# Patient Record
Sex: FEMALE | Race: WHITE | NOT HISPANIC OR LATINO | Employment: OTHER | URBAN - METROPOLITAN AREA
[De-identification: names, ages, dates, MRNs, and addresses within clinical notes are randomized per-mention and may not be internally consistent; named-entity substitution may affect disease eponyms.]

---

## 2017-01-24 ENCOUNTER — RESOLUTE PROFESSIONAL BILLING HOSPITAL PROF FEE (OUTPATIENT)
Dept: CARDIOLOGY | Facility: MEDICAL CENTER | Age: 67
End: 2017-01-24
Payer: MEDICARE

## 2017-01-24 ENCOUNTER — APPOINTMENT (OUTPATIENT)
Dept: RADIOLOGY | Facility: MEDICAL CENTER | Age: 67
DRG: 216 | End: 2017-01-24
Attending: EMERGENCY MEDICINE
Payer: MEDICARE

## 2017-01-24 ENCOUNTER — HOSPITAL ENCOUNTER (INPATIENT)
Facility: MEDICAL CENTER | Age: 67
LOS: 16 days | DRG: 216 | End: 2017-02-09
Attending: EMERGENCY MEDICINE | Admitting: INTERNAL MEDICINE
Payer: MEDICARE

## 2017-01-24 ENCOUNTER — HOSPITAL ENCOUNTER (OUTPATIENT)
Dept: RADIOLOGY | Facility: MEDICAL CENTER | Age: 67
End: 2017-01-24

## 2017-01-24 DIAGNOSIS — I34.0 MITRAL VALVE INSUFFICIENCY, UNSPECIFIED ETIOLOGY: ICD-10-CM

## 2017-01-24 PROBLEM — J81.1 PULMONARY EDEMA: Status: ACTIVE | Noted: 2017-01-24

## 2017-01-24 LAB
ALBUMIN SERPL BCP-MCNC: 3.9 G/DL (ref 3.2–4.9)
ALBUMIN/GLOB SERPL: 1.4 G/DL
ALP SERPL-CCNC: 95 U/L (ref 30–99)
ALT SERPL-CCNC: 16 U/L (ref 2–50)
ANION GAP SERPL CALC-SCNC: 9 MMOL/L (ref 0–11.9)
APPEARANCE UR: CLEAR
APTT PPP: 25.6 SEC (ref 24.7–36)
AST SERPL-CCNC: 20 U/L (ref 12–45)
BACTERIA #/AREA URNS HPF: ABNORMAL /HPF
BASE EXCESS BLDA CALC-SCNC: -7 MMOL/L (ref -4–3)
BASE EXCESS BLDA CALC-SCNC: -8 MMOL/L (ref -4–3)
BASOPHILS # BLD AUTO: 0.3 % (ref 0–1.8)
BASOPHILS # BLD: 0.05 K/UL (ref 0–0.12)
BILIRUB SERPL-MCNC: 0.6 MG/DL (ref 0.1–1.5)
BILIRUB UR QL STRIP.AUTO: NEGATIVE
BNP SERPL-MCNC: 309 PG/ML (ref 0–100)
BODY TEMPERATURE: ABNORMAL DEGREES
BODY TEMPERATURE: ABNORMAL DEGREES
BUN SERPL-MCNC: 19 MG/DL (ref 8–22)
CALCIUM SERPL-MCNC: 8.3 MG/DL (ref 8.5–10.5)
CHLORIDE SERPL-SCNC: 99 MMOL/L (ref 96–112)
CO2 BLDA-SCNC: 19 MMOL/L (ref 20–33)
CO2 BLDA-SCNC: 22 MMOL/L (ref 20–33)
CO2 SERPL-SCNC: 21 MMOL/L (ref 20–33)
COLOR UR: ABNORMAL
CREAT SERPL-MCNC: 0.98 MG/DL (ref 0.5–1.4)
DEPRECATED D DIMER PPP IA-ACNC: 1543 NG/ML(D-DU)
EOSINOPHIL # BLD AUTO: 0.02 K/UL (ref 0–0.51)
EOSINOPHIL NFR BLD: 0.1 % (ref 0–6.9)
EPI CELLS #/AREA URNS HPF: ABNORMAL /HPF
ERYTHROCYTE [DISTWIDTH] IN BLOOD BY AUTOMATED COUNT: 48 FL (ref 35.9–50)
EST. AVERAGE GLUCOSE BLD GHB EST-MCNC: 111 MG/DL
FLUAV H1 2009 PAND RNA SPEC QL NAA+PROBE: NOT DETECTED
FLUAV RNA SPEC QL NAA+PROBE: NEGATIVE
FLUAV+FLUBV AG SPEC QL IA: NORMAL
FLUBV RNA SPEC QL NAA+PROBE: NEGATIVE
GFR SERPL CREATININE-BSD FRML MDRD: 56 ML/MIN/1.73 M 2
GLOBULIN SER CALC-MCNC: 2.7 G/DL (ref 1.9–3.5)
GLUCOSE SERPL-MCNC: 178 MG/DL (ref 65–99)
GLUCOSE UR STRIP.AUTO-MCNC: NEGATIVE MG/DL
HBA1C MFR BLD: 5.5 % (ref 0–5.6)
HCO3 BLDA-SCNC: 18.3 MMOL/L (ref 17–25)
HCO3 BLDA-SCNC: 20.4 MMOL/L (ref 17–25)
HCT VFR BLD AUTO: 45.4 % (ref 37–47)
HGB BLD-MCNC: 14.9 G/DL (ref 12–16)
HYALINE CASTS #/AREA URNS LPF: ABNORMAL /LPF
IMM GRANULOCYTES # BLD AUTO: 0.07 K/UL (ref 0–0.11)
IMM GRANULOCYTES NFR BLD AUTO: 0.5 % (ref 0–0.9)
INR PPP: 0.96 (ref 0.87–1.13)
KETONES UR STRIP.AUTO-MCNC: NEGATIVE MG/DL
LACTATE BLD-SCNC: 1.6 MMOL/L (ref 0.5–2)
LEUKOCYTE ESTERASE UR QL STRIP.AUTO: NEGATIVE
LV EJECT FRACT  99904: 75
LV EJECT FRACT MOD 2C 99903: 83.14
LV EJECT FRACT MOD 4C 99902: 76.68
LV EJECT FRACT MOD BP 99901: 80.65
LYMPHOCYTES # BLD AUTO: 0.89 K/UL (ref 1–4.8)
LYMPHOCYTES NFR BLD: 6 % (ref 22–41)
MAGNESIUM SERPL-MCNC: 1.9 MG/DL (ref 1.5–2.5)
MCH RBC QN AUTO: 31.4 PG (ref 27–33)
MCHC RBC AUTO-ENTMCNC: 32.8 G/DL (ref 33.6–35)
MCV RBC AUTO: 95.8 FL (ref 81.4–97.8)
MICRO URNS: ABNORMAL
MONOCYTES # BLD AUTO: 0.83 K/UL (ref 0–0.85)
MONOCYTES NFR BLD AUTO: 5.6 % (ref 0–13.4)
NEUTROPHILS # BLD AUTO: 12.86 K/UL (ref 2–7.15)
NEUTROPHILS NFR BLD: 87.5 % (ref 44–72)
NITRITE UR QL STRIP.AUTO: NEGATIVE
NRBC # BLD AUTO: 0 K/UL
NRBC BLD AUTO-RTO: 0 /100 WBC
O2/TOTAL GAS SETTING VFR VENT: 100 %
O2/TOTAL GAS SETTING VFR VENT: 100 %
PCO2 BLDA: 38.6 MMHG (ref 26–37)
PCO2 BLDA: 48 MMHG (ref 26–37)
PCO2 TEMP ADJ BLDA: 38.6 MMHG (ref 26–37)
PCO2 TEMP ADJ BLDA: 48 MMHG (ref 26–37)
PH BLDA: 7.24 [PH] (ref 7.4–7.5)
PH BLDA: 7.28 [PH] (ref 7.4–7.5)
PH TEMP ADJ BLDA: 7.24 [PH] (ref 7.4–7.5)
PH TEMP ADJ BLDA: 7.28 [PH] (ref 7.4–7.5)
PH UR STRIP.AUTO: 5 [PH]
PHOSPHATE SERPL-MCNC: 4.6 MG/DL (ref 2.5–4.5)
PLATELET # BLD AUTO: 222 K/UL (ref 164–446)
PMV BLD AUTO: 11.1 FL (ref 9–12.9)
PO2 BLDA: 62 MMHG (ref 64–87)
PO2 BLDA: 79 MMHG (ref 64–87)
PO2 TEMP ADJ BLDA: 62 MMHG (ref 64–87)
PO2 TEMP ADJ BLDA: 79 MMHG (ref 64–87)
POTASSIUM SERPL-SCNC: 3.8 MMOL/L (ref 3.6–5.5)
PROT SERPL-MCNC: 6.6 G/DL (ref 6–8.2)
PROT UR QL STRIP: NEGATIVE MG/DL
PROTHROMBIN TIME: 13.1 SEC (ref 12–14.6)
RBC # BLD AUTO: 4.74 M/UL (ref 4.2–5.4)
RBC # URNS HPF: ABNORMAL /HPF
RBC UR QL AUTO: ABNORMAL
SAO2 % BLDA: 87 % (ref 93–99)
SAO2 % BLDA: 94 % (ref 93–99)
SIGNIFICANT IND 70042: NORMAL
SITE SITE: NORMAL
SODIUM SERPL-SCNC: 129 MMOL/L (ref 135–145)
SOURCE SOURCE: NORMAL
SP GR UR STRIP.AUTO: 1
SPECIMEN DRAWN FROM PATIENT: ABNORMAL
SPECIMEN DRAWN FROM PATIENT: ABNORMAL
TROPONIN I SERPL-MCNC: 1.79 NG/ML (ref 0–0.04)
TSH SERPL DL<=0.005 MIU/L-ACNC: 3.22 UIU/ML (ref 0.3–3.7)
WBC # BLD AUTO: 14.7 K/UL (ref 4.8–10.8)
WBC #/AREA URNS HPF: ABNORMAL /HPF

## 2017-01-24 PROCEDURE — 81001 URINALYSIS AUTO W/SCOPE: CPT

## 2017-01-24 PROCEDURE — 80053 COMPREHEN METABOLIC PANEL: CPT

## 2017-01-24 PROCEDURE — 83605 ASSAY OF LACTIC ACID: CPT

## 2017-01-24 PROCEDURE — 84484 ASSAY OF TROPONIN QUANT: CPT

## 2017-01-24 PROCEDURE — 87040 BLOOD CULTURE FOR BACTERIA: CPT | Mod: 91

## 2017-01-24 PROCEDURE — 700111 HCHG RX REV CODE 636 W/ 250 OVERRIDE (IP): Performed by: INTERNAL MEDICINE

## 2017-01-24 PROCEDURE — 83880 ASSAY OF NATRIURETIC PEPTIDE: CPT

## 2017-01-24 PROCEDURE — 94002 VENT MGMT INPAT INIT DAY: CPT

## 2017-01-24 PROCEDURE — 93306 TTE W/DOPPLER COMPLETE: CPT | Mod: 26 | Performed by: INTERNAL MEDICINE

## 2017-01-24 PROCEDURE — 02HV33Z INSERTION OF INFUSION DEVICE INTO SUPERIOR VENA CAVA, PERCUTANEOUS APPROACH: ICD-10-PCS | Performed by: EMERGENCY MEDICINE

## 2017-01-24 PROCEDURE — 83036 HEMOGLOBIN GLYCOSYLATED A1C: CPT

## 2017-01-24 PROCEDURE — 700101 HCHG RX REV CODE 250: Performed by: EMERGENCY MEDICINE

## 2017-01-24 PROCEDURE — 93005 ELECTROCARDIOGRAM TRACING: CPT | Performed by: EMERGENCY MEDICINE

## 2017-01-24 PROCEDURE — 96366 THER/PROPH/DIAG IV INF ADDON: CPT

## 2017-01-24 PROCEDURE — 5A1945Z RESPIRATORY VENTILATION, 24-96 CONSECUTIVE HOURS: ICD-10-PCS | Performed by: EMERGENCY MEDICINE

## 2017-01-24 PROCEDURE — 85610 PROTHROMBIN TIME: CPT

## 2017-01-24 PROCEDURE — 85730 THROMBOPLASTIN TIME PARTIAL: CPT

## 2017-01-24 PROCEDURE — 87503 INFLUENZA DNA AMP PROB ADDL: CPT

## 2017-01-24 PROCEDURE — 770022 HCHG ROOM/CARE - ICU (200)

## 2017-01-24 PROCEDURE — 87086 URINE CULTURE/COLONY COUNT: CPT

## 2017-01-24 PROCEDURE — 700111 HCHG RX REV CODE 636 W/ 250 OVERRIDE (IP): Performed by: STUDENT IN AN ORGANIZED HEALTH CARE EDUCATION/TRAINING PROGRAM

## 2017-01-24 PROCEDURE — 36556 INSERT NON-TUNNEL CV CATH: CPT

## 2017-01-24 PROCEDURE — 99291 CRITICAL CARE FIRST HOUR: CPT | Mod: GC | Performed by: INTERNAL MEDICINE

## 2017-01-24 PROCEDURE — 71010 DX-CHEST-PORTABLE (1 VIEW): CPT

## 2017-01-24 PROCEDURE — 83735 ASSAY OF MAGNESIUM: CPT

## 2017-01-24 PROCEDURE — 700111 HCHG RX REV CODE 636 W/ 250 OVERRIDE (IP): Performed by: EMERGENCY MEDICINE

## 2017-01-24 PROCEDURE — 3E043XZ INTRODUCTION OF VASOPRESSOR INTO CENTRAL VEIN, PERCUTANEOUS APPROACH: ICD-10-PCS | Performed by: EMERGENCY MEDICINE

## 2017-01-24 PROCEDURE — 85379 FIBRIN DEGRADATION QUANT: CPT

## 2017-01-24 PROCEDURE — 99223 1ST HOSP IP/OBS HIGH 75: CPT | Performed by: INTERNAL MEDICINE

## 2017-01-24 PROCEDURE — 82803 BLOOD GASES ANY COMBINATION: CPT

## 2017-01-24 PROCEDURE — 93306 TTE W/DOPPLER COMPLETE: CPT

## 2017-01-24 PROCEDURE — C1751 CATH, INF, PER/CENT/MIDLINE: HCPCS

## 2017-01-24 PROCEDURE — 84443 ASSAY THYROID STIM HORMONE: CPT

## 2017-01-24 PROCEDURE — 85025 COMPLETE CBC W/AUTO DIFF WBC: CPT

## 2017-01-24 PROCEDURE — 99291 CRITICAL CARE FIRST HOUR: CPT

## 2017-01-24 PROCEDURE — 96367 TX/PROPH/DG ADDL SEQ IV INF: CPT

## 2017-01-24 PROCEDURE — 84100 ASSAY OF PHOSPHORUS: CPT

## 2017-01-24 PROCEDURE — 96375 TX/PRO/DX INJ NEW DRUG ADDON: CPT

## 2017-01-24 PROCEDURE — 87400 INFLUENZA A/B EACH AG IA: CPT

## 2017-01-24 PROCEDURE — 96365 THER/PROPH/DIAG IV INF INIT: CPT

## 2017-01-24 PROCEDURE — 700105 HCHG RX REV CODE 258: Performed by: EMERGENCY MEDICINE

## 2017-01-24 PROCEDURE — 96368 THER/DIAG CONCURRENT INF: CPT

## 2017-01-24 PROCEDURE — 87502 INFLUENZA DNA AMP PROBE: CPT

## 2017-01-24 PROCEDURE — 36600 WITHDRAWAL OF ARTERIAL BLOOD: CPT

## 2017-01-24 PROCEDURE — C1751 CATH, INF, PER/CENT/MIDLINE: HCPCS | Performed by: EMERGENCY MEDICINE

## 2017-01-24 PROCEDURE — 36415 COLL VENOUS BLD VENIPUNCTURE: CPT

## 2017-01-24 RX ORDER — POTASSIUM CHLORIDE 7.45 MG/ML
10 INJECTION INTRAVENOUS
Status: COMPLETED | OUTPATIENT
Start: 2017-01-24 | End: 2017-01-25

## 2017-01-24 RX ORDER — BISACODYL 10 MG
10 SUPPOSITORY, RECTAL RECTAL
Status: DISCONTINUED | OUTPATIENT
Start: 2017-01-24 | End: 2017-01-31

## 2017-01-24 RX ORDER — POTASSIUM CHLORIDE 14.9 MG/ML
20 INJECTION INTRAVENOUS ONCE
Status: DISCONTINUED | OUTPATIENT
Start: 2017-01-24 | End: 2017-01-24

## 2017-01-24 RX ORDER — HEPARIN SODIUM 5000 [USP'U]/ML
5000 INJECTION, SOLUTION INTRAVENOUS; SUBCUTANEOUS EVERY 8 HOURS
Status: DISCONTINUED | OUTPATIENT
Start: 2017-01-25 | End: 2017-01-24

## 2017-01-24 RX ORDER — NITROGLYCERIN 20 MG/100ML
5-100 INJECTION INTRAVENOUS CONTINUOUS
Status: DISCONTINUED | OUTPATIENT
Start: 2017-01-24 | End: 2017-01-24

## 2017-01-24 RX ORDER — LIDOCAINE HYDROCHLORIDE 10 MG/ML
1-2 INJECTION, SOLUTION INFILTRATION; PERINEURAL
Status: DISCONTINUED | OUTPATIENT
Start: 2017-01-24 | End: 2017-02-01

## 2017-01-24 RX ORDER — NOREPINEPHRINE BITARTRATE 1 MG/ML
INJECTION, SOLUTION INTRAVENOUS
Status: COMPLETED
Start: 2017-01-24 | End: 2017-01-24

## 2017-01-24 RX ORDER — LACTULOSE 20 G/30ML
30 SOLUTION ORAL
Status: DISCONTINUED | OUTPATIENT
Start: 2017-01-24 | End: 2017-02-09 | Stop reason: HOSPADM

## 2017-01-24 RX ORDER — IPRATROPIUM BROMIDE AND ALBUTEROL SULFATE 2.5; .5 MG/3ML; MG/3ML
3 SOLUTION RESPIRATORY (INHALATION)
Status: DISCONTINUED | OUTPATIENT
Start: 2017-01-24 | End: 2017-02-09 | Stop reason: HOSPADM

## 2017-01-24 RX ORDER — PRAVASTATIN SODIUM 20 MG
20 TABLET ORAL DAILY
Status: ON HOLD | COMMUNITY
End: 2017-02-18

## 2017-01-24 RX ORDER — FAMOTIDINE 20 MG/1
20 TABLET, FILM COATED ORAL EVERY 12 HOURS
Status: DISCONTINUED | OUTPATIENT
Start: 2017-01-24 | End: 2017-02-09 | Stop reason: HOSPADM

## 2017-01-24 RX ORDER — CHLORHEXIDINE GLUCONATE ORAL RINSE 1.2 MG/ML
15 SOLUTION DENTAL 2 TIMES DAILY
Status: DISCONTINUED | OUTPATIENT
Start: 2017-01-24 | End: 2017-02-01

## 2017-01-24 RX ORDER — ENEMA 19; 7 G/133ML; G/133ML
1 ENEMA RECTAL
Status: DISCONTINUED | OUTPATIENT
Start: 2017-01-24 | End: 2017-01-31

## 2017-01-24 RX ORDER — DEXTROSE MONOHYDRATE 25 G/50ML
25 INJECTION, SOLUTION INTRAVENOUS
Status: DISCONTINUED | OUTPATIENT
Start: 2017-01-24 | End: 2017-02-09 | Stop reason: HOSPADM

## 2017-01-24 RX ORDER — MAGNESIUM SULFATE HEPTAHYDRATE 40 MG/ML
2 INJECTION, SOLUTION INTRAVENOUS ONCE
Status: COMPLETED | OUTPATIENT
Start: 2017-01-24 | End: 2017-01-25

## 2017-01-24 RX ORDER — PRAVASTATIN SODIUM 20 MG
20 TABLET ORAL EVERY EVENING
Status: DISCONTINUED | OUTPATIENT
Start: 2017-01-24 | End: 2017-02-09 | Stop reason: HOSPADM

## 2017-01-24 RX ADMIN — FAMOTIDINE 20 MG: 10 INJECTION INTRAVENOUS at 23:23

## 2017-01-24 RX ADMIN — PROPOFOL 30 MCG/KG/MIN: 10 INJECTION, EMULSION INTRAVENOUS at 20:44

## 2017-01-24 RX ADMIN — MAGNESIUM SULFATE IN WATER 2 G: 40 INJECTION, SOLUTION INTRAVENOUS at 23:26

## 2017-01-24 RX ADMIN — POTASSIUM CHLORIDE 10 MEQ: 7.46 INJECTION, SOLUTION INTRAVENOUS at 23:26

## 2017-01-24 RX ADMIN — NITROGLYCERIN 10 MCG/MIN: 20 INJECTION INTRAVENOUS at 20:44

## 2017-01-24 RX ADMIN — NOREPINEPHRINE BITARTRATE 15 MCG/MIN: 1 INJECTION INTRAVENOUS at 21:15

## 2017-01-24 ASSESSMENT — LIFESTYLE VARIABLES: DO YOU DRINK ALCOHOL: NO

## 2017-01-24 ASSESSMENT — PAIN SCALES - GENERAL: PAINLEVEL_OUTOF10: ASSUMED PAIN PRESENT

## 2017-01-24 NOTE — IP AVS SNAPSHOT
" <p align=\"LEFT\"><IMG SRC=\"//[email or web address]\" alt=\"Image\" WIDTH=\"50%\" HEIGHT=\"200\" BORDER=\"\"></p>                   Name:Kristen Salazar  Medical Record Number:[id number]  CSN: [id number]    YOB: 1950   Age: 66 y.o.  Sex: female  HT:1.702 m (5' 7.01\") WT: 58 kg (127 lb 13.9 oz)          Admit Date: 1/24/2017     Discharge Date:   Today's Date: 2/9/2017  Attending Doctor:  Priyanka Atkins, *                  Allergies:  Codeine and Latex          Follow-up Information     1. Follow up with BRODERICK Rankin On 2/13/2017.    Specialty:  Family Medicine    Why:  10:20 am    Contact information    [address]  [address]  [facility] [id number] [phone]          2. Follow up with Chris Schmitt M.D. On 3/6/2017.    Specialty:  Cardiac Surgery    Why:  12:45 pm    Contact information    [address]  [address]  [place] [address]  [phone]           Medication List      Take these Medications        Instructions    artificial tears 1.4 % Soln    Place 1 Drop in both eyes as needed (discomfort/dry eyes).   Dose:  1 Drop       aspirin 81 MG EC tablet    Take 1 Tab by mouth every day.   Dose:  81 mg       famotidine 20 MG Tabs   Commonly known as:  PEPCID    Take 1 Tab by mouth every 12 hours.   Dose:  20 mg       furosemide 10 MG/ML Soln   Commonly known as:  LASIX    4 mL by Intravenous route every day.   Dose:  40 mg       glucosamine Sulfate 500 MG Caps    Take 500 mg by mouth 3 times a day, with meals.   Dose:  500 mg       ipratropium-albuterol 0.5-2.5 (3) MG/3ML nebulizer solution   Commonly known as:  DUONEB    3 mL by Nebulization route every four hours as needed for Shortness of Breath.   Dose:  3 mL       multivitamin Tabs    Take 1 Tab by mouth every day.   Dose:  1 Tab       oyster shell calcium/vitamin D 250-125 MG-UNIT Tabs tablet    Take 1 Tab by mouth every day.   Dose:  1 Tab       potassium chloride SA 10 MEQ Tbcr   Commonly known as:  K-DUR    Take 2 Tabs by " mouth every day.   Dose:  20 mEq       pravastatin 20 MG Tabs   Commonly known as:  PRAVACHOL    Take 20 mg by mouth every day.   Dose:  20 mg       sodium chloride 0.65 % Soln   Commonly known as:  OCEAN    Spray 2 Sprays in nose as needed.   Dose:  2 Spray       tramadol 50 MG Tabs   Commonly known as:  ULTRAM    Take 1 Tab by mouth every four hours as needed (Moderate Pain (NRS Pain Scale 4-6; CPOT Pain Scale 3-5) if opiates not ordered or tolerated).   Dose:  50 mg       * warfarin 5 MG Tabs   Commonly known as:  COUMADIN    Take 1 Tab by mouth COUMADIN-DAILY.   Dose:  5 mg       * warfarin 7.5 MG Tabs   Commonly known as:  COUMADIN    Take 1 Tab by mouth COUMADIN-DAILY.   Dose:  7.5 mg       * Notice:  This list has 2 medication(s) that are the same as other medications prescribed for you. Read the directions carefully, and ask your doctor or other care provider to review them with you.

## 2017-01-24 NOTE — IP AVS SNAPSHOT
2/9/2017          Kristen Salazar  [address]  [facility] [address]    Dear Kristen:    [facility] wants to ensure your discharge home is safe and you or your loved ones have had all your questions answered regarding your care after you leave the hospital.    You may receive a telephone call within two days of your discharge.  This call is to make certain you understand your discharge instructions as well as ensure we provided you with the best care possible during your stay with us.     The call will only last approximately 3-5 minutes and will be done by a nurse.    Once again, we want to ensure your discharge home is safe and that you have a clear understanding of any next steps in your care.  If you have any questions or concerns, please do not hesitate to contact us, we are here for you.  Thank you for choosing [facility] for your healthcare needs.    Sincerely,    Marquis Harrell    [facility]

## 2017-01-24 NOTE — IP AVS SNAPSHOT
" Home Care Instructions                                                                                                                  Name:Kristen Salazar  Medical Record Number:[id number]  CSN: [id number]    YOB: 1950   Age: 66 y.o.  Sex: female  HT:1.702 m (5' 7.01\") WT: 58 kg (127 lb 13.9 oz)          Admit Date: 1/24/2017     Discharge Date:   Today's Date: 2/9/2017  Attending Doctor:  Priyanka Atkins, *                  Allergies:  Codeine and Latex            Discharge Instructions       Discharge Instructions    Discharged to group home by medical transportation with escort. Discharged via wheelchair, hospital escort: Yes.  Special equipment needed: Oxygen    Be sure to schedule a follow-up appointment with your primary care doctor or any specialists as instructed.     Discharge Plan:   Influenza Vaccine Indication: Indicated: 65 years and older    I understand that a diet low in cholesterol, fat, and sodium is recommended for good health. Unless I have been given specific instructions below for another diet, I accept this instruction as my diet prescription.   Other diet: cardiac    Special Instructions: heart surgery and coumadin    Cardiac Surgery Discharge Instructions/[facility]    Activity:  1. NO driving for 4 weeks after surgery. You may ride as a passenger.  2. NO lifting of any item over 10 lbs (e.g. gallon of milk) for 6 weeks after surgery.  Do not raise both arms above head, only one at a time.  Do not push or pull with your arms.  3. Walk as much as possible! Walk a minimum of 4 times per day. Depending on your fatigue and comfort level, you may walk as much as you wish. There is no maximum.  4. Other physical activities (sex, housework, gardening, etc.) are OK after 4 weeks or after 8 weeks if you want to golf (start by putting, then advance to chipping, then to driving).  5. Continue using incentive spirometer for 2 weeks.  If you are going home on oxygen and " you were not on oxygen prior to surgery, keep using until you are oxygen free.  6. Weigh daily and write it down starting  the next morning after you arrive home. Call your doctor for a weight gain of 2-4 lbs in 1-2 days.    Incision Care:  1. SHOWER EVERYDAY-no baths. Make sure to clean your incision(s) twice daily with plain, perfume and dye-free soap (if you shower in the morning, stand at the sink at bedtime and clean incision with soap and water). Then pat incision(s) dry with clean towel. Avoid creams or lotions on the incision(s).    2. If there is any increase in redness or swelling, or separation of the incision line, or thick drainage* from any of the incisions, call [facility] ([phone]). * Clear, thin drainage is not abnormal especially from the leg incision and/or chest tube sites.                    3. Continue to wear your LC Stockings for 4 weeks on the leg(s) with the incision(s) or swelling. You may take off the stocking(s) when in bed or when the leg(s) are elevated.    General Instructions:  1. If you are on the blood thinner Coumadin (warfarin), you will need your coumadin levels checked periodically.  2. You have been referred to Cardiac Rehab which is highly recommended for you after heart surgery. You can start Cardiac Rehab 30 days after surgery.  If you do not have an appointment at the time of discharge call [phone] to schedule an appointment.  3. Your Primary Care Doctor typically handles Home Oxygen. The Home Oxygen service that drops off your tanks should be checking your oxygen saturation levels. Oxygen may be stopped when you are > 90 % saturated on room air. Check with your Primary Care Doctor if you are unsure.    Prescription Refills/Questions:   Call your usual Pharmacy for ALL refills   1. Pain medication questions only: Call [facility] at [phone].  (Remember that refills may require additional physician approval and will need at least 24 hours’  notice. Please call for refills on Mondays through Fridays from 9 am to 4 pm).  2. For all other medications (except pain medication): Call your Cardiology Group or Primary Care Doctor (not [facility]) for refills or questions.    [facility] IS ALWAYS AVAILABLE TO ANSWER YOUR QUESTIONS. DO NOT HESITATE TO CALL!    · Is patient discharged on Warfarin / Coumadin?   Yes    You are receiving the drug warfarin. Please understand the importance of monitoring warfarin with scheduled PT/INR blood draws.    IMPORTANT: HOW TO USE THIS INFORMATION:  This is a summary and does NOT have all possible information about this product. This information does not assure that this product is safe, effective, or appropriate for you. This information is not individual medical advice and does not substitute for the advice of your health care professional. Always ask your health care professional for complete information about this product and your specific health needs.      WARFARIN - ORAL (WARF-uh-rin)      COMMON BRAND NAME(S): Coumadin      WARNING:  Warfarin can cause very serious (possibly fatal) bleeding. This is more likely to occur when you first start taking this medication or if you take too much warfarin. To decrease your risk for bleeding, your doctor or other health care provider will monitor you closely and check your lab results (INR test) to make sure you are not taking too much warfarin. Keep all medical and laboratory appointments. Tell your doctor right away if you notice any signs of serious bleeding. See also Side Effects section.      USES:  This medication is used to treat blood clots (such as in deep vein thrombosis-DVT or pulmonary embolus-PE) and/or to prevent new clots from forming in your body. Preventing harmful blood clots helps to reduce the risk of a stroke or heart attack. Conditions that increase your risk of developing blood clots include a certain type of irregular heart rhythm  "(atrial fibrillation), heart valve replacement, recent heart attack, and certain surgeries (such as hip/knee replacement). Warfarin is commonly called a \"blood thinner,\" but the more correct term is \"anticoagulant.\" It helps to keep blood flowing smoothly in your body by decreasing the amount of certain substances (clotting proteins) in your blood.      HOW TO USE:  Read the Medication Guide provided by your pharmacist before you start taking warfarin and each time you get a refill. If you have any questions, ask your doctor or pharmacist. Take this medication by mouth with or without food as directed by your doctor or other health care professional, usually once a day. It is very important to take it exactly as directed. Do not increase the dose, take it more frequently, or stop using it unless directed by your doctor. Dosage is based on your medical condition, laboratory tests (such as INR), and response to treatment. Your doctor or other health care provider will monitor you closely while you are taking this medication to determine the right dose for you. Use this medication regularly to get the most benefit from it. To help you remember, take it at the same time each day. It is important to eat a balanced, consistent diet while taking warfarin. Some foods can affect how warfarin works in your body and may affect your treatment and dose. Avoid sudden large increases or decreases in your intake of foods high in vitamin K (such as broccoli, cauliflower, cabbage, brussels sprouts, kale, spinach, and other green leafy vegetables, liver, green tea, certain vitamin supplements). If you are trying to lose weight, check with your doctor before you try to go on a diet. Cranberry products may also affect how your warfarin works. Limit the amount of cranberry juice (16 ounces/480 milliliters a day) or other cranberry products you may drink or eat.      SIDE EFFECTS:  Nausea, loss of appetite, or stomach/abdominal pain may " occur. If any of these effects persist or worsen, tell your doctor or pharmacist promptly. Remember that your doctor has prescribed this medication because he or she has judged that the benefit to you is greater than the risk of side effects. Many people using this medication do not have serious side effects. This medication can cause serious bleeding if it affects your blood clotting proteins too much (shown by unusually high INR lab results). Even if your doctor stops your medication, this risk of bleeding can continue for up to a week. Tell your doctor right away if you have any signs of serious bleeding, including: unusual pain/swelling/discomfort, unusual/easy bruising, prolonged bleeding from cuts or gums, persistent/frequent nosebleeds, unusually heavy/prolonged menstrual flow, pink/dark urine, coughing up blood, vomit that is bloody or looks like coffee grounds, severe headache, dizziness/fainting, unusual or persistent tiredness/weakness, bloody/black/tarry stools, chest pain, shortness of breath, difficulty swallowing. Tell your doctor right away if any of these unlikely but serious side effects occur: persistent nausea/vomiting, severe stomach/abdominal pain, yellowing eyes/skin. This drug rarely has caused very serious (possibly fatal) problems if its effects lead to small blood clots (usually at the beginning of treatment). This can lead to severe skin/tissue damage that may require surgery or amputation if left untreated. Patients with certain blood conditions (protein C or S deficiency) may be at greater risk. Get medical help right away if any of these rare but serious side effects occur: painful/red/purplish patches on the skin (such as on the toe, breast, abdomen), change in the amount of urine, vision changes, confusion, slurred speech, weakness on one side of the body. A very serious allergic reaction to this drug is rare. However, get medical help right away if you notice any symptoms of a  serious allergic reaction, including: rash, itching/swelling (especially of the face/tongue/throat), severe dizziness, trouble breathing. This is not a complete list of possible side effects. If you notice other effects not listed above, contact your doctor or pharmacist. In the [place] - Call your doctor for medical advice about side effects. You may report side effects to FDA at 1-733-EIP-9286. In [place] - Call your doctor for medical advice about side effects. You may report side effects to [facility] [place] at 1-969.385.7072.      PRECAUTIONS:  Before taking warfarin, tell your doctor or pharmacist if you are allergic to it; or if you have any other allergies. This product may contain inactive ingredients, which can cause allergic reactions or other problems. Talk to your pharmacist for more details. Before using this medication, tell your doctor or pharmacist your medical history, especially of: blood disorders (such as anemia, hemophilia), bleeding problems (such as bleeding of the stomach/intestines, bleeding in the brain), blood vessel disorders (such as aneurysms), recent major injury/surgery, liver disease, alcohol use, mental/mood disorders (including memory problems), frequent falls/injuries. It is important that all your doctors and dentists know that you take warfarin. Before having surgery or any medical/dental procedures, tell your doctor or dentist that you are taking this medication and about all the products you use (including prescription drugs, nonprescription drugs, and herbal products). Avoid getting injections into the muscles. If you must have an injection into a muscle (for example, a flu shot), it should be given in the arm. This way, it will be easier to check for bleeding and/or apply pressure bandages. This medication may cause stomach bleeding. Daily use of alcohol while using this medicine will increase your risk for stomach bleeding and may also affect how this medication works. Limit or  avoid alcoholic beverages. If you have not been eating well, if you have an illness or infection that causes fever, vomiting, or diarrhea for more than 2 days, or if you start using any antibiotic medications, contact your doctor or pharmacist immediately because these conditions can affect how warfarin works. This medication can cause heavy bleeding. To lower the chance of getting cut, bruised, or injured, use great caution with sharp objects like safety razors and nail cutters. Use an electric razor when shaving and a soft toothbrush when brushing your teeth. Avoid activities such as contact sports. If you fall or injure yourself, especially if you hit your head, call your doctor immediately. Your doctor may need to check you. The Food & Drug Administration has stated that generic warfarin products are interchangeable. However, consult your doctor or pharmacist before switching warfarin products. Be careful not to take more than one medication that contains warfarin unless specifically directed by the doctor or health care provider who is monitoring your warfarin treatment. Older adults may be at greater risk for bleeding while using this drug. This medication is not recommended for use during pregnancy because of serious (possibly fatal) harm to an unborn baby. Discuss the use of reliable forms of birth control with your doctor. If you become pregnant or think you may be pregnant, tell your doctor immediately. If you are planning pregnancy, discuss a plan for managing your condition with your doctor before you become pregnant. Your doctor may switch the type of medication you use during pregnancy. Very small amounts of this medication may pass into breast milk but is unlikely to harm a nursing infant. Consult your doctor before breast-feeding.      DRUG INTERACTIONS:  Drug interactions may change how your medications work or increase your risk for serious side effects. This document does not contain all possible  "drug interactions. Keep a list of all the products you use (including prescription/nonprescription drugs and herbal products) and share it with your doctor and pharmacist. Do not start, stop, or change the dosage of any medicines without your doctor's approval. Warfarin interacts with many prescription, nonprescription, vitamin, and herbal products. This includes medications that are applied to the skin or inside the vagina or rectum. The interactions with warfarin usually result in an increase or decrease in the \"blood-thinning\" (anticoagulant) effect. Your doctor or other health care professional should closely monitor you to prevent serious bleeding or clotting problems. While taking warfarin, it is very important to tell your doctor or pharmacist of any changes in medications, vitamins, or herbal products that you are taking. Some products that may interact with this drug include: capecitabine, imatinib, mifepristone. Aspirin, aspirin-like drugs (salicylates), and nonsteroidal anti-inflammatory drugs (NSAIDs such as ibuprofen, naproxen, celecoxib) may have effects similar to warfarin. These drugs may increase the risk of bleeding problems if taken during treatment with warfarin. Carefully check all prescription/nonprescription product labels (including drugs applied to the skin such as pain-relieving creams) since the products may contain NSAIDs or salicylates. Talk to your doctor about using a different medication (such as acetaminophen) to treat pain/fever. Low-dose aspirin and related drugs (such as clopidogrel, ticlopidine) should be continued if prescribed by your doctor for specific medical reasons such as heart attack or stroke prevention. Consult your doctor or pharmacist for more details. Many herbal products interact with warfarin. Tell your doctor before taking any herbal products, especially bromelains, coenzyme Q10, cranberry, danshen, dong quai, fenugreek, garlic, ginkgo biloba, ginseng, and St. " Saud's wort, among others. This medication may interfere with a certain laboratory test to measure theophylline levels, possibly causing false test results. Make sure laboratory personnel and all your doctors know you use this drug.      OVERDOSE:  If overdose is suspected, contact a poison control center or emergency room immediately. [place] residents can call the  National Poison Hotline at 1-137.570.9665. [place] residents can call a provincial poison control center. Symptoms of overdose may include: bloody/black/tarry stools, pink/dark urine, unusual/prolonged bleeding.      NOTES:  Do not share this medication with others. Laboratory and/or medical tests (such as INR, complete blood count) must be performed periodically to monitor your progress or check for side effects. Consult your doctor for more details.      MISSED DOSE:  For the best possible benefit, do not miss any doses. If you do miss a dose and remember on the same day, take it as soon as you remember. If you remember on the next day, skip the missed dose and resume your usual dosing schedule. Do not double the dose to catch up because this could increase your risk for bleeding. Keep a record of missed doses to give to your doctor or pharmacist. Contact your doctor or pharmacist if you miss 2 or more doses in a row.      STORAGE:  Store at room temperature away from light and moisture. Do not store in the bathroom. Keep all medications away from children and pets. Do not flush medications down the toilet or pour them into a drain unless instructed to do so. Properly discard this product when it is  or no longer needed. Consult your pharmacist or local waste disposal company for more details about how to safely discard your product.      MEDICAL ALERT:  Your condition and medication can cause complications in a medical emergency. For information about enrolling in MedicAlert, call 1-913.218.3732 ([place]) or 1-771.652.9973 ([place]).      Information  last revised October 2010 Copyright(c) 2010 [employer]             · Is patient Post Blood Transfusion?  No  Depression / Suicide Risk    As you are discharged from this [facility] facility, it is important to learn how to keep safe from harming yourself.    Recognize the warning signs:  · Abrupt changes in personality, positive or negative- including increase in energy   · Giving away possessions  · Change in eating patterns- significant weight changes-  positive or negative  · Change in sleeping patterns- unable to sleep or sleeping all the time   · Unwillingness or inability to communicate  · Depression  · Unusual sadness, discouragement and loneliness  · Talk of wanting to die  · Neglect of personal appearance   · Rebelliousness- reckless behavior  · Withdrawal from people/activities they love  · Confusion- inability to concentrate     If you or a loved one observes any of these behaviors or has concerns about self-harm, here's what you can do:  · Talk about it- your feelings and reasons for harming yourself  · Remove any means that you might use to hurt yourself (examples: pills, rope, extension cords, firearm)  · Get professional help from the community (Mental Health, Substance Abuse, psychological counseling)  · Do not be alone:Call your Safe Contact- someone whom you trust who will be there for you.  · Call your local CRISIS HOTLINE 165-3700 or 050-070-6532  · Call your local Children's Mobile Crisis Response Team [place] (966) 612-0389 or www.[place]  · Call the toll free National Suicide Prevention Hotlines   · National Suicide Prevention Lifeline 637-581-EQQI (1950)  · National Hope Line Network 800-SUICIDE (613-5668)    Discharge Instructions    [facility]. Discharged via wheelchair, hospital escort: Yes.  Special equipment needed: Oxygen    Be sure to schedule a follow-up appointment with your primary care doctor or any specialists as  instructed.     Discharge Plan:   Influenza Vaccine Indication: Patient Refuses (open heart surgical patient.)    I understand that a diet low in cholesterol, fat, and sodium is recommended for good health. Unless I have been given specific instructions below for another diet, I accept this instruction as my diet prescription.   Other diet: Cardiac    Special Instructions:   [facility] Cardiac Surgery Discharge Instructions    Activity:  1. NO driving for 4 weeks after surgery. You may ride as a passenger.  2. NO lifting of any item over 10 lbs (e.g. gallon of milk) for 6 weeks after surgery.  3. Walk as much as possible! Walk a minimum of once a day. Depending on your fatigue and comfort level, you may walk as much as you wish. There is no maximum.  4. Other physical activities (sex, housework, gardening, etc.) are OK after 4 weeks or after 8 weeks if you want to golf (start by putting, then advance to chipping, then to driving).  5. Continue using incentive spirometer for 2 weeks, especially if going home on oxygen.  6. Weigh daily and write it down starting  the next morning after you arrive home. Call your doctor for a weight gain of 2-4 lbs in 1-2 days.    Incision Care:  1. SHOWER ONLY - no baths. Clean incision(s) daily with plain soap or any other dye or perfume free soap. Then pat incision(s) dry with clean towel. Avoid creams or lotions on the incision(s).  2. If there is any increase in redness or swelling, or separation of the incision line, or thick drainage* from any of the incisions, call [facility] ([phone]). * Clear, thin drainage is not abnormal especially from the leg incision and/or chest tube sites.                    3. Continue to wear your LC Stockings for 4 weeks on the leg(s) with the incision(s) or swelling. You may take off the stocking(s) when in bed or when the leg(s) are elevated.    Prescription Refills/Questions: Call your usual Pharmacy for ALL refills      (Remember that refills may require additional physician approval and will need at least 24 hours' notice. Please call for refills on Mondays through Fridays from 9 am to 4 pm).    1. Pain medication questions only: Call [facility] ([phone]).  2. For all other medications (except pain medication): Call your Cardiology Group or Primary Care Doctor (not [facility]) for refills or questions.    General Instructions:  1. If you are on the blood thinner Coumadin (warfarin), you will need your coumadin levels checked periodically. For any questions about scheduling, ask your Cardiology Group or your Home Health Nurse whether this has been arranged for you.     2. Cardiac Rehab is highly recommended. If you do not have an appointment at the time of discharge call [facility] @ [phone] to schedule your initial interview.      3. Your Primary Care Doctor typically handles Home Oxygen. The Home Oxygen service that drops off your tanks should be checking your oxygen saturation levels. Oxygen may be stopped when you are > 90 % saturated on room air. Check with your Primary Care Doctor if you are unsure.    [facility] IS ALWAYS AVAILABLE TO ANSWER YOUR QUESTIONS. DO NOT HESITATE TO CALL!      · Is patient discharged on Warfarin / Coumadin?   Yes    You are receiving the drug warfarin. Please understand the importance of monitoring warfarin with scheduled PT/INR blood draws.  Follow-up with a call to your personal Doctor's office in 3 days to schedule a PT/INR. .    IMPORTANT: HOW TO USE THIS INFORMATION:  This is a summary and does NOT have all possible information about this product. This information does not assure that this product is safe, effective, or appropriate for you. This information is not individual medical advice and does not substitute for the advice of your health care professional. Always ask your health care professional for complete information about this product and your  "specific health needs.      WARFARIN - ORAL (WARF-uh-rin)      COMMON BRAND NAME(S): Coumadin      WARNING:  Warfarin can cause very serious (possibly fatal) bleeding. This is more likely to occur when you first start taking this medication or if you take too much warfarin. To decrease your risk for bleeding, your doctor or other health care provider will monitor you closely and check your lab results (INR test) to make sure you are not taking too much warfarin. Keep all medical and laboratory appointments. Tell your doctor right away if you notice any signs of serious bleeding. See also Side Effects section.      USES:  This medication is used to treat blood clots (such as in deep vein thrombosis-DVT or pulmonary embolus-PE) and/or to prevent new clots from forming in your body. Preventing harmful blood clots helps to reduce the risk of a stroke or heart attack. Conditions that increase your risk of developing blood clots include a certain type of irregular heart rhythm (atrial fibrillation), heart valve replacement, recent heart attack, and certain surgeries (such as hip/knee replacement). Warfarin is commonly called a \"blood thinner,\" but the more correct term is \"anticoagulant.\" It helps to keep blood flowing smoothly in your body by decreasing the amount of certain substances (clotting proteins) in your blood.      HOW TO USE:  Read the Medication Guide provided by your pharmacist before you start taking warfarin and each time you get a refill. If you have any questions, ask your doctor or pharmacist. Take this medication by mouth with or without food as directed by your doctor or other health care professional, usually once a day. It is very important to take it exactly as directed. Do not increase the dose, take it more frequently, or stop using it unless directed by your doctor. Dosage is based on your medical condition, laboratory tests (such as INR), and response to treatment. Your doctor or other health " care provider will monitor you closely while you are taking this medication to determine the right dose for you. Use this medication regularly to get the most benefit from it. To help you remember, take it at the same time each day. It is important to eat a balanced, consistent diet while taking warfarin. Some foods can affect how warfarin works in your body and may affect your treatment and dose. Avoid sudden large increases or decreases in your intake of foods high in vitamin K (such as broccoli, cauliflower, cabbage, brussels sprouts, kale, spinach, and other green leafy vegetables, liver, green tea, certain vitamin supplements). If you are trying to lose weight, check with your doctor before you try to go on a diet. Cranberry products may also affect how your warfarin works. Limit the amount of cranberry juice (16 ounces/480 milliliters a day) or other cranberry products you may drink or eat.      SIDE EFFECTS:  Nausea, loss of appetite, or stomach/abdominal pain may occur. If any of these effects persist or worsen, tell your doctor or pharmacist promptly. Remember that your doctor has prescribed this medication because he or she has judged that the benefit to you is greater than the risk of side effects. Many people using this medication do not have serious side effects. This medication can cause serious bleeding if it affects your blood clotting proteins too much (shown by unusually high INR lab results). Even if your doctor stops your medication, this risk of bleeding can continue for up to a week. Tell your doctor right away if you have any signs of serious bleeding, including: unusual pain/swelling/discomfort, unusual/easy bruising, prolonged bleeding from cuts or gums, persistent/frequent nosebleeds, unusually heavy/prolonged menstrual flow, pink/dark urine, coughing up blood, vomit that is bloody or looks like coffee grounds, severe headache, dizziness/fainting, unusual or persistent tiredness/weakness,  bloody/black/tarry stools, chest pain, shortness of breath, difficulty swallowing. Tell your doctor right away if any of these unlikely but serious side effects occur: persistent nausea/vomiting, severe stomach/abdominal pain, yellowing eyes/skin. This drug rarely has caused very serious (possibly fatal) problems if its effects lead to small blood clots (usually at the beginning of treatment). This can lead to severe skin/tissue damage that may require surgery or amputation if left untreated. Patients with certain blood conditions (protein C or S deficiency) may be at greater risk. Get medical help right away if any of these rare but serious side effects occur: painful/red/purplish patches on the skin (such as on the toe, breast, abdomen), change in the amount of urine, vision changes, confusion, slurred speech, weakness on one side of the body. A very serious allergic reaction to this drug is rare. However, get medical help right away if you notice any symptoms of a serious allergic reaction, including: rash, itching/swelling (especially of the face/tongue/throat), severe dizziness, trouble breathing. This is not a complete list of possible side effects. If you notice other effects not listed above, contact your doctor or pharmacist. In the [place] - Call your doctor for medical advice about side effects. You may report side effects to FDA at 1-710-CIH-8867. In [place] - Call your doctor for medical advice about side effects. You may report side effects to [facility] [place] at 1-819.535.3922.      PRECAUTIONS:  Before taking warfarin, tell your doctor or pharmacist if you are allergic to it; or if you have any other allergies. This product may contain inactive ingredients, which can cause allergic reactions or other problems. Talk to your pharmacist for more details. Before using this medication, tell your doctor or pharmacist your medical history, especially of: blood disorders (such as anemia, hemophilia), bleeding  problems (such as bleeding of the stomach/intestines, bleeding in the brain), blood vessel disorders (such as aneurysms), recent major injury/surgery, liver disease, alcohol use, mental/mood disorders (including memory problems), frequent falls/injuries. It is important that all your doctors and dentists know that you take warfarin. Before having surgery or any medical/dental procedures, tell your doctor or dentist that you are taking this medication and about all the products you use (including prescription drugs, nonprescription drugs, and herbal products). Avoid getting injections into the muscles. If you must have an injection into a muscle (for example, a flu shot), it should be given in the arm. This way, it will be easier to check for bleeding and/or apply pressure bandages. This medication may cause stomach bleeding. Daily use of alcohol while using this medicine will increase your risk for stomach bleeding and may also affect how this medication works. Limit or avoid alcoholic beverages. If you have not been eating well, if you have an illness or infection that causes fever, vomiting, or diarrhea for more than 2 days, or if you start using any antibiotic medications, contact your doctor or pharmacist immediately because these conditions can affect how warfarin works. This medication can cause heavy bleeding. To lower the chance of getting cut, bruised, or injured, use great caution with sharp objects like safety razors and nail cutters. Use an electric razor when shaving and a soft toothbrush when brushing your teeth. Avoid activities such as contact sports. If you fall or injure yourself, especially if you hit your head, call your doctor immediately. Your doctor may need to check you. The Food & Drug Administration has stated that generic warfarin products are interchangeable. However, consult your doctor or pharmacist before switching warfarin products. Be careful not to take more than one medication that  "contains warfarin unless specifically directed by the doctor or health care provider who is monitoring your warfarin treatment. Older adults may be at greater risk for bleeding while using this drug. This medication is not recommended for use during pregnancy because of serious (possibly fatal) harm to an unborn baby. Discuss the use of reliable forms of birth control with your doctor. If you become pregnant or think you may be pregnant, tell your doctor immediately. If you are planning pregnancy, discuss a plan for managing your condition with your doctor before you become pregnant. Your doctor may switch the type of medication you use during pregnancy. Very small amounts of this medication may pass into breast milk but is unlikely to harm a nursing infant. Consult your doctor before breast-feeding.      DRUG INTERACTIONS:  Drug interactions may change how your medications work or increase your risk for serious side effects. This document does not contain all possible drug interactions. Keep a list of all the products you use (including prescription/nonprescription drugs and herbal products) and share it with your doctor and pharmacist. Do not start, stop, or change the dosage of any medicines without your doctor's approval. Warfarin interacts with many prescription, nonprescription, vitamin, and herbal products. This includes medications that are applied to the skin or inside the vagina or rectum. The interactions with warfarin usually result in an increase or decrease in the \"blood-thinning\" (anticoagulant) effect. Your doctor or other health care professional should closely monitor you to prevent serious bleeding or clotting problems. While taking warfarin, it is very important to tell your doctor or pharmacist of any changes in medications, vitamins, or herbal products that you are taking. Some products that may interact with this drug include: capecitabine, imatinib, mifepristone. Aspirin, aspirin-like drugs " (salicylates), and nonsteroidal anti-inflammatory drugs (NSAIDs such as ibuprofen, naproxen, celecoxib) may have effects similar to warfarin. These drugs may increase the risk of bleeding problems if taken during treatment with warfarin. Carefully check all prescription/nonprescription product labels (including drugs applied to the skin such as pain-relieving creams) since the products may contain NSAIDs or salicylates. Talk to your doctor about using a different medication (such as acetaminophen) to treat pain/fever. Low-dose aspirin and related drugs (such as clopidogrel, ticlopidine) should be continued if prescribed by your doctor for specific medical reasons such as heart attack or stroke prevention. Consult your doctor or pharmacist for more details. Many herbal products interact with warfarin. Tell your doctor before taking any herbal products, especially bromelains, coenzyme Q10, cranberry, danshen, dong quai, fenugreek, garlic, ginkgo biloba, ginseng, and [place]'s wort, among others. This medication may interfere with a certain laboratory test to measure theophylline levels, possibly causing false test results. Make sure laboratory personnel and all your doctors know you use this drug.      OVERDOSE:  If overdose is suspected, contact a poison control center or emergency room immediately. [place] residents can call the [place] National Poison Hotline at 1-587.171.9960. [place] residents can call a provincial poison control center. Symptoms of overdose may include: bloody/black/tarry stools, pink/dark urine, unusual/prolonged bleeding.      NOTES:  Do not share this medication with others. Laboratory and/or medical tests (such as INR, complete blood count) must be performed periodically to monitor your progress or check for side effects. Consult your doctor for more details.      MISSED DOSE:  For the best possible benefit, do not miss any doses. If you do miss a dose and remember on the same day, take it as soon as  you remember. If you remember on the next day, skip the missed dose and resume your usual dosing schedule. Do not double the dose to catch up because this could increase your risk for bleeding. Keep a record of missed doses to give to your doctor or pharmacist. Contact your doctor or pharmacist if you miss 2 or more doses in a row.      STORAGE:  Store at room temperature away from light and moisture. Do not store in the bathroom. Keep all medications away from children and pets. Do not flush medications down the toilet or pour them into a drain unless instructed to do so. Properly discard this product when it is  or no longer needed. Consult your pharmacist or local waste disposal company for more details about how to safely discard your product.      MEDICAL ALERT:  Your condition and medication can cause complications in a medical emergency. For information about enrolling in MedicAlert, call 1-495.779.2038 ([place]) or 1-401.191.9326 ([place]).      Information last revised 2010 Copyright(c) 2010 [employer]             · Is patient Post Blood Transfusion?  No    Depression / Suicide Risk    As you are discharged from this [facility] facility, it is important to learn how to keep safe from harming yourself.    Recognize the warning signs:  · Abrupt changes in personality, positive or negative- including increase in energy   · Giving away possessions  · Change in eating patterns- significant weight changes-  positive or negative  · Change in sleeping patterns- unable to sleep or sleeping all the time   · Unwillingness or inability to communicate  · Depression  · Unusual sadness, discouragement and loneliness  · Talk of wanting to die  · Neglect of personal appearance   · Rebelliousness- reckless behavior  · Withdrawal from people/activities they love  · Confusion- inability to concentrate     If you or a loved one observes any of these behaviors or has concerns about self-harm, here's what  you can do:  · Talk about it- your feelings and reasons for harming yourself  · Remove any means that you might use to hurt yourself (examples: pills, rope, extension cords, firearm)  · Get professional help from the community (Mental Health, Substance Abuse, psychological counseling)  · Do not be alone:Call your Safe Contact- someone whom you trust who will be there for you.  · Call your local CRISIS HOTLINE 053-0766 or 977-355-5275  · Call your local Children's Mobile Crisis Response Team [place] (679) 821-6693 or wwwSpontly  · Call the toll free National Suicide Prevention Hotlines   · National Suicide Prevention Lifeline 575-490-UOHW (7592)  · National Hope Line Network 800-SUICIDE (557-4174)    Heart Attack  A heart attack (myocardial infarction, MI) causes damage to your heart that cannot be fixed. A heart attack can happen when a heart (coronary) artery becomes blocked or narrowed. This cuts off the blood supply and oxygen to your heart.  When one or more of your coronary arteries becomes blocked, that area of your heart begins to die. This causes the pain you feel during a heart attack. Heart attack pain can also occur in one part of the body but be felt in another part of the body (referred pain). You may feel referred heart attack pain in your left arm, neck, or jaw. Pain may even be felt in the right arm.  CAUSES   Many conditions can cause a heart attack. These include:   · Atherosclerosis. This is when a fatty substance (plaque) gradually builds up in the arteries. This buildup can block or reduce the blood supply to one or more of the heart arteries.  · A blood clot. A blood clot can develop suddenly when plaque breaks up (ruptures) within a heart artery. A blood clot can block the heart artery, which prevents blood flow to the heart.    · Severe tightening (spasm) of the heart artery. This cuts off blood flow through the artery.    RISK FACTORS  People at risk for heart attack usually  have one or more of the following risk factors:   2. High blood pressure (hypertension).  3. High cholesterol.  4. Smoking.  5. Being male.  6. Being overweight or obese.  7. Older aged.     8. A family history of heart disease.  9. Lack of exercise.  10. Diabetes.  11. Stress.  12. Drinking too much alcohol.  13. Using illegal street drugs, such as cocaine and methamphetamines.  SYMPTOMS   Heart attack symptoms can vary from person to person. Symptoms depend on factors like gender and age.   2. In both men and women, heart attack symptoms can include the followin. Chest pain. This may feel like crushing, squeezing, or a feeling of pressure.  2. Shortness of breath.  3. Heartburn or indigestion with or without vomiting, shortness of breath, or sweating.  4. Sudden cold sweats.  5. Sudden light-headedness.  6. Upper back pain.    3. Women can have unique heart attack symptoms, such as:    1. Unexplained feelings of nervousness or anxiety.  2. Discomfort between the shoulder blades or upper back.  3. Tingling in the hands and arms.    4. Older people (of both genders) can have subtle heart attack symptoms, such as:    1. Sweating.  2. Shortness of breath.  3. General tiredness or not feeling well.    DIAGNOSIS   Diagnosing a heart attack involves several tests. They include:   3. An assessment of your vital signs. This includes checking your:  1. Heart rhythm.  2. Blood pressure.  3. Breathing rate.  4. Oxygen level.    4. An ECG (electrocardiogram) to measure the electrical activity of your heart.  5. Blood tests called cardiac markers. In these tests, blood is drawn at scheduled times to check for the specific proteins or enzymes released by damaged heart muscle.  6. A chest X-ray.  7. An echocardiogram to evaluate heart motion and blood flow.  8. Coronary angiography to look at the heart arteries.    TREATMENT   Treatment for a heart attack may include:   3. Medicine that breaks up or dissolves blood clots  in the heart artery.  4. Angioplasty.  5. Cardiac stent placement.  6. Intra-aortic balloon pump therapy (IABP).  7. Open heart surgery (coronary artery bypass graft, CABG).  HOME CARE INSTRUCTIONS  · Take medicines only as directed by your health care provider. You may need to take medicine after a heart attack to:    ¨ Keep your blood from clotting too easily.  ¨ Control your blood pressure.  ¨ Lower your cholesterol.  ¨ Control abnormal heart rhythms.    · Do not take the following medicines unless your health care provider approves:  ¨ Nonsteroidal anti-inflammatory drugs (NSAIDs), such as ibuprofen, naproxen, or celecoxib.  ¨ Vitamin supplements that contain vitamin A, vitamin E, or both.  ¨ Hormone replacement therapy that contains estrogen with or without progestin.  · Make lifestyle changes as directed by your health care provider. These may include:    ¨ Quitting smoking, if you smoke.  ¨ Getting regular exercise. Ask your health care provider to suggest some activities that are safe for you.  ¨ Eating a heart-healthy diet. A registered dietitian can help you learn healthy eating options.  ¨ Maintaining a healthy weight.    ¨ Managing diabetes, if necessary.  ¨ Reducing stress.  ¨ Limiting how much alcohol you drink.  SEEK IMMEDIATE MEDICAL CARE IF:   · You have sudden, unexplained chest discomfort.  · You have sudden, unexplained discomfort in your arms, back, neck, or jaw.  · You have shortness of breath at any time.  · You suddenly start to sweat or your skin gets clammy.  · You feel nauseous or vomit.  · You suddenly feel light-headed or dizzy.  · Your heart begins to beat fast or feels like it is skipping beats.  These symptoms may represent a serious problem that is an emergency. Do not wait to see if the symptoms will go away. Get medical help right away. Call your local emergency services (911 in the [place] Do not drive yourself to the hospital.     This information is not intended to replace advice  given to you by your health care provider. Make sure you discuss any questions you have with your health care provider.     Document Released: 12/18/2006 Document Revised: 01/08/2016 Document Reviewed: 02/20/2015  [employer] Patient Education ©2016 [employer]      Follow-up Information     1. Follow up with BRODERICK Rankin On 2/13/2017.    Specialty:  Family Medicine    Why:  10:20 am    Contact information    [address]  [address]  [place] [id number] [phone]          2. Follow up with Chris Schmitt M.D. On 3/6/2017.    Specialty:  Cardiac Surgery    Why:  12:45 pm    Contact information    [id number]  [address]  [place] [address]  [phone]           Discharge Medication Instructions:    Below are the medications your physician expects you to take upon discharge:    Review all your home medications and newly ordered medications with your doctor and/or pharmacist. Follow medication instructions as directed by your doctor and/or pharmacist.    Please keep your medication list with you and share with your physician.               Medication List      START taking these medications        Instructions    artificial tears 1.4 % Soln    Place 1 Drop in both eyes as needed (discomfort/dry eyes).   Dose:  1 Drop       aspirin 81 MG EC tablet   Last time this was given:  81 mg on 2/9/2017  8:10 AM    Take 1 Tab by mouth every day.   Dose:  81 mg       famotidine 20 MG Tabs   Last time this was given:  20 mg on 2/9/2017  8:10 AM   Commonly known as:  PEPCID    Take 1 Tab by mouth every 12 hours.   Dose:  20 mg       furosemide 10 MG/ML Soln   Last time this was given:  60 mg on 2/8/2017  8:23 AM   Commonly known as:  LASIX    4 mL by Intravenous route every day.   Dose:  40 mg       ipratropium-albuterol 0.5-2.5 (3) MG/3ML nebulizer solution   Last time this was given:  3 mL on 2/6/2017  5:46 AM   Commonly known as:  DUONEB    3 mL by Nebulization route every four hours as needed for  Shortness of Breath.   Dose:  3 mL       potassium chloride SA 10 MEQ Tbcr   Last time this was given:  40 mEq on 2/5/2017  8:05 AM   Commonly known as:  K-DUR    Take 2 Tabs by mouth every day.   Dose:  20 mEq       sodium chloride 0.65 % Soln   Last time this was given:  2 Sprays on 2/7/2017  8:41 AM   Commonly known as:  OCEAN    Spray 2 Sprays in nose as needed.   Dose:  2 Spray       tramadol 50 MG Tabs   Last time this was given:  50 mg on 2/6/2017  9:56 PM   Commonly known as:  ULTRAM    Take 1 Tab by mouth every four hours as needed (Moderate Pain (NRS Pain Scale 4-6; CPOT Pain Scale 3-5) if opiates not ordered or tolerated).   Dose:  50 mg       * warfarin 5 MG Tabs   Last time this was given:  7.5 mg on 2/8/2017  6:07 PM   Commonly known as:  COUMADIN    Take 1 Tab by mouth COUMADIN-DAILY.   Dose:  5 mg       * warfarin 7.5 MG Tabs   Last time this was given:  7.5 mg on 2/8/2017  6:07 PM   Commonly known as:  COUMADIN    Take 1 Tab by mouth COUMADIN-DAILY.   Dose:  7.5 mg       * Notice:  This list has 2 medication(s) that are the same as other medications prescribed for you. Read the directions carefully, and ask your doctor or other care provider to review them with you.      CONTINUE taking these medications        Instructions    glucosamine Sulfate 500 MG Caps    Take 500 mg by mouth 3 times a day, with meals.   Dose:  500 mg       multivitamin Tabs   Last time this was given:  1 Tab on 2/9/2017  8:10 AM    Take 1 Tab by mouth every day.   Dose:  1 Tab       oyster shell calcium/vitamin D 250-125 MG-UNIT Tabs tablet   Last time this was given:  1 Tab on 2/9/2017  8:09 AM    Take 1 Tab by mouth every day.   Dose:  1 Tab       pravastatin 20 MG Tabs   Last time this was given:  20 mg on 2/8/2017 10:03 PM   Commonly known as:  PRAVACHOL    Take 20 mg by mouth every day.   Dose:  20 mg               Instructions           Diet / Nutrition:    Follow any diet instructions given to you by your doctor or the  dietician, including how much salt (sodium) you are allowed each day.    If you are overweight, talk to your doctor about a weight reduction plan.    Activity:    Remain physically active following your doctor's instructions about exercise and activity.    Rest often.     Any time you become even a little tired or short of breath, SIT DOWN and rest.    Worsening Symptoms:    Report any of the following signs and symptoms to the doctor's office immediately:    *Pain of jaw, arm, or neck  *Chest pain not relieved by medication                               *Dizziness or loss of consciousness  *Difficulty breathing even when at rest   *More tired than usual                                       *Bleeding drainage or swelling of surgical site  *Swelling of feet, ankles, legs or stomach                 *Fever (>100ºF)  *Pink or blood tinged sputum  *Weight gain (3lbs/day or 5lbs /week)           *Shock from internal defibrillator (if applicable)  *Palpitations or irregular heartbeats                *Cool and/or numb extremities    Stroke Awareness    Common Risk Factors for Stroke include:    Age  Atrial Fibrillation  Carotid Artery Stenosis  Diabetes Mellitus  Excessive alcohol consumption  High blood pressure  Overweight   Physical inactivity  Smoking    Warning signs and symptoms of a stroke include:    *Sudden numbness or weakness of the face, arm or leg (especially on one side of the body).  *Sudden confusion, trouble speaking or understanding.  *Sudden trouble seeing in one or both eyes.  *Sudden trouble walking, dizziness, loss of balance or coordination.Sudden severe headache with no known cause.    It is very important to get treatment quickly when a stroke occurs. If you experience any of the above warning signs, call 911 immediately.                   Disclaimer         Quit Smoking / Tobacco Use:    I understand the use of any tobacco products increases my chance of suffering from future heart disease or  stroke and could cause other illnesses which may shorten my life. Quitting the use of tobacco products is the single most important thing I can do to improve my health. For further information on smoking / tobacco cessation call a Toll Free Quit Line at 1-953.145.5110 (*[employer] [place]) or 1-927.234.3332 (American Lung Association) or you can access the web based program at www.lungusa.org.    [place] Tobacco Users Help Line:  (841) 524-5742       Toll Free: 1-954.270.7197  Quit Tobacco Program [facility] (999)430-4158    Crisis Hotline:    [place] Crisis Hotline:  0-238-LBXALRS or 1-629.927.6890    [place] Crisis Hotline:    1-836.477.4627 or 792-421-0317    Discharge Survey:   Thank you for choosing [facility]. We hope we did everything we could to make your hospital stay a pleasant one. You may be receiving a phone survey and we would appreciate your time and participation in answering the questions. Your input is very valuable to us in our efforts to improve our service to our patients and their families.        My signature on this form indicates that:    1. I have reviewed and understand the above information.  2. My questions regarding this information have been answered to my satisfaction.  3. I have formulated a plan with my discharge nurse to obtain my prescribed medications for home.                  Disclaimer         __________________________________                     __________       ________                       Patient Signature                                                 Date                    Time

## 2017-01-24 NOTE — IP AVS SNAPSHOT
Odimax Access Code: Activation code not generated  Current Odimax Status: Patient Declined    [employer]  A secure, online tool to manage your health information     [employer]’s Odimax® is a secure, online tool that connects you to your personalized health information from the privacy of your home -- day or night - making it very easy for you to manage your healthcare. Once the activation process is completed, you can even access your medical information using the Odimax elle, which is available for free in the Apple Elle store or Google Play store.     Odimax provides the following levels of access (as shown below):   My Chart Features   [facility] Primary Care Doctor [facility]  Specialists [facility]  Urgent  Care Non-[facility]  Primary Care  Doctor   Email your healthcare team securely and privately 24/7 X X X X   Manage appointments: schedule your next appointment; view details of past/upcoming appointments X      Request prescription refills. X      View recent personal medical records, including lab and immunizations X X X X   View health record, including health history, allergies, medications X X X X   Read reports about your outpatient visits, procedures, consult and ER notes X X X X   See your discharge summary, which is a recap of your hospital and/or ER visit that includes your diagnosis, lab results, and care plan. X X       How to register for Odimax:  1. Go to  [email or web address].  2. Click on the Sign Up Now box, which takes you to the New Member Sign Up page. You will need to provide the following information:  a. Enter your Odimax Access Code exactly as it appears at the top of this page. (You will not need to use this code after you’ve completed the sign-up process. If you do not sign up before the expiration date, you must request a new code.)   b. Enter your date of birth.   c. Enter your home email address.   d. Click Submit, and follow the next screen’s instructions.  3. Create a Odimax ID.  This will be your [employer] login ID and cannot be changed, so think of one that is secure and easy to remember.  4. Create a [employer] password. You can change your password at any time.  5. Enter your Password Reset Question and Answer. This can be used at a later time if you forget your password.   6. Enter your e-mail address. This allows you to receive e-mail notifications when new information is available in [employer].  7. Click Sign Up. You can now view your health information.    For assistance activating your [employer] account, call [phone]

## 2017-01-25 ENCOUNTER — APPOINTMENT (OUTPATIENT)
Dept: RADIOLOGY | Facility: MEDICAL CENTER | Age: 67
DRG: 216 | End: 2017-01-25
Attending: STUDENT IN AN ORGANIZED HEALTH CARE EDUCATION/TRAINING PROGRAM
Payer: MEDICARE

## 2017-01-25 ENCOUNTER — APPOINTMENT (OUTPATIENT)
Dept: RADIOLOGY | Facility: MEDICAL CENTER | Age: 67
DRG: 216 | End: 2017-01-25
Attending: INTERNAL MEDICINE
Payer: MEDICARE

## 2017-01-25 ENCOUNTER — APPOINTMENT (OUTPATIENT)
Dept: RADIOLOGY | Facility: MEDICAL CENTER | Age: 67
DRG: 216 | End: 2017-01-25
Attending: PSYCHIATRY & NEUROLOGY
Payer: MEDICARE

## 2017-01-25 LAB
ALBUMIN SERPL BCP-MCNC: 3.6 G/DL (ref 3.2–4.9)
ALBUMIN/GLOB SERPL: 1.6 G/DL
ALP SERPL-CCNC: 71 U/L (ref 30–99)
ALT SERPL-CCNC: 14 U/L (ref 2–50)
ANION GAP SERPL CALC-SCNC: 9 MMOL/L (ref 0–11.9)
APTT PPP: 179.3 SEC (ref 24.7–36)
APTT PPP: 35.2 SEC (ref 24.7–36)
AST SERPL-CCNC: 24 U/L (ref 12–45)
BASE EXCESS BLDA CALC-SCNC: -6 MMOL/L (ref -4–3)
BASOPHILS # BLD AUTO: 0.2 % (ref 0–1.8)
BASOPHILS # BLD: 0.03 K/UL (ref 0–0.12)
BILIRUB SERPL-MCNC: 0.7 MG/DL (ref 0.1–1.5)
BODY TEMPERATURE: ABNORMAL DEGREES
BUN SERPL-MCNC: 21 MG/DL (ref 8–22)
CALCIUM SERPL-MCNC: 8.2 MG/DL (ref 8.5–10.5)
CHLORIDE SERPL-SCNC: 98 MMOL/L (ref 96–112)
CHOLEST SERPL-MCNC: 154 MG/DL (ref 100–199)
CO2 BLDA-SCNC: 19 MMOL/L (ref 20–33)
CO2 SERPL-SCNC: 19 MMOL/L (ref 20–33)
CREAT SERPL-MCNC: 1.24 MG/DL (ref 0.5–1.4)
EKG IMPRESSION: NORMAL
EOSINOPHIL # BLD AUTO: 0 K/UL (ref 0–0.51)
EOSINOPHIL NFR BLD: 0 % (ref 0–6.9)
ERYTHROCYTE [DISTWIDTH] IN BLOOD BY AUTOMATED COUNT: 46.5 FL (ref 35.9–50)
GFR SERPL CREATININE-BSD FRML MDRD: 43 ML/MIN/1.73 M 2
GLOBULIN SER CALC-MCNC: 2.2 G/DL (ref 1.9–3.5)
GLUCOSE SERPL-MCNC: 137 MG/DL (ref 65–99)
HCO3 BLDA-SCNC: 18.4 MMOL/L (ref 17–25)
HCT VFR BLD AUTO: 42.3 % (ref 37–47)
HDLC SERPL-MCNC: 72 MG/DL
HGB BLD-MCNC: 14.1 G/DL (ref 12–16)
IMM GRANULOCYTES # BLD AUTO: 0.07 K/UL (ref 0–0.11)
IMM GRANULOCYTES NFR BLD AUTO: 0.4 % (ref 0–0.9)
LDLC SERPL CALC-MCNC: 61 MG/DL
LYMPHOCYTES # BLD AUTO: 0.98 K/UL (ref 1–4.8)
LYMPHOCYTES NFR BLD: 4.9 % (ref 22–41)
MAGNESIUM SERPL-MCNC: 2.2 MG/DL (ref 1.5–2.5)
MCH RBC QN AUTO: 31.2 PG (ref 27–33)
MCHC RBC AUTO-ENTMCNC: 33.3 G/DL (ref 33.6–35)
MCV RBC AUTO: 93.6 FL (ref 81.4–97.8)
MONOCYTES # BLD AUTO: 1.12 K/UL (ref 0–0.85)
MONOCYTES NFR BLD AUTO: 5.6 % (ref 0–13.4)
NEUTROPHILS # BLD AUTO: 17.64 K/UL (ref 2–7.15)
NEUTROPHILS NFR BLD: 88.9 % (ref 44–72)
NRBC # BLD AUTO: 0 K/UL
NRBC BLD AUTO-RTO: 0 /100 WBC
O2/TOTAL GAS SETTING VFR VENT: 100 %
PCO2 BLDA: 32.8 MMHG (ref 26–37)
PCO2 TEMP ADJ BLDA: 30.8 MMHG (ref 26–37)
PH BLDA: 7.36 [PH] (ref 7.4–7.5)
PH TEMP ADJ BLDA: 7.38 [PH] (ref 7.4–7.5)
PHOSPHATE SERPL-MCNC: 4.1 MG/DL (ref 2.5–4.5)
PLATELET # BLD AUTO: 257 K/UL (ref 164–446)
PMV BLD AUTO: 11.9 FL (ref 9–12.9)
PO2 BLDA: 156 MMHG (ref 64–87)
PO2 TEMP ADJ BLDA: 147 MMHG (ref 64–87)
POTASSIUM SERPL-SCNC: 4.6 MMOL/L (ref 3.6–5.5)
PROT SERPL-MCNC: 5.8 G/DL (ref 6–8.2)
RBC # BLD AUTO: 4.52 M/UL (ref 4.2–5.4)
SAO2 % BLDA: 99 % (ref 93–99)
SODIUM SERPL-SCNC: 126 MMOL/L (ref 135–145)
SPECIMEN DRAWN FROM PATIENT: ABNORMAL
TRIGL SERPL-MCNC: 105 MG/DL (ref 0–149)
TRIGL SERPL-MCNC: 87 MG/DL (ref 0–149)
TROPONIN I SERPL-MCNC: 0.75 NG/ML (ref 0–0.04)
TROPONIN I SERPL-MCNC: 3.35 NG/ML (ref 0–0.04)
TROPONIN I SERPL-MCNC: 4.23 NG/ML (ref 0–0.04)
WBC # BLD AUTO: 19.8 K/UL (ref 4.8–10.8)

## 2017-01-25 PROCEDURE — 84478 ASSAY OF TRIGLYCERIDES: CPT

## 2017-01-25 PROCEDURE — 700101 HCHG RX REV CODE 250

## 2017-01-25 PROCEDURE — 36600 WITHDRAWAL OF ARTERIAL BLOOD: CPT

## 2017-01-25 PROCEDURE — 700111 HCHG RX REV CODE 636 W/ 250 OVERRIDE (IP): Performed by: INTERNAL MEDICINE

## 2017-01-25 PROCEDURE — 700111 HCHG RX REV CODE 636 W/ 250 OVERRIDE (IP)

## 2017-01-25 PROCEDURE — C1894 INTRO/SHEATH, NON-LASER: HCPCS

## 2017-01-25 PROCEDURE — 700102 HCHG RX REV CODE 250 W/ 637 OVERRIDE(OP): Performed by: STUDENT IN AN ORGANIZED HEALTH CARE EDUCATION/TRAINING PROGRAM

## 2017-01-25 PROCEDURE — 700101 HCHG RX REV CODE 250: Performed by: INTERNAL MEDICINE

## 2017-01-25 PROCEDURE — 80053 COMPREHEN METABOLIC PANEL: CPT

## 2017-01-25 PROCEDURE — 99291 CRITICAL CARE FIRST HOUR: CPT | Mod: GC | Performed by: INTERNAL MEDICINE

## 2017-01-25 PROCEDURE — 80061 LIPID PANEL: CPT

## 2017-01-25 PROCEDURE — 82803 BLOOD GASES ANY COMBINATION: CPT

## 2017-01-25 PROCEDURE — 83735 ASSAY OF MAGNESIUM: CPT

## 2017-01-25 PROCEDURE — 96366 THER/PROPH/DIAG IV INF ADDON: CPT

## 2017-01-25 PROCEDURE — 93005 ELECTROCARDIOGRAM TRACING: CPT | Performed by: STUDENT IN AN ORGANIZED HEALTH CARE EDUCATION/TRAINING PROGRAM

## 2017-01-25 PROCEDURE — 85025 COMPLETE CBC W/AUTO DIFF WBC: CPT

## 2017-01-25 PROCEDURE — 360979 HCHG DIAGNOSTIC CATH

## 2017-01-25 PROCEDURE — 700111 HCHG RX REV CODE 636 W/ 250 OVERRIDE (IP): Performed by: STUDENT IN AN ORGANIZED HEALTH CARE EDUCATION/TRAINING PROGRAM

## 2017-01-25 PROCEDURE — 71010 DX-CHEST-PORTABLE (1 VIEW): CPT

## 2017-01-25 PROCEDURE — 93458 L HRT ARTERY/VENTRICLE ANGIO: CPT

## 2017-01-25 PROCEDURE — 302136 NUTRITION PUMP: Performed by: INTERNAL MEDICINE

## 2017-01-25 PROCEDURE — 306782 CATH,FOLEY 16FR LATEX FREE: Performed by: INTERNAL MEDICINE

## 2017-01-25 PROCEDURE — 93010 ELECTROCARDIOGRAM REPORT: CPT | Performed by: INTERNAL MEDICINE

## 2017-01-25 PROCEDURE — 99153 MOD SED SAME PHYS/QHP EA: CPT

## 2017-01-25 PROCEDURE — 96367 TX/PROPH/DG ADDL SEQ IV INF: CPT

## 2017-01-25 PROCEDURE — A9270 NON-COVERED ITEM OR SERVICE: HCPCS | Performed by: STUDENT IN AN ORGANIZED HEALTH CARE EDUCATION/TRAINING PROGRAM

## 2017-01-25 PROCEDURE — 304952 HCHG R 2 PADS

## 2017-01-25 PROCEDURE — B2151ZZ FLUOROSCOPY OF LEFT HEART USING LOW OSMOLAR CONTRAST: ICD-10-PCS | Performed by: INTERNAL MEDICINE

## 2017-01-25 PROCEDURE — C1769 GUIDE WIRE: HCPCS

## 2017-01-25 PROCEDURE — 700101 HCHG RX REV CODE 250: Performed by: STUDENT IN AN ORGANIZED HEALTH CARE EDUCATION/TRAINING PROGRAM

## 2017-01-25 PROCEDURE — 94003 VENT MGMT INPAT SUBQ DAY: CPT

## 2017-01-25 PROCEDURE — 4A023N7 MEASUREMENT OF CARDIAC SAMPLING AND PRESSURE, LEFT HEART, PERCUTANEOUS APPROACH: ICD-10-PCS | Performed by: INTERNAL MEDICINE

## 2017-01-25 PROCEDURE — 84484 ASSAY OF TROPONIN QUANT: CPT | Mod: 91

## 2017-01-25 PROCEDURE — 84100 ASSAY OF PHOSPHORUS: CPT

## 2017-01-25 PROCEDURE — 71275 CT ANGIOGRAPHY CHEST: CPT

## 2017-01-25 PROCEDURE — A9270 NON-COVERED ITEM OR SERVICE: HCPCS | Performed by: INTERNAL MEDICINE

## 2017-01-25 PROCEDURE — 700105 HCHG RX REV CODE 258: Performed by: STUDENT IN AN ORGANIZED HEALTH CARE EDUCATION/TRAINING PROGRAM

## 2017-01-25 PROCEDURE — B2111ZZ FLUOROSCOPY OF MULTIPLE CORONARY ARTERIES USING LOW OSMOLAR CONTRAST: ICD-10-PCS | Performed by: INTERNAL MEDICINE

## 2017-01-25 PROCEDURE — 700105 HCHG RX REV CODE 258: Performed by: INTERNAL MEDICINE

## 2017-01-25 PROCEDURE — 770022 HCHG ROOM/CARE - ICU (200)

## 2017-01-25 PROCEDURE — 99152 MOD SED SAME PHYS/QHP 5/>YRS: CPT

## 2017-01-25 PROCEDURE — 85730 THROMBOPLASTIN TIME PARTIAL: CPT | Mod: 91

## 2017-01-25 PROCEDURE — 700117 HCHG RX CONTRAST REV CODE 255: Performed by: INTERNAL MEDICINE

## 2017-01-25 PROCEDURE — 700102 HCHG RX REV CODE 250 W/ 637 OVERRIDE(OP): Performed by: INTERNAL MEDICINE

## 2017-01-25 RX ORDER — FUROSEMIDE 10 MG/ML
20 INJECTION INTRAMUSCULAR; INTRAVENOUS ONCE
Status: COMPLETED | OUTPATIENT
Start: 2017-01-25 | End: 2017-01-25

## 2017-01-25 RX ORDER — LIDOCAINE HYDROCHLORIDE 20 MG/ML
INJECTION, SOLUTION INFILTRATION; PERINEURAL
Status: COMPLETED
Start: 2017-01-25 | End: 2017-01-25

## 2017-01-25 RX ORDER — MIDAZOLAM HYDROCHLORIDE 1 MG/ML
INJECTION INTRAMUSCULAR; INTRAVENOUS
Status: COMPLETED
Start: 2017-01-25 | End: 2017-01-25

## 2017-01-25 RX ORDER — SODIUM CHLORIDE 9 MG/ML
INJECTION, SOLUTION INTRAVENOUS
Status: ACTIVE
Start: 2017-01-25 | End: 2017-01-25

## 2017-01-25 RX ORDER — OMEPRAZOLE 20 MG/1
20 CAPSULE, DELAYED RELEASE ORAL DAILY
Status: DISCONTINUED | OUTPATIENT
Start: 2017-01-25 | End: 2017-01-25

## 2017-01-25 RX ORDER — HEPARIN SODIUM,PORCINE 1000/ML
VIAL (ML) INJECTION
Status: COMPLETED
Start: 2017-01-25 | End: 2017-01-25

## 2017-01-25 RX ORDER — ASPIRIN 325 MG
325 TABLET ORAL DAILY
Status: DISCONTINUED | OUTPATIENT
Start: 2017-01-25 | End: 2017-01-26

## 2017-01-25 RX ORDER — HEPARIN SODIUM 5000 [USP'U]/ML
5000 INJECTION, SOLUTION INTRAVENOUS; SUBCUTANEOUS EVERY 8 HOURS
Status: DISCONTINUED | OUTPATIENT
Start: 2017-01-25 | End: 2017-01-30

## 2017-01-25 RX ORDER — HEPARIN SODIUM 1000 [USP'U]/ML
5000 INJECTION, SOLUTION INTRAVENOUS; SUBCUTANEOUS ONCE
Status: COMPLETED | OUTPATIENT
Start: 2017-01-25 | End: 2017-01-25

## 2017-01-25 RX ORDER — HEPARIN SODIUM 1000 [USP'U]/ML
2600 INJECTION, SOLUTION INTRAVENOUS; SUBCUTANEOUS PRN
Status: DISCONTINUED | OUTPATIENT
Start: 2017-01-25 | End: 2017-01-25

## 2017-01-25 RX ORDER — VERAPAMIL HYDROCHLORIDE 2.5 MG/ML
INJECTION, SOLUTION INTRAVENOUS
Status: COMPLETED
Start: 2017-01-25 | End: 2017-01-25

## 2017-01-25 RX ADMIN — PROPOFOL 60 MCG/KG/MIN: 10 INJECTION, EMULSION INTRAVENOUS at 23:08

## 2017-01-25 RX ADMIN — ENOXAPARIN SODIUM 40 MG: 100 INJECTION SUBCUTANEOUS at 08:07

## 2017-01-25 RX ADMIN — DOXYCYCLINE 100 MG: 100 INJECTION, POWDER, LYOPHILIZED, FOR SOLUTION INTRAVENOUS at 23:11

## 2017-01-25 RX ADMIN — HEPARIN SODIUM 5000 UNITS: 1000 INJECTION, SOLUTION INTRAVENOUS; SUBCUTANEOUS at 12:51

## 2017-01-25 RX ADMIN — PROPOFOL 70 MCG/KG/MIN: 10 INJECTION, EMULSION INTRAVENOUS at 16:30

## 2017-01-25 RX ADMIN — FENTANYL CITRATE 25 MCG: 50 INJECTION, SOLUTION INTRAMUSCULAR; INTRAVENOUS at 06:32

## 2017-01-25 RX ADMIN — HEPARIN SODIUM 2000 UNITS: 200 INJECTION, SOLUTION INTRAVENOUS at 14:54

## 2017-01-25 RX ADMIN — DOXYCYCLINE 100 MG: 100 INJECTION, POWDER, LYOPHILIZED, FOR SOLUTION INTRAVENOUS at 12:28

## 2017-01-25 RX ADMIN — POTASSIUM CHLORIDE 10 MEQ: 7.46 INJECTION, SOLUTION INTRAVENOUS at 00:00

## 2017-01-25 RX ADMIN — DOXYCYCLINE 100 MG: 100 INJECTION, POWDER, LYOPHILIZED, FOR SOLUTION INTRAVENOUS at 00:33

## 2017-01-25 RX ADMIN — CHLORHEXIDINE GLUCONATE 15 ML: 1.2 RINSE ORAL at 08:07

## 2017-01-25 RX ADMIN — PROPOFOL 45 MCG/KG/MIN: 10 INJECTION, EMULSION INTRAVENOUS at 05:58

## 2017-01-25 RX ADMIN — NOREPINEPHRINE BITARTRATE 15 MCG/MIN: 1 INJECTION INTRAVENOUS at 17:53

## 2017-01-25 RX ADMIN — FENTANYL CITRATE 50 MCG: 50 INJECTION, SOLUTION INTRAMUSCULAR; INTRAVENOUS at 14:57

## 2017-01-25 RX ADMIN — PROPOFOL 60 MCG/KG/MIN: 10 INJECTION, EMULSION INTRAVENOUS at 00:49

## 2017-01-25 RX ADMIN — HEPARIN SODIUM 1050 UNITS: 5000 INJECTION, SOLUTION INTRAVENOUS at 12:52

## 2017-01-25 RX ADMIN — PROPOFOL 70 MCG/KG/MIN: 10 INJECTION, EMULSION INTRAVENOUS at 19:56

## 2017-01-25 RX ADMIN — NITROGLYCERIN 10 ML: 20 INJECTION INTRAVENOUS at 14:54

## 2017-01-25 RX ADMIN — PROPOFOL 70 MCG/KG/MIN: 10 INJECTION, EMULSION INTRAVENOUS at 08:25

## 2017-01-25 RX ADMIN — THERA TABS 1 TABLET: TAB at 08:07

## 2017-01-25 RX ADMIN — PROPOFOL 70 MCG/KG/MIN: 10 INJECTION, EMULSION INTRAVENOUS at 12:17

## 2017-01-25 RX ADMIN — PRAVASTATIN SODIUM 20 MG: 20 TABLET ORAL at 21:07

## 2017-01-25 RX ADMIN — NOREPINEPHRINE BITARTRATE 20 MCG/MIN: 1 INJECTION INTRAVENOUS at 03:11

## 2017-01-25 RX ADMIN — IOHEXOL 78 ML: 350 INJECTION, SOLUTION INTRAVENOUS at 15:00

## 2017-01-25 RX ADMIN — FUROSEMIDE 20 MG: 10 INJECTION, SOLUTION INTRAVENOUS at 06:20

## 2017-01-25 RX ADMIN — CHLORHEXIDINE GLUCONATE 15 ML: 1.2 RINSE ORAL at 03:10

## 2017-01-25 RX ADMIN — FAMOTIDINE 20 MG: 20 TABLET, FILM COATED ORAL at 08:07

## 2017-01-25 RX ADMIN — NOREPINEPHRINE BITARTRATE 20 MCG/MIN: 1 INJECTION INTRAVENOUS at 09:10

## 2017-01-25 RX ADMIN — IOHEXOL 40 ML: 350 INJECTION, SOLUTION INTRAVENOUS at 15:46

## 2017-01-25 RX ADMIN — CHLORHEXIDINE GLUCONATE 15 ML: 1.2 RINSE ORAL at 21:06

## 2017-01-25 RX ADMIN — CALCIUM CARBONATE-CHOLECALCIFEROL TAB 250 MG-125 UNIT 1 TABLET: 250-125 TAB at 08:07

## 2017-01-25 RX ADMIN — CEFTRIAXONE SODIUM 1 G: 1 INJECTION, POWDER, FOR SOLUTION INTRAMUSCULAR; INTRAVENOUS at 08:06

## 2017-01-25 RX ADMIN — FAMOTIDINE 20 MG: 20 TABLET, FILM COATED ORAL at 21:07

## 2017-01-25 RX ADMIN — HEPARIN SODIUM: 1000 INJECTION, SOLUTION INTRAVENOUS; SUBCUTANEOUS at 14:54

## 2017-01-25 RX ADMIN — ASPIRIN 325 MG: 325 TABLET, COATED ORAL at 08:08

## 2017-01-25 RX ADMIN — LIDOCAINE HYDROCHLORIDE: 20 INJECTION, SOLUTION INFILTRATION; PERINEURAL at 14:54

## 2017-01-25 RX ADMIN — DOBUTAMINE HYDROCHLORIDE 2.5 MCG/KG/MIN: 100 INJECTION INTRAVENOUS at 00:34

## 2017-01-25 ASSESSMENT — PATIENT HEALTH QUESTIONNAIRE - PHQ9
SUM OF ALL RESPONSES TO PHQ9 QUESTIONS 1 AND 2: 0
2. FEELING DOWN, DEPRESSED, IRRITABLE, OR HOPELESS: NOT AT ALL
SUM OF ALL RESPONSES TO PHQ QUESTIONS 1-9: 0
1. LITTLE INTEREST OR PLEASURE IN DOING THINGS: NOT AT ALL

## 2017-01-25 ASSESSMENT — ENCOUNTER SYMPTOMS
MYALGIAS: 0
TINGLING: 0
HEARTBURN: 0
NAUSEA: 0
WEIGHT LOSS: 0
VOMITING: 0
DIZZINESS: 0
NECK PAIN: 0
BLURRED VISION: 0
HEADACHES: 0
SHORTNESS OF BREATH: 1
COUGH: 1
WHEEZING: 1
DOUBLE VISION: 0
CHILLS: 1
FEVER: 0

## 2017-01-25 NOTE — PROGRESS NOTES
Procedure:  1.  LHC with LV gram  2.  Selective R and L coronary angiogram    Indications:  1.  Non-STEMI  2.  Respiratory Failure  3.  History of MR    Findings:  LV with EF 60-65% with MR 2+    LM - Normal  LAD - (tortous) Normal  LCX - Normal  RCA - Normal    Recommendation:  - Relatively normal coronary arteries.  Discuss with [facility] (Dr. Corbett) who has reviewed patient's echocardiogram, and believe that her mitral regurgitation is moderate-to-severe (chronic), and does not need surgery at this time which I am in agreement also.    - Recommend CT chest to rule out PE.  - Aggressive medical treatment:  Continue pressors support, and gentle diuresis.

## 2017-01-25 NOTE — H&P
[facility] Admitting History and Physical    Name Kristen Salazar     1950   Age/Sex 66 y.o. female   MRN [id number]   Code Status FULL     After 5PM or if no immediate response to page, please call for cross-coverage  Attending/Team: ALESIA ZARAGOZA/GONDA Call [phone] to page   1st Call - Day Intern (R1):   Callie 2nd Call - Day Sr. Resident (R2/R3):          Chief Complaint:    Exertional dyspnea with hypertensive emergency  and acute pulmonary edema     Diagnosis:  Acute hypoxemic respiratory failure  Acute Pulmonary edema due to severe MR in setting of Mitral valve prolapse  HTN emergency 208/121  Cardiogenic shock   Hyponatremia  Hypomagnesemia  Elevated troponin  Non-anion gap metabolic acidosis  Dyslipidemia  LAURI--on 2 L home oxygen  CAP/Leukocytosis  Hx of SDH-  Chronic back pain/knee/neck pain  Diarrhea     ID: 66-year-old female with past medical history of mitral valve prolapse, hypertension and chronic back pain and neck pain was admitted with exertional dyspnea hypertensive emergency and acute pulmonary edema.    History is obtained from patient's  at bedside since patient is intubated.  According to her  patient was doing fine until this afternoon and she was doing some skiing yesterday.  This morning she called her  that she is not feeling well and went to ER. She was found hypoxic, tachypneic, tachycardiac in ER with blood pressure 208/121, heart rate 135. She was found to have desaturation to 50%,73% 87% and was requiring 3-4    2 L oxygen. She is a 2 L oxygen at home. According to chart review patient also had loose stool this morning at  some chills and cough but denied any night sweats or fever. She was intubated, 1 L fluid given, nitroglycerin drip was started and she was transferred here with [facility].      Course at outside facility  Blood pressure 208/121, heart rate 135, oxygen saturation 50%/73%/87% 3-4 L oxygen, afebrile    WBC  10.2, hemoglobin 12.2, hematocrit 35.2, platelet 196  Lactic acid 0.9, troponin 0.04, d-dimer less than 136,  Sodium 129, potassium 2.7, chloride 97, CO2 25, we are 19, creatinine 0.9, liver enzymes within normal limits  She was given Rocephin, 1 L fluid intubated and was started on nitroglycerin drip.    Course in our ER,  Blood pressure 76/50, pulse 112, respiratory 22,   Central line was placed nitroglycerin was stopped and Levophed was started  Stat echocardiogram  Sodium 129, potassium 3.8, magnesium 1.9, phosphorus 4.6, CO2 is21,  Troponin 1.79,   WBC 14.7, hemoglobin 14.9, hematocrit 45.4, platelet count 222  UA: Small occult blood, WBC 0-2, RBC 20-50, bacteria few, hyaline casts 3-5 negative for nitrite and leukocyte esterase  TSH 3.2, influenza AB Negative  Arterial blood gas: 7.2/38.6/79     Chest x-ray: Suggestive of bilateral lower lobe infiltrates likely ARDS versus pulmonary edema  Echocardiogram:  patient is on Levophed at time of echocardiogram,    Hyperdynamic left ventricular systolic function, left ventricular ejection fraction 75%, posterior mitral wall prolapse with leaflet flail, severe mitral regurg, mild tricuspid regurg, RVSP 40    Review of Systems   Constitutional: Positive for chills. Negative for fever and weight loss.   HENT: Negative for hearing loss.    Eyes: Negative for blurred vision and double vision.   Respiratory: Positive for cough, shortness of breath and wheezing.    Gastrointestinal: Negative for heartburn, nausea and vomiting.   Genitourinary: Negative for dysuria and urgency.   Musculoskeletal: Negative for myalgias and neck pain.   Skin: Negative for itching and rash.   Neurological: Negative for dizziness, tingling and headaches.             Past Medical History:   Past Medical History   Diagnosis Date   • Allergy    • Arthritis    • Osteoporosis    • Heart murmur    • Fracture        Past Surgical History:  Past Surgical History   Procedure Laterality Date   •  "Knee arthroscopy         Current Outpatient Medications:  Home Medications     Reviewed by Birdie Schumacher (Pharmacy Tech) on 01/24/17 at 2307  Med List Status: Complete    Medication Last Dose Status    Calcium Carb-Cholecalciferol (OYSTER SHELL CALCIUM/VITAMIN D) 250-125 MG-UNIT Tab tablet 1/24/2017 Active    glucosamine Sulfate 500 MG CAPS 1/24/2017 Active    losartan (COZAAR) 25 MG Tab 1/24/2017 Active    multivitamin (THERAGRAN) TABS 1/24/2017 Active    pravastatin (PRAVACHOL) 20 MG Tab 1/24/2017 Active                Medication Allergy/Sensitivities:  Allergies   Allergen Reactions   • Codeine      vomiting   • Latex          Family History:  History reviewed. No pertinent family history.    Social History:  Social History     Social History   • Marital Status:      Spouse Name: N/A   • Number of Children: N/A   • Years of Education: N/A     Occupational History   • Not on file.     Social History Main Topics   • Smoking status: Never Smoker    • Smokeless tobacco: Never Used   • Alcohol Use: Yes      Comment: sometimes   • Drug Use: No   • Sexual Activity: Not on file     Other Topics Concern   • Not on file     Social History Narrative    smoking: Never smoker, alcohol: One glass of wine every now and then, and drugs: Denies  Lives with family  Living situation: Family  PCP :       Physical Exam     Filed Vitals:    01/24/17 2230 01/24/17 2245 01/24/17 2300 01/24/17 2310   BP:       Pulse:       Temp:       Resp: 20      Height:       Weight:       SpO2: 98% 97% 100% 99%     Body mass index is 19.85 kg/(m^2).  /74 mmHg  Pulse 106  Temp(Src) 37 °C (98.6 °F)  Resp 20  Ht 1.753 m (5' 9\")  Wt 61 kg (134 lb 7.7 oz)  BMI 19.85 kg/m2  SpO2 99%  LMP  (LMP Unknown)  O2 therapy: Pulse Oximetry: 99 %, FIO2%: 100, O2 Delivery: Ventilator    Physical Exam   Constitutional: She is well-developed, well-nourished, and in no distress.   HENT:   Head: Normocephalic.   Eyes: Pupils are equal, round, and " reactive to light.   Neck: No JVD present. No tracheal deviation present. No thyromegaly present.   Cardiovascular: Normal rate and regular rhythm.  Exam reveals no friction rub.    Murmur heard.  S3 gallop  PSM   Pulmonary/Chest: She is in respiratory distress. She has no wheezes. She has no rales.   Abdominal: She exhibits no distension. There is no tenderness. There is no rebound.   Musculoskeletal: She exhibits no edema.   Neurological:   Intubated/Sedated             Data Review       Old Records Request:   Completed  Current Records review and summary: Completed    Lab Data Review:  Recent Results (from the past 24 hour(s))   ISTAT ARTERIAL BLOOD GAS    Collection Time: 17  8:45 PM   Result Value Ref Range    Ph 7.236 (LL) 7.400 - 7.500    Pco2 48.0 (H) 26.0 - 37.0 mmHg    Po2 62 (L) 64 - 87 mmHg    Tco2 22 20 - 33 mmol/L    S02 87 (L) 93 - 99 %    Hco3 20.4 17.0 - 25.0 mmol/L    BE -7 (L) -4 - 3 mmol/L    Body Temp 98.6 F degrees    O2 Therapy 100 %    Ph Temp Alexandr 7.236 (LL) 7.400 - 7.500    Pco2 Temp Co 48.0 (H) 26.0 - 37.0 mmHg    Po2 Temp Cor 62 (L) 64 - 87 mmHg    Specimen Arterial    EKG (ER)    Collection Time: 17  8:46 PM   Result Value Ref Range    Report       [facility]    Test Date:  2017  Pt Name:    LILY SMARTRADHA             Department: ER  MRN:        [id number]                      Room:       [facility]  Gender:     F                            Technician: SUNIL  :        1950                   Requested By:LOCO SIGALA  Order #:    [id number]                    Reading MD:    Measurements  Intervals                                Axis  Rate:       103                          P:          65  CO:         164                          QRS:        -10  QRSD:       98                           T:          52  QT:         372  QTc:        487    Interpretive Statements  SINUS TACHYCARDIA  BORDERLINE PROLONGED QT INTERVAL  Compared to  ECG 02/07/2012 00:12:45  Sinus rhythm no longer present  Ventricular premature complex(es) no longer present     CBC WITH DIFFERENTIAL    Collection Time: 01/24/17  8:56 PM   Result Value Ref Range    WBC 14.7 (H) 4.8 - 10.8 K/uL    RBC 4.74 4.20 - 5.40 M/uL    Hemoglobin 14.9 12.0 - 16.0 g/dL    Hematocrit 45.4 37.0 - 47.0 %    MCV 95.8 81.4 - 97.8 fL    MCH 31.4 27.0 - 33.0 pg    MCHC 32.8 (L) 33.6 - 35.0 g/dL    RDW 48.0 35.9 - 50.0 fL    Platelet Count 222 164 - 446 K/uL    MPV 11.1 9.0 - 12.9 fL    Neutrophils-Polys 87.50 (H) 44.00 - 72.00 %    Lymphocytes 6.00 (L) 22.00 - 41.00 %    Monocytes 5.60 0.00 - 13.40 %    Eosinophils 0.10 0.00 - 6.90 %    Basophils 0.30 0.00 - 1.80 %    Immature Granulocytes 0.50 0.00 - 0.90 %    Nucleated RBC 0.00 /100 WBC    Neutrophils (Absolute) 12.86 (H) 2.00 - 7.15 K/uL    Lymphs (Absolute) 0.89 (L) 1.00 - 4.80 K/uL    Monos (Absolute) 0.83 0.00 - 0.85 K/uL    Eos (Absolute) 0.02 0.00 - 0.51 K/uL    Baso (Absolute) 0.05 0.00 - 0.12 K/uL    Immature Granulocytes (abs) 0.07 0.00 - 0.11 K/uL    NRBC (Absolute) 0.00 K/uL   COMP METABOLIC PANEL    Collection Time: 01/24/17  8:56 PM   Result Value Ref Range    Sodium 129 (L) 135 - 145 mmol/L    Potassium 3.8 3.6 - 5.5 mmol/L    Chloride 99 96 - 112 mmol/L    Co2 21 20 - 33 mmol/L    Anion Gap 9.0 0.0 - 11.9    Glucose 178 (H) 65 - 99 mg/dL    Bun 19 8 - 22 mg/dL    Creatinine 0.98 0.50 - 1.40 mg/dL    Calcium 8.3 (L) 8.5 - 10.5 mg/dL    AST(SGOT) 20 12 - 45 U/L    ALT(SGPT) 16 2 - 50 U/L    Alkaline Phosphatase 95 30 - 99 U/L    Total Bilirubin 0.6 0.1 - 1.5 mg/dL    Albumin 3.9 3.2 - 4.9 g/dL    Total Protein 6.6 6.0 - 8.2 g/dL    Globulin 2.7 1.9 - 3.5 g/dL    A-G Ratio 1.4 g/dL   TROPONIN    Collection Time: 01/24/17  8:56 PM   Result Value Ref Range    Troponin I 1.79 (H) 0.00 - 0.04 ng/mL   BTYPE NATRIURETIC PEPTIDE    Collection Time: 01/24/17  8:56 PM   Result Value Ref Range    B Natriuretic Peptide 309 (H) 0 - 100 pg/mL    PROTHROMBIN TIME (INR)    Collection Time: 01/24/17  8:56 PM   Result Value Ref Range    PT 13.1 12.0 - 14.6 sec    INR 0.96 0.87 - 1.13   APTT    Collection Time: 01/24/17  8:56 PM   Result Value Ref Range    APTT 25.6 24.7 - 36.0 sec   Influenza Rapid    Collection Time: 01/24/17  8:56 PM   Result Value Ref Range    Significant Indicator NEG     Source RESP     Site RESPIRATORY     Rapid Influenza A-B       Negative for Influenza A and Influenza B antigens.  Infection due to influenza A or B cannot be ruled out  since the antigen present in the specimen may be below the  detection limit of the test. Culture confirmation of  negative samples is recommended.     Influenza By PCR, A/B/H1N1    Collection Time: 01/24/17  8:56 PM   Result Value Ref Range    Influenza virus A RNA Negative Negative    Influenza virus B RNA Negative Negative    Influenza A 2009, H1N1, PCR Not Detected Negative   MAGNESIUM    Collection Time: 01/24/17  8:56 PM   Result Value Ref Range    Magnesium 1.9 1.5 - 2.5 mg/dL   PHOSPHORUS    Collection Time: 01/24/17  8:56 PM   Result Value Ref Range    Phosphorus 4.6 (H) 2.5 - 4.5 mg/dL   ESTIMATED GFR    Collection Time: 01/24/17  8:56 PM   Result Value Ref Range    GFR If African American >60 >60 mL/min/1.73 m 2    GFR If Non  56 (A) >60 mL/min/1.73 m 2   Hemoglobin A1c    Collection Time: 01/24/17  8:56 PM   Result Value Ref Range    Glycohemoglobin 5.5 0.0 - 5.6 %    Est Avg Glucose 111 mg/dL   TSH (Thyroid Stimulating Hormone)    Collection Time: 01/24/17  8:56 PM   Result Value Ref Range    TSH 3.220 0.300 - 3.700 uIU/mL   ECHOCARDIOGRAM COMP W/O CONT    Collection Time: 01/24/17  9:47 PM   Result Value Ref Range    Eject.Frac. MOD BP 80.65     Eject.Frac. MOD 4C 76.68     Eject.Frac. MOD 2C 83.14     Left Ventrical Ejection Fraction 75    ISTAT ARTERIAL BLOOD GAS    Collection Time: 01/24/17 10:28 PM   Result Value Ref Range    Ph 7.284 (LL) 7.400 - 7.500    Pco2 38.6 (H)  26.0 - 37.0 mmHg    Po2 79 64 - 87 mmHg    Tco2 19 (L) 20 - 33 mmol/L    S02 94 93 - 99 %    Hco3 18.3 17.0 - 25.0 mmol/L    BE -8 (L) -4 - 3 mmol/L    Body Temp 37.0 C degrees    O2 Therapy 100 %    Ph Temp Alexandr 7.284 (LL) 7.400 - 7.500    Pco2 Temp Co 38.6 (H) 26.0 - 37.0 mmHg    Po2 Temp Cor 79 64 - 87 mmHg    Specimen Arterial    Urinalysis    Collection Time: 01/24/17 10:39 PM   Result Value Ref Range    Micro Urine Req Microscopic     Color Straw     Character Clear     Specific Gravity 1.005 <1.035    Ph 5.0 5.0-8.0    Glucose Negative Negative mg/dL    Ketones Negative Negative mg/dL    Protein Negative Negative mg/dL    Bilirubin Negative Negative    Nitrite Negative Negative    Leukocyte Esterase Negative Negative    Occult Blood Small (A) Negative   LACTIC ACID    Collection Time: 01/24/17 10:39 PM   Result Value Ref Range    Lactic Acid 1.6 0.5 - 2.0 mmol/L   D-DIMER    Collection Time: 01/24/17 10:39 PM   Result Value Ref Range    D-Dimer Screen 1543 (H) <250 ng/mL(D-DU)   URINE MICROSCOPIC (W/UA)    Collection Time: 01/24/17 10:39 PM   Result Value Ref Range    WBC 0-2 /hpf    RBC 20-50 (A) /hpf    Bacteria Few (A) None /hpf    Epithelial Cells Few /hpf    Hyaline Cast 3-5 (A) /lpf       Imaging/Procedures Review:    ndependant Imaging Review: Completed  ECHOCARDIOGRAM COMP W/O CONT   Final Result      DX-CHEST-PORTABLE (1 VIEW)   Final Result         1. Right central venous catheter with tip in the mid SVC.      OUTSIDE IMAGES-DX CHEST   Preliminary Result      OUTSIDE IMAGES-DX CHEST   Preliminary Result      DX-CHEST-PORTABLE (1 VIEW)   Final Result         1. Extensive groundglass and airspace opacities throughout both lungs, worse in the bilateral lower lungs. This could be seen with multifocal pneumonia, ARDS or pulmonary edema.   2. Endotracheal tube with tip in the mid thoracic trachea.      DX-CHEST-PORTABLE (1 VIEW)    (Results Pending)        EKG:  Heart rate 103, , weight 164, QTC  487 no acute ST/T-segment changes,   EKG Independant Review: Completed    (x) Records reviewed and summarized in current documentation       Assessment/Plan     No new assessment & plan notes have been filed under this hospital service since the last note was generated.  Service: MEDICAL      Anticipated Hospital stay:  >2 midnights     Acute hypoxemic respiratory failure  Acute Pulmonary edema due to severe MR in setting of Mitral valve prolapse  HTN emergency 208/121  Elevated troponin  Cardiogenic shock  Due to acute pulmonary edema due to severe MR  Bump in troponin is likely due to pulmonary edema and demand ischemia and shock  No EKG changes  Bedside echocardiogram done, cardiology on board  Currently on pressors Levophed, Dobutamine  The patient will require KAREN, angiogram and mitral valve repair/replacement  Stopped Nitroglycerin  Trend Trops and EKgs     Hyponatremia   repeat BMP in the morning  Due to volume overload due to acute pulmonary edema    Hypomagnesemia  2 g IV    Non-anion gap metabolic acidosis  Diarrhea  Likely due to diarrhea  Repeat CMP in the morning  Received 1 L of fluid  Strict I's and O's  No further fluids    Dyslipidemia  Repeat lipid panel  Continue pravastatin 20    LAURI--on 2 L home oxygen  CAP/Leukocytosis  Tachycardia tachypnea and hypoxia is due to pulmonary edema but has symptoms of cough and chills  Started on CT and doxycycline can de-escalate when stable--    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

## 2017-01-25 NOTE — PROGRESS NOTES
Pt will be transferred to [facility] for continued care. Report called to Dr. Ramirez. Please Contact for further questions.

## 2017-01-25 NOTE — ED PROVIDER NOTES
ED Provider Note    Scribed for Walter Riggins D.O. by Shauna Borrego. 1/24/2017  8:37 PM    Primary care provider: Young Salinas M.D.  Means of arrival: EMS  History obtained from: EMS  History limited by: Intubated     CHIEF COMPLAINT  Chief Complaint   Patient presents with   • Respiratory Distress     HPI  Kristen Salazar is a 66 y.o. female with history of COPD who presents to the Emergency Department by [facility] from [facility]. Patient was in severe respiratory distress and was intubated prior to transport. She was given 1 liter NS, 1 g rocephin and was started on Nitro and propofol when intubated. Per EMS, the patient's  reported the patient had been short of breath for the past three days. Other symptoms include chills and nonproductive cough. Per report, the patient's  denied her having fever, chest pain or back pain. She is a non-smoker. No further details of history of present illness can be obtained within the constraints of the patient's intubated and sedated state.     REVIEW OF SYSTEMS  No further details of the review of systems can be obtained within the constraints of the patient's intubated and sedated state.     PAST MEDICAL HISTORY  Past Medical History   Diagnosis Date   • Allergy    • Arthritis    • Osteoporosis    • Heart murmur    • Fracture      SURGICAL HISTORY  Past Surgical History   Procedure Laterality Date   • Knee arthroscopy        SOCIAL HISTORY  Social History   Substance Use Topics   • Smoking status: Never Smoker    • Smokeless tobacco: Never Used   • Alcohol Use: Yes      Comment: sometimes      History   Drug Use No     FAMILY HISTORY  History reviewed. No pertinent family history.    CURRENT MEDICATIONS  Home Medications     Reviewed by Birdie Schumacher (Pharmacy Tech) on 01/24/17 at 2307  Med List Status: Complete    Medication Last Dose Status    Calcium Carb-Cholecalciferol (OYSTER SHELL CALCIUM/VITAMIN D) 250-125 MG-UNIT Tab tablet  "1/24/2017 Active    glucosamine Sulfate 500 MG CAPS 1/24/2017 Active    losartan (COZAAR) 25 MG Tab 1/24/2017 Active    multivitamin (THERAGRAN) TABS 1/24/2017 Active    pravastatin (PRAVACHOL) 20 MG Tab 1/24/2017 Active              ALLERGIES  Allergies   Allergen Reactions   • Codeine      vomiting   • Latex        PHYSICAL EXAM  VITAL SIGNS: /74 mmHg  Pulse 106  Temp(Src) 37 °C (98.6 °F)  Resp 22  Ht 1.753 m (5' 9\")  Wt 61 kg (134 lb 7.7 oz)  BMI 19.85 kg/m2  SpO2 90%  LMP  (LMP Unknown)    Nursing notes and vitals reviewed.  Constitutional: Well developed, Well nourished. Intubated.   Eyes: PERRLA, EOMI, Conjunctiva normal, No discharge.   Cardiovascular: Normal heart rate, Normal rhythm, No murmurs, No rubs, No gallops.   Thorax & Lungs: Rales and rhonchi bilaterally, positive chest rise with ventilator  Abdomen: Bowel sounds normal, Soft.  Skin: Warm, Dry, ecchymosis like to bilateral lower extremities  Musculoskeletal: Intact distal pulses, No edema, No cyanosis, No clubbing.   Neurologic: Unable to assess.   Psychiatric: Unable to assess.     DIAGNOSTIC STUDIES/PROCEDURES    LABS  Results for orders placed or performed during the hospital encounter of 01/24/17   CBC WITH DIFFERENTIAL   Result Value Ref Range    WBC 14.7 (H) 4.8 - 10.8 K/uL    RBC 4.74 4.20 - 5.40 M/uL    Hemoglobin 14.9 12.0 - 16.0 g/dL    Hematocrit 45.4 37.0 - 47.0 %    MCV 95.8 81.4 - 97.8 fL    MCH 31.4 27.0 - 33.0 pg    MCHC 32.8 (L) 33.6 - 35.0 g/dL    RDW 48.0 35.9 - 50.0 fL    Platelet Count 222 164 - 446 K/uL    MPV 11.1 9.0 - 12.9 fL    Neutrophils-Polys 87.50 (H) 44.00 - 72.00 %    Lymphocytes 6.00 (L) 22.00 - 41.00 %    Monocytes 5.60 0.00 - 13.40 %    Eosinophils 0.10 0.00 - 6.90 %    Basophils 0.30 0.00 - 1.80 %    Immature Granulocytes 0.50 0.00 - 0.90 %    Nucleated RBC 0.00 /100 WBC    Neutrophils (Absolute) 12.86 (H) 2.00 - 7.15 K/uL    Lymphs (Absolute) 0.89 (L) 1.00 - 4.80 K/uL    Monos (Absolute) 0.83 0.00 " - 0.85 K/uL    Eos (Absolute) 0.02 0.00 - 0.51 K/uL    Baso (Absolute) 0.05 0.00 - 0.12 K/uL    Immature Granulocytes (abs) 0.07 0.00 - 0.11 K/uL    NRBC (Absolute) 0.00 K/uL   COMP METABOLIC PANEL   Result Value Ref Range    Sodium 129 (L) 135 - 145 mmol/L    Potassium 3.8 3.6 - 5.5 mmol/L    Chloride 99 96 - 112 mmol/L    Co2 21 20 - 33 mmol/L    Anion Gap 9.0 0.0 - 11.9    Glucose 178 (H) 65 - 99 mg/dL    Bun 19 8 - 22 mg/dL    Creatinine 0.98 0.50 - 1.40 mg/dL    Calcium 8.3 (L) 8.5 - 10.5 mg/dL    AST(SGOT) 20 12 - 45 U/L    ALT(SGPT) 16 2 - 50 U/L    Alkaline Phosphatase 95 30 - 99 U/L    Total Bilirubin 0.6 0.1 - 1.5 mg/dL    Albumin 3.9 3.2 - 4.9 g/dL    Total Protein 6.6 6.0 - 8.2 g/dL    Globulin 2.7 1.9 - 3.5 g/dL    A-G Ratio 1.4 g/dL   TROPONIN   Result Value Ref Range    Troponin I 1.79 (H) 0.00 - 0.04 ng/mL   BTYPE NATRIURETIC PEPTIDE   Result Value Ref Range    B Natriuretic Peptide 309 (H) 0 - 100 pg/mL   PROTHROMBIN TIME (INR)   Result Value Ref Range    PT 13.1 12.0 - 14.6 sec    INR 0.96 0.87 - 1.13   APTT   Result Value Ref Range    APTT 25.6 24.7 - 36.0 sec   Influenza Rapid   Result Value Ref Range    Significant Indicator NEG     Source RESP     Site RESPIRATORY     Rapid Influenza A-B       Negative for Influenza A and Influenza B antigens.  Infection due to influenza A or B cannot be ruled out  since the antigen present in the specimen may be below the  detection limit of the test. Culture confirmation of  negative samples is recommended.     Influenza By PCR, A/B/H1N1   Result Value Ref Range    Influenza virus A RNA Negative Negative    Influenza virus B RNA Negative Negative    Influenza A 2009, H1N1, PCR Not Detected Negative   MAGNESIUM   Result Value Ref Range    Magnesium 1.9 1.5 - 2.5 mg/dL   PHOSPHORUS   Result Value Ref Range    Phosphorus 4.6 (H) 2.5 - 4.5 mg/dL   ESTIMATED GFR   Result Value Ref Range    GFR If African American >60 >60 mL/min/1.73 m 2    GFR If Non African  American 56 (A) >60 mL/min/1.73 m 2   Urinalysis   Result Value Ref Range    Micro Urine Req Microscopic     Color Straw     Character Clear     Specific Gravity 1.005 <1.035    Ph 5.0 5.0-8.0    Glucose Negative Negative mg/dL    Ketones Negative Negative mg/dL    Protein Negative Negative mg/dL    Bilirubin Negative Negative    Nitrite Negative Negative    Leukocyte Esterase Negative Negative    Occult Blood Small (A) Negative   Hemoglobin A1c   Result Value Ref Range    Glycohemoglobin 5.5 0.0 - 5.6 %    Est Avg Glucose 111 mg/dL   TSH (Thyroid Stimulating Hormone)   Result Value Ref Range    TSH 3.220 0.300 - 3.700 uIU/mL   LACTIC ACID   Result Value Ref Range    Lactic Acid 1.6 0.5 - 2.0 mmol/L   D-DIMER   Result Value Ref Range    D-Dimer Screen 1543 (H) <250 ng/mL(D-DU)   URINE MICROSCOPIC (W/UA)   Result Value Ref Range    WBC 0-2 /hpf    RBC 20-50 (A) /hpf    Bacteria Few (A) None /hpf    Epithelial Cells Few /hpf    Hyaline Cast 3-5 (A) /lpf   EKG (ER)   Result Value Ref Range    Report       [facility]    Test Date:  2017  Pt Name:    LILY ARGUETA             Department: ER  MRN:        [id number]                      Room:       [facility]  Gender:     F                            Technician: SUNIL  :        [id number]                   Requested By:LOCO SIGALA  Order #:    [id number]                    Reading MD:    Measurements  Intervals                                Axis  Rate:       103                          P:          65  MA:         164                          QRS:        -10  QRSD:       98                           T:          52  QT:         372  QTc:        487    Interpretive Statements  SINUS TACHYCARDIA  BORDERLINE PROLONGED QT INTERVAL  Compared to ECG 2012 00:12:45  Sinus rhythm no longer present  Ventricular premature complex(es) no longer present     ECHOCARDIOGRAM COMP W/O CONT   Result Value Ref Range    Eject.Frac. MOD BP  80.65     Eject.Frac. MOD 4C 76.68     Eject.Frac. MOD 2C 83.14     Left Ventrical Ejection Fraction 75    ISTAT ARTERIAL BLOOD GAS   Result Value Ref Range    Ph 7.236 (LL) 7.400 - 7.500    Pco2 48.0 (H) 26.0 - 37.0 mmHg    Po2 62 (L) 64 - 87 mmHg    Tco2 22 20 - 33 mmol/L    S02 87 (L) 93 - 99 %    Hco3 20.4 17.0 - 25.0 mmol/L    BE -7 (L) -4 - 3 mmol/L    Body Temp 98.6 F degrees    O2 Therapy 100 %    Ph Temp Alexandr 7.236 (LL) 7.400 - 7.500    Pco2 Temp Co 48.0 (H) 26.0 - 37.0 mmHg    Po2 Temp Cor 62 (L) 64 - 87 mmHg    Specimen Arterial    ISTAT ARTERIAL BLOOD GAS   Result Value Ref Range    Ph 7.284 (LL) 7.400 - 7.500    Pco2 38.6 (H) 26.0 - 37.0 mmHg    Po2 79 64 - 87 mmHg    Tco2 19 (L) 20 - 33 mmol/L    S02 94 93 - 99 %    Hco3 18.3 17.0 - 25.0 mmol/L    BE -8 (L) -4 - 3 mmol/L    Body Temp 37.0 C degrees    O2 Therapy 100 %    Ph Temp Alexandr 7.284 (LL) 7.400 - 7.500    Pco2 Temp Co 38.6 (H) 26.0 - 37.0 mmHg    Po2 Temp Cor 79 64 - 87 mmHg    Specimen Arterial       All labs reviewed by me.    RADIOLOGY  ECHOCARDIOGRAM COMP W/O CONT   Final Result      DX-CHEST-PORTABLE (1 VIEW)   Final Result         1. Right central venous catheter with tip in the mid SVC.      OUTSIDE IMAGES-DX CHEST   Preliminary Result      OUTSIDE IMAGES-DX CHEST   Preliminary Result      DX-CHEST-PORTABLE (1 VIEW)   Final Result         1. Extensive groundglass and airspace opacities throughout both lungs, worse in the bilateral lower lungs. This could be seen with multifocal pneumonia, ARDS or pulmonary edema.   2. Endotracheal tube with tip in the mid thoracic trachea.      DX-CHEST-PORTABLE (1 VIEW)    (Results Pending)     The radiologist's interpretation of all radiological studies have been reviewed by me.    EKG  12 lead EKG interpreted by me.   Rhythm:  Sinus tachycardia  Rate: 103  Axis: Normal  Ectopy: None  Conduction: Normal  ST Segments: No acute change  T Waves: No acute change  Q Waves: None  Clinical Impression: No ST  elevation myocardial infarction.   Comparison: No acute changes from previous EKG.    Central Line Placement Procedure Note  Indication: vascular access, poor peripheral access, central venous monitoring and centrally administered medications    Consent: Unable to be obtained due to patient's condition.    Procedure: The patient was positioned appropriately and the skin over the right internal jugular vein was prepped with Chloraprep. Local anesthesia was not performed due to the emergent nature of this procedure.  A large bore needle was used to identify the vein.  A guide wire was then inserted into the vein through the needle. A triple lumen catheter was then inserted into the vessel over the guide wire using the Seldinger technique.  All ports showed good, free flowing blood return and were flushed with saline solution.  The catheter was then securely fastened to the skin with sutures and covered with a sterile dressing.  A post procedure X-ray was ordered and showed good line position.    The patient tolerated the procedure well.    Complications: None    COURSE & MEDICAL DECISION MAKING  Pertinent Labs & Imaging studies reviewed. (See chart for details)    8:29 PM - Called acutely to bedside. EMS, pharmacy and respiratory at bedside. Patient will be treated with diprivan injection and 50 mg nitroglycerin. Ordered chest x-ray, CMP, triglyceride, troponin and other labs to evaluate her symptoms.     8:48 PM Consulted with pulmonary, Dr. Gonda and discussed patient's condition.     8:49 PM Evaluated patient's EKG. See above.     8:51 PM Reviewed patient's labs from previous facility for comparison which shows negative troponin, negative D-dimer, negative lactic acid, CMP showed sodium 129 and CBC was normal.     8:56 PM Consulted with cardiology, Dr. Delarosa and discussed patient's condition.     9:01 PM Consulted with nursing and respiratory. Patient is hypoxic and hypotensive at this time. Ordered  echocardiogram.     9:10 PM Consulted with [facility] Team, Dr. Pérez and discussed patient's condition. He will admit the patient.     9:15 PM Called to ensure echocardiogram is on their way to the patient.     9:17 PM Echocardiogram confirmed.     9:29 PM Placed central line at bedside. See procedure note above.     9:42 PM Ordered chest x-ray.     CRITICAL CARE  The very real possibilty of a deterioration of this patient's condition required the highest level of my preparedness for sudden, emergent intervention.  I provided critical care services, which included medication orders, frequent reevaluations of the patient's condition and response to treatment, ordering and reviewing test results, and discussing the case with various consultants.  The critical care time associated with the care of the patient was 35 minutes. Review chart for interventions. This time is exclusive of any other billable procedures.     This is a 58-year-old female presents with severe respiratory distress. The patient is initially intubated and transferred her facility. Upon arrival, the patient is extremely agitated, she is hypertensive, tachypneic and hypoxic. Initial x-ray reveals evidence of possible/probable pulmonary edema although infiltrate cannot be excluded. The patient had an outlying lactic acid that was negative and negative lactic acid here, she has a leukocytosis of 14,700 with a significant left-sided shift. Blood cultures have been obtained here in our emergency department for possible pneumonia. As for her Hemovac instability, patient became hypotensive with a blood pressure 70 systolic therefore central venous access obtained and the patient is placed on a leave of drip to titrate for a MAP greater than 65. Fortunately after IV fluids immediately fed the patient became normotensive. As her respiration status, she is hypoxic even though she is under there are status. I am unsure eren the etiology of her hypoxia. She may  have a pulmonary embolism, pneumonia, heart failure. I emergently consulted Dr. Delarosa for stat echocardiogram for possible valve rupture or congestive heart failure. The patient does not have ST elevation myocardial infarction EKG yet the patient does have an elevated troponin 1.79 I am unable to give the patient aspirin and cardiology has intervened at this point. In addition I consulted critical care Dr. Gonda for ventilatory support and critical care management. The patient is critically ill, she is responded to IV fluids and IV vasopressin as, it is possible she has a pneumonia is atypical at this point has received anabiotic. The patient's care has been seen by Dr. Govea at this point time.      CRITICAL CARE  The very real possibilty of a deterioration of this patient's condition required the highest level of my preparedness for sudden, emergent intervention.  I provided critical care services, which included medication orders, frequent reevaluations of the patient's condition and response to treatment, ordering and reviewing test results, and discussing the case with various consultants.  The critical care time associated with the care of the patient was 35 minutes. Review chart for interventions. This time is exclusive of any other billable procedures.     DISPOSITION:  Patient will be admitted to the ICU in guarded condition.    FINAL IMPRESSION  Respiratory failure  Pneumonia  Non-ST elevation myocardial infarction  Central venous access  Critical care time 35 minutes     IShauna (Marifer), am scribing for, and in the presence of, Walter Riggins D.O    Electronically signed by: Shauna Borrego (Marifer), 1/24/2017    Walter YU D.O. personally performed the services described in this documentation, as scribed by Shauna Borrego in my presence, and it is both accurate and complete.    The note accurately reflects work and decisions made by me.  Walter Riggins  1/25/2017  1:08  AM

## 2017-01-25 NOTE — H&P
CHIEF COMPLAINT:  Shortness of breath.    HISTORY OF PRESENT ILLNESS:  The patient is a 66-year-old female with a past   medical history significant for hypertension, hyperlipidemia, and mitral valve   prolapse who was in her usual state of health yesterday and in fact when   skiing all day with her .  This morning, she reported to her    that she was not feeling well with a nonproductive cough and some mild   shortness of breath.  She checked her oxygen saturation at home and found to   be 85%.  She presented to the emergency department at [facility]   where unfortunately she decompensated quickly requiring up to 15 L facemask   and then eventually requiring intubation.  She was initially hypertensive and   tachycardic with an oxygen saturation of 86% on room air at the outside   hospital and was transported on high peak and expiratory pressures on the   ventilator with an FIO2 of 100%, achieving saturations in the low 90s.  She   became progressively more hypotensive at this point and has been started on a   norepinephrine drip.  A stat bedside echo was performed with Dr. Delarosa, which   revealed a ruptured mitral valve leaflet with severe MR as the likely cause   of her decompensation.  She does have an elevated white count and temperature   up to 99 degrees and was started on empiric antibiotics in the meantime.  Per   her , she has not had any recent sick contacts and has been compliant   with her medications and was not having any chest pain or syncope or   palpitations.  Also no lower extremity edema.    PAST MEDICAL HISTORY:  1.  Systemic arterial hypertension.  2.  Hyperlipidemia.  3.  Mitral valve prolapse followed by Dr. Landin outpatient with last   checkup three months ago that was stable per .  4.  Nocturnal hypoxemia, on supplemental oxygen 2 liters per minute at night.  5.  Urinary tract infection.  6.  Subdural hematoma.    ALLERGIES:  TO CODEINE AND  LATEX.    OUTPATIENT MEDICATIONS:  Include pravastatin, and losartan.    PAST SURGICAL HISTORY:  Remarkable for subdural hematoma evacuation and knee   surgery.    SOCIAL HISTORY:  Remarkable for 10 years of minimal tobacco use.  No alcohol   nor illicit drugs and lives in [place], [place] with her .    FAMILY HISTORY:  Noncontributory.    REVIEW OF SYSTEMS:  Please see history of present illness, otherwise unable to   obtain additional given patient's current clinical condition.    PHYSICAL EXAMINATION:  VITAL SIGNS:  Temperature 37 degrees Celsius, heart rate of 106, respiratory   rate of 25 with an oxygen saturation of 100% on 100% FIO2, and blood pressure   102/63 on norepinephrine at 15 mcg per kilogram per minute.  GENERAL:  Well-developed, well-nourished female, sedated and on mechanical   ventilatory support.  HEENT:  Normocephalic and atraumatic.  Pupils are equal, round, and reactive   to light without scleral icterus or conjunctival pallor.  Moist oral mucosal   membranes with an endotracheal tube and a gastric tube in position without   evidence of intraoral trauma.  NECK:  Supple.  Trachea is midline.  There is no stridor or obvious jugular   venous distention.  CARDIOVASCULAR:  Holosystolic loud murmur in the left sternal border radiating   to the neck with a nondisplaced PMI.  Radial pulses are 2+ and symmetric,   brisk capillary refill on a Levophed drip at 15 mcg per kilogram per minute.  PULMONARY:  Coarse crackles throughout bilateral lung fields, worse in the   bases without consolidative changes or wheeze.  Breathing comfortably with the   ventilator at a mode of CMV a set rate of 20, tidal volume of 400, PEEP of   12, and 100% FIO2 with peak pressures in the mid 20s.  ABDOMEN:  Soft, nontender, and nondistended.  Positive bowel sounds.  GENITOURINARY:  Normal external female genitalia with Frank catheter in place.  EXTREMITIES:  No clubbing, cyanosis, or edema.  No calf asymmetry and  no   palpable cord.  NEUROLOGIC:  Patient is heavily sedated on a propofol drip, but does arouse   with sedation wean following commands, moving all extremities without focal   deficits.  SKIN:  Warm, pink, and dry without lesions or rash.    LABORATORY DATA:  CBC with a white count of 15,000, hemoglobin of 15,   platelets of 222,000 with a left shift on the differential.  Chemistries   remarkable for a sodium of 129, a potassium of 3.8, chloride 99, bicarbonate   of 21, BUN at 19, creatinine 0.98 with a glucose of 178, calcium of 8.3 with   LFTs being normal, phosphorus of 4.6, magnesium 1.9, lactic acid of 1.6,   glycohemoglobin of 5.5, troponin of 1.79, up from 0.04, and a brain   natriuretic peptide of 309 up from 288.  INR of 0.96.  D-dimer 1543.    Urinalysis showing small occult blood, negative nitrite, negative leukocyte   esterase, and 0-2 white cells.  Arterial blood gas, pH of 7.28, pCO2 of 39,   PaO2 of 79.  Influenza screen negative.    IMAGIN.  Chest x-ray showing diffuse bilateral infiltrates, worse in the right   lower lobe with a small left pleural effusion.  Endotracheal tube and the   internal jugular central venous catheter in good position without evidence of   pneumothorax.  2.  Echocardiogram showing hyperdynamic left ventricular systolic function   with an ejection fraction greater than 75% and posterior mitral valve prolapse   with probable flail leaflet with severe mitral regurgitation, mild tricuspid   regurgitation, and a right ventricular systolic pressure estimated at 40 mmHg.  3.  EKG sinus tachycardia at a rate of 103 with minimal lateral ST depression   and a QTC interval prolonged at 487.    ASSESSMENT:  1.  Acute hypoxemic respiratory failure requiring mechanical ventilatory   support.  2.  Diffuse acute cardiogenic pulmonary edema with severe A-a gradient.  3.  Mitral valve prolapse with ruptured leaflets and resultant severe mitral   regurgitation.  4.  Undifferentiated  shock requiring vasopressor support.  5.  Partially compensated metabolic acidosis.  6.  Leukocytosis, likely reactive, although will cover with empiric   antibiotics.  7.  Hyponatremia.  8.  Hypomagnesemia and hypocalcemia.  9.  Elevated troponin likely secondary to demand ischemia.  10.  Elevated D-dimer as an inflammatory marker.  Doubt pulmonary   embolism/deep venous thrombosis with a Wells score of 0.    PLAN:  Patient is critically ill at this time and will be admitted to the   intensive care unit for close observation and management.  She will be   continued on full mechanical ventilatory support with attempts to wean her   inspired oxygen fraction to keep saturations greater than 92%.  We will   increase her mandatory minute ventilation to help compensate for her metabolic   acidosis and maintain higher peak and expiratory pressures given her   pulmonary edema.  She will be kept on a norepinephrine support to maintain a   mean arterial pressure greater than 65 and may need the addition of low dose   Lasix drip to help diurese her pulmonary edema.  Cardiology is involved in her   case and we appreciate their assistance and will need to involve   cardiothoracic surgery as well for possible operative intervention of her   mitral valve.  She will be continued on empiric antibiotics at this time   including Rocephin and doxycycline given her prolonged QTC interval and her   calcium and magnesium will be replaced and her other electrolytes will be   monitored closely.  A Cortrak will be placed to initiate enteral nutrition.    She will be kept on DVT and GI prophylaxis per ICU protocol.  This assessment   and plan was discussed with the patient's  at bedside, the nurse,   respiratory therapy, and Dr. Riggins, as well as Dr. Delarosa and the [facility]   resident.  Patient is critically ill at this time and we appreciate the   opportunity to assist in her care and will continue to follow along closely   with  you.    Critical care time 45 minutes.  No time overlap.  Procedures are not included   in this time.    [id number].       ____________________________________     Jeremy Gonda, MD JG / JANET    DD:  01/24/2017 23:34:08  DT:  01/25/2017 02:02:36    D#:  [id number]  Job#:  [id number]

## 2017-01-25 NOTE — ED NOTES
Med rec updated and complete.  Allergies reviewed.  Met with family at bedside.  Pt unable to participate in an  Interview at this time.  No antibiotics in last 30 days.

## 2017-01-25 NOTE — PROGRESS NOTES
Pulmonary Critical Care Progress Note        Date of Service: 1/25/2017  Chief Complaint: Worsening dyspnea and acute hypoxic respiratory failure    History of Present Illness: The patient is a 66-year-old female with a past medical history significant for hypertension, hyperlipidemia, and mitral valve prolapse who was in her usual state of health on 1/24 when she noted a cough and sudden onset of shortness of breath.  She checked her oxygen saturation at home and found to be 85%. She presented to the emergency department at [facility] where unfortunately she decompensated quickly requiring up to 15 L facemask   and then eventually requiring intubation. She was initially hypertensive and tachycardic with an oxygen saturation of 86% on room air at the outside hospital and was transported on high peak and expiratory pressures on the ventilator with an FIO2 of 100%, achieving saturations in the low 90s. She became progressively more hypotensive requiring a norepinephrine drip. A stat bedside echo was performed with Dr. Delarosa in the ER, which revealed a ruptured mitral valve leaflet with severe MR as the likely cause of her decompensation.     ROS: Unable to obtain as the patient is sedated on the ventilator     Interval Events:  24 hour interval history reviewed    - Troponin cont to rise   - [facility] to evaluate patient and will go for cardiac catheterization today    PFSH:  No change.    Respiratory:  Quinones Vent Mode: APVCMV, Rate (breaths/min): 24, Vt Target (mL): 400, PEEP/CPAP: 12, FiO2: 100, Static Compliance (ml / cm H2O): 48, Control VTE (exp VT): 388  Pulse Oximetry: 100 %    Exam: clear to auscultation without rales or wheezes  ImagingCXR  I have personally reviewed the chest x-ray my impression is   there is no change from prior image bilateral pulmonary edema    CTA chest 1/25:  1.  No evidence pulmonary emboli.  2.  Moderate right and small left pleural effusions.  3.   Compressive atelectasis both lower lobes especially the left and left lung volume loss with mediastinal shift towards left.  4.  Patchy groundglass opacities within the right upper lobe consistent with focal areas of pneumonitis.  5.  7.5 mm left upper lobe nodule and smaller nodules within the lungs.  Recent Labs      01/24/17 2045  01/24/17   2228   ISTATAPH  7.236*  7.284*   ISTATAPCO2  48.0*  38.6*   ISTATAPO2  62*  79   ISTATATCO2  22  19*   [id number]  87*  94   ISTATARTHCO3  20.4  18.3   ISTATARTBE  -7*  -8*   ISTATTEMP  98.6 F  37.0 C   ISTATFIO2  100  100   ISTATSPEC  Arterial  Arterial   ISTATAPHTC  7.236*  7.284*   [id number]  62*  79       HemoDynamics:  Pulse: 95, Heart Rate (Monitored): 94  Blood Pressure : 105/74 mmHg, NIBP: (!) 93/52 mmHg     Levophed - 20  Dobutamine - 2.5  Exam: 3/6 holosystolic murmur heard throughout the precordium, regular rate and rhythm  Imaging: echo Reviewed     ECHO from 1/24  CONCLUSIONS  Hyperdynamic left ventricular systolic function.  Left ventricular ejection fraction is visually estimated to be greater   than 75%.  Posterior mitral valve prolapse with probable flail leaflet.  Severe mitral regurgitation.  Mild tricuspid regurgitation.  Right ventricular systolic pressure is estimated to be 40 mmHg  Recent Labs      01/24/17 2056  01/25/17   0304   TROPONINI  1.79*  4.23*   BNPBTYPENAT  309*   --        Neuro:  GCS Total Kali Coma Score: 7     Propofol - 70  Exam: Heavily sedated  Imaging: Available data reviewed    Fluids:  Intake/Output       01/23/17 0700 - 01/24/17 0659 (Not Admitted) 01/24/17 0700 - 01/25/17 0659 01/25/17 0700 - 01/26/17 0659      [id number] 3884-4002 Total 0073-5217 9194-1704 Total [id number] 1900-0659 Total       Intake    I.V.  --  -- --  --  366.8 366.8  --  -- --    Propofol Volume -- -- -- -- 137.8 137.8 -- -- --    Norepinephrine Volume -- -- -- -- 197.4 197.4 -- -- --    Dobutamine Volume -- -- -- -- 31.6 31.6 -- -- --    Total  Intake -- -- -- -- 366.8 366.8 -- -- --       Output    Urine  --  -- --  --  275 275  --  -- --    Indwelling Cathether -- -- -- -- 275 275 -- -- --    Stool  --  -- --  --  -- --  --  -- --    Number of Times Stooled -- -- -- -- 0 x 0 x -- -- --    Total Output -- -- -- -- 275 275 -- -- --       Net I/O     -- -- -- -- 91.8 91.8 -- -- --        Weight: 64.5 kg (142 lb 3.2 oz)  Recent Labs      17   SODIUM  129*   POTASSIUM  3.8   CHLORIDE  99   CO2  21   BUN  19   CREATININE  0.98   MAGNESIUM  1.9   PHOSPHORUS  4.6*   CALCIUM  8.3*       GI/Nutrition:  Exam: abdomen is soft and non-tender, normal active bowel sounds  Imaging: Available data reviewed  NPO  Liver Function  Recent Labs      17   ALTSGPT  16   ASTSGOT  20   ALKPHOSPHAT  95   TBILIRUBIN  0.6   GLUCOSE  178*       Heme:  Recent Labs      17   RBC  4.74   HEMOGLOBIN  14.9   HEMATOCRIT  45.4   PLATELETCT  222   PROTHROMBTM  13.1   APTT  25.6   INR  0.96       Infectious Disease:  Temp  Av.1 °C (97 °F)  Min: 35.5 °C (95.9 °F)  Max: 37 °C (98.6 °F)  Micro: antibiotics reviewed, cultures pending and cultures reviewed  Recent Labs      17   WBC  14.7*   NEUTSPOLYS  87.50*   LYMPHOCYTES  6.00*   MONOCYTES  5.60   EOSINOPHILS  0.10   BASOPHILS  0.30   ASTSGOT  20   ALTSGPT  16   ALKPHOSPHAT  95   TBILIRUBIN  0.6     Current Facility-Administered Medications   Medication Dose Frequency Provider Last Rate Last Dose   • omeprazole (PRILOSEC) capsule 20 mg  20 mg DAILY James Pérez M.D.       • propofol (DIPRIVAN) injection  5-80 mcg/kg/min Continuous Jeremy M Gonda, M.D. 18.3 mL/hr at 177 50 mcg/kg/min at 17   • norepinephrine (LEVOPHED) 8 mg in  mL Infusion  0.5-30 mcg/min Continuous James Pérez M.D. 41.3 mL/hr at 17 22 mcg/min at 17   • glucose 4 g chewable tablet 16 g  16 g Q15 MIN PRN James Pérez M.D.        And   • dextrose 50% (D50W)  injection 25 mL  25 mL Q15 MIN PRN James Pérez M.D.       • multivitamin (THERAGRAN) tablet 1 Tab  1 Tab DAILY James Pérez M.D.       • Respiratory Care per Protocol   Continuous RT Jeremy M Gonda, M.D.       • lactulose 20 GM/30ML solution 30 mL  30 mL Q24HRS PRN Jeremy M Gonda, M.D.       • bisacodyl (DULCOLAX) suppository 10 mg  10 mg Q24HRS PRN Jeremy M Gonda, M.D.       • fleet enema 133 mL  1 Each Once PRN Jeremy M Gonda, M.D.       • chlorhexidine (PERIDEX) 0.12 % solution 15 mL  15 mL BID Jeremy M Gonda, M.D.   15 mL at 01/25/17 0310   • lidocaine (XYLOCAINE) 1%  injection  1-2 mL Q30 MIN PRN Jeremy M Gonda, M.D.       • enoxaparin (LOVENOX) inj 40 mg  40 mg DAILY Jeremy M Gonda, M.D.       • fentaNYL (SUBLIMAZE) injection 25 mcg  25 mcg Q HOUR PRN Jeremy M Gonda, M.D.        Or   • fentaNYL (SUBLIMAZE) injection 50 mcg  50 mcg Q HOUR PRN Jeremy M Gonda, M.D.        Or   • fentaNYL (SUBLIMAZE) injection 100 mcg  100 mcg Q HOUR PRN Jeremy M Gonda, M.D.       • ipratropium-albuterol (DUONEB) nebulizer solution 3 mL  3 mL Q2HRS PRN (RT) Jeremy M Gonda, M.D.       • famotidine (PEPCID) tablet 20 mg  20 mg Q12HRS Jeremy M Gonda, M.D.        Or   • famotidine (PEPCID) injection 20 mg  20 mg Q12HRS Jeremy M Gonda, M.D.   20 mg at 01/24/17 7313   • cefTRIAXone (ROCEPHIN) 1 g in  mL IVPB  1 g Q24HRS Jeremy M Gonda, M.D.       • doxycycline (VIBRAMYCIN) 100 mg in  mL IVPB  100 mg Q12HRS Jeremy M Gonda, M.D.   Stopped at 01/25/17 0133   • oyster shell calcium/vitamin D 250-125 MG-UNIT tablet 1 Tab  1 Tab QDAY with Breakfast James Pérez M.D.       • pravastatin (PRAVACHOL) tablet 20 mg  20 mg Q EVENING Jeremy M Gonda, M.D.   Stopped at 01/24/17 2330   • DOBUTamine (DOBUTREX) 1 mg/mL premix infusion  2.5 mcg/kg/min Continuous Jeremy M Gonda, M.D. 9.2 mL/hr at 01/25/17 0311 2.5 mcg/kg/min at 01/25/17 0311     Last reviewed on 1/24/2017 11:07 PM by Birdie Schumacher    Quality   Measures:  Medications reviewed, EKG reviewed, Labs reviewed and Radiology images reviewed  Frank catheter: Critically Ill - Requiring Accurate Measurement of Urinary Output and Unconscious / Sedated Patient on a Ventilator  Central line in place: Need for access and Vasopressors    DVT Prophylaxis: Enoxaparin (Lovenox)  DVT prophylaxis - mechanical: SCDs  Ulcer prophylaxis: Yes  Antibiotics: Treating active infection/contamination beyond 24 hours perioperative coverage    Problems/Plan:    Acute Hypoxic Respiratory Failure from pulmonary edema and cardiogenic shock   - Cont full vent support   - RT/O2 protocols   - No SBTs for now  Cardiogenic Shock likely related to severe mitral regurgitation   - Cont vasopressor/ionotropic therapy   - Cards following   - Cardiac cath today   - CTA to r/o PE today  Severe Mitral Valve Regurgitation likely related to valve prolapse   - cardiac cath today   - CTA r/o PE   - CTS eval for valve repair  Acute Pulmonary Edema   - RT/O2 protocols   - cont vent support   - will diurese once patient clinically stable  Acute Leukocytosis and concern for aspiration   - blood/sputum cultures sent and follow   - Empiric Ceftriaxone/Doxycycline  Hyponatremia   - monitor   - reduce IVF  Elevated Troponin   - monitor   - f/u on cardiac catheterization   - heparin gtt   - ASA  Hyperglycemia   - ISS  Acute Metabolic Acidosis   - monitor    - keep MAPs > 65  Hx of mitral valve prolapse  Hx of systemic arterial hypertension  Hx of Dyslipidemia   - statin therapy  Prophylaxis   - heparin, bowel regimen, pepcid    Discussed patient condition and risk of morbidity and/or mortality with Family, RN, RT, Pharmacy, , [facility] resident and Charge nurse / hot rounds.    The patient remains critically ill.  Critical care time = 35 minutes in directly providing and coordinating critical care and extensive data review.  No time overlap and excludes procedures.

## 2017-01-25 NOTE — CARE PLAN
Problem: Ventilation Defect:  Goal: Ability to achieve and maintain unassisted ventilation or tolerate decreased levels of ventilator support  Intervention: Monitor ventilator weaning response  Adult Ventilation Update    Total Vent Days: 1      Patient Lines/Drains/Airways Status    Active Airway      Name: Placement date: Placement time: Site: Days:     Airway Group ET Tube Oral 8.0 01/24/17    Oral  less than 1               Events/Summary/Plan: Pt arrived to ED and placed on vent at this time (01/24/17 2025)

## 2017-01-25 NOTE — DIETARY
"Nutrition Support Assessment    Kristen Slaazar is a 66 y.o. female with admitting DX of Pulmonary edema    Pertinent History: COPD, mitral valve prolapse, HTN, osteoporosis  Allergies: Codeine and Latex    Height: 175.3 cm (5' 9\")  Weight: 64.5 kg (142 lb 3.2 oz)  Ideal Body Weight: 65.772 kg (145 lb)  Percent Ideal Body Weight: 98.1  Body mass index is 20.99 kg/(m^2).    Pertinent Labs: Na 126, glu 137, GFR 43, calcium 8.2  Pertinent Medications: Rocephin, doxycycline, pepcid, MVI, levophed, oyster shell calcium/Vit D, propofol; prn bowel meds  Surgery / Procedures: Pt arrived intubated   Skin: No breakdown noted  GI: Last BM PTA    Estimated Needs: REE per MSJ x1.2 = 1489 kcal/day; RMR per [facility] (vent L/min 9.6, T max/24 hours 37) = 1454 kcal/day  Total Calories / day: 1400 - 1600 (Calories / k - 25)  Total Grams Protein / day: 77 - 97 (Grams Protein / k.2 - 1.5)  Total Fluids ml / day: 1610 - 1935 ml (25-30 ml/kg)        Assessment / Evaluation:  Pt is intubated with orders to start TF. Cortrak in place.   Propofol @ 27.1 ml/hr provides 715 kcal/day; will adjust TF.  Vasopressors: Levophed @ 20 mcg/min. Will utilize fiber-free, semi-elemental TF formula to promote GI tolerance.   Await POC per Cardiology.    Plan / Recommendation:   Start Impact Peptide 1.5 @ 25 ml/hr and advance per protocol to goal rate with propofol 35 ml/hr to provide 1260 kcal (+Kcal from propofol), 79 grams protein, and 647 ml free water per day.  When propofol D/c'd, increase TF to final goal rate 40 ml/hr to provide 1440 kcal, 90 grams protein, and 739 ml free water per day.  Fluids per MD.    RD following.  "

## 2017-01-25 NOTE — DISCHARGE PLANNING
Remains intubated.   Day 2  12/50.    Does not follow.  On Levo.     Day 1 Doxy and Roceph.   Cortrak.      Follow for extubation.   Family bedside.

## 2017-01-25 NOTE — PROGRESS NOTES
Assumed care of patient at 0145. Received bedside report from IVY Snow. Patient transported on monitor with respiratory therapist and CIC RN without incident. Family at bedside. Rates verified. Patient comfortable in bed with appropriate alarms set. Will continue with care and continue to closely monitor.

## 2017-01-25 NOTE — CONSULTS
DATE OF SERVICE:  01/24/2017    REFERRING PHYSICIAN:  Walter Riggins DO    CHIEF COMPLAINT:  Respiratory failure.    HISTORY OF PRESENT ILLNESS:  The patient is a 66-year-old female with past   medical history significant for mitral valve prolapse.  She was well until today when   she suffered acute shortness of breath leading to respiratory failure.  She was intubated   and transferred from [facility] for further care.  Her EKG is unremarkable   without significant ST segment changes.    ALLERGIES:  CODEINE.    HOME MEDICATIONS:  Include Cozaar 25 mg daily.    CURRENT MEDICATIONS:  Include IV Levophed.    PAST MEDICAL ILLNESS:  As above plus osteoporosis, arthritis.    PAST SURGICAL HISTORY:  Knee surgery.    SOCIAL HISTORY:  She is .  Tobacco, none.    FAMILY HISTORY:  Unknown.    REVIEW OF SYSTEMS:  Unable to be obtained.    PHYSICAL EXAMINATION:  VITAL SIGNS:  Pulse 106, respirations 25, /74, temperature 37.9.  GENERAL:  No acute distress.  HEENT:  Eyes closed, oral intubated.  RESPIRATORY:  Anterior clear.  CARDIOVASCULAR:  S1, S2 regular, tachycardic with systolic murmur.  ABDOMEN:  Soft, nondistended.  No hepatosplenomegaly noted.  EXTREMITIES:  Upper extremities with no clubbing, cyanosis.  Lower   extremities, no edema.  NEUROLOGIC:  Sedated.  SKIN:  Warm and dry.    EKG:  As above.    LABORATORY DATA:  Chem-7 with potassium of 4.5, BUN of 21, creatinine of 0.94.    CBC with WBC of 14, H and H of 14 and 45, platelets of 222.    ASSESSMENT AND PLAN:  1.  Acute respiratory failure, ventilation support, EKG showing sinus   tachycardia:  The patient is a 66-year-old female with history of mitral valve  prolapse.  She suffered acute respiratory failure and is currently intubated.  We will obtain a stat echocardiogram as requested.  We will follow her   clinically.  2.  History of mitral valve prolapse:  Plan as above.  3.  Hypotension on pressor support.  4.  Respiratory failure, on  ventilation support.    Thank you for this consult.    Addendum: Stat echocardiogram demonstrated posterior mitral valve prolapse   with probable flail leaflet severe mitral regurgitation. Also her  arrived and  he informed me that she has been followed by Tom [facility] for mitral   valve prolapse with regular echocardiogram evaluations. We will contact [facility]  cardiologist service tomorrow. She will likely need transesophageal echocardiogram  and cardiac catheterization evaluations and consult with Chris Castaneda for  mitral valve repair or replacement.          ____________________________________     MD JOSE ANDRADE / NTS    DD:  01/24/2017 21:32:48  DT:  01/24/2017 23:44:08    D#:  [id number]  Job#:  [id number]

## 2017-01-25 NOTE — DISCHARGE PLANNING
Medical Social Work    Referral: Family Support    Intervention: MSW received a call from Unit Clerk; pt is a transfer from [facility] and pt's  is in the Lobby and is unable to be at bedside at this time.  MSW and SLOANE Palma met with pt's , Eliud De Anda ([phone] or [phone]) and escorted him to the Family Support Room.  Pt's  was briefly updated and provided with emotional support.  Pt's  was then escorted to bedside and was updated by ERP.     Plan: SW will remain available.

## 2017-01-25 NOTE — ED NOTES
All labs collected and sent, EKG complete.  Pt hypoxic and hypotensive, ERP aware and new orders received and implemented.

## 2017-01-26 ENCOUNTER — APPOINTMENT (OUTPATIENT)
Dept: RADIOLOGY | Facility: MEDICAL CENTER | Age: 67
DRG: 216 | End: 2017-01-26
Attending: INTERNAL MEDICINE
Payer: MEDICARE

## 2017-01-26 LAB
ALBUMIN SERPL BCP-MCNC: 3.2 G/DL (ref 3.2–4.9)
ALBUMIN/GLOB SERPL: 1.5 G/DL
ALP SERPL-CCNC: 61 U/L (ref 30–99)
ALT SERPL-CCNC: 10 U/L (ref 2–50)
ANION GAP SERPL CALC-SCNC: 10 MMOL/L (ref 0–11.9)
AST SERPL-CCNC: 15 U/L (ref 12–45)
BACTERIA UR CULT: NORMAL
BASE EXCESS BLDA CALC-SCNC: -4 MMOL/L (ref -4–3)
BASOPHILS # BLD AUTO: 0.2 % (ref 0–1.8)
BASOPHILS # BLD: 0.02 K/UL (ref 0–0.12)
BILIRUB SERPL-MCNC: 0.5 MG/DL (ref 0.1–1.5)
BODY TEMPERATURE: ABNORMAL DEGREES
BUN SERPL-MCNC: 21 MG/DL (ref 8–22)
CALCIUM SERPL-MCNC: 8.1 MG/DL (ref 8.5–10.5)
CHLORIDE SERPL-SCNC: 104 MMOL/L (ref 96–112)
CO2 BLDA-SCNC: 19 MMOL/L (ref 20–33)
CO2 SERPL-SCNC: 19 MMOL/L (ref 20–33)
CREAT SERPL-MCNC: 1.09 MG/DL (ref 0.5–1.4)
EOSINOPHIL # BLD AUTO: 0.01 K/UL (ref 0–0.51)
EOSINOPHIL NFR BLD: 0.1 % (ref 0–6.9)
ERYTHROCYTE [DISTWIDTH] IN BLOOD BY AUTOMATED COUNT: 47.2 FL (ref 35.9–50)
GFR SERPL CREATININE-BSD FRML MDRD: 50 ML/MIN/1.73 M 2
GLOBULIN SER CALC-MCNC: 2.1 G/DL (ref 1.9–3.5)
GLUCOSE SERPL-MCNC: 126 MG/DL (ref 65–99)
HCO3 BLDA-SCNC: 18.5 MMOL/L (ref 17–25)
HCT VFR BLD AUTO: 36.4 % (ref 37–47)
HGB BLD-MCNC: 12.3 G/DL (ref 12–16)
IMM GRANULOCYTES # BLD AUTO: 0.04 K/UL (ref 0–0.11)
IMM GRANULOCYTES NFR BLD AUTO: 0.4 % (ref 0–0.9)
LV EJECT FRACT  99904: 65
LYMPHOCYTES # BLD AUTO: 1.16 K/UL (ref 1–4.8)
LYMPHOCYTES NFR BLD: 10.8 % (ref 22–41)
MAGNESIUM SERPL-MCNC: 2.3 MG/DL (ref 1.5–2.5)
MCH RBC QN AUTO: 31.3 PG (ref 27–33)
MCHC RBC AUTO-ENTMCNC: 33.8 G/DL (ref 33.6–35)
MCV RBC AUTO: 92.6 FL (ref 81.4–97.8)
MONOCYTES # BLD AUTO: 0.73 K/UL (ref 0–0.85)
MONOCYTES NFR BLD AUTO: 6.8 % (ref 0–13.4)
NEUTROPHILS # BLD AUTO: 8.75 K/UL (ref 2–7.15)
NEUTROPHILS NFR BLD: 81.7 % (ref 44–72)
NRBC # BLD AUTO: 0 K/UL
NRBC BLD AUTO-RTO: 0 /100 WBC
O2/TOTAL GAS SETTING VFR VENT: 40 %
PCO2 BLDA: 26.4 MMHG (ref 26–37)
PCO2 TEMP ADJ BLDA: 26.9 MMHG (ref 26–37)
PH BLDA: 7.45 [PH] (ref 7.4–7.5)
PH TEMP ADJ BLDA: 7.45 [PH] (ref 7.4–7.5)
PHOSPHATE SERPL-MCNC: 3.1 MG/DL (ref 2.5–4.5)
PLATELET # BLD AUTO: 224 K/UL (ref 164–446)
PMV BLD AUTO: 11.9 FL (ref 9–12.9)
PO2 BLDA: 100 MMHG (ref 64–87)
PO2 TEMP ADJ BLDA: 103 MMHG (ref 64–87)
POTASSIUM SERPL-SCNC: 3.8 MMOL/L (ref 3.6–5.5)
PROT SERPL-MCNC: 5.3 G/DL (ref 6–8.2)
RBC # BLD AUTO: 3.93 M/UL (ref 4.2–5.4)
SAO2 % BLDA: 98 % (ref 93–99)
SIGNIFICANT IND 70042: NORMAL
SITE SITE: NORMAL
SODIUM SERPL-SCNC: 133 MMOL/L (ref 135–145)
SOURCE SOURCE: NORMAL
SPECIMEN DRAWN FROM PATIENT: ABNORMAL
TROPONIN I SERPL-MCNC: 0.48 NG/ML (ref 0–0.04)
WBC # BLD AUTO: 10.7 K/UL (ref 4.8–10.8)

## 2017-01-26 PROCEDURE — 83735 ASSAY OF MAGNESIUM: CPT

## 2017-01-26 PROCEDURE — 700111 HCHG RX REV CODE 636 W/ 250 OVERRIDE (IP): Performed by: INTERNAL MEDICINE

## 2017-01-26 PROCEDURE — 700101 HCHG RX REV CODE 250: Performed by: INTERNAL MEDICINE

## 2017-01-26 PROCEDURE — 93312 ECHO TRANSESOPHAGEAL: CPT

## 2017-01-26 PROCEDURE — 700111 HCHG RX REV CODE 636 W/ 250 OVERRIDE (IP): Performed by: HOSPITALIST

## 2017-01-26 PROCEDURE — 84100 ASSAY OF PHOSPHORUS: CPT

## 2017-01-26 PROCEDURE — 700105 HCHG RX REV CODE 258: Performed by: INTERNAL MEDICINE

## 2017-01-26 PROCEDURE — 99291 CRITICAL CARE FIRST HOUR: CPT | Mod: GC | Performed by: INTERNAL MEDICINE

## 2017-01-26 PROCEDURE — 71010 DX-CHEST-PORTABLE (1 VIEW): CPT

## 2017-01-26 PROCEDURE — 700102 HCHG RX REV CODE 250 W/ 637 OVERRIDE(OP): Performed by: INTERNAL MEDICINE

## 2017-01-26 PROCEDURE — 770022 HCHG ROOM/CARE - ICU (200)

## 2017-01-26 PROCEDURE — 700102 HCHG RX REV CODE 250 W/ 637 OVERRIDE(OP): Performed by: HOSPITALIST

## 2017-01-26 PROCEDURE — 85025 COMPLETE CBC W/AUTO DIFF WBC: CPT

## 2017-01-26 PROCEDURE — 93325 DOPPLER ECHO COLOR FLOW MAPG: CPT

## 2017-01-26 PROCEDURE — A9270 NON-COVERED ITEM OR SERVICE: HCPCS | Performed by: STUDENT IN AN ORGANIZED HEALTH CARE EDUCATION/TRAINING PROGRAM

## 2017-01-26 PROCEDURE — 84484 ASSAY OF TROPONIN QUANT: CPT

## 2017-01-26 PROCEDURE — 700102 HCHG RX REV CODE 250 W/ 637 OVERRIDE(OP): Performed by: STUDENT IN AN ORGANIZED HEALTH CARE EDUCATION/TRAINING PROGRAM

## 2017-01-26 PROCEDURE — 700105 HCHG RX REV CODE 258: Performed by: STUDENT IN AN ORGANIZED HEALTH CARE EDUCATION/TRAINING PROGRAM

## 2017-01-26 PROCEDURE — 700101 HCHG RX REV CODE 250: Performed by: STUDENT IN AN ORGANIZED HEALTH CARE EDUCATION/TRAINING PROGRAM

## 2017-01-26 PROCEDURE — A9270 NON-COVERED ITEM OR SERVICE: HCPCS | Performed by: HOSPITALIST

## 2017-01-26 PROCEDURE — 36600 WITHDRAWAL OF ARTERIAL BLOOD: CPT

## 2017-01-26 PROCEDURE — 82803 BLOOD GASES ANY COMBINATION: CPT

## 2017-01-26 PROCEDURE — 80053 COMPREHEN METABOLIC PANEL: CPT

## 2017-01-26 PROCEDURE — 94003 VENT MGMT INPAT SUBQ DAY: CPT

## 2017-01-26 PROCEDURE — A9270 NON-COVERED ITEM OR SERVICE: HCPCS | Performed by: INTERNAL MEDICINE

## 2017-01-26 PROCEDURE — 93321 DOPPLER ECHO F-UP/LMTD STD: CPT

## 2017-01-26 RX ORDER — POTASSIUM CHLORIDE 1.5 G/1.58G
40 POWDER, FOR SOLUTION ORAL ONCE
Status: COMPLETED | OUTPATIENT
Start: 2017-01-26 | End: 2017-01-26

## 2017-01-26 RX ORDER — FUROSEMIDE 10 MG/ML
20 INJECTION INTRAMUSCULAR; INTRAVENOUS ONCE
Status: COMPLETED | OUTPATIENT
Start: 2017-01-26 | End: 2017-01-26

## 2017-01-26 RX ADMIN — THERA TABS 1 TABLET: TAB at 08:06

## 2017-01-26 RX ADMIN — CHLORHEXIDINE GLUCONATE 15 ML: 1.2 RINSE ORAL at 20:28

## 2017-01-26 RX ADMIN — PROPOFOL 50 MCG/KG/MIN: 10 INJECTION, EMULSION INTRAVENOUS at 23:53

## 2017-01-26 RX ADMIN — FENTANYL CITRATE 100 MCG: 50 INJECTION, SOLUTION INTRAMUSCULAR; INTRAVENOUS at 09:15

## 2017-01-26 RX ADMIN — DOXYCYCLINE 100 MG: 100 INJECTION, POWDER, LYOPHILIZED, FOR SOLUTION INTRAVENOUS at 10:12

## 2017-01-26 RX ADMIN — NOREPINEPHRINE BITARTRATE 6 MCG/MIN: 1 INJECTION INTRAVENOUS at 22:01

## 2017-01-26 RX ADMIN — PROPOFOL 50 MCG/KG/MIN: 10 INJECTION, EMULSION INTRAVENOUS at 17:40

## 2017-01-26 RX ADMIN — PROPOFOL 55 MCG/KG/MIN: 10 INJECTION, EMULSION INTRAVENOUS at 08:09

## 2017-01-26 RX ADMIN — CALCIUM CARBONATE-CHOLECALCIFEROL TAB 250 MG-125 UNIT 1 TABLET: 250-125 TAB at 08:06

## 2017-01-26 RX ADMIN — FAMOTIDINE 20 MG: 20 TABLET, FILM COATED ORAL at 20:28

## 2017-01-26 RX ADMIN — HEPARIN SODIUM 5000 UNITS: 5000 INJECTION, SOLUTION INTRAVENOUS; SUBCUTANEOUS at 06:00

## 2017-01-26 RX ADMIN — ASPIRIN 81 MG: 81 TABLET ORAL at 08:06

## 2017-01-26 RX ADMIN — HEPARIN SODIUM 5000 UNITS: 5000 INJECTION, SOLUTION INTRAVENOUS; SUBCUTANEOUS at 15:22

## 2017-01-26 RX ADMIN — PRAVASTATIN SODIUM 20 MG: 20 TABLET ORAL at 21:56

## 2017-01-26 RX ADMIN — PROPOFOL 60 MCG/KG/MIN: 10 INJECTION, EMULSION INTRAVENOUS at 15:23

## 2017-01-26 RX ADMIN — CEFTRIAXONE SODIUM 1 G: 1 INJECTION, POWDER, FOR SOLUTION INTRAMUSCULAR; INTRAVENOUS at 08:06

## 2017-01-26 RX ADMIN — FUROSEMIDE 20 MG: 10 INJECTION, SOLUTION INTRAMUSCULAR; INTRAVENOUS at 10:12

## 2017-01-26 RX ADMIN — NOREPINEPHRINE BITARTRATE 13 MCG/MIN: 1 INJECTION INTRAVENOUS at 03:27

## 2017-01-26 RX ADMIN — PROPOFOL 60 MCG/KG/MIN: 10 INJECTION, EMULSION INTRAVENOUS at 03:20

## 2017-01-26 RX ADMIN — HEPARIN SODIUM 5000 UNITS: 5000 INJECTION, SOLUTION INTRAVENOUS; SUBCUTANEOUS at 21:56

## 2017-01-26 RX ADMIN — DOXYCYCLINE 100 MG: 100 INJECTION, POWDER, LYOPHILIZED, FOR SOLUTION INTRAVENOUS at 21:56

## 2017-01-26 RX ADMIN — CHLORHEXIDINE GLUCONATE 15 ML: 1.2 RINSE ORAL at 08:06

## 2017-01-26 RX ADMIN — FAMOTIDINE 20 MG: 10 INJECTION INTRAVENOUS at 08:07

## 2017-01-26 RX ADMIN — POTASSIUM CHLORIDE 40 MEQ: 1.5 POWDER, FOR SOLUTION ORAL at 08:06

## 2017-01-26 ASSESSMENT — ENCOUNTER SYMPTOMS
RESPIRATORY NEGATIVE: 1
MUSCULOSKELETAL NEGATIVE: 1
CARDIOVASCULAR NEGATIVE: 1
NEUROLOGICAL NEGATIVE: 1
PSYCHIATRIC NEGATIVE: 1
EYES NEGATIVE: 1
CONSTITUTIONAL NEGATIVE: 1
GASTROINTESTINAL NEGATIVE: 1

## 2017-01-26 NOTE — CARE PLAN
Problem: Infection  Goal: Will remain free from infection  Outcome: PROGRESSING SLOWER THAN EXPECTED    Problem: Venous Thromboembolism (VTW)/Deep Vein Thrombosis (DVT) Prevention:  Goal: Patient will participate in Venous Thrombosis (VTE)/Deep Vein Thrombosis (DVT)Prevention Measures  Outcome: PROGRESSING AS EXPECTED

## 2017-01-26 NOTE — CARE PLAN
Problem: Nutritional:  Goal: Nutrition support tolerated and meeting greater than 85% of estimated needs  Outcome: PROGRESSING AS EXPECTED  TF @ goal with propofol.

## 2017-01-26 NOTE — PROGRESS NOTES
[facility] ICU Progress Note      Admit Date: 1/24/2017  ICU Day: 2    Resident(s): Navid Ledesma   Attending: ALESIA ZARAGOZA/ Dr. Atkins    Date & Time:   1/26/2017   6:24 AM       Patient ID:    Name:             Kristen Salazar   YOB: 1950  Age:                 66 y.o.  female   MRN:               [id number]    Diagnosis:  Acute Hypoxemic respiratory failure   Acute pulmonary edema d/t severe MR  Hypertensive Emergency  Demand ischemia  Prolonged Qtc     HPI:  67 y/o F with PMHx HTN ,DLD, MVP presenting as a transfer for acute hypoxic respiratory failure with acute pulmonary edema.     Consultants:  Cardiology: Dr. Ramirez    PMA: Dr. Atkins    Interval Events:  KIMBERLY overnight.   Plan for KAREN today.   Will discuss with Dr. Ramirez about involvement of CT surgery.   Still on pressors, on Levo 13.   Will start lasix today.     Physical Exam:  GEN: Vented, Sedated.   HEENT: NC/AT. ET tube securely in place. NGT in place.  CV: S1, S2, NSR, Systolic murmur +  RESP: CTABL, no w/r/r, no basilar crackles   ABD: Soft, NT, ND; +BS  EXT: No LE edema b/l. No signs of cyanosis  NEURO: Sedated, Vented.     Respiratory:  Quinones Vent Mode: APVCMV  Respiration: (!) 24, Pulse Oximetry: 98 %    Chest Tube Drains:    Recent Labs      01/24/17   2228  01/25/17   0444  01/26/17   0450   ISTATAPH  7.284*  7.356*  7.454   ISTATAPCO2  38.6*  32.8  26.4   ISTATAPO2  79  156*  100*   ISTATATCO2  19*  19*  19*   [id number]  94  99  98   ISTATARTHCO3  18.3  18.4  18.5   ISTATARTBE  -8*  -6*  -4   ISTATTEMP  37.0 C  96.0 F  99.3 F   ISTATFIO2  100  100  40   ISTATSPEC  Arterial  Arterial  Arterial   ISTATAPHTC  7.284*  7.377*  7.449   KCHKNEUM9EC  79  147*  103*       HemoDynamics:  Pulse: 81, Heart Rate (Monitored): 82 NIBP: 103/61 mmHg      Neuro:      Fluids:        Intake/Output Summary (Last 24 hours) at 01/26/17 0624  Last data filed at 01/26/17 0200   Gross per 24 hour   Intake 1438.35 ml   Output   1225 ml   Net 213.35  ml       Weight: 66.2 kg (145 lb 15.1 oz)  Body mass index is 21.54 kg/(m^2).    Recent Labs      17   0530  17   0500   SODIUM  129*  126*  133*   POTASSIUM  3.8  4.6  3.8   CHLORIDE  99  98  104   CO2  21  19*  19*   BUN  19  21  21   CREATININE  0.98  1.24  1.09   MAGNESIUM  1.9  2.2  2.3   PHOSPHORUS  4.6*  4.1  3.1   CALCIUM  8.3*  8.2*  8.1*       GI/Nutrition:  Recent Labs      17   0530  17   0500   ALTSGPT  16  14  10   ASTSGOT    15   ALKPHOSPHAT  95  71  61   TBILIRUBIN  0.6  0.7  0.5   GLUCOSE  178*  137*  126*       Heme:  Recent Labs      1730  17   1145  17   1800  17   0500   RBC  4.74  4.52   --    --   3.93*   HEMOGLOBIN  14.9  14.1   --    --   12.3   HEMATOCRIT  45.4  42.3   --    --   36.4*   PLATELETCT  222  257   --    --   224   PROTHROMBTM  13.1   --    --    --    --    APTT  25.6   --   35.2  179.3*   --    INR  0.96   --    --    --    --        Infectious Disease:  Temp  Av.8 °C (98.3 °F)  Min: 36.5 °C (97.7 °F)  Max: 37.6 °C (99.7 °F)  Recent Labs      17   0530  17   0500   WBC  14.7*  19.8*  10.7   NEUTSPOLYS  87.50*  88.90*  81.70*   LYMPHOCYTES  6.00*  4.90*  10.80*   MONOCYTES  5.60  5.60  6.80   EOSINOPHILS  0.10  0.00  0.10   BASOPHILS  0.30  0.20  0.20   ASTSGOT  20  24  15   ALTSGPT  16  14  10   ALKPHOSPHAT  95  71  61   TBILIRUBIN  0.6  0.7  0.5       Meds:  • aspirin  325 mg     • heparin  5,000 Units     • propofol  5-80 mcg/kg/min 60 mcg/kg/min (17)   • NORepinephrine (LEVOPHED) infusion  0.5-30 mcg/min 13 mcg/min (17)   • glucose 4 g  16 g      And   • dextrose 50%  25 mL     • multivitamin  1 Tab     • Respiratory Care per Protocol       • lactulose  30 mL     • bisacodyl  10 mg     • fleet  1 Each     • chlorhexidine  15 mL     • lidocaine  1-2 mL     • fentaNYL  25 mcg      Or   • fentaNYL  50 mcg      Or    • fentaNYL  100 mcg     • ipratropium-albuterol  3 mL     • famotidine  20 mg      Or   • famotidine  20 mg     • cefTRIAXone (ROCEPHIN) IVPB  1 g Stopped (01/25/17 0836)   • doxycycline  100 mg Stopped (01/26/17 0011)   • oyster shell calcium/vitamin D  1 Tab     • pravastatin  20 mg     • DOBUTamine in D5W  2.5 mcg/kg/min Stopped (01/25/17 2529)        Problem and Plan:    Acute Hypoxemic respiratory failure   Acute pulmonary edema d/t severe MR  Hypertensive Emergency  Demand ischemia  Prolonged Qtc   CAP?  - due to severe MR?   - echo:EF 75%, severe MR, RVSP 40  - cardiac cath: normal coronary arteries  - CTA: no PE  - Dr. Ramirez spoke with Dr. Schmitt (thoracic surg) -> no need for emergent surgery at this time  PLAN  - KAREN to be done today  - cont levo  - will start lasix today -> 20mg once and will see how BP tolerates  - avoid qtc prolonging drugs, keep Mg >2  - empirically on C3 and doxy    Chronic medical issues:   DLD: cont pravastatin  Chronic back/knee/neck pain: pain meds held  HTN: losartan held      DISPO: ICU    CODE STATUS: Full    Quality Measures:  Frank Catheter: Yes  DVT Prophylaxis: Heparin  Ulcer Prophylaxis: pepcid  Antibiotics:Rocephin, doxy  Lines: Central    Procedures:  1/24 Intubation  1/24 Central line  1/25 cardiac cath    Imaging:  DX-CHEST-PORTABLE (1 VIEW)   Final Result         1. Decrease groundglass and airspace opacities in both lungs.      CT-CTA CHEST PULMONARY ARTERY W/ RECONS   Final Result      1.  No evidence pulmonary emboli.   2.  Moderate right and small left pleural effusions.   3.  Compressive atelectasis both lower lobes especially the left and left lung volume loss with mediastinal shift towards left.   4.  Patchy groundglass opacities within the right upper lobe consistent with focal areas of pneumonitis.   5.  7.5 mm left upper lobe nodule and smaller nodules within the lungs.   Nodule measuring between 7 and 8 mm:   Low Risk Patient:  Initial follow up CT at  6-12 months, then at 18-24 months if no change.      High Risk Patient:  Initial follow up CT at 3-6 months, then at 9-12 and 24 months if no change.         Low Risk - Minimal or absent history of smoking and of other known risk factors.   High Risk - History of smoking or of other known risk factors.   Fleischner Society Guidelines for Pulmonary Nodules:  Radiology, 237:2005         DX-CHEST-PORTABLE (1 VIEW)   Final Result         1. Interval placement of feeding tube. No other significant interval change.      DX-ABDOMEN FOR TUBE PLACEMENT   Final Result         Feeding tube with tip in the stomach.      ECHOCARDIOGRAM COMP W/O CONT   Final Result      DX-CHEST-PORTABLE (1 VIEW)   Final Result         1. Right central venous catheter with tip in the mid SVC.      OUTSIDE IMAGES-DX CHEST   Preliminary Result      OUTSIDE IMAGES-DX CHEST   Preliminary Result      DX-CHEST-PORTABLE (1 VIEW)   Final Result         1. Extensive groundglass and airspace opacities throughout both lungs, worse in the bilateral lower lungs. This could be seen with multifocal pneumonia, ARDS or pulmonary edema.   2. Endotracheal tube with tip in the mid thoracic trachea.      TRANSESOPHAGEAL ECHO W/O CONT (Order not available for [facility])    (Results Pending)

## 2017-01-26 NOTE — PROGRESS NOTES
Ariel Shrestha, and Colleen bedside for KAREN. See MAR for medications, pt tolerated procedure well. BP stable, respirations even and unlabored.  notified of result.

## 2017-01-26 NOTE — CONSULTS
DATE OF SERVICE:  01/25/2017    CONSULTATION REGARDING:  Severe MR    BRIEF SUMMARY:  The patient was seen and examined in the CCU (bed [place] [address])   at your request for consultation regarding severe MR.  As you know, the   patient is a 66-year-old male with known history of stable (moderate to   severe) mitral valve regurgitation, O2 (2 liter) dependent at night, who was   transferred to [facility] for acute respiratory failure.  She is currently sedated   and intubated on ventilation support.  Most of the information is taken by   the medical record and also spoken to her .  According to him, patient   has been doing well and actually went skiing with him the day before.  She   became more dyspneic yesterday with saturation 85.  She eventually was brought   to the ER in [place], [place], which ___ respiratory did decompensate   quickly that required mask and eventually intubated for airway protection.    Initial cardiac enzyme, at one point, indicated is was 1.7 and, this morning,   it was elevated at 3.35.    Her cardiologist is Dr. Tom Landin at [facility].  She has been   followed regularly for her valvular disease.  She has had a series of   echocardiograms in which the last one was in April of 2016, with EF of 55-60%.    Mitral valve prolapse with posterior leaflet involvement.  There is   moderate-to-severe MR.  Prior to that, there was an echo March 2015, with EF   of 55% with moderate-to-severe MR.  She had an echo back in April 2014, with   EF of 60-65% with moderate-to-severe MR.  KAREN was done back in May of 2014 and   indicated moderate mitral valve prolapse with moderate MR.    PAST MEDICAL HISTORY:  1.  Mitral valve prolapse with mitral valve regurgitation.  2.  Chronic obstructive pulmonary disease.  3.  O2 at night.    PAST SURGICAL HISTORY:  Unable to obtain.    SOCIAL HISTORY:  Patient is .  No tobacco use.    FAMILY HISTORY:  Noncontributory.    MEDICATIONS:  1.   Pravachol 20 mg daily.  2.  Multivitamin daily.  3.  Losartan 25 mg daily.  4.  Glucosamine 500 mg.    ALLERGIES:  1.  CODEINE.  2.  LATEX.    REVIEW OF SYSTEMS:  All are negative according to CMS/AMA criteria, except for   what is stated as above.    PHYSICAL EXAMINATION:  VITAL SIGNS:  Consistent with borderline hypertension.  She is currently on   pressor medication.  She is currently intubated on the ventilation support.  HEENT:  Consist of ET tube is in place.  SKIN:  Consist of cool and moist.  CARDIOVASCULAR:  Consist of normal rate and rhythm, S1, S2 with positive   murmurs.  PULMONARY:  Decreased breath sound bilaterally.  GASTROINTESTINAL:  Positive bowel sounds, soft and nontender.  EXTREMITIES:  Consist of no edema.    LABORATORY DATA:  Reviewed.    ASSESSMENT AND PLAN:  1.  Respiratory failure/ventilation support.  2.  Elevated troponin.  3.  Severe MR on echocardiogram.  4.  O2 dependent at night.    Recommended start heparin for elevated troponin.  In addition, I had a long   discussion with the patient's , at this point, we would recommend   coronary angiogram for further assessment due to the elevated troponin,   otherwise, would recommend consult ___, please see Dr. Chris Schmitt for   further assessment on the valve.    Thank you very much for allowing me to be part of this patient's healthcare.       ____________________________________     Kurtis Ramirez DO    TY / NTS    DD:  01/25/2017 17:13:40  DT:  01/25/2017 22:24:32    D#:  [id number]  Job#:  [id number]

## 2017-01-26 NOTE — PROGRESS NOTES
[facility] ICU Progress Note    Admission Date: 1/25/2017    ICU Day: 1    Admitting Attending/Resident: Dr. Gonda/James Pérez    Day Attending/Resident(s): Dr. Atkins/Navid Salazar    Consultants:   Dr. Ramirez, Dr. Corbett    Interval Events:  Admitted to floor.  Cardiologist Dr. Ramirez suspected that acute MI or acute PE was cause  Of worsening mitral valve, patient sent to cath, normal coronaries, PE study negative, cardiothoracic consulted, [place] immediate surgery not warranted    =====================================================  Objective - Systems Based - Physical Exam Incorporated into Labs  =====================================================  Fluids:    Date 01/25/17 0700 - 01/26/17 0659   Shift 3694-9897 9122-4701 1331-0887 24 Hour Total   I  N  T  A  K  E   I.V. 601.9   601.9      Propofol Volume 195.7   195.7      Norepinephrine Volume 303.8   303.8      Heparin Volume 28.8   28.8      Dobutamine Volume 73.6   73.6    Shift Total 601.9   601.9   O  U  T  P  U  T   Urine 600   600      Indwelling Cathether 600   600    Shift Total 600   600   NET 1.9   1.9     Intake/Output Summary (Last 24 hours) at 01/25/17 1710  Last data filed at 01/25/17 1400   Gross per 24 hour   Intake 1104.64 ml   Output    961 ml   Net 143.64 ml     Weight: 64.5 kg (142 lb 3.2 oz)  Body mass index is 20.99 kg/(m^2).    Recent Labs      01/24/17 2056 01/25/17   0530   SODIUM  129*  126*   POTASSIUM  3.8  4.6   CHLORIDE  99  98   CO2  21  19*   BUN  19  21   CREATININE  0.98  1.24   MAGNESIUM  1.9  2.2   PHOSPHORUS  4.6*  4.1   CALCIUM  8.3*  8.2*     =====================================================    GEN:  Vent/sedated    HEENT:  Nc; at; pupils reactive    EXT:  2+ pulses; no LE edema    NEURO:  Vent/sedated    Respiratory/Pulmonary:  Quinones Vent Mode: APVCMV  Respiration: (!) 24, Pulse Oximetry: 97 %  Coarse crackles    HemoDynamics/Cardiovascular:  Pulse: 84, Heart Rate (Monitored): 88 Blood Pressure :  105/74 mmHg, NIBP: 129/66 mmHg    Holosystolic murmur appreciated    GI/Nutrition:  Recent Labs      17   0530   ALTSGPT  16  14   ASTSGOT  20  24   ALKPHOSPHAT  95  71   TBILIRUBIN  0.6  0.7   GLUCOSE  178*  137*       Heme/Onc:  Recent Labs      17   0530  17   1145   RBC  4.74  4.52   --    HEMOGLOBIN  14.9  14.1   --    HEMATOCRIT  45.4  42.3   --    PLATELETCT  222  257   --    PROTHROMBTM  13.1   --    --    APTT  25.6   --   35.2   INR  0.96   --    --        Infectious Disease:  Temp  Av.2 °C (97.1 °F)  Min: 35.5 °C (95.9 °F)  Max: 37 °C (98.6 °F)  Recent Labs      17   0530   WBC  14.7*  19.8*   NEUTSPOLYS  87.50*  88.90*   LYMPHOCYTES  6.00*  4.90*   MONOCYTES  5.60  5.60   EOSINOPHILS  0.10  0.00   BASOPHILS  0.30  0.20   ASTSGOT  20  24   ALTSGPT  16  14   ALKPHOSPHAT  95  71   TBILIRUBIN  0.6  0.7     • aspirin  325 mg     • NS       • heparin 1000 units/mL  2,600 Units      And   • heparin   1,050 Units (17 1252)   • propofol   Stopped (17 1730)   • propofol  5-80 mcg/kg/min 70 mcg/kg/min (17 1217)   • NORepinephrine (LEVOPHED) infusion  0.5-30 mcg/min 20 mcg/min (17 0910)   • glucose 4 g  16 g      And   • dextrose 50%  25 mL     • multivitamin  1 Tab     • Respiratory Care per Protocol       • lactulose  30 mL     • bisacodyl  10 mg     • fleet  1 Each     • chlorhexidine  15 mL     • lidocaine  1-2 mL     • fentaNYL  25 mcg      Or   • fentaNYL  50 mcg      Or   • fentaNYL  100 mcg     • ipratropium-albuterol  3 mL     • famotidine  20 mg      Or   • famotidine  20 mg     • cefTRIAXone (ROCEPHIN) IVPB  1 g Stopped (17 0836)   • doxycycline  100 mg Stopped (17 1328)   • oyster shell calcium/vitamin D  1 Tab     • pravastatin  20 mg     • DOBUTamine in D5W  2.5 mcg/kg/min 2.5 mcg/kg/min (17 0311)        =====================================================  Procedures and  Imaging:  CT-CTA CHEST PULMONARY ARTERY W/ RECONS   Final Result      1.  No evidence pulmonary emboli.   2.  Moderate right and small left pleural effusions.   3.  Compressive atelectasis both lower lobes especially the left and left lung volume loss with mediastinal shift towards left.   4.  Patchy groundglass opacities within the right upper lobe consistent with focal areas of pneumonitis.   5.  7.5 mm left upper lobe nodule and smaller nodules within the lungs.   Nodule measuring between 7 and 8 mm:   Low Risk Patient:  Initial follow up CT at 6-12 months, then at 18-24 months if no change.      High Risk Patient:  Initial follow up CT at 3-6 months, then at 9-12 and 24 months if no change.         Low Risk - Minimal or absent history of smoking and of other known risk factors.   High Risk - History of smoking or of other known risk factors.   Fleischner Society Guidelines for Pulmonary Nodules:  Radiology, 237:2005         DX-CHEST-PORTABLE (1 VIEW)   Final Result         1. Interval placement of feeding tube. No other significant interval change.      DX-ABDOMEN FOR TUBE PLACEMENT   Final Result         Feeding tube with tip in the stomach.      ECHOCARDIOGRAM COMP W/O CONT   Final Result      DX-CHEST-PORTABLE (1 VIEW)   Final Result         1. Right central venous catheter with tip in the mid SVC.      OUTSIDE IMAGES-DX CHEST   Preliminary Result      OUTSIDE IMAGES-DX CHEST   Preliminary Result      DX-CHEST-PORTABLE (1 VIEW)   Final Result         1. Extensive groundglass and airspace opacities throughout both lungs, worse in the bilateral lower lungs. This could be seen with multifocal pneumonia, ARDS or pulmonary edema.   2. Endotracheal tube with tip in the mid thoracic trachea.          =====================================================  Problem and Plan:  Acute hypoxemic respiratory failure from acute pulmonary edema  Severe MR  Dr. Ramirez suspects acute MI or acute PE, cardiac cath shows  essentially  Normal coronaries - PE study negative  Currently on pressors Levophed, Dobutamine  Full vent support  Medical management  Diuresis    Elevated trops  No EKG changes  Dr. Ramirez started patient on heparin therapy, thought NSTEMI  Cardiac cath normal    Leukocytosis  No fevers, neg cxr, neg UA  Cover with empiric abx  Cont ceftriaxone & doxy (prolonged QTc)    Quality Measures:  Frank Catheter: yes   DVT Prophylaxis: heparin  Ulcer Prophylaxis: yes  Antibiotics: ceftriaxone, doxycline  Lines: piv, central  ======================================================

## 2017-01-26 NOTE — PROCEDURES
DATE OF SERVICE:  01/25/2017    INDICATION:  1.  Non-STEMI.  2.  Right-sided heart failure.  3.  Known history of moderate to severe MR.    BRIEF SUMMARY:  The patient is a pleasant 66-year-old female with history of   moderate to severe mitral valve regurgitation that has been followed carefully   by Dr. Landin ([facility]) for the past several years.  Last   echocardiogram was done possibly last in 6 months ago with stable moderate to   severe mitral valve regurgitation.  The patient is transferred to [facility] ED   from [facility] where she presented with acute shortness of breath.    Per record, patient's  indicated that she is not feeling well with   nonproductive cough and mild shortness of breath.  Saturation was in the 80s.    By the time she is in [facility], she is quickly decompensated   with requirement of 15 liters of face mask.  Patient eventually was intubated   and transferred to [facility] for further evaluation.  Echocardiogram was   obtained, and evaluated by Dr. Delarosa.    PROCEDURE:  1.  Left heart catheterization with LV gram.  2.  Selective right and left coronary angiography.    FINDINGS:  1.  LV gram consists of EF of 60-65% with no wall motion abnormality.  2+   mitral valve regurgitation.  2.  Right dominant system.  3.  Left main consists of normal size vessel with normal bifurcation of LAD   and left circumflex.  Left main is angiographically free of disease.  4.  LAD consists of moderate to large size vessel.  It is mildly tortuous at   the mid and distal segment, but appeared to have good ATIF flow and no   significant disease.  LAD would give off multiple small branches of diagonal,   large one at the mid segment.  Overall, diagonal system appeared to be   angiographically free of disease.  5.  Left circumflex consists of moderate to large size vessel.  It is   angiographically free of disease.  The left circumflex gives 3 OM system with   the  first and second OM tiny, rest no more than 1.5 mm in diameter.  They are   angiographically free of disease.  OM3 is the large of the size of 3, but   still approximately 2.0 mm in diameter, but angiographically free of disease.  6.  Ostial consists of a large and dominant system.  It has a high bifurcation   of the PDA and posterior lateral branch.  Overall, the ostial system is   angiographically free of disease.    RECOMMENDATION:  Apparently, normal coronary arteries.  I have discussed the   case with Dr. Schmitt (thoracic surgeon) and updated him on regarding to her   coronary situation.  Dr. Schmitt has reviewed echocardiogram, which appeared   to be more of moderate to severe MR and chronically origin.  He indicated that   patient does not need emergent surgery at this time, which I agree.  Plan for   CT chest to rule out PE given her elevated D-dimer and also the troponin.  In   addition, patient would benefit a KAREN for further assessment above.    DESCRIPTION OF PROCEDURE:  Patient was brought back to the operating table,   and draped in sterile fashion.  Timeout as per protocol was initiated prior to   starting procedure.  Following that, the right common femoral artery was   assessed and located.  2% lidocaine was used to anesthetize.  The right common   femoral artery was accessed by modified Seldinger method.  A 4-Chadian sheath   was then placed.  We used a pigtail catheter along with J-wire to advance   through the aortic valve without any difficulty.  J-wire was removed.    Hemodynamic measurement of the left ventricle was taken, which LVEDP was   approximately 11-13 mmHg.  Following that, LV gram was obtained.  Findings as   stated above.  At that point in time, pigtail catheter was then pulled back to   aorta with no gradient differences during the pullback.  Next, J-wire was   reinserted for catheter exchange, which the pigtail catheter was exchanged for   JL4 catheter.  We used JL4 catheter to  engage into the left coronary system.    Selective coronary angiogram was obtained in multiple planes and views.    Findings as stated above.  Following that, the JL4 catheter was exchanged for   JR4 catheter over the wire.  JR4 catheter was then carefully engaged into the   right coronary system without difficulty.  Coronary angiogram was obtained in   multiple planes and views.  Findings as stated above.  Following that, the   catheter was removed.  At this point in time, _____ of its entirety.  No   revascularization is recommended.  Please see further discussion as stated   above in the recommendation.  Catheter was removed, sheath was then pulled and   manual compression was held as per protocol.  Overall, patient tolerated the   procedure well and no complication noticed.    Approximately 80 mL contrast used.       ____________________________________     Kurtis Ramirez DO    TY / JANET    DD:  01/25/2017 15:41:03  DT:  01/25/2017 19:00:59    D#:  [id number]  Job#:  [id number]    cc: VICKEY DAVIS DO

## 2017-01-26 NOTE — CARE PLAN
Problem: Ventilation Defect:  Goal: Ability to achieve and maintain unassisted ventilation or tolerate decreased levels of ventilator support  Intervention: Support and monitor invasive and noninvasive mechanical ventilation  Adult Ventilation Update    Total Vent Days: 4  CMV: 24/400/+12/30%  Patient Lines/Drains/Airways Status    Active Airway      Name: Placement date:           Airway Group ET Tube Oral 8.0 01/24/17                  Plateau Pressure (Q Shift): 21   Static Compliance (ml / cm H2O): 47     Patient failed trials because of Barriers to Wean: No Order     Sputum/Suction   Cough: Moist   Sputum Amount: Small  Sputum Color: White   Sputum Consistency: Thick;Thin     Mobility Group  Activity Performed: Unable to mobilize     Events/Summary/Plan: No ventilatory changes made overnight. FiO2 titrated per ABG and SpO2.

## 2017-01-26 NOTE — PROGRESS NOTES
Cardiology Progress Note               Author: Kurtis Ramirez Date & Time created: 2017  8:58 AM     Interval History:  (late entry)   Patient is seen and examined earlier this morning prior to the KAREN.  Chart is reviewed.  Tele - NSR      Review of Systems   Unable to perform ROS: intubated   Constitutional: Negative.    HENT: Negative.    Eyes: Negative.    Respiratory: Negative.    Cardiovascular: Negative.    Gastrointestinal: Negative.    Genitourinary: Negative.    Musculoskeletal: Negative.    Skin: Negative.    Neurological: Negative.    Endo/Heme/Allergies: Negative.    Psychiatric/Behavioral: Negative.        Physical Exam   Constitutional: She is oriented to person, place, and time. She appears well-developed and well-nourished.   Eyes: EOM are normal.   Neck: Normal range of motion. Neck supple.   Cardiovascular: Normal rate, regular rhythm and normal heart sounds.    Pulmonary/Chest: Effort normal and breath sounds normal.   Abdominal: Soft. Bowel sounds are normal.   Musculoskeletal: Normal range of motion.   Neurological: She is alert and oriented to person, place, and time. She has normal reflexes.   Skin: Skin is warm and dry.   Psychiatric: She has a normal mood and affect. Her behavior is normal. Judgment and thought content normal.       Hemodynamics:  Temp (24hrs), Av.8 °C (98.3 °F), Min:36.5 °C (97.7 °F), Max:37.6 °C (99.7 °F)  Temperature: 37.6 °C (99.7 °F)  Pulse  Av  Min: 66  Max: 106Heart Rate (Monitored): 80  NIBP: 112/66 mmHg     Respiratory:  Quinones Vent Mode: APVCMV, Rate (breaths/min): 24, PEEP/CPAP: 12, FiO2: 30, P Peak (PIP): 23, P MEAN: 15 Respiration: (!) 24, Pulse Oximetry: 97 %     Work Of Breathing / Effort: Vented  RUL Breath Sounds: Clear, RML Breath Sounds: Clear, RLL Breath Sounds: Diminished, PRAMOD Breath Sounds: Clear, LLL Breath Sounds: Diminished  Fluids:     Weight: 66.2 kg (145 lb 15.1 oz)  GI/Nutrition:  Orders Placed This Encounter   Procedures   •  Diet NPO     Standing Status: Standing      Number of Occurrences: 1      Standing Expiration Date:      Order Specific Question:  Type:     Answer:  Now [1]     Order Specific Question:  Restrict to:     Answer:  Strict [1]     Lab Results:  Recent Labs      01/24/17 2056 01/25/17   0530  01/26/17   0500   WBC  14.7*  19.8*  10.7   RBC  4.74  4.52  3.93*   HEMOGLOBIN  14.9  14.1  12.3   HEMATOCRIT  45.4  42.3  36.4*   MCV  95.8  93.6  92.6   MCH  31.4  31.2  31.3   MCHC  32.8*  33.3*  33.8   RDW  48.0  46.5  47.2   PLATELETCT  222  257  224   MPV  11.1  11.9  11.9     Recent Labs      01/24/17 2056 01/25/17   0530  01/26/17   0500   SODIUM  129*  126*  133*   POTASSIUM  3.8  4.6  3.8   CHLORIDE  99  98  104   CO2  21  19*  19*   GLUCOSE  178*  137*  126*   BUN  19  21  21   CREATININE  0.98  1.24  1.09   CALCIUM  8.3*  8.2*  8.1*     Recent Labs      01/24/17 2056 01/25/17   1145  01/25/17   1800   APTT  25.6  35.2  179.3*   INR  0.96   --    --      Recent Labs      01/24/17 2056   BNPBTYPENAT  309*     Recent Labs      01/24/17 2056 01/25/17   0530  01/25/17   1800  01/26/17   0500   TROPONINI  1.79*   < >  3.35*  0.75*  0.48*   BNPBTYPENAT  309*   --    --    --    --     < > = values in this interval not displayed.     Recent Labs      01/25/17   0304  01/25/17   0530   TRIGLYCERIDE  105  87   HDL  72   --    LDL  61   --          Medical Decision Making, by Problem:  Active Hospital Problems    Diagnosis   • Pulmonary edema [J81.1]       Plan:  - KAREN shows no rupture cord or tendineae.  Discuss with IM team.    - Continue current treatment.  Continue to wean Levophed.  Gentle diuresis.  - Will obtain renal artery US to rule out renal stenosis (low suspect) as the cause for pulm edema, which is suspected due to tachycardia with chronic mitral valve regurgitation.    EKG reviewed, Labs reviewed, Medications reviewed and Radiology images reviewed

## 2017-01-26 NOTE — PROGRESS NOTES
Pulmonary Critical Care Progress Note        Date of Service: 1/26/2017  Chief Complaint: Worsening dyspnea and acute hypoxic respiratory failure    History of Present Illness: The patient is a 66-year-old female with a past medical history significant for hypertension, hyperlipidemia, and mitral valve prolapse who was in her usual state of health on 1/24 when she noted a cough and sudden onset of shortness of breath.  She checked her oxygen saturation at home and found to be 85%. She presented to the emergency department at [facility] where unfortunately she decompensated quickly requiring up to 15 L facemask   and then eventually requiring intubation. She was initially hypertensive and tachycardic with an oxygen saturation of 86% on room air at the outside hospital and was transported on high peak and expiratory pressures on the ventilator with an FIO2 of 100%, achieving saturations in the low 90s. She became progressively more hypotensive requiring a norepinephrine drip. A stat bedside echo was performed with Dr. Delarosa in the ER, which revealed a ruptured mitral valve leaflet with severe MR as the likely cause of her decompensation.     ROS: Unable to obtain as the patient is sedated on the ventilator     Interval Events:  24 hour interval history reviewed    - Cardiac cath yesterday that did not show disease   - CTA of chest was negative for PE      PFSH:  No change.    Respiratory:  Quinones Vent Mode: APVCMV, Rate (breaths/min): 24, Vt Target (mL): 400, PEEP/CPAP: 12, FiO2: 40, Static Compliance (ml / cm H2O): 47, Control VTE (exp VT): 400  Pulse Oximetry: 97 %    Exam: clear to auscultation without rales or wheezes  ImagingCXR  I have personally reviewed the chest x-ray my impression is   there is no change from prior image bilateral pulmonary edema     CTA chest 1/25:  1.  No evidence pulmonary emboli.  2.  Moderate right and small left pleural effusions.  3.  Compressive atelectasis both lower  lobes especially the left and left lung volume loss with mediastinal shift towards left.  4.  Patchy groundglass opacities within the right upper lobe consistent with focal areas of pneumonitis.  5.  7.5 mm left upper lobe nodule and smaller nodules within the lungs.  Recent Labs      01/24/17   2045  01/24/17   2228  01/25/17   0444   ISTATAPH  7.236*  7.284*  7.356*   ISTATAPCO2  48.0*  38.6*  32.8   ISTATAPO2  62*  79  156*   ISTATATCO2  22  19*  19*   [id number]  87*  94  99   ISTATARTHCO3  20.4  18.3  18.4   ISTATARTBE  -7*  -8*  -6*   ISTATTEMP  98.6 F  37.0 C  96.0 F   ISTATFIO2  100  100  100   ISTATSPEC  Arterial  Arterial  Arterial   ISTATAPHTC  7.236*  7.284*  7.377*   [id number]  62*  79  147*       HemoDynamics:  Pulse: 81, Heart Rate (Monitored): 81  NIBP: 103/61 mmHg     Levophed - 20  Dobutamine - 2.5  Exam: 3/6 holosystolic murmur heard throughout the precordium, regular rate and rhythm  Imaging: echo Reviewed     ECHO from 1/24  CONCLUSIONS  Hyperdynamic left ventricular systolic function.  Left ventricular ejection fraction is visually estimated to be greater   than 75%.  Posterior mitral valve prolapse with probable flail leaflet.  Severe mitral regurgitation.  Mild tricuspid regurgitation.  Right ventricular systolic pressure is estimated to be 40 mmHg  Recent Labs      01/24/17   2056  01/25/17   0304  01/25/17   0530  01/25/17   1800   TROPONINI  1.79*  4.23*  3.35*  0.75*   BNPBTYPENAT  309*   --    --    --        Neuro:  GCS Total Hayden Coma Score: 8     Propofol - 70  Exam: Heavily sedated  Imaging: Available data reviewed    Fluids:  Intake/Output       01/24/17 0700 - 01/25/17 0659 01/25/17 0700 - 01/26/17 0659 01/26/17 0700 - 01/27/17 0659      5091-6052 8731-0341 Total 0371-1842 7482-2960 Total 5702-5810 9683-0587 Total       Intake    I.V.  --  502.7 502.7  942.3  436.1 1378.4  --  -- --    Propofol Volume -- 173.9 173.9 298.1 190.4 488.5 -- -- --    Norepinephrine Volume -- 278.9  278.9 425.6 203.7 629.3 -- -- --    Heparin Volume -- -- -- 112.8 42 154.8 -- -- --    Dobutamine Volume -- 50 50 105.8 -- 105.8 -- -- --    Enteral  --  -- --  60  -- 60  --  -- --    Free Water / Tube Flush -- -- -- 60 -- 60 -- -- --    Total Intake -- 502.7 502.7 1002.3 436.1 1438.4 -- -- --       Output    Urine  --  361 361  900  325 1225  --  -- --    Indwelling Cathether -- 361 361  -- -- --    Stool  --  -- --  --  -- --  --  -- --    Number of Times Stooled -- 0 x 0 x -- -- -- -- -- --    Total Output -- 361 361  -- -- --       Net I/O     -- 141.7 141.7 102.3 111.1 213.4 -- -- --           Recent Labs      17   0530   SODIUM  129*  126*   POTASSIUM  3.8  4.6   CHLORIDE  99  98   CO2  21  19*   BUN  19  21   CREATININE  0.98  1.24   MAGNESIUM  1.9  2.2   PHOSPHORUS  4.6*  4.1   CALCIUM  8.3*  8.2*       GI/Nutrition:  Exam: abdomen is soft and non-tender, normal active bowel sounds  Imaging: Available data reviewed  NPO  Liver Function  Recent Labs      17   0530   ALTSGPT  16  14   ASTSGOT  20  24   ALKPHOSPHAT  95  71   TBILIRUBIN  0.6  0.7   GLUCOSE  178*  137*       Heme:  Recent Labs      17   0530  17   1145  17   1800   RBC  4.74  4.52   --    --    HEMOGLOBIN  14.9  14.1   --    --    HEMATOCRIT  45.4  42.3   --    --    PLATELETCT  222  257   --    --    PROTHROMBTM  13.1   --    --    --    APTT  25.6   --   35.2  179.3*   INR  0.96   --    --    --        Infectious Disease:  Temp  Av.4 °C (97.5 °F)  Min: 35.6 °C (96 °F)  Max: 36.7 °C (98.1 °F)  Micro: antibiotics reviewed, cultures pending and cultures reviewed  Recent Labs      17   0530   WBC  14.7*  19.8*   NEUTSPOLYS  87.50*  88.90*   LYMPHOCYTES  6.00*  4.90*   MONOCYTES  5.60  5.60   EOSINOPHILS  0.10  0.00   BASOPHILS  0.30  0.20   ASTSGOT  20  24   ALTSGPT  16  14   ALKPHOSPHAT  95  71   TBILIRUBIN  0.6   0.7     Current Facility-Administered Medications   Medication Dose Frequency Provider Last Rate Last Dose   • aspirin (ASA) tablet 325 mg  325 mg DAILY Jeremy M Gonda, M.D.   325 mg at 01/25/17 0808   • heparin injection 5,000 Units  5,000 Units Q8HRS Kurtis Ramirez D.O.   Stopped at 01/25/17 2200   • propofol (DIPRIVAN) injection  5-80 mcg/kg/min Continuous Jeremy M Gonda, M.D. 22 mL/hr at 01/26/17 0320 60 mcg/kg/min at 01/26/17 0320   • norepinephrine (LEVOPHED) 8 mg in  mL Infusion  0.5-30 mcg/min Continuous James Pérez M.D. 24.4 mL/hr at 01/26/17 0327 13 mcg/min at 01/26/17 0327   • glucose 4 g chewable tablet 16 g  16 g Q15 MIN PRN James Pérez M.D.        And   • dextrose 50% (D50W) injection 25 mL  25 mL Q15 MIN PRN James Pérez M.D.       • multivitamin (THERAGRAN) tablet 1 Tab  1 Tab DAILY James Pérez M.D.   1 Tab at 01/25/17 0807   • Respiratory Care per Protocol   Continuous RT Jeremy M Gonda, M.D.       • lactulose 20 GM/30ML solution 30 mL  30 mL Q24HRS PRN Jeremy M Gonda, M.D.       • bisacodyl (DULCOLAX) suppository 10 mg  10 mg Q24HRS PRN Jeremy M Gonda, M.D.       • fleet enema 133 mL  1 Each Once PRN Jeremy M Gonda, M.D.       • chlorhexidine (PERIDEX) 0.12 % solution 15 mL  15 mL BID Jeremy M Gonda, M.D.   15 mL at 01/25/17 2106   • lidocaine (XYLOCAINE) 1%  injection  1-2 mL Q30 MIN PRN Jeremy M Gonda, M.D.       • fentaNYL (SUBLIMAZE) injection 25 mcg  25 mcg Q HOUR PRN Jeremy M Gonda, M.D.   25 mcg at 01/25/17 0632    Or   • fentaNYL (SUBLIMAZE) injection 50 mcg  50 mcg Q HOUR PRN Jeremy M Gonda, M.D.        Or   • fentaNYL (SUBLIMAZE) injection 100 mcg  100 mcg Q HOUR PRN Jeremy M Gonda, M.D.       • ipratropium-albuterol (DUONEB) nebulizer solution 3 mL  3 mL Q2HRS PRN (RT) Jeremy M Gonda, M.D.       • famotidine (PEPCID) tablet 20 mg  20 mg Q12HRS Jeremy M Gonda, M.D.   20 mg at 01/25/17 2107    Or   • famotidine (PEPCID) injection 20 mg  20 mg Q12HRS Christo GARRIDO  Gonda, M.D.   20 mg at 01/24/17 2323   • cefTRIAXone (ROCEPHIN) 1 g in  mL IVPB  1 g Q24HRS Jeremy M Gonda, M.D.   Stopped at 01/25/17 0836   • doxycycline (VIBRAMYCIN) 100 mg in  mL IVPB  100 mg Q12HRS Jeremy M Gonda, M.D.   Stopped at 01/26/17 0011   • oyster shell calcium/vitamin D 250-125 MG-UNIT tablet 1 Tab  1 Tab QDAY with Breakfast James Pérez M.D.   1 Tab at 01/25/17 0807   • pravastatin (PRAVACHOL) tablet 20 mg  20 mg Q EVENING Jeremy M Gonda, M.D.   20 mg at 01/25/17 2107   • DOBUTamine (DOBUTREX) 1 mg/mL premix infusion  2.5 mcg/kg/min Continuous Jeremy M Gonda, M.D.   Stopped at 01/25/17 1730     Last reviewed on 1/24/2017 11:07 PM by [facility]    Quality  Measures:  Medications reviewed, EKG reviewed, Labs reviewed and Radiology images reviewed  Frank catheter: Critically Ill - Requiring Accurate Measurement of Urinary Output and Unconscious / Sedated Patient on a Ventilator  Central line in place: Need for access and Vasopressors    DVT Prophylaxis: Heparin  DVT prophylaxis - mechanical: SCDs  Ulcer prophylaxis: Yes  Antibiotics: Treating active infection/contamination beyond 24 hours perioperative coverage    Problems/Plan:    Acute Hypoxic Respiratory Failure from pulmonary edema and cardiogenic shock   - Cont full vent support   - RT/O2 protocols   - No SBTs for now due to high PEEP   - will wean PEEP to 10 today  Cardiogenic Shock likely related to severe mitral regurgitation   - Cont vasopressor/ionotropic therapy   - Will start gentle diuresis   - Cards following   - Cardiac cath yesterday was negative and heparin stopped   - CTA was negative  Severe Mitral Valve Regurgitation likely related to valve prolapse   - cardiac cath today   - CTA r/o PE   - CTS consulted and no surgery at this time  Acute Pulmonary Edema   - RT/O2 protocols   - cont vent support   - will start gentle diuresis  Acute Leukocytosis and concern for aspiration   - blood/sputum cultures sent and  follow   - Empiric Ceftriaxone/Doxycycline  Hyponatremia   - improved   - monitor   - reduce IVF  Elevated Troponin   - monitor   - f/u on cardiac catheterization   - heparin gtt   - ASA  Hyperglycemia   - ISS  Acute Metabolic Acidosis   - monitor    - keep MAPs > 65  Hx of mitral valve prolapse  Hx of systemic arterial hypertension  Hx of Dyslipidemia   - statin therapy  Prophylaxis   - heparin, bowel regimen, pepcid    Discussed patient condition and risk of morbidity and/or mortality with Family, RN, RT, Pharmacy, , [employer] [facility] resident and Charge nurse / hot rounds and well as cardiology  The patient remains critically ill.  Critical care time = 33 minutes in directly providing and coordinating critical care and extensive data review.  No time overlap and excludes procedures.

## 2017-01-26 NOTE — CARE PLAN
Problem: Ventilation Defect:  Goal: Ability to achieve and maintain unassisted ventilation or tolerate decreased levels of ventilator support  Intervention: Support and monitor invasive and noninvasive mechanical ventilation  Adult Ventilation Update    Total Vent Days: 2      Patient Lines/Drains/Airways Status    Active Airway      Name: Placement date: Placement time: Site: Days:     Airway Group ET Tube Oral 8.0 01/24/17    Oral  1                 Cough: Non Productive (01/24/17 2025)  Sputum Amount: Small (01/25/17 1200)  Sputum Color: Clear;White (01/25/17 1200)  Sputum Consistency: Thin (01/25/17 1200)    Mobility Group  Activity Performed: Unable to mobilize (01/25/17 1200)  Pt Calls for Assistance: No (01/25/17 1300)  Reason Not Mobilized: Bed rest;Other (comment) (01/25/17 0600)    Events/Summary/Plan:

## 2017-01-26 NOTE — CONSULTS
Cardiology Consult  (late entry - patient is seen and examined earlier this morning.  Please see dictation)  - This is a 66-year-old female with history of stable (moderate to severe) Mitral Regurgitation, O2 (2 L) dependent at night who is transferred to [facility] for acute respiratory failure.    - Her cardiologist is Dr. LOUIE Landin at [facility] [place]    - 4/22/2016 Echo:  EF 55-60% MVP (posterior leaflet) with moderate to severe MR (no changes since 3/30/2015)  - 03/30/2015 Echo:  EF 55% moderate to severe MR.    - 05/01/2014 KAREN:  Moderate MVP with moderate MR  - 04/02/2014 Echo:  EF 60-65% with moderate to severe MR.      A/P:  1) Resp Failure/Vent support  2) Elevated troponin  3) Severe MR  4) O2 dependent at night hx.     - Start heparin for elevated troponin.   - Recommend coronary angiogram for further assessment  - Recommend consult [facility] (Dr. SHANTELLE Leblanc).

## 2017-01-26 NOTE — CARE PLAN
Problem: Venous Thromboembolism (VTW)/Deep Vein Thrombosis (DVT) Prevention:  Goal: Patient will participate in Venous Thrombosis (VTE)/Deep Vein Thrombosis (DVT)Prevention Measures  Intervention: Ensure patient wears graduated elastic stockings (LC hose) and/or SCDs, if ordered, when in bed or chair (Remove at least once per shift for skin check)  SCDs were in place throughout shift. No signs of DTV.       Problem: Skin Integrity  Goal: Risk for impaired skin integrity will decrease  Intervention: Assess and monitor skin integrity, appearance and/or temperature  Skin was intact but cold in the extremities. No signs of skin break down.

## 2017-01-26 NOTE — CARE PLAN
Problem: Infection  Goal: Will remain free from infection  Outcome: PROGRESSING AS EXPECTED  Patients individual infection risk assessed. Monitored for signs and symptoms of infection throughout shift. Washed hands or foamed in and out every encounter. Utilized universal precautions. Scrubbed hub before access to IV lines per protocol.     Problem: Skin Integrity  Goal: Risk for impaired skin integrity will decrease  Outcome: PROGRESSING AS EXPECTED  Skin assessed at start of shift. Nutrition, moisture, sensory, activity, mobility, friction and shear assessed and addressed with patient and family. Ray nicholson.

## 2017-01-26 NOTE — DISCHARGE PLANNING
Remains intubated.    Day 4. 12/30%   Day 2 Doxy and Roceph.     MV Prolapse.  SR 80-90's.  BP stable.  No temp.  Not moblize.  Consult CARDS for surgery.      Follow for extubation.

## 2017-01-27 ENCOUNTER — APPOINTMENT (OUTPATIENT)
Dept: RADIOLOGY | Facility: MEDICAL CENTER | Age: 67
DRG: 216 | End: 2017-01-27
Attending: INTERNAL MEDICINE
Payer: MEDICARE

## 2017-01-27 LAB
ALBUMIN SERPL BCP-MCNC: 3.1 G/DL (ref 3.2–4.9)
ALBUMIN/GLOB SERPL: 1.3 G/DL
ALP SERPL-CCNC: 63 U/L (ref 30–99)
ALT SERPL-CCNC: 8 U/L (ref 2–50)
ANION GAP SERPL CALC-SCNC: 9 MMOL/L (ref 0–11.9)
AST SERPL-CCNC: 10 U/L (ref 12–45)
BASE EXCESS BLDA CALC-SCNC: -4 MMOL/L (ref -4–3)
BASOPHILS # BLD AUTO: 0.3 % (ref 0–1.8)
BASOPHILS # BLD: 0.02 K/UL (ref 0–0.12)
BILIRUB SERPL-MCNC: 0.5 MG/DL (ref 0.1–1.5)
BODY TEMPERATURE: ABNORMAL DEGREES
BUN SERPL-MCNC: 32 MG/DL (ref 8–22)
CALCIUM SERPL-MCNC: 8.4 MG/DL (ref 8.5–10.5)
CHLORIDE SERPL-SCNC: 108 MMOL/L (ref 96–112)
CO2 BLDA-SCNC: 19 MMOL/L (ref 20–33)
CO2 SERPL-SCNC: 20 MMOL/L (ref 20–33)
CREAT SERPL-MCNC: 0.98 MG/DL (ref 0.5–1.4)
EOSINOPHIL # BLD AUTO: 0.06 K/UL (ref 0–0.51)
EOSINOPHIL NFR BLD: 0.8 % (ref 0–6.9)
ERYTHROCYTE [DISTWIDTH] IN BLOOD BY AUTOMATED COUNT: 47.9 FL (ref 35.9–50)
GFR SERPL CREATININE-BSD FRML MDRD: 56 ML/MIN/1.73 M 2
GLOBULIN SER CALC-MCNC: 2.4 G/DL (ref 1.9–3.5)
GLUCOSE SERPL-MCNC: 121 MG/DL (ref 65–99)
HCO3 BLDA-SCNC: 18 MMOL/L (ref 17–25)
HCT VFR BLD AUTO: 33.1 % (ref 37–47)
HGB BLD-MCNC: 10.9 G/DL (ref 12–16)
IMM GRANULOCYTES # BLD AUTO: 0.03 K/UL (ref 0–0.11)
IMM GRANULOCYTES NFR BLD AUTO: 0.4 % (ref 0–0.9)
LYMPHOCYTES # BLD AUTO: 1.01 K/UL (ref 1–4.8)
LYMPHOCYTES NFR BLD: 14 % (ref 22–41)
MAGNESIUM SERPL-MCNC: 2.4 MG/DL (ref 1.5–2.5)
MCH RBC QN AUTO: 30.7 PG (ref 27–33)
MCHC RBC AUTO-ENTMCNC: 32.9 G/DL (ref 33.6–35)
MCV RBC AUTO: 93.2 FL (ref 81.4–97.8)
MONOCYTES # BLD AUTO: 0.62 K/UL (ref 0–0.85)
MONOCYTES NFR BLD AUTO: 8.6 % (ref 0–13.4)
NEUTROPHILS # BLD AUTO: 5.45 K/UL (ref 2–7.15)
NEUTROPHILS NFR BLD: 75.9 % (ref 44–72)
NRBC # BLD AUTO: 0 K/UL
NRBC BLD AUTO-RTO: 0 /100 WBC
O2/TOTAL GAS SETTING VFR VENT: 30 %
PCO2 BLDA: 24.1 MMHG (ref 26–37)
PCO2 TEMP ADJ BLDA: 22.8 MMHG (ref 26–37)
PH BLDA: 7.48 [PH] (ref 7.4–7.5)
PH TEMP ADJ BLDA: 7.5 [PH] (ref 7.4–7.5)
PHOSPHATE SERPL-MCNC: 2.7 MG/DL (ref 2.5–4.5)
PLATELET # BLD AUTO: 215 K/UL (ref 164–446)
PMV BLD AUTO: 12.9 FL (ref 9–12.9)
PO2 BLDA: 56 MMHG (ref 64–87)
PO2 TEMP ADJ BLDA: 52 MMHG (ref 64–87)
POTASSIUM SERPL-SCNC: 3.5 MMOL/L (ref 3.6–5.5)
PROT SERPL-MCNC: 5.5 G/DL (ref 6–8.2)
RBC # BLD AUTO: 3.55 M/UL (ref 4.2–5.4)
SAO2 % BLDA: 92 % (ref 93–99)
SODIUM SERPL-SCNC: 137 MMOL/L (ref 135–145)
SPECIMEN DRAWN FROM PATIENT: ABNORMAL
TRIGL SERPL-MCNC: 168 MG/DL (ref 0–149)
WBC # BLD AUTO: 7.2 K/UL (ref 4.8–10.8)

## 2017-01-27 PROCEDURE — 700111 HCHG RX REV CODE 636 W/ 250 OVERRIDE (IP): Performed by: INTERNAL MEDICINE

## 2017-01-27 PROCEDURE — 82803 BLOOD GASES ANY COMBINATION: CPT

## 2017-01-27 PROCEDURE — 85025 COMPLETE CBC W/AUTO DIFF WBC: CPT

## 2017-01-27 PROCEDURE — 700105 HCHG RX REV CODE 258: Performed by: INTERNAL MEDICINE

## 2017-01-27 PROCEDURE — A9270 NON-COVERED ITEM OR SERVICE: HCPCS | Performed by: STUDENT IN AN ORGANIZED HEALTH CARE EDUCATION/TRAINING PROGRAM

## 2017-01-27 PROCEDURE — 700102 HCHG RX REV CODE 250 W/ 637 OVERRIDE(OP): Performed by: INTERNAL MEDICINE

## 2017-01-27 PROCEDURE — 84478 ASSAY OF TRIGLYCERIDES: CPT

## 2017-01-27 PROCEDURE — 94003 VENT MGMT INPAT SUBQ DAY: CPT

## 2017-01-27 PROCEDURE — 770022 HCHG ROOM/CARE - ICU (200)

## 2017-01-27 PROCEDURE — 36600 WITHDRAWAL OF ARTERIAL BLOOD: CPT

## 2017-01-27 PROCEDURE — 99291 CRITICAL CARE FIRST HOUR: CPT | Mod: GC | Performed by: INTERNAL MEDICINE

## 2017-01-27 PROCEDURE — 700102 HCHG RX REV CODE 250 W/ 637 OVERRIDE(OP): Performed by: HOSPITALIST

## 2017-01-27 PROCEDURE — A9270 NON-COVERED ITEM OR SERVICE: HCPCS | Performed by: INTERNAL MEDICINE

## 2017-01-27 PROCEDURE — 93975 VASCULAR STUDY: CPT

## 2017-01-27 PROCEDURE — 80053 COMPREHEN METABOLIC PANEL: CPT

## 2017-01-27 PROCEDURE — 700102 HCHG RX REV CODE 250 W/ 637 OVERRIDE(OP): Performed by: STUDENT IN AN ORGANIZED HEALTH CARE EDUCATION/TRAINING PROGRAM

## 2017-01-27 PROCEDURE — 700111 HCHG RX REV CODE 636 W/ 250 OVERRIDE (IP): Performed by: HOSPITALIST

## 2017-01-27 PROCEDURE — 84100 ASSAY OF PHOSPHORUS: CPT

## 2017-01-27 PROCEDURE — 83735 ASSAY OF MAGNESIUM: CPT

## 2017-01-27 PROCEDURE — 700101 HCHG RX REV CODE 250: Performed by: INTERNAL MEDICINE

## 2017-01-27 PROCEDURE — A9270 NON-COVERED ITEM OR SERVICE: HCPCS | Performed by: HOSPITALIST

## 2017-01-27 PROCEDURE — 71010 DX-CHEST-PORTABLE (1 VIEW): CPT

## 2017-01-27 RX ORDER — POTASSIUM CHLORIDE 1.5 G/1.58G
40 POWDER, FOR SOLUTION ORAL ONCE
Status: COMPLETED | OUTPATIENT
Start: 2017-01-27 | End: 2017-01-27

## 2017-01-27 RX ORDER — QUETIAPINE FUMARATE 25 MG/1
25 TABLET, FILM COATED ORAL
Status: DISCONTINUED | OUTPATIENT
Start: 2017-01-27 | End: 2017-01-29

## 2017-01-27 RX ORDER — FUROSEMIDE 10 MG/ML
20 INJECTION INTRAMUSCULAR; INTRAVENOUS ONCE
Status: COMPLETED | OUTPATIENT
Start: 2017-01-27 | End: 2017-01-27

## 2017-01-27 RX ORDER — FUROSEMIDE 10 MG/ML
20 INJECTION INTRAMUSCULAR; INTRAVENOUS
Status: DISCONTINUED | OUTPATIENT
Start: 2017-01-28 | End: 2017-01-31

## 2017-01-27 RX ORDER — POTASSIUM CHLORIDE 1.5 G/1.58G
20 POWDER, FOR SOLUTION ORAL DAILY
Status: DISCONTINUED | OUTPATIENT
Start: 2017-01-27 | End: 2017-01-30

## 2017-01-27 RX ADMIN — THERA TABS 1 TABLET: TAB at 08:38

## 2017-01-27 RX ADMIN — POTASSIUM CHLORIDE 20 MEQ: 1.5 POWDER, FOR SOLUTION ORAL at 10:32

## 2017-01-27 RX ADMIN — FENTANYL CITRATE 50 MCG: 50 INJECTION, SOLUTION INTRAMUSCULAR; INTRAVENOUS at 20:53

## 2017-01-27 RX ADMIN — CEFTRIAXONE SODIUM 1 G: 1 INJECTION, POWDER, FOR SOLUTION INTRAMUSCULAR; INTRAVENOUS at 08:39

## 2017-01-27 RX ADMIN — PROPOFOL 60 MCG/KG/MIN: 10 INJECTION, EMULSION INTRAVENOUS at 04:26

## 2017-01-27 RX ADMIN — IBUPROFEN 400 MG: 100 SUSPENSION ORAL at 10:32

## 2017-01-27 RX ADMIN — CHLORHEXIDINE GLUCONATE 15 ML: 1.2 RINSE ORAL at 08:38

## 2017-01-27 RX ADMIN — CHLORHEXIDINE GLUCONATE 15 ML: 1.2 RINSE ORAL at 20:53

## 2017-01-27 RX ADMIN — DOXYCYCLINE 100 MG: 100 INJECTION, POWDER, LYOPHILIZED, FOR SOLUTION INTRAVENOUS at 08:39

## 2017-01-27 RX ADMIN — LIDOCAINE HYDROCHLORIDE 2 ML: 10 INJECTION, SOLUTION INFILTRATION; PERINEURAL at 20:57

## 2017-01-27 RX ADMIN — POTASSIUM CHLORIDE 40 MEQ: 1.5 POWDER, FOR SOLUTION ORAL at 08:38

## 2017-01-27 RX ADMIN — ASPIRIN 81 MG: 81 TABLET ORAL at 08:38

## 2017-01-27 RX ADMIN — QUETIAPINE FUMARATE 25 MG: 25 TABLET, FILM COATED ORAL at 16:21

## 2017-01-27 RX ADMIN — FAMOTIDINE 20 MG: 20 TABLET, FILM COATED ORAL at 08:38

## 2017-01-27 RX ADMIN — HEPARIN SODIUM 5000 UNITS: 5000 INJECTION, SOLUTION INTRAVENOUS; SUBCUTANEOUS at 06:30

## 2017-01-27 RX ADMIN — FENTANYL CITRATE 100 MCG: 50 INJECTION, SOLUTION INTRAMUSCULAR; INTRAVENOUS at 23:52

## 2017-01-27 RX ADMIN — FUROSEMIDE 20 MG: 10 INJECTION, SOLUTION INTRAVENOUS at 08:38

## 2017-01-27 RX ADMIN — FAMOTIDINE 20 MG: 20 TABLET, FILM COATED ORAL at 20:53

## 2017-01-27 RX ADMIN — PROPOFOL 40 MCG/KG/MIN: 10 INJECTION, EMULSION INTRAVENOUS at 08:46

## 2017-01-27 RX ADMIN — LIDOCAINE HYDROCHLORIDE 2 ML: 10 INJECTION, SOLUTION INFILTRATION; PERINEURAL at 15:33

## 2017-01-27 RX ADMIN — FUROSEMIDE 20 MG: 10 INJECTION, SOLUTION INTRAVENOUS at 20:52

## 2017-01-27 RX ADMIN — LIDOCAINE HYDROCHLORIDE 2 ML: 10 INJECTION, SOLUTION INFILTRATION; PERINEURAL at 17:45

## 2017-01-27 RX ADMIN — HEPARIN SODIUM 5000 UNITS: 5000 INJECTION, SOLUTION INTRAVENOUS; SUBCUTANEOUS at 13:52

## 2017-01-27 RX ADMIN — CALCIUM CARBONATE-CHOLECALCIFEROL TAB 250 MG-125 UNIT 1 TABLET: 250-125 TAB at 08:49

## 2017-01-27 RX ADMIN — DOXYCYCLINE 100 MG: 100 INJECTION, POWDER, LYOPHILIZED, FOR SOLUTION INTRAVENOUS at 21:39

## 2017-01-27 RX ADMIN — HEPARIN SODIUM 5000 UNITS: 5000 INJECTION, SOLUTION INTRAVENOUS; SUBCUTANEOUS at 20:52

## 2017-01-27 RX ADMIN — FENTANYL CITRATE 100 MCG: 50 INJECTION, SOLUTION INTRAMUSCULAR; INTRAVENOUS at 22:44

## 2017-01-27 RX ADMIN — PRAVASTATIN SODIUM 20 MG: 20 TABLET ORAL at 20:52

## 2017-01-27 ASSESSMENT — PAIN SCALES - GENERAL
PAINLEVEL_OUTOF10: ASSUMED PAIN PRESENT
PAINLEVEL_OUTOF10: ASSUMED PAIN PRESENT

## 2017-01-27 NOTE — CARE PLAN
Problem: Ventilation Defect:  Goal: Ability to achieve and maintain unassisted ventilation or tolerate decreased levels of ventilator support  Adult Ventilation Update    Total Vent Days: 5      Patient Lines/Drains/Airways Status    Active Airway      Name: Placement date: Placement time: Site: Days:     Airway Group ET Tube Oral 8.0 01/24/17    Oral  4                 Plateau Pressure (Q Shift): 21 (01/26/17 0718)  Static Compliance (ml / cm H2O): 50 (01/27/17 0502)    Sputum/Suction   Cough: Non Productive (01/27/17 0400)  Sputum Amount: Moderate (01/27/17 0400)  Sputum Color: White;Yellow (01/27/17 0400)  Sputum Consistency: Thin;Thick (01/27/17 0400)    Events/Summary/Plan: Fio2 increased to 40% post ABG

## 2017-01-27 NOTE — CARE PLAN
Problem: Ventilation Defect:  Goal: Ability to achieve and maintain unassisted ventilation or tolerate decreased levels of ventilator support  Adult Ventilation Update    Total Vent Days: 4      Patient Lines/Drains/Airways Status    Active Airway      Name: Placement date: Placement time: Site: Days:     Airway Group ET Tube Oral 8.0 01/24/17    Oral  2               APVcmv 24, 400, +10, 30%     Plateau Pressure (Q Shift): 21 (01/26/17 0718)  Static Compliance (ml / cm H2O): 53 (01/26/17 1449)    Patient failed trials because of Barriers to Wean: No Order (01/26/17 0718)    Cough: Productive (01/26/17 1600)  Sputum Amount: Small (01/26/17 1600)  Sputum Color: Clear;White (01/26/17 1600)  Sputum Consistency: Thick;Thin (01/26/17 1600)    Mobility Group  Activity Performed: Unable to mobilize (01/26/17 0800)  Pt Calls for Assistance: No (01/26/17 0800)  Reason Not Mobilized: Unstable condition;Other (comment) (PEEP>12) (01/26/17 0800)  Mobilization Comments:  (PEEP 12) (01/26/17 0400)    Events/Summary/Plan: decreased PEEP from 12 to 10, no other vent changes made today

## 2017-01-27 NOTE — CARE PLAN
Problem: Venous Thromboembolism (VTW)/Deep Vein Thrombosis (DVT) Prevention:  Goal: Patient will participate in Venous Thrombosis (VTE)/Deep Vein Thrombosis (DVT)Prevention Measures  Intervention: Ensure patient wears graduated elastic stockings (LC hose) and/or SCDs, if ordered, when in bed or chair (Remove at least once per shift for skin check)  SCDs in place throughout shift. Pt is being anticoagulated. No signs of DVT.       Problem: Respiratory:  Goal: Respiratory status will improve  Intervention: Administer and titrate oxygen therapy  Pt sating in mid to high 90s throughout shift, however morning ABG showed decreased O2. RT increased FiO2 to 40%

## 2017-01-27 NOTE — PROGRESS NOTES
Pulmonary Critical Care Progress Note        Date of Service: 1/27/2017  Chief Complaint: Worsening dyspnea and acute hypoxic respiratory failure    History of Present Illness: The patient is a 66-year-old female with a past medical history significant for hypertension, hyperlipidemia, and mitral valve prolapse who was in her usual state of health on 1/24 when she noted a cough and sudden onset of shortness of breath.  She checked her oxygen saturation at home and found to be 85%. She presented to the emergency department at [facility] where unfortunately she decompensated quickly requiring up to 15 L facemask   and then eventually requiring intubation. She was initially hypertensive and tachycardic with an oxygen saturation of 86% on room air at the outside hospital and was transported on high peak and expiratory pressures on the ventilator with an FIO2 of 100%, achieving saturations in the low 90s. She became progressively more hypotensive requiring a norepinephrine drip. A stat bedside echo was performed with Dr. Delarosa in the ER, which revealed a ruptured mitral valve leaflet with severe MR as the likely cause of her decompensation.     ROS: Unable to obtain as the patient is sedated on the ventilator     Interval Events:  24 hour interval history reviewed    - Tolerated lasix challenge yesterday   - Was agitated overnight with reduction of Propofol   - Improved hemodynamics today      PFSH:  No change.    Respiratory:  Quinones Vent Mode: APVCMV, Rate (breaths/min): 24, Vt Target (mL): 400, PEEP/CPAP: 10, FiO2: 40, Static Compliance (ml / cm H2O): 50, Control VTE (exp VT): 401  Pulse Oximetry: 98 %    Exam: clear to auscultation without rales or wheezes  ImagingCXR  I have personally reviewed the chest x-ray my impression is   there is no change from prior image bilateral pulmonary edema slightly improved    CTA chest 1/25:  1.  No evidence pulmonary emboli.  2.  Moderate right and small left pleural  effusions.  3.  Compressive atelectasis both lower lobes especially the left and left lung volume loss with mediastinal shift towards left.  4.  Patchy groundglass opacities within the right upper lobe consistent with focal areas of pneumonitis.  5.  7.5 mm left upper lobe nodule and smaller nodules within the lungs.  Recent Labs      01/24/17   2228  01/25/17   0444  01/26/17   0450   ISTATAPH  7.284*  7.356*  7.454   ISTATAPCO2  38.6*  32.8  26.4   ISTATAPO2  79  156*  100*   ISTATATCO2  19*  19*  19*   [id number]  94  99  98   ISTATARTHCO3  18.3  18.4  18.5   ISTATARTBE  -8*  -6*  -4   ISTATTEMP  37.0 C  96.0 F  99.3 F   ISTATFIO2  100  100  40   ISTATSPEC  Arterial  Arterial  Arterial   ISTATAPHTC  7.284*  7.377*  7.449   [id number]  79  147*  103*       HemoDynamics:  Pulse: 68, Heart Rate (Monitored): 63  NIBP: 107/61 mmHg     Levophed - 20  Dobutamine - 2.5  Exam: 3/6 holosystolic murmur heard throughout the precordium, regular rate and rhythm  Imaging: echo Reviewed     ECHO from 1/24  CONCLUSIONS  Hyperdynamic left ventricular systolic function.  Left ventricular ejection fraction is visually estimated to be greater   than 75%.  Posterior mitral valve prolapse with probable flail leaflet.  Severe mitral regurgitation.  Mild tricuspid regurgitation.  Right ventricular systolic pressure is estimated to be 40 mmHg  Recent Labs      01/24/17   2056   01/25/17   0530  01/25/17   1800  01/26/17   0500   TROPONINI  1.79*   < >  3.35*  0.75*  0.48*   BNPBTYPENAT  309*   --    --    --    --     < > = values in this interval not displayed.       Neuro:  GCS Total Phoenix Coma Score: 8     Propofol - 60  Exam: Heavily sedated  Imaging: Available data reviewed    Fluids:  Intake/Output       01/25/17 0700 - 01/26/17 0659 01/26/17 0700 - 01/27/17 0659 01/27/17 0700 - 01/28/17 0659      5066-2643 8920-3396 Total 2977-9450 8441-1159 Total 0700-1859 1900-0659 Total       Intake    I.V.  942.3  621.7 1564  410.9  53.5  464.4  --  -- --    Propofol Volume 298.1 278.4 576.5 221.7 36.6 258.3 -- -- --    Norepinephrine Volume 425.6 301.3 726.9 189.2 16.9 206.1 -- -- --    Heparin Volume 112.8 42 154.8 -- -- -- -- -- --    Dobutamine Volume 105.8 -- 105.8 -- -- -- -- -- --    Enteral  60  340 400  420  70 490  --  -- --    Enteral Volume -- 340 340 420 70 490 -- -- --    Free Water / Tube Flush 60 -- 60 -- -- -- -- -- --    Total Intake 1002.3 961.7 1964 830.9 123.5 954.4 -- -- --       Output    Urine  900  565 1465  1350  250 1600  --  -- --    Indwelling Cathether  4363 360 9105 -- -- --    Total Output  8117 214 9583 -- -- --       Net I/O     102.3 396.7 499 -519.1 -126.5 -645.6 -- -- --        Weight: 62.6 kg (138 lb 0.1 oz)  Recent Labs      17   0530  17   0500   SODIUM  129*  126*  133*   POTASSIUM  3.8  4.6  3.8   CHLORIDE  99  98  104   CO2  21  19*  19*   BUN  19  21  21   CREATININE  0.98  1.24  1.09   MAGNESIUM  1.9  2.2  2.3   PHOSPHORUS  4.6*  4.1  3.1   CALCIUM  8.3*  8.2*  8.1*       GI/Nutrition:  Exam: abdomen is soft and non-tender, normal active bowel sounds  Imaging: Available data reviewed  CorTrak: at goal with tube feeds  Liver Function  Recent Labs      17   0530  17   0500   ALTSGPT  16  14  10   ASTSGOT  20  24  15   ALKPHOSPHAT  95  71  61   TBILIRUBIN  0.6  0.7  0.5   GLUCOSE  178*  137*  126*       Heme:  Recent Labs      17   0530  17   1145  17   1800  17   0500   RBC  4.74  4.52   --    --   3.93*   HEMOGLOBIN  14.9  14.1   --    --   12.3   HEMATOCRIT  45.4  42.3   --    --   36.4*   PLATELETCT  222  257   --    --   224   PROTHROMBTM  13.1   --    --    --    --    APTT  25.6   --   35.2  179.3*   --    INR  0.96   --    --    --    --        Infectious Disease:  Temp  Av.7 °C (98 °F)  Min: 35.6 °C (96.1 °F)  Max: 37.6 °C (99.6 °F)  Micro: antibiotics reviewed, cultures  pending and cultures reviewed  Recent Labs      01/24/17 2056 01/25/17   0530  01/26/17   0500   WBC  14.7*  19.8*  10.7   NEUTSPOLYS  87.50*  88.90*  81.70*   LYMPHOCYTES  6.00*  4.90*  10.80*   MONOCYTES  5.60  5.60  6.80   EOSINOPHILS  0.10  0.00  0.10   BASOPHILS  0.30  0.20  0.20   ASTSGOT  20  24  15   ALTSGPT  16  14  10   ALKPHOSPHAT  95  71  61   TBILIRUBIN  0.6  0.7  0.5     Current Facility-Administered Medications   Medication Dose Frequency Provider Last Rate Last Dose   • aspirin EC (ECOTRIN) tablet 81 mg  81 mg DAILY Navid Ledesma M.D.   81 mg at 01/26/17 0806   • heparin injection 5,000 Units  5,000 Units Q8HRS Kurtis Ramirez D.O.   5,000 Units at 01/26/17 2156   • propofol (DIPRIVAN) injection  5-80 mcg/kg/min Continuous Jeremy M Gonda, M.D. 22 mL/hr at 01/27/17 0426 60 mcg/kg/min at 01/27/17 0426   • norepinephrine (LEVOPHED) 8 mg in  mL Infusion  0.5-30 mcg/min Continuous James Pérez M.D. 9.4 mL/hr at 01/27/17 0405 5 mcg/min at 01/27/17 0405   • glucose 4 g chewable tablet 16 g  16 g Q15 MIN PRN James Pérez M.D.        And   • dextrose 50% (D50W) injection 25 mL  25 mL Q15 MIN PRN James Pérez M.D.       • multivitamin (THERAGRAN) tablet 1 Tab  1 Tab DAILY James Pérez M.D.   1 Tab at 01/26/17 0806   • Respiratory Care per Protocol   Continuous RT Jeremy M Gonda, M.D.       • lactulose 20 GM/30ML solution 30 mL  30 mL Q24HRS PRN Jeremy M Gonda, M.D.       • bisacodyl (DULCOLAX) suppository 10 mg  10 mg Q24HRS PRN Jeremy M Gonda, M.D.       • fleet enema 133 mL  1 Each Once PRN Jeremy M Gonda, M.D.       • chlorhexidine (PERIDEX) 0.12 % solution 15 mL  15 mL BID Jeremy M Gonda, M.D.   15 mL at 01/26/17 2028   • lidocaine (XYLOCAINE) 1%  injection  1-2 mL Q30 MIN PRN Jeremy M Gonda, M.D.       • fentaNYL (SUBLIMAZE) injection 25 mcg  25 mcg Q HOUR PRN Jeremy M Gonda, M.D.   25 mcg at 01/25/17 0632    Or   • fentaNYL (SUBLIMAZE) injection 50 mcg  50 mcg Q HOUR PRN Christo  M Gonda, M.D.   100 mcg at 01/26/17 0915    Or   • fentaNYL (SUBLIMAZE) injection 100 mcg  100 mcg Q HOUR PRN Jeremy M Gonda, M.D.       • ipratropium-albuterol (DUONEB) nebulizer solution 3 mL  3 mL Q2HRS PRN (RT) Jeremy M Gonda, M.D.       • famotidine (PEPCID) tablet 20 mg  20 mg Q12HRS Jeremy M Gonda, M.D.   20 mg at 01/26/17 2028    Or   • famotidine (PEPCID) injection 20 mg  20 mg Q12HRS Jeremy M Gonda, M.D.   20 mg at 01/26/17 0807   • cefTRIAXone (ROCEPHIN) 1 g in  mL IVPB  1 g Q24HRS Jeremy M Gonda, M.D.   Stopped at 01/26/17 0836   • doxycycline (VIBRAMYCIN) 100 mg in  mL IVPB  100 mg Q12HRS Jeremy M Gonda, M.D.   Stopped at 01/26/17 2256   • oyster shell calcium/vitamin D 250-125 MG-UNIT tablet 1 Tab  1 Tab QDAY with Breakfast James Pérez M.D.   1 Tab at 01/26/17 0806   • pravastatin (PRAVACHOL) tablet 20 mg  20 mg Q EVENING Jeremy M Gonda, M.D.   20 mg at 01/26/17 2156   • DOBUTamine (DOBUTREX) 1 mg/mL premix infusion  2.5 mcg/kg/min Continuous Jeremy M Gonda, M.D.   Stopped at 01/25/17 1730     Last reviewed on 1/24/2017 11:07 PM by Abril Plata [facility]    Quality  Measures:  Medications reviewed, EKG reviewed, Labs reviewed and Radiology images reviewed  Frank catheter: Critically Ill - Requiring Accurate Measurement of Urinary Output and Unconscious / Sedated Patient on a Ventilator  Central line in place: Need for access and Vasopressors    DVT Prophylaxis: Heparin  DVT prophylaxis - mechanical: SCDs  Ulcer prophylaxis: Yes  Antibiotics: Treating active infection/contamination beyond 24 hours perioperative coverage    Problems/Plan:    Acute Hypoxic Respiratory Failure from pulmonary edema and cardiogenic shock   - Cont full vent support   - RT/O2 protocols   - Weaned PEEP to 8   - Start SBTs BID  Cardiogenic Shock likely related to severe mitral regurgitation   - s/p vasopressor/ionotropic therapy   - Cont diuresis   - Cards following   - Cardiac cath yesterday was negative and heparin  stopped   - CTA was negative  Severe Mitral Valve Regurgitation likely related to valve prolapse   - cardiac cath on 1/25   - CTA r/o'd PE   - CTS consulted and no surgery at this time  Acute Pulmonary Edema   - RT/O2 protocols   - cont vent support   - cont gentle diuresis  Acute Leukocytosis and concern for aspiration   - blood/sputum cultures sent and follow   - Empiric Ceftriaxone/Doxycycline  Hyponatremia   - improved   - monitor   - reduce IVF  Elevated Troponin   - monitor   - f/u on cardiac catheterization   - heparin gtt   - ASA  Hyperglycemia   - ISS  Acute Metabolic Acidosis   - monitor    - keep MAPs > 65  Hx of mitral valve prolapse  Hx of systemic arterial hypertension  Hx of Dyslipidemia   - statin therapy  Prophylaxis   - heparin, bowel regimen, pepcid    Discussed patient condition and risk of morbidity and/or mortality with Family, RN, RT, Pharmacy, , [facility] resident and Charge nurse / hot rounds and well as cardiology  The patient remains critically ill.  Critical care time = 32 minutes in directly providing and coordinating critical care and extensive data review.  No time overlap and excludes procedures.

## 2017-01-27 NOTE — PROGRESS NOTES
[facility] ICU Progress Note      Admit Date: 1/24/2017  ICU Day: 3    Resident(s): Navid Ledesma   Attending: ALESIA ZARAGOZA/ Dr. Atkins    Date & Time:   1/27/2017   9:06 AM       Patient ID:    Name:             Kristen Salazar   YOB: 1950  Age:                 66 y.o.  female   MRN:               [id number]    Diagnosis:  Acute Hypoxemic respiratory failure   Acute pulmonary edema d/t severe MR  Hypertensive Emergency  Demand ischemia  Prolonged Qtc     HPI:  65 y/o F with PMHx HTN ,DLD, MVP presenting as a transfer for acute hypoxic respiratory failure with acute pulmonary edema.     Consultants:  Cardiology: Dr. Ramirez    PMA: Dr. Atkins    Interval Events:  KIMBERLY overnight.   KAREN done yesterday: no rupture of cord or tendinae  Still on pressors, on Levo 4.   Diurese as tolerated.   Renal artery duplex to be done today.      Physical Exam:  GEN: Vented, Sedated.   HEENT: NC/AT. ET tube securely in place. NGT in place.  CV: S1, S2, NSR, Systolic murmur +  RESP: CTABL, no w/r/r, no basilar crackles   ABD: Soft, NT, ND; +BS  EXT: No LE edema b/l. No signs of cyanosis  NEURO: Sedated, Vented.     Respiratory:  Quinones Vent Mode: APVCMV  Respiration: (!) 24, Pulse Oximetry: 99 %    Chest Tube Drains:    Recent Labs      01/25/17   0444  01/26/17   0450  01/27/17   0456   ISTATAPH  7.356*  7.454  7.481   ISTATAPCO2  32.8  26.4  24.1*   ISTATAPO2  156*  100*  56*   ISTATATCO2  19*  19*  19*   [id number]  99  98  92*   ISTATARTHCO3  18.4  18.5  18.0   ISTATARTBE  -6*  -4  -4   ISTATTEMP  96.0 F  99.3 F  96.4 F   ISTATFIO2  100  40  30   ISTATSPEC  Arterial  Arterial  Arterial   ISTATAPHTC  7.377*  7.449  7.500   [id number]  147*  103*  52*       HemoDynamics:  Pulse: 62, Heart Rate (Monitored): 62 NIBP: (!) 97/56 mmHg      Neuro:      Fluids:        Intake/Output Summary (Last 24 hours) at 01/26/17 0624  Last data filed at 01/26/17 0200   Gross per 24 hour   Intake 1438.35 ml   Output   1225 ml   Net  213.35 ml       Weight: 62.6 kg (138 lb 0.1 oz)  Body mass index is 20.37 kg/(m^2).    Recent Labs      17   0530  17   0500  17   0430   SODIUM  126*  133*  137   POTASSIUM  4.6  3.8  3.5*   CHLORIDE  98  104  108   CO2  19*  19*  20   BUN  21  21  32*   CREATININE  1.24  1.09  0.98   MAGNESIUM  2.2  2.3  2.4   PHOSPHORUS  4.1  3.1  2.7   CALCIUM  8.2*  8.1*  8.4*       GI/Nutrition:  Recent Labs      17   0530  17   0500  17   0430   ALTSGPT  14  10  8   ASTSGOT  24  15  10*   ALKPHOSPHAT  71  61  63   TBILIRUBIN  0.7  0.5  0.5   GLUCOSE  137*  126*  121*       Heme:  Recent Labs      176  17   0530  17   1145  17   1800  17   0500  17   0430   RBC  4.74  4.52   --    --   3.93*  3.55*   HEMOGLOBIN  14.9  14.1   --    --   12.3  10.9*   HEMATOCRIT  45.4  42.3   --    --   36.4*  33.1*   PLATELETCT  222  257   --    --   224  215   PROTHROMBTM  13.1   --    --    --    --    --    APTT  25.6   --   35.2  179.3*   --    --    INR  0.96   --    --    --    --    --        Infectious Disease:  Temp  Av.7 °C (96.3 °F)  Min: 32.2 °C (89.9 °F)  Max: 37.6 °C (99.6 °F)  Recent Labs      17   0530  17   0500  17   0430   WBC  19.8*  10.7  7.2   NEUTSPOLYS  88.90*  81.70*  75.90*   LYMPHOCYTES  4.90*  10.80*  14.00*   MONOCYTES  5.60  6.80  8.60   EOSINOPHILS  0.00  0.10  0.80   BASOPHILS  0.20  0.20  0.30   ASTSGOT  24  15  10*   ALTSGPT  14  10  8   ALKPHOSPHAT  71  61  63   TBILIRUBIN  0.7  0.5  0.5       Meds:  • [START ON 2017] furosemide  20 mg     • aspirin EC  81 mg     • heparin  5,000 Units     • propofol  5-80 mcg/kg/min 40 mcg/kg/min (17 1240)   • NORepinephrine (LEVOPHED) infusion  0.5-30 mcg/min 5 mcg/min (17 3863)   • glucose 4 g  16 g      And   • dextrose 50%  25 mL     • multivitamin  1 Tab     • Respiratory Care per Protocol       • lactulose  30 mL     • bisacodyl  10 mg     • fleet  1  Each     • chlorhexidine  15 mL     • lidocaine  1-2 mL     • fentaNYL  25 mcg      Or   • fentaNYL  50 mcg      Or   • fentaNYL  100 mcg     • ipratropium-albuterol  3 mL     • famotidine  20 mg      Or   • famotidine  20 mg     • cefTRIAXone (ROCEPHIN) IVPB  1 g Stopped (01/27/17 0909)   • doxycycline  100 mg Stopped (01/27/17 0939)   • oyster shell calcium/vitamin D  1 Tab     • pravastatin  20 mg     • DOBUTamine in D5W  2.5 mcg/kg/min Stopped (01/25/17 6254)        Problem and Plan:    Acute Hypoxemic respiratory failure   Acute pulmonary edema d/t severe MR  Hypertensive Emergency  Demand ischemia  Prolonged Qtc   CAP?  - due to severe MR?   - echo:EF 75%, severe MR, RVSP 40  - cardiac cath: normal coronary arteries  - CTA: no PE  - Dr. Ramirez spoke with Dr. Schmitt (thoracic surg) -> no need for emergent surgery at this time  - KAREN: EF 65%, mod MR  PLAN  - cont levo  - lasix as BP can tolerate  - avoid qtc prolonging drugs, keep Mg >2  - empirically on C3 and doxy    Chronic medical issues:   DLD: cont pravastatin  Chronic back/knee/neck pain: pain meds held  HTN: losartan held      DISPO: ICU    CODE STATUS: Full    Quality Measures:  Frank Catheter: Yes  DVT Prophylaxis: Heparin  Ulcer Prophylaxis: pepcid  Antibiotics:Rocephin, doxy  Lines: Central    Procedures:  1/24 Intubation  1/24 Central line  1/25 cardiac cath    Imaging:  DX-CHEST-PORTABLE (1 VIEW)   Final Result         1. No significant interval change.      TRANSESOPHAGEAL ECHO W/O CONT (Order not available for [facility])   Final Result      DX-CHEST-PORTABLE (1 VIEW)   Final Result         1. Decrease groundglass and airspace opacities in both lungs.      CT-CTA CHEST PULMONARY ARTERY W/ RECONS   Final Result      1.  No evidence pulmonary emboli.   2.  Moderate right and small left pleural effusions.   3.  Compressive atelectasis both lower lobes especially the left and left lung volume loss with mediastinal shift towards left.   4.   Patchy groundglass opacities within the right upper lobe consistent with focal areas of pneumonitis.   5.  7.5 mm left upper lobe nodule and smaller nodules within the lungs.   Nodule measuring between 7 and 8 mm:   Low Risk Patient:  Initial follow up CT at 6-12 months, then at 18-24 months if no change.      High Risk Patient:  Initial follow up CT at 3-6 months, then at 9-12 and 24 months if no change.         Low Risk - Minimal or absent history of smoking and of other known risk factors.   High Risk - History of smoking or of other known risk factors.   Fleischner Society Guidelines for Pulmonary Nodules:  Radiology, 237:2005         DX-CHEST-PORTABLE (1 VIEW)   Final Result         1. Interval placement of feeding tube. No other significant interval change.      DX-ABDOMEN FOR TUBE PLACEMENT   Final Result         Feeding tube with tip in the stomach.      ECHOCARDIOGRAM COMP W/O CONT   Final Result      DX-CHEST-PORTABLE (1 VIEW)   Final Result         1. Right central venous catheter with tip in the mid SVC.      OUTSIDE IMAGES-DX CHEST   Preliminary Result      OUTSIDE IMAGES-DX CHEST   Preliminary Result      DX-CHEST-PORTABLE (1 VIEW)   Final Result         1. Extensive groundglass and airspace opacities throughout both lungs, worse in the bilateral lower lungs. This could be seen with multifocal pneumonia, ARDS or pulmonary edema.   2. Endotracheal tube with tip in the mid thoracic trachea.      US-RENAL ARTERY DUPLEX    (Results Pending)

## 2017-01-28 ENCOUNTER — APPOINTMENT (OUTPATIENT)
Dept: RADIOLOGY | Facility: MEDICAL CENTER | Age: 67
DRG: 216 | End: 2017-01-28
Attending: INTERNAL MEDICINE
Payer: MEDICARE

## 2017-01-28 LAB
ALBUMIN SERPL BCP-MCNC: 3.4 G/DL (ref 3.2–4.9)
ALBUMIN/GLOB SERPL: 1.4 G/DL
ALP SERPL-CCNC: 67 U/L (ref 30–99)
ALT SERPL-CCNC: 12 U/L (ref 2–50)
ANION GAP SERPL CALC-SCNC: 12 MMOL/L (ref 0–11.9)
AST SERPL-CCNC: 15 U/L (ref 12–45)
BASE EXCESS BLDA CALC-SCNC: -4 MMOL/L (ref -4–3)
BASOPHILS # BLD AUTO: 0.2 % (ref 0–1.8)
BASOPHILS # BLD: 0.01 K/UL (ref 0–0.12)
BILIRUB SERPL-MCNC: 0.6 MG/DL (ref 0.1–1.5)
BODY TEMPERATURE: ABNORMAL DEGREES
BUN SERPL-MCNC: 36 MG/DL (ref 8–22)
CALCIUM SERPL-MCNC: 8.8 MG/DL (ref 8.5–10.5)
CHLORIDE SERPL-SCNC: 109 MMOL/L (ref 96–112)
CO2 BLDA-SCNC: 20 MMOL/L (ref 20–33)
CO2 SERPL-SCNC: 22 MMOL/L (ref 20–33)
CREAT SERPL-MCNC: 0.79 MG/DL (ref 0.5–1.4)
EKG IMPRESSION: NORMAL
EOSINOPHIL # BLD AUTO: 0.07 K/UL (ref 0–0.51)
EOSINOPHIL NFR BLD: 1.1 % (ref 0–6.9)
ERYTHROCYTE [DISTWIDTH] IN BLOOD BY AUTOMATED COUNT: 47.3 FL (ref 35.9–50)
GFR SERPL CREATININE-BSD FRML MDRD: >60 ML/MIN/1.73 M 2
GLOBULIN SER CALC-MCNC: 2.5 G/DL (ref 1.9–3.5)
GLUCOSE SERPL-MCNC: 104 MG/DL (ref 65–99)
HCO3 BLDA-SCNC: 19.1 MMOL/L (ref 17–25)
HCT VFR BLD AUTO: 33.9 % (ref 37–47)
HGB BLD-MCNC: 11.2 G/DL (ref 12–16)
IMM GRANULOCYTES # BLD AUTO: 0.03 K/UL (ref 0–0.11)
IMM GRANULOCYTES NFR BLD AUTO: 0.5 % (ref 0–0.9)
LYMPHOCYTES # BLD AUTO: 0.86 K/UL (ref 1–4.8)
LYMPHOCYTES NFR BLD: 13.2 % (ref 22–41)
MAGNESIUM SERPL-MCNC: 2.1 MG/DL (ref 1.5–2.5)
MCH RBC QN AUTO: 31 PG (ref 27–33)
MCHC RBC AUTO-ENTMCNC: 33 G/DL (ref 33.6–35)
MCV RBC AUTO: 93.9 FL (ref 81.4–97.8)
MONOCYTES # BLD AUTO: 0.75 K/UL (ref 0–0.85)
MONOCYTES NFR BLD AUTO: 11.5 % (ref 0–13.4)
NEUTROPHILS # BLD AUTO: 4.8 K/UL (ref 2–7.15)
NEUTROPHILS NFR BLD: 73.5 % (ref 44–72)
NRBC # BLD AUTO: 0 K/UL
NRBC BLD AUTO-RTO: 0 /100 WBC
O2/TOTAL GAS SETTING VFR VENT: 40 %
PCO2 BLDA: 25.9 MMHG (ref 26–37)
PCO2 TEMP ADJ BLDA: 25.5 MMHG (ref 26–37)
PH BLDA: 7.48 [PH] (ref 7.4–7.5)
PH TEMP ADJ BLDA: 7.48 [PH] (ref 7.4–7.5)
PLATELET # BLD AUTO: 220 K/UL (ref 164–446)
PMV BLD AUTO: 12.4 FL (ref 9–12.9)
PO2 BLDA: 50 MMHG (ref 64–87)
PO2 TEMP ADJ BLDA: 49 MMHG (ref 64–87)
POTASSIUM SERPL-SCNC: 3.6 MMOL/L (ref 3.6–5.5)
PROT SERPL-MCNC: 5.9 G/DL (ref 6–8.2)
RBC # BLD AUTO: 3.61 M/UL (ref 4.2–5.4)
SAO2 % BLDA: 88 % (ref 93–99)
SODIUM SERPL-SCNC: 143 MMOL/L (ref 135–145)
SPECIMEN DRAWN FROM PATIENT: ABNORMAL
WBC # BLD AUTO: 6.5 K/UL (ref 4.8–10.8)

## 2017-01-28 PROCEDURE — 0BH18EZ INSERTION OF ENDOTRACHEAL AIRWAY INTO TRACHEA, VIA NATURAL OR ARTIFICIAL OPENING ENDOSCOPIC: ICD-10-PCS | Performed by: INTERNAL MEDICINE

## 2017-01-28 PROCEDURE — A9270 NON-COVERED ITEM OR SERVICE: HCPCS | Performed by: INTERNAL MEDICINE

## 2017-01-28 PROCEDURE — 71010 DX-CHEST-LIMITED (1 VIEW): CPT

## 2017-01-28 PROCEDURE — 700105 HCHG RX REV CODE 258: Performed by: INTERNAL MEDICINE

## 2017-01-28 PROCEDURE — 82803 BLOOD GASES ANY COMBINATION: CPT

## 2017-01-28 PROCEDURE — 700111 HCHG RX REV CODE 636 W/ 250 OVERRIDE (IP): Performed by: INTERNAL MEDICINE

## 2017-01-28 PROCEDURE — 80053 COMPREHEN METABOLIC PANEL: CPT

## 2017-01-28 PROCEDURE — 700101 HCHG RX REV CODE 250: Performed by: INTERNAL MEDICINE

## 2017-01-28 PROCEDURE — 700101 HCHG RX REV CODE 250

## 2017-01-28 PROCEDURE — A9270 NON-COVERED ITEM OR SERVICE: HCPCS | Performed by: STUDENT IN AN ORGANIZED HEALTH CARE EDUCATION/TRAINING PROGRAM

## 2017-01-28 PROCEDURE — 93005 ELECTROCARDIOGRAM TRACING: CPT | Performed by: HOSPITALIST

## 2017-01-28 PROCEDURE — 770022 HCHG ROOM/CARE - ICU (200)

## 2017-01-28 PROCEDURE — 94150 VITAL CAPACITY TEST: CPT

## 2017-01-28 PROCEDURE — 700102 HCHG RX REV CODE 250 W/ 637 OVERRIDE(OP): Performed by: INTERNAL MEDICINE

## 2017-01-28 PROCEDURE — 71010 DX-CHEST-PORTABLE (1 VIEW): CPT

## 2017-01-28 PROCEDURE — A9270 NON-COVERED ITEM OR SERVICE: HCPCS | Performed by: HOSPITALIST

## 2017-01-28 PROCEDURE — 700111 HCHG RX REV CODE 636 W/ 250 OVERRIDE (IP)

## 2017-01-28 PROCEDURE — 99291 CRITICAL CARE FIRST HOUR: CPT | Mod: GC,25 | Performed by: INTERNAL MEDICINE

## 2017-01-28 PROCEDURE — 83735 ASSAY OF MAGNESIUM: CPT

## 2017-01-28 PROCEDURE — 700101 HCHG RX REV CODE 250: Performed by: STUDENT IN AN ORGANIZED HEALTH CARE EDUCATION/TRAINING PROGRAM

## 2017-01-28 PROCEDURE — 700102 HCHG RX REV CODE 250 W/ 637 OVERRIDE(OP): Performed by: HOSPITALIST

## 2017-01-28 PROCEDURE — 94003 VENT MGMT INPAT SUBQ DAY: CPT

## 2017-01-28 PROCEDURE — 36600 WITHDRAWAL OF ARTERIAL BLOOD: CPT

## 2017-01-28 PROCEDURE — 700111 HCHG RX REV CODE 636 W/ 250 OVERRIDE (IP): Performed by: HOSPITALIST

## 2017-01-28 PROCEDURE — 31500 INSERT EMERGENCY AIRWAY: CPT | Performed by: INTERNAL MEDICINE

## 2017-01-28 PROCEDURE — 5A1955Z RESPIRATORY VENTILATION, GREATER THAN 96 CONSECUTIVE HOURS: ICD-10-PCS | Performed by: INTERNAL MEDICINE

## 2017-01-28 PROCEDURE — 31500 INSERT EMERGENCY AIRWAY: CPT

## 2017-01-28 PROCEDURE — 93010 ELECTROCARDIOGRAM REPORT: CPT | Performed by: INTERNAL MEDICINE

## 2017-01-28 PROCEDURE — 700102 HCHG RX REV CODE 250 W/ 637 OVERRIDE(OP): Performed by: STUDENT IN AN ORGANIZED HEALTH CARE EDUCATION/TRAINING PROGRAM

## 2017-01-28 PROCEDURE — 700105 HCHG RX REV CODE 258: Performed by: STUDENT IN AN ORGANIZED HEALTH CARE EDUCATION/TRAINING PROGRAM

## 2017-01-28 PROCEDURE — 85025 COMPLETE CBC W/AUTO DIFF WBC: CPT

## 2017-01-28 RX ORDER — DEXTROSE MONOHYDRATE 50 MG/ML
INJECTION, SOLUTION INTRAVENOUS
Status: ACTIVE
Start: 2017-01-28 | End: 2017-01-29

## 2017-01-28 RX ORDER — NOREPINEPHRINE BITARTRATE 1 MG/ML
INJECTION, SOLUTION INTRAVENOUS
Status: ACTIVE
Start: 2017-01-28 | End: 2017-01-29

## 2017-01-28 RX ORDER — LORAZEPAM 2 MG/ML
1 INJECTION INTRAMUSCULAR ONCE
Status: ACTIVE | OUTPATIENT
Start: 2017-01-28 | End: 2017-01-29

## 2017-01-28 RX ORDER — LORAZEPAM 2 MG/ML
INJECTION INTRAMUSCULAR
Status: COMPLETED
Start: 2017-01-28 | End: 2017-01-28

## 2017-01-28 RX ORDER — ETOMIDATE 2 MG/ML
20 INJECTION INTRAVENOUS ONCE
Status: COMPLETED | OUTPATIENT
Start: 2017-01-28 | End: 2017-01-28

## 2017-01-28 RX ORDER — ROCURONIUM BROMIDE 10 MG/ML
50 INJECTION, SOLUTION INTRAVENOUS ONCE
Status: COMPLETED | OUTPATIENT
Start: 2017-01-28 | End: 2017-01-28

## 2017-01-28 RX ADMIN — DEXMEDETOMIDINE HYDROCHLORIDE 0.7 MCG/KG/HR: 100 INJECTION, SOLUTION, CONCENTRATE INTRAVENOUS at 22:59

## 2017-01-28 RX ADMIN — POTASSIUM CHLORIDE 20 MEQ: 1.5 POWDER, FOR SOLUTION ORAL at 08:34

## 2017-01-28 RX ADMIN — LIDOCAINE HYDROCHLORIDE 2 ML: 10 INJECTION, SOLUTION INFILTRATION; PERINEURAL at 00:47

## 2017-01-28 RX ADMIN — LORAZEPAM 1 MG: 2 INJECTION INTRAMUSCULAR; INTRAVENOUS at 09:30

## 2017-01-28 RX ADMIN — AMIODARONE HYDROCHLORIDE 150 MG: 50 INJECTION, SOLUTION INTRAVENOUS at 10:55

## 2017-01-28 RX ADMIN — DEXMEDETOMIDINE HYDROCHLORIDE 0.7 MCG/KG/HR: 100 INJECTION, SOLUTION, CONCENTRATE INTRAVENOUS at 11:05

## 2017-01-28 RX ADMIN — PRAVASTATIN SODIUM 20 MG: 20 TABLET ORAL at 20:24

## 2017-01-28 RX ADMIN — THERA TABS 1 TABLET: TAB at 08:34

## 2017-01-28 RX ADMIN — DEXMEDETOMIDINE HYDROCHLORIDE 0.8 MCG/KG/HR: 100 INJECTION, SOLUTION, CONCENTRATE INTRAVENOUS at 18:04

## 2017-01-28 RX ADMIN — CHLORHEXIDINE GLUCONATE 15 ML: 1.2 RINSE ORAL at 20:21

## 2017-01-28 RX ADMIN — CHLORHEXIDINE GLUCONATE 15 ML: 1.2 RINSE ORAL at 08:34

## 2017-01-28 RX ADMIN — ETOMIDATE 20 MG: 2 INJECTION, SOLUTION INTRAVENOUS at 09:29

## 2017-01-28 RX ADMIN — ASPIRIN 81 MG: 81 TABLET ORAL at 08:34

## 2017-01-28 RX ADMIN — PROPOFOL 40 MCG/KG/MIN: 10 INJECTION, EMULSION INTRAVENOUS at 09:29

## 2017-01-28 RX ADMIN — PROPOFOL 15 MCG/KG/MIN: 10 INJECTION, EMULSION INTRAVENOUS at 22:28

## 2017-01-28 RX ADMIN — FENTANYL CITRATE 100 MCG: 50 INJECTION, SOLUTION INTRAMUSCULAR; INTRAVENOUS at 01:02

## 2017-01-28 RX ADMIN — LIDOCAINE HYDROCHLORIDE 2 ML: 10 INJECTION, SOLUTION INFILTRATION; PERINEURAL at 02:30

## 2017-01-28 RX ADMIN — FENTANYL CITRATE 100 MCG: 50 INJECTION, SOLUTION INTRAMUSCULAR; INTRAVENOUS at 02:03

## 2017-01-28 RX ADMIN — FUROSEMIDE 20 MG: 10 INJECTION, SOLUTION INTRAMUSCULAR; INTRAVENOUS at 05:27

## 2017-01-28 RX ADMIN — FENTANYL CITRATE 100 MCG: 50 INJECTION, SOLUTION INTRAMUSCULAR; INTRAVENOUS at 04:05

## 2017-01-28 RX ADMIN — CALCIUM CARBONATE-CHOLECALCIFEROL TAB 250 MG-125 UNIT 1 TABLET: 250-125 TAB at 08:34

## 2017-01-28 RX ADMIN — DEXMEDETOMIDINE HYDROCHLORIDE 0.9 MCG/KG/HR: 100 INJECTION, SOLUTION, CONCENTRATE INTRAVENOUS at 14:22

## 2017-01-28 RX ADMIN — CEFTRIAXONE SODIUM 1 G: 1 INJECTION, POWDER, FOR SOLUTION INTRAMUSCULAR; INTRAVENOUS at 08:34

## 2017-01-28 RX ADMIN — AMIODARONE HYDROCHLORIDE 1 MG/MIN: 50 INJECTION, SOLUTION INTRAVENOUS at 11:12

## 2017-01-28 RX ADMIN — AMIODARONE HYDROCHLORIDE 0.5 MG/MIN: 50 INJECTION, SOLUTION INTRAVENOUS at 19:00

## 2017-01-28 RX ADMIN — FENTANYL CITRATE 100 MCG: 50 INJECTION, SOLUTION INTRAMUSCULAR; INTRAVENOUS at 06:20

## 2017-01-28 RX ADMIN — HEPARIN SODIUM 5000 UNITS: 5000 INJECTION, SOLUTION INTRAVENOUS; SUBCUTANEOUS at 05:27

## 2017-01-28 RX ADMIN — DOXYCYCLINE 100 MG: 100 INJECTION, POWDER, LYOPHILIZED, FOR SOLUTION INTRAVENOUS at 08:34

## 2017-01-28 RX ADMIN — DOXYCYCLINE 100 MG: 100 INJECTION, POWDER, LYOPHILIZED, FOR SOLUTION INTRAVENOUS at 20:22

## 2017-01-28 RX ADMIN — HEPARIN SODIUM 5000 UNITS: 5000 INJECTION, SOLUTION INTRAVENOUS; SUBCUTANEOUS at 14:21

## 2017-01-28 RX ADMIN — ROCURONIUM BROMIDE 50 MG: 10 INJECTION INTRAVENOUS at 09:29

## 2017-01-28 RX ADMIN — NOREPINEPHRINE BITARTRATE 15 MCG/MIN: 1 INJECTION INTRAVENOUS at 12:30

## 2017-01-28 RX ADMIN — FAMOTIDINE 20 MG: 20 TABLET, FILM COATED ORAL at 20:24

## 2017-01-28 RX ADMIN — FENTANYL CITRATE 100 MCG: 50 INJECTION, SOLUTION INTRAMUSCULAR; INTRAVENOUS at 05:12

## 2017-01-28 RX ADMIN — LIDOCAINE HYDROCHLORIDE 1 ML: 10 INJECTION, SOLUTION INFILTRATION; PERINEURAL at 04:18

## 2017-01-28 RX ADMIN — HEPARIN SODIUM 5000 UNITS: 5000 INJECTION, SOLUTION INTRAVENOUS; SUBCUTANEOUS at 21:00

## 2017-01-28 RX ADMIN — FENTANYL CITRATE 100 MCG: 50 INJECTION, SOLUTION INTRAMUSCULAR; INTRAVENOUS at 03:03

## 2017-01-28 RX ADMIN — FUROSEMIDE 20 MG: 10 INJECTION, SOLUTION INTRAMUSCULAR; INTRAVENOUS at 16:01

## 2017-01-28 RX ADMIN — FAMOTIDINE 20 MG: 10 INJECTION INTRAVENOUS at 08:34

## 2017-01-28 ASSESSMENT — PULMONARY FUNCTION TESTS: FVC: 1.8

## 2017-01-28 ASSESSMENT — PATIENT HEALTH QUESTIONNAIRE - PHQ9
SUM OF ALL RESPONSES TO PHQ9 QUESTIONS 1 AND 2: 0
1. LITTLE INTEREST OR PLEASURE IN DOING THINGS: NOT AT ALL
2. FEELING DOWN, DEPRESSED, IRRITABLE, OR HOPELESS: NOT AT ALL
SUM OF ALL RESPONSES TO PHQ QUESTIONS 1-9: 0

## 2017-01-28 ASSESSMENT — LIFESTYLE VARIABLES: DO YOU DRINK ALCOHOL: NO

## 2017-01-28 ASSESSMENT — PAIN SCALES - GENERAL: PAINLEVEL_OUTOF10: 0

## 2017-01-28 NOTE — PROGRESS NOTES
[facility] ICU Progress Note      Admit Date: 1/24/2017  ICU Day: 4    Resident(s): Josr Al  Attending: ALESIA ZARAGOZA/ Dr. Atkins    Date & Time:   1/28/2017   9:59 AM       Patient ID:    Name:             Kristen Salazar   YOB: 1950  Age:                 66 y.o.  female   MRN:               [id number]    Diagnosis:  Acute Hypoxemic respiratory failure   Acute pulmonary edema d/t severe MR  Hypertensive Emergency  Demand ischemia  Prolonged Qtc     HPI:  67 y/o F with PMHx HTN ,DLD, MVP presenting as a transfer for acute hypoxic respiratory failure with acute pulmonary edema.     Consultants:  Cardiology: Dr. Ramirez    PMA: Dr. Atkins    Interval Events:  Renal duplex no evidence of stenosis  Extubated in AM, then re-intubated for respiratory failure  Possible run of Afib, not captured on EKG    Physical Exam:  GEN: Vented, Sedated.   HEENT: NC/AT. ET tube securely in place. NGT in place.  CV: S1, S2, NSR, Systolic murmur +  RESP: CTABL, no w/r/r, no basilar crackles   ABD: Soft, NT, ND; +BS  EXT: No LE edema b/l. No signs of cyanosis  NEURO: Sedated, Vented.     Respiratory:  Quinones Vent Mode: APVCMV  Respiration: (!) 30, Pulse Oximetry: 97 %    Chest Tube Drains:    Recent Labs      01/26/17   0450  01/27/17   0456  01/28/17   0435   ISTATAPH  7.454  7.481  7.476   ISTATAPCO2  26.4  24.1*  25.9*   ISTATAPO2  100*  56*  50*   ISTATATCO2  19*  19*  20   [id number]  98  92*  88*   ISTATARTHCO3  18.5  18.0  19.1   ISTATARTBE  -4  -4  -4   ISTATTEMP  99.3 F  96.4 F  98.1 F   ISTATFIO2  40  30  40   ISTATSPEC  Arterial  Arterial  Arterial   ISTATAPHTC  7.449  7.500  7.480   [id number]  103*  52*  49*       HemoDynamics:  Pulse: (!) 107, Heart Rate (Monitored): (!) 148 NIBP: 134/75 mmHg      Neuro:      Fluids:    Date 01/28/17 0700 - 01/29/17 0659   Shift 2415-6236 8847-0738 4145-8540 24 Hour Total   I  N  T  A  K  E   Enteral 80   80      Enteral Volume 80   80    Shift Total 80   80    O  U  T  P  U  T   Urine 650   650      Indwelling Cathether 650   650    Shift Total 650   650   NET -570   -570        Intake/Output Summary (Last 24 hours) at 17 0624  Last data filed at 17 0200   Gross per 24 hour   Intake 1438.35 ml   Output   1225 ml   Net 213.35 ml       Weight: 61 kg (134 lb 7.7 oz)  Body mass index is 19.85 kg/(m^2).    Recent Labs      17   0500  17   0430  17   SODIUM  133*  137  143   POTASSIUM  3.8  3.5*  3.6   CHLORIDE  104  108  109   CO2  19*  20  22   BUN  21  32*  36*   CREATININE  1.09  0.98  0.79   MAGNESIUM  2.3  2.4  2.1   PHOSPHORUS  3.1  2.7   --    CALCIUM  8.1*  8.4*  8.8       GI/Nutrition:  Recent Labs      17   0500  17   ALTSGPT  10  8  12   ASTSGOT  15  10*  15   ALKPHOSPHAT  61  63  67   TBILIRUBIN  0.5  0.5  0.6   GLUCOSE  126*  121*  104*       Heme:  Recent Labs      17   1145  17   1800  17   0500  17   0430  17   RBC   --    --   3.93*  3.55*  3.61*   HEMOGLOBIN   --    --   12.3  10.9*  11.2*   HEMATOCRIT   --    --   36.4*  33.1*  33.9*   PLATELETCT   --    --   224  215  220   APTT  35.2  179.3*   --    --    --        Infectious Disease:  Temp  Av.6 °C (97.8 °F)  Min: 35.7 °C (96.3 °F)  Max: 36.9 °C (98.5 °F)  Recent Labs      17   0500  17   0430  17   WBC  10.7  7.2  6.5   NEUTSPOLYS  81.70*  75.90*  73.50*   LYMPHOCYTES  10.80*  14.00*  13.20*   MONOCYTES  6.80  8.60  11.50   EOSINOPHILS  0.10  0.80  1.10   BASOPHILS  0.20  0.30  0.20   ASTSGOT  15  10*  15   ALTSGPT  10  8  12   ALKPHOSPHAT  61  63  67   TBILIRUBIN  0.5  0.5  0.6       Meds:  • lorazepam       • dexmedetomidine (PRECEDEX) infusion 4 mcg/ml  0.1-0.7 mcg/kg/hr     • MD Alert...Amiodarone Protocol Pharmacist to Implement       • amiodarone infusion  0.5-1 mg/min     • amiodarone ivpb (LOAD)  150 mg     • furosemide  20 mg     • quetiapine  25 mg     •  ibuprofen  400 mg     • potassium chloride  20 mEq     • aspirin EC  81 mg     • heparin  5,000 Units     • propofol  5-80 mcg/kg/min Stopped (01/27/17 0910)   • NORepinephrine (LEVOPHED) infusion  0.5-30 mcg/min Stopped (01/27/17 0900)   • glucose 4 g  16 g      And   • dextrose 50%  25 mL     • multivitamin  1 Tab     • Respiratory Care per Protocol       • lactulose  30 mL     • bisacodyl  10 mg     • fleet  1 Each     • chlorhexidine  15 mL     • lidocaine  1-2 mL     • fentaNYL  25 mcg      Or   • fentaNYL  50 mcg      Or   • fentaNYL  100 mcg     • ipratropium-albuterol  3 mL     • famotidine  20 mg      Or   • famotidine  20 mg     • cefTRIAXone (ROCEPHIN) IVPB  1 g Stopped (01/28/17 0904)   • doxycycline  100 mg 100 mg (01/28/17 0834)   • oyster shell calcium/vitamin D  1 Tab     • pravastatin  20 mg          Problem and Plan:    Acute Hypoxemic respiratory failure   Acute pulmonary edema d/t severe MR  Hypertensive Emergency  Demand ischemia  Prolonged Qtc   CAP?  - full vent support, extubation tried on 1/28 but patient had to be re-intubated due to distress  - due to severe MR?   - echo:EF 75%, severe MR, RVSP 40  - cardiac cath: normal coronary arteries  - CTA: no PE  - KAREN: EF 65%, mod MR, no rupture of cord or tendinae  - renal duplex neg for stenosis  - cont levo  - lasix as BP can tolerate  - avoid qtc prolonging drugs, keep Mg >2  - empirically on C3 and doxy    Plan:  - possible AFib - Dr. Ramirez discuss event with Dr. Leblanc and to consider having valve surgery sooner then later since patient is now symptomatic with electrical rhythm issues. Pending Dr. Leblanc assessment.    Chronic medical issues:   DLD: cont pravastatin  Chronic back/knee/neck pain: pain meds held  HTN: losartan held    DISPO: ICU    CODE STATUS: Full    Quality Measures:  Frank Catheter: Yes  DVT Prophylaxis: Heparin  Ulcer Prophylaxis: pepcid  Antibiotics:Rocephin, doxy  Lines: Central    Procedures:  1/24 Intubation  1/24  Central line  1/25 cardiac cath    Imaging:  DX-CHEST-PORTABLE (1 VIEW)   Final Result      No significant change from prior exam.      US-RENAL ARTERY DUPLEX   Final Result      No evidence of main renal artery stenosis.      DX-CHEST-PORTABLE (1 VIEW)   Final Result         1. No significant interval change.      TRANSESOPHAGEAL ECHO W/O CONT (Order not available for [facility])   Final Result      DX-CHEST-PORTABLE (1 VIEW)   Final Result         1. Decrease groundglass and airspace opacities in both lungs.      CT-CTA CHEST PULMONARY ARTERY W/ RECONS   Final Result      1.  No evidence pulmonary emboli.   2.  Moderate right and small left pleural effusions.   3.  Compressive atelectasis both lower lobes especially the left and left lung volume loss with mediastinal shift towards left.   4.  Patchy groundglass opacities within the right upper lobe consistent with focal areas of pneumonitis.   5.  7.5 mm left upper lobe nodule and smaller nodules within the lungs.   Nodule measuring between 7 and 8 mm:   Low Risk Patient:  Initial follow up CT at 6-12 months, then at 18-24 months if no change.      High Risk Patient:  Initial follow up CT at 3-6 months, then at 9-12 and 24 months if no change.         Low Risk - Minimal or absent history of smoking and of other known risk factors.   High Risk - History of smoking or of other known risk factors.   Fleischner Society Guidelines for Pulmonary Nodules:  Radiology, 237:2005         DX-CHEST-PORTABLE (1 VIEW)   Final Result         1. Interval placement of feeding tube. No other significant interval change.      DX-ABDOMEN FOR TUBE PLACEMENT   Final Result         Feeding tube with tip in the stomach.      ECHOCARDIOGRAM COMP W/O CONT   Final Result      DX-CHEST-PORTABLE (1 VIEW)   Final Result         1. Right central venous catheter with tip in the mid SVC.      OUTSIDE IMAGES-DX CHEST   Preliminary Result      OUTSIDE IMAGES-DX CHEST   Preliminary Result       DX-CHEST-PORTABLE (1 VIEW)   Final Result         1. Extensive groundglass and airspace opacities throughout both lungs, worse in the bilateral lower lungs. This could be seen with multifocal pneumonia, ARDS or pulmonary edema.   2. Endotracheal tube with tip in the mid thoracic trachea.      DX-CHEST-LIMITED (1 VIEW)    (Results Pending)

## 2017-01-28 NOTE — PROGRESS NOTES
Cardiology Progress Note               Author: Kurtis Ramirez Date & Time created: 2017  6:18 PM     Interval History:  Patient is seen and examined.  Her  is at the bedside.    - This is a 66-year-old male with known history of stable (moderate to severe) mitral valve regurgitation, O2 (2 liter) dependent at night, who was transferred to [facility] for acute respiratory failure.  The patient    has been doing well, and actually went skiing with him the day before.  She becames more dyspneic yesterday with saturation 85.  She eventually was brought  to the ER in [place], [place], which her respiratory did decompensate quickly that required mask and eventually intubated for airway protection.  Elevated cardiac enzymes.  - Her cardiologist is Dr. Tom Landin at [facility].  She has been  followed regularly for her valvular disease.  She has had a series of echocardiograms in which the last one was in 2016, with EF of 55-60%.    Mitral valve prolapse with posterior leaflet involvement.  There is  moderate-to-severe MR.  Prior to that, there was an echo 2015, with EF  of 55% with moderate-to-severe MR.  She had an echo back in 2014, with    EF of 60-65% with moderate-to-severe MR.  KAREN was done back in May of 2014 and  indicated moderate mitral valve prolapse with moderate MR.  - Coronary angiogram (2017):  Relatively normal arteries.    Review of Systems   Unable to perform ROS: intubated       Physical Exam    Hemodynamics:  Temp (24hrs), Av.8 °C (96.5 °F), Min:32.2 °C (89.9 °F), Max:37.6 °C (99.6 °F)  Temperature: 36.3 °C (97.3 °F)  Pulse  Av  Min: 62  Max: 106Heart Rate (Monitored): 78  NIBP: 109/58 mmHg     Respiratory:  Quinones Vent Mode: APVCMV, Rate (breaths/min): 24, PEEP/CPAP: 8, FiO2: 40, P Peak (PIP): 20, P MEAN: 12 Respiration: (!) 24, Pulse Oximetry: 96 %     Work Of Breathing / Effort: Vented  RUL Breath Sounds: Clear, RML Breath Sounds: Diminished,  RLL Breath Sounds: Diminished, PRAMOD Breath Sounds: Clear, LLL Breath Sounds: Diminished  Fluids:  Date 01/27/17 0700 - 01/28/17 0659   Shift 9718-3652 2182-9591 1064-0814 24 Hour Total   I  N  T  A  K  E   I.V. 84.6   84.6    Other 60   60    Enteral 340 140  480    Shift Total 484.6 140  624.6   O  U  T  P  U  T   Urine 900 300  1200    Drains 0   0    Shift Total 900 300  1200   Weight (kg) 62.6 62.6 62.6 62.6       Weight: 62.6 kg (138 lb 0.1 oz)  GI/Nutrition:  Orders Placed This Encounter   Procedures   • Diet NPO     Standing Status: Standing      Number of Occurrences: 1      Standing Expiration Date:      Order Specific Question:  Type:     Answer:  Now [1]     Order Specific Question:  Restrict to:     Answer:  Strict [1]     Lab Results:  Recent Labs      01/25/17 0530  01/26/17   0500  01/27/17   0430   WBC  19.8*  10.7  7.2   RBC  4.52  3.93*  3.55*   HEMOGLOBIN  14.1  12.3  10.9*   HEMATOCRIT  42.3  36.4*  33.1*   MCV  93.6  92.6  93.2   MCH  31.2  31.3  30.7   MCHC  33.3*  33.8  32.9*   RDW  46.5  47.2  47.9   PLATELETCT  257  224  215   MPV  11.9  11.9  12.9     Recent Labs      01/25/17   0530  01/26/17   0500  01/27/17   0430   SODIUM  126*  133*  137   POTASSIUM  4.6  3.8  3.5*   CHLORIDE  98  104  108   CO2  19*  19*  20   GLUCOSE  137*  126*  121*   BUN  21  21  32*   CREATININE  1.24  1.09  0.98   CALCIUM  8.2*  8.1*  8.4*     Recent Labs      01/24/17 2056 01/25/17   1145  01/25/17   1800   APTT  25.6  35.2  179.3*   INR  0.96   --    --      Recent Labs      01/24/17 2056   BNPBTYPENAT  309*     Recent Labs      01/24/17 2056 01/25/17   0530  01/25/17   1800  01/26/17   0500   TROPONINI  1.79*   < >  3.35*  0.75*  0.48*   BNPBTYPENAT  309*   --    --    --    --     < > = values in this interval not displayed.     Recent Labs      01/25/17   0304  01/25/17   0530  01/27/17   0430   TRIGLYCERIDE  105  87  168*   HDL  72   --    --    LDL  61   --    --          Medical Decision  Making, by Problem:  Active Hospital Problems    Diagnosis   • Pulmonary edema [J81.1]       Plan:  - KAREN shows no rupture cord or tendineae  - Continue current treatment.    - She is off Levophed.  Continue gentle diuresis.  - Renal artery US is negative.    Core Measures

## 2017-01-28 NOTE — CARE PLAN
Problem: Safety  Goal: Will remain free from injury  Outcome: PROGRESSING SLOWER THAN EXPECTED  Pt in BUE wrist restraints and agitated at times despite Precedex use. Pt is impulsive and reaches for tubes when agitated. Frequent reorientation by RN and .

## 2017-01-28 NOTE — PROGRESS NOTES
Cardiology Progress Note               Author: Kurtis Ramirez Date & Time created: 1/28/2017  1:05 PM     Interval History:  Patient is seen and examined.  Her  is at the bedside.  Tele- reviewed  - 1/28:  Extubated this morning, but have to re-intubated about 30 minutes later for airway protection; patient became distress and tachycardic in 150s.    - This is a 66-year-old male with known history of stable (moderate to severe) mitral valve regurgitation, O2 (2 liter) dependent at night, who was transferred to [facility] for acute respiratory failure.  The patient    has been doing well, and actually went skiing with him the day before.  She becames more dyspneic yesterday with saturation 85.  She eventually was brought  to the ER in [place], [place], which her respiratory did decompensate quickly that required mask and eventually intubated for airway protection.  Elevated cardiac enzymes.  - Her cardiologist is Dr. Tom Landin at [facility].  She has been  followed regularly for her valvular disease.  She has had a series of echocardiograms in which the last one was in April of 2016, with EF of 55-60%.    Mitral valve prolapse with posterior leaflet involvement.  There is  moderate-to-severe MR.  Prior to that, there was an echo March 2015, with EF  of 55% with moderate-to-severe MR.  She had an echo back in April 2014, with    EF of 60-65% with moderate-to-severe MR.  KAREN was done back in May of 2014 and  indicated moderate mitral valve prolapse with moderate MR.  - Coronary angiogram (1/2017):  Relatively normal arteries.    Review of Systems   Unable to perform ROS: intubated       Physical Exam   Constitutional: She appears well-developed and well-nourished.   HENT:   Head: Normocephalic and atraumatic.   Cardiovascular: Normal rate, regular rhythm and normal heart sounds.    Pulmonary/Chest: Effort normal and breath sounds normal.   Abdominal: Bowel sounds are normal.   Musculoskeletal:  Normal range of motion.       Hemodynamics:  Temp (24hrs), Av.7 °C (98.1 °F), Min:36.3 °C (97.3 °F), Max:36.9 °C (98.5 °F)  Temperature: 36.8 °C (98.2 °F)  Pulse  Av.5  Min: 62  Max: 129Heart Rate (Monitored): (!) 101  NIBP: (!) 82/49 mmHg     Respiratory:  Quinones Vent Mode: APVCMV, Rate (breaths/min): 24, PEEP/CPAP: 8, FiO2: 100, P Peak (PIP): 21, P MEAN: 12 Respiration: (!) 27, Pulse Oximetry: 92 %     Work Of Breathing / Effort: Vented  RUL Breath Sounds: Clear, RML Breath Sounds: Diminished, RLL Breath Sounds: Diminished, PRAMOD Breath Sounds: Clear, LLL Breath Sounds: Diminished  Fluids:  Date 17 0700 - 17 0659   Shift 8623-4162 9931-0790 4595-4928 24 Hour Total   I  N  T  A  K  E   Enteral 240   240    Shift Total 240   240   O  U  T  P  U  T   Urine 650   650    Drains 0   0    Shift Total 650   650   Weight (kg) 61 61 61 61       Weight: 61 kg (134 lb 7.7 oz)  GI/Nutrition:  Orders Placed This Encounter   Procedures   • Diet NPO     Standing Status: Standing      Number of Occurrences: 1      Standing Expiration Date:      Order Specific Question:  Type:     Answer:  Now [1]     Order Specific Question:  Restrict to:     Answer:  Strict [1]     Lab Results:  Recent Labs      17   0500  17   0430  17   0445   WBC  10.7  7.2  6.5   RBC  3.93*  3.55*  3.61*   HEMOGLOBIN  12.3  10.9*  11.2*   HEMATOCRIT  36.4*  33.1*  33.9*   MCV  92.6  93.2  93.9   MCH  31.3  30.7  31.0   MCHC  33.8  32.9*  33.0*   RDW  47.2  47.9  47.3   PLATELETCT  224  215  220   MPV  11.9  12.9  12.4     Recent Labs      17   0500  17   0430  17   0445   SODIUM  133*  137  143   POTASSIUM  3.8  3.5*  3.6   CHLORIDE  104  108  109   CO2  19*  20  22   GLUCOSE  126*  121*  104*   BUN  21  32*  36*   CREATININE  1.09  0.98  0.79   CALCIUM  8.1*  8.4*  8.8     Recent Labs      17   1800   APTT  179.3*         Recent Labs      17   1800  17   0500   TROPONINI  0.75*  0.48*      Recent Labs      01/27/17   0430   TRIGLYCERIDE  168*         Medical Decision Making, by Problem:  Active Hospital Problems    Diagnosis   • Pulmonary edema [J81.1]       Plan:  *I have reviewed her ECG during this morning.  It appears to be sinus tachycardia; however, atrial fib/flutter can not be completely rule out.  She is back at sinus rhythm.  Her BP is slowly improving again.  This event appears to be similar to the previous event that brought the patient to the ED.  Continue med for BP suport which is improving since this morning event.   *D/W Dr. Leblanc about patient's recurrent event.  Question if it is better to consider to have her valve surgery sooner during later since patient is possibly symptomatic with electrical rhythm issue now.  Have request [facility] (Dr. Leblanc) will re-assess the situation; appreciate the input.  *D/W Dr. Atkins.  *Long discussion with the patient's .  All questions are answered to my best ability.    > 60 minutes.    EKG reviewed, Labs reviewed, Medications reviewed and Radiology images reviewed

## 2017-01-28 NOTE — CARE PLAN
Problem: Bronchoconstriction:  Goal: Improve in air movement and diminished wheezing  Outcome: MET Date Met:  01/27/17

## 2017-01-28 NOTE — THERAPY
Orders received for swallow evaluation, however pt was extubated this morning at approx 8:30am.  SLP will hold evaluation for 24 hours per protocol.  Thank you.

## 2017-01-28 NOTE — CARE PLAN
Problem: Ventilation Defect:  Goal: Ability to achieve and maintain unassisted ventilation or tolerate decreased levels of ventilator support  Adult Ventilation Update    Total Vent Days: 6      Patient Lines/Drains/Airways Status    Active Airway      Name: Placement date: Placement time: Site: Days:     Airway Group ET Tube Oral 8.0 01/24/17    Oral  5                 Plateau Pressure (Q Shift): 17 (01/28/17 0221)  Static Compliance (ml / cm H2O): 53 (01/28/17 0422)      Sputum/Suction   Cough: Productive (01/28/17 0400)  Sputum Amount: Moderate (01/28/17 0400)  Sputum Color: Clear;White (01/28/17 0400)  Sputum Consistency: Thick (01/28/17 0400)    Events/Summary/Plan: Fio2 increased to 50% post ABG. Will continue to monitor.

## 2017-01-28 NOTE — CARE PLAN
Problem: Skin Integrity  Goal: Risk for impaired skin integrity will decrease  Outcome: PROGRESSING AS EXPECTED  Pt skin is intact. No signs of breakdown or pressure ulcers

## 2017-01-28 NOTE — PROGRESS NOTES
"1030 Assumed care from IVY Koo per family request. Received bedside report. Pt in BUE wrist restraints with propofol infusing. Recently reintubated. Tachycardic to 110s; amiodarone ordered and started. SBP 90s.     1100 SBP 82 with MAP 55; propofol changed to precedex. Updated  at bedside.     1130 Levophed ordered from pharmacy; SBP continues to fall into 70s. Precedex ineffective for sedation as of yet. Will continue to monitor.    1200 Pt calming somewhat on Precedex.     1230 Levophed started; weaning as tolerated. MD Spence at bedside to assess pt and update . Discussed amio gtt and levophed with MD Spence; discussed potential future need for valve replacement. No further orders at this time. Pt SBP  Responsive to Levophed and heart rate NSR in 60s-70s.     1400 Fio2 40% down from 100% Fio2.    1700 Pt with variable RASS; agitated; titratinng precedex to effect.     1800 Pt awake and agitated; reports feeling \"hot\" by nodding. Pt accepted ice packs. Temp 99.7F orally.     1900 Bedside report given to RN. Pt in NAD and resting at this time.  at bedside. Precedex, levophed and amio gtt infusing. VSS. Bed alarm set.  "

## 2017-01-28 NOTE — CARE PLAN
Problem: Skin Integrity  Goal: Risk for impaired skin integrity will decrease  Outcome: PROGRESSING AS EXPECTED  Q2H turns    Problem: Mobility  Goal: Risk for activity intolerance will decrease  Outcome: PROGRESSING AS EXPECTED  EOB x5 minutes

## 2017-01-28 NOTE — RESPIRATORY CARE
Extubation    Cuff leak noted Yes     Stridor present No        O2 (LPM): 6        Events/Summary/Plan: Extubated and placed on 6L NC, will continue to monitor (01/28/17 7038)

## 2017-01-28 NOTE — CARE PLAN
Problem: Ventilation Defect:  Goal: Ability to achieve and maintain unassisted ventilation or tolerate decreased levels of ventilator support  Outcome: PROGRESSING AS EXPECTED    Problem: Oxygenation:  Goal: Maintain adequate oxygenation dependent on patient condition  Outcome: PROGRESSING AS EXPECTED    Adult Ventilation Update    Total Vent Days: 5      Patient Lines/Drains/Airways Status    Active Airway      Name: Placement date: Placement time: Site: Days:     Airway Group ET Tube Oral 8.0 01/24/17    Oral  5                   Events/Summary/Plan: No vent changes made at this time, will continue to monitor      Weaning Trial Stopped due to:: Apnea > 30 seconds X 4                 (both SBT attempts)

## 2017-01-28 NOTE — PROGRESS NOTES
Assumed pt care. Pt is currently resting in bed. She is alert and able to follow commands.  No signs of pain at this time. Bed is in low position and call light is within reach. Vent settings are 24 400 8 50%. VSS

## 2017-01-28 NOTE — CARE PLAN
Problem: Mobility  Goal: Risk for activity intolerance will decrease  Outcome: PROGRESSING SLOWER THAN EXPECTED  Unable to mobilize patient today due to reintubation and currently requiring 100% FIO2 on vent.

## 2017-01-28 NOTE — CARE PLAN
Problem: Psychosocial Needs:  Goal: Level of anxiety will decrease  Outcome: PROGRESSING AS EXPECTED  Pt is still having episodes of anxiety but is learning to use relaxation techniques

## 2017-01-28 NOTE — PROCEDURES
Procedure Note    Date: 1/28/2017  Time: 0930    Procedure: Intubation    Indication: Acute hypoxicrespiratory failure, Unresponsive  Consent: Informed consent obtained from patient or designated decision maker after explaining the benefits/risks of the procedure including but not limited to airway/dental trauma, hypoxia/hypercarbia, bleeding, aspiration/infection, vascular/nerve or other deep structure injury, arrythmia, or death. Patient or surrogate expressed understanding and agreement and signed consent which can be found in the patient's chart.    Procedure: After obtaining consent, a time-out was performed. Airway assessed and patient found to have Mallampati class 3.  Equipment prepared and RT/RN at bedside. Patient pre-oxygenated with 100% FiO2.  After medication delivery, a Glidescope was used to acheive direct visualization and a grade A view. A 7.5 ETT was placed with 1 attempt(s) into the trachea directly through the vocal cords. Appropriate placement confirmed by direct visualization, bilateral chest and epigastric auscultation, misting in the ETT, and ETCO2 detector. ETT secured in place and patient connected to ventilator. Sedation/analgesia continued as appropriate. Patient tolerated procedure well without any difficulties and remains in care of bedside nurse and respiratory therapy. CXR will be performed to confirm appropriate placement of ETT.    Medications: Etomidate 20mg IV and Rocuronium 50mg IV  Complications: None  CXR: ET tube at 3cm above the anmol with acute bilateral pulmonary edema    Lesley Atkins MD  Pulmonary and Critical Care Medicine

## 2017-01-28 NOTE — PROGRESS NOTES
Pulmonary Critical Care Progress Note        Date of Service: 1/28/2017  Chief Complaint: Worsening dyspnea and acute hypoxic respiratory failure    History of Present Illness: The patient is a 66-year-old female with a past medical history significant for hypertension, hyperlipidemia, and mitral valve prolapse who was in her usual state of health on 1/24 when she noted a cough and sudden onset of shortness of breath.  She checked her oxygen saturation at home and found to be 85%. She presented to the emergency department at [facility] where unfortunately she decompensated quickly requiring up to 15 L facemask and then eventually requiring intubation. She was initially hypertensive and tachycardic with an oxygen saturation of 86% on room air at the outside hospital and was transported on high peak and expiratory pressures on the ventilator with an FIO2 of 100%, achieving saturations in the low 90s. She became progressively more hypotensive requiring a norepinephrine drip. A stat bedside echo was performed with Dr. Delarosa in the ER, which revealed a ruptured mitral valve leaflet with severe MR as the likely cause of her decompensation.     ROS: Unable to obtain as the patient is sedated on the ventilator     Interval Events:  24 hour interval history reviewed    - SBT for one hour with excellent weaning parameters   - Extubated to 6 liters   - Very anxious after intubated with tachycardia, tachypnea, with O2 sats to 80s and unresponsive   - Pt re-intubated around 0930   - Remains tachycardic with what appears to be a-fib--amio bolus/drip   - CXR reveal ETT at 3cm above anmol and acute bilat pulmonary edema      PFSH:  No change.    Respiratory:  Quinones Vent Mode: APVCMV, Rate (breaths/min): 24, Vt Target (mL): 400, PEEP/CPAP: 8, FiO2: 50, Static Compliance (ml / cm H2O): 53, Control VTE (exp VT): 395  Pulse Oximetry: 97 %    Exam: clear to auscultation without rales or wheezes  ImagingCXR  I have  personally reviewed the chest x-ray my impression is   there is no change from prior image bilateral pulmonary edema improved (prior to extubation)  CXR after re-intubation:  ETT at 3cm above anmol and bilateral pulm edema    CTA chest 1/25:  1.  No evidence pulmonary emboli.  2.  Moderate right and small left pleural effusions.  3.  Compressive atelectasis both lower lobes especially the left and left lung volume loss with mediastinal shift towards left.  4.  Patchy groundglass opacities within the right upper lobe consistent with focal areas of pneumonitis.  5.  7.5 mm left upper lobe nodule and smaller nodules within the lungs.  Recent Labs      01/25/17   0444  01/26/17   0450  01/27/17   0456   ISTATAPH  7.356*  7.454  7.481   ISTATAPCO2  32.8  26.4  24.1*   ISTATAPO2  156*  100*  56*   ISTATATCO2  19*  19*  19*   [id number]  99  98  92*   ISTATARTHCO3  18.4  18.5  18.0   ISTATARTBE  -6*  -4  -4   ISTATTEMP  96.0 F  99.3 F  96.4 F   ISTATFIO2  100  40  30   ISTATSPEC  Arterial  Arterial  Arterial   ISTATAPHTC  7.377*  7.449  7.500   RBTMESVT4TG  147*  103*  52*       HemoDynamics:  Pulse: 88, Heart Rate (Monitored): 89  NIBP: 142/70 mmHg     Levophed - off  Dobutamine - off  Exam: 3/6 holosystolic murmur heard throughout the precordium, regular rate and rhythm  Imaging: echo Reviewed     ECHO from 1/24  CONCLUSIONS  Hyperdynamic left ventricular systolic function.  Left ventricular ejection fraction is visually estimated to be greater   than 75%.  Posterior mitral valve prolapse with probable flail leaflet.  Severe mitral regurgitation.  Mild tricuspid regurgitation.  Right ventricular systolic pressure is estimated to be 40 mmHg  Recent Labs      01/25/17   0530  01/25/17   1800  01/26/17   0500   TROPONINI  3.35*  0.75*  0.48*       Neuro:  GCS Total Kali Coma Score: 11     Propofol - off  Exam: awake, alert, following all commands, symmetrical facial expression, moving all X 4  Imaging: Available data  reviewed    Fluids:  Intake/Output       01/26/17 0700 - 01/27/17 0659 01/27/17 0700 - 01/28/17 0659 01/28/17 0700 - 01/29/17 0659      0700-1859 1900-0659 Total 0700-1859 1900-0659 Total 0700-1859 1900-0659 Total       Intake    I.V.  410.9  351 761.9  84.6  -- 84.6  --  -- --    Propofol Volume 221.7 228.6 450.3 64.9 -- 64.9 -- -- --    Norepinephrine Volume 189.2 122.4 311.6 19.8 -- 19.8 -- -- --    Other  --  -- --  60  60 120  --  -- --    Medications (P.O./ Enteral Liquids) -- -- -- 60 60 120 -- -- --    Enteral  420  420 840  480  430 910  --  -- --    Enteral Volume 420 420 840 420 400 820 -- -- --    Free Water / Tube Flush -- -- -- 60 30 90 -- -- --    Total Intake 830.9 771 1601.9 624.6 490 1114.6 -- -- --       Output    Urine  1350  435 1785  1200  -- 1200  --  -- --    Indwelling Cathether 6482 607 9312 1200 -- 1200 -- -- --    Drains  --  -- --  0  0 0  --  -- --    Residual Amount (ml) (Discarded) -- -- -- 0 0 0 -- -- --    Total Output 5799 826 7419 1200 0 1200 -- -- --       Net I/O     -519.1 336 -183.1 -575.4 490 -85.4 -- -- --        Weight: 61 kg (134 lb 7.7 oz)  Recent Labs      01/25/17   0530  01/26/17   0500  01/27/17   0430   SODIUM  126*  133*  137   POTASSIUM  4.6  3.8  3.5*   CHLORIDE  98  104  108   CO2  19*  19*  20   BUN  21  21  32*   CREATININE  1.24  1.09  0.98   MAGNESIUM  2.2  2.3  2.4   PHOSPHORUS  4.1  3.1  2.7   CALCIUM  8.2*  8.1*  8.4*       GI/Nutrition:  Exam: abdomen is soft and non-tender, normal active bowel sounds  Imaging: Available data reviewed  CorTrak: at goal with tube feeds  Liver Function  Recent Labs      01/25/17   0530  01/26/17   0500  01/27/17   0430   ALTSGPT  14  10  8   ASTSGOT  24  15  10*   ALKPHOSPHAT  71  61  63   TBILIRUBIN  0.7  0.5  0.5   GLUCOSE  137*  126*  121*       Heme:  Recent Labs      01/25/17   0530  01/25/17   1145  01/25/17   1800  01/26/17   0500  01/27/17   0430   RBC  4.52   --    --   3.93*  3.55*   HEMOGLOBIN  14.1   --    --    12.3  10.9*   HEMATOCRIT  42.3   --    --   36.4*  33.1*   PLATELETCT  257   --    --   224  215   APTT   --   35.2  179.3*   --    --        Infectious Disease:  Temp  Av.5 °C (97.7 °F)  Min: 35.7 °C (96.3 °F)  Max: 36.9 °C (98.5 °F)  Micro: antibiotics reviewed, cultures pending and cultures reviewed  Recent Labs      17   0530  17   0500  17   0430   WBC  19.8*  10.7  7.2   NEUTSPOLYS  88.90*  81.70*  75.90*   LYMPHOCYTES  4.90*  10.80*  14.00*   MONOCYTES  5.60  6.80  8.60   EOSINOPHILS  0.00  0.10  0.80   BASOPHILS  0.20  0.20  0.30   ASTSGOT  24  15  10*   ALTSGPT  14  10  8   ALKPHOSPHAT  71  61  63   TBILIRUBIN  0.7  0.5  0.5     Current Facility-Administered Medications   Medication Dose Frequency Provider Last Rate Last Dose   • furosemide (LASIX) injection 20 mg  20 mg BID DIURETIC Navid Ledesma M.D.       • quetiapine (SEROQUEL) tablet 25 mg  25 mg QDAY PRN Lesley Atkins M.D.   25 mg at 17 1621   • ibuprofen (MOTRIN) oral suspension 400 mg  400 mg Q6HRS PRN Lesley Atkins M.D.   400 mg at 17 1032   • potassium chloride (KLOR-CON) 20 MEQ packet 20 mEq  20 mEq DAILY Navid Ledesma M.D.   20 mEq at 17 1032   • aspirin EC (ECOTRIN) tablet 81 mg  81 mg DAILY Navid Ledesma M.D.   81 mg at 17 0838   • heparin injection 5,000 Units  5,000 Units Q8HRS Kurtis Ramirez D.O.   5,000 Units at 17 2052   • propofol (DIPRIVAN) injection  5-80 mcg/kg/min Continuous Jeremy M Gonda, M.D.   Stopped at 17 0910   • norepinephrine (LEVOPHED) 8 mg in  mL Infusion  0.5-30 mcg/min Continuous James Pérez M.D.   Stopped at 17 0900   • glucose 4 g chewable tablet 16 g  16 g Q15 MIN PRN James Pérez M.D.        And   • dextrose 50% (D50W) injection 25 mL  25 mL Q15 MIN PRN James Pérez M.D.       • multivitamin (THERAGRAN) tablet 1 Tab  1 Tab DAILY James Pérez M.D.   1 Tab at 17 0838   • Respiratory Care per Protocol   Continuous  RT Jeremy M Gonda, M.D.       • lactulose 20 GM/30ML solution 30 mL  30 mL Q24HRS PRN Jeremy M Gonda, M.D.       • bisacodyl (DULCOLAX) suppository 10 mg  10 mg Q24HRS PRN Jeremy M Gonda, M.D.       • fleet enema 133 mL  1 Each Once PRN Jeremy M Gonda, M.D.       • chlorhexidine (PERIDEX) 0.12 % solution 15 mL  15 mL BID Jeremy M Gonda, M.D.   15 mL at 01/27/17 2053   • lidocaine (XYLOCAINE) 1%  injection  1-2 mL Q30 MIN PRN Jeremy M Gonda, M.D.   1 mL at 01/28/17 0418   • fentaNYL (SUBLIMAZE) injection 25 mcg  25 mcg Q HOUR PRN Jeremy M Gonda, M.D.   25 mcg at 01/25/17 0632    Or   • fentaNYL (SUBLIMAZE) injection 50 mcg  50 mcg Q HOUR PRN Jeremy M Gonda, M.D.   50 mcg at 01/27/17 2053    Or   • fentaNYL (SUBLIMAZE) injection 100 mcg  100 mcg Q HOUR PRN Jeremy M Gonda, M.D.   100 mcg at 01/28/17 0405   • ipratropium-albuterol (DUONEB) nebulizer solution 3 mL  3 mL Q2HRS PRN (RT) Jeremy M Gonda, M.D.       • famotidine (PEPCID) tablet 20 mg  20 mg Q12HRS Jeremy M Gonda, M.D.   20 mg at 01/27/17 2053    Or   • famotidine (PEPCID) injection 20 mg  20 mg Q12HRS Jeremy M Gonda, M.D.   20 mg at 01/26/17 0807   • cefTRIAXone (ROCEPHIN) 1 g in  mL IVPB  1 g Q24HRS Lesley Atkins M.D.   Stopped at 01/27/17 0909   • doxycycline (VIBRAMYCIN) 100 mg in  mL IVPB  100 mg Q12HRS Lesley Atkins M.D.   Stopped at 01/27/17 2239   • oyster shell calcium/vitamin D 250-125 MG-UNIT tablet 1 Tab  1 Tab QDAY with Breakfast James Pérez M.D.   1 Tab at 01/27/17 0849   • pravastatin (PRAVACHOL) tablet 20 mg  20 mg Q EVENING Jeremy M Gonda, M.D.   20 mg at 01/27/17 2052   • DOBUTamine (DOBUTREX) 1 mg/mL premix infusion  2.5 mcg/kg/min Continuous Jeremy M Gonda, M.D.   Stopped at 01/25/17 1730     Last reviewed on 1/24/2017 11:07 PM by Birdie Schumacher    Quality  Measures:  Medications reviewed, EKG reviewed, Labs reviewed and Radiology images reviewed  Frank catheter: Critically Ill - Requiring Accurate Measurement of  Urinary Output and Unconscious / Sedated Patient on a Ventilator  Central line in place: Need for access and Vasopressors    DVT Prophylaxis: Heparin  DVT prophylaxis - mechanical: SCDs  Ulcer prophylaxis: Yes  Antibiotics: Treating active infection/contamination beyond 24 hours perioperative coverage    Problems/Plan:    Acute Hypoxic Respiratory Failure from pulmonary edema and cardiogenic shock   - SBTs BID with excellent weaning parameters: extubated   - Re-intubated within 20-30 minutes due to tachy/unresponsive/hypoxic likely due to flash pulmonary edema due    - cont full vent support   - RT/O2 therapies   - Hold SBTs until heart rate improves  Acute Pulmonary Edema   - RT/O2 protocols   - cont vent support   - will gentle diuresis once stable  Severe Mitral Valve Regurgitation likely related to valve prolapse   - cardiac cath on 1/25   - CTA r/o'd PE   - CTS to see tomorrow to re-evaluate   Cardiogenic Shock likely related to severe mitral regurgitation   - s/p vasopressor/ionotropic therapy   - hold diuresis   - Cards following   - Cardiac cath on 1/25 was negative and heparin stopped   - CTA was negative  Acute Leukocytosis and concern for aspiration   - blood/sputum cultures sent and follow   - Empiric Ceftriaxone/Doxycycline for a total 7 days  Hyponatremia   - improved   - monitor   - reduce IVF  Elevated Troponin   - monitor   - f/u on cardiac catheterization   - heparin gtt   - ASA  Hyperglycemia   - ISS  Acute Metabolic Acidosis   - improved   - monitor    - keep MAPs > 65  Hx of mitral valve prolapse  Hx of systemic arterial hypertension  Hx of Dyslipidemia   - statin therapy  Prophylaxis   - heparin, bowel regimen, pepcid, enteral feeds    Discussed patient condition and risk of morbidity and/or mortality with Family, RN, RT, Pharmacy, , [employer] resident and Charge nurse / hot rounds and well as cardiology  The patient remains critically ill.  Critical care time = 36 minutes in  directly providing and coordinating critical care and extensive data review.  No time overlap and excludes procedures.

## 2017-01-28 NOTE — CARE PLAN
Problem: Ventilation Defect:  Goal: Ability to achieve and maintain unassisted ventilation or tolerate decreased levels of ventilator support  Outcome: PROGRESSING SLOWER THAN EXPECTED    Problem: Oxygenation:  Goal: Maintain adequate oxygenation dependent on patient condition  Outcome: PROGRESSING AS EXPECTED  Adult Ventilation Update    Total Vent Days: 6      Patient Lines/Drains/Airways Status    Active Airway      Name: Placement date: Placement time: Site: Days:     Airway Group ET Tube 7.5 01/28/17  0935    1                 Events/Summary/Plan: PT emergently reintubated, No vent changes made at this time, will continue to monitor

## 2017-01-29 ENCOUNTER — APPOINTMENT (OUTPATIENT)
Dept: RADIOLOGY | Facility: MEDICAL CENTER | Age: 67
DRG: 216 | End: 2017-01-29
Attending: INTERNAL MEDICINE
Payer: MEDICARE

## 2017-01-29 LAB
ALBUMIN SERPL BCP-MCNC: 3.4 G/DL (ref 3.2–4.9)
ALBUMIN/GLOB SERPL: 1.3 G/DL
ALP SERPL-CCNC: 65 U/L (ref 30–99)
ALT SERPL-CCNC: 16 U/L (ref 2–50)
ANION GAP SERPL CALC-SCNC: 11 MMOL/L (ref 0–11.9)
AST SERPL-CCNC: 18 U/L (ref 12–45)
BACTERIA BLD CULT: NORMAL
BACTERIA BLD CULT: NORMAL
BASE EXCESS BLDA CALC-SCNC: -3 MMOL/L (ref -4–3)
BASOPHILS # BLD AUTO: 0.4 % (ref 0–1.8)
BASOPHILS # BLD: 0.03 K/UL (ref 0–0.12)
BILIRUB SERPL-MCNC: 0.7 MG/DL (ref 0.1–1.5)
BODY TEMPERATURE: ABNORMAL DEGREES
BUN SERPL-MCNC: 49 MG/DL (ref 8–22)
CALCIUM SERPL-MCNC: 8.8 MG/DL (ref 8.5–10.5)
CHLORIDE SERPL-SCNC: 107 MMOL/L (ref 96–112)
CO2 BLDA-SCNC: 20 MMOL/L (ref 20–33)
CO2 SERPL-SCNC: 22 MMOL/L (ref 20–33)
CREAT SERPL-MCNC: 0.91 MG/DL (ref 0.5–1.4)
EKG IMPRESSION: NORMAL
EOSINOPHIL # BLD AUTO: 0.06 K/UL (ref 0–0.51)
EOSINOPHIL NFR BLD: 0.7 % (ref 0–6.9)
ERYTHROCYTE [DISTWIDTH] IN BLOOD BY AUTOMATED COUNT: 48.1 FL (ref 35.9–50)
GFR SERPL CREATININE-BSD FRML MDRD: >60 ML/MIN/1.73 M 2
GLOBULIN SER CALC-MCNC: 2.7 G/DL (ref 1.9–3.5)
GLUCOSE SERPL-MCNC: 125 MG/DL (ref 65–99)
HCO3 BLDA-SCNC: 19.2 MMOL/L (ref 17–25)
HCT VFR BLD AUTO: 35.1 % (ref 37–47)
HGB BLD-MCNC: 11.5 G/DL (ref 12–16)
IMM GRANULOCYTES # BLD AUTO: 0.04 K/UL (ref 0–0.11)
IMM GRANULOCYTES NFR BLD AUTO: 0.5 % (ref 0–0.9)
LYMPHOCYTES # BLD AUTO: 0.75 K/UL (ref 1–4.8)
LYMPHOCYTES NFR BLD: 9.2 % (ref 22–41)
MAGNESIUM SERPL-MCNC: 2.3 MG/DL (ref 1.5–2.5)
MCH RBC QN AUTO: 30.7 PG (ref 27–33)
MCHC RBC AUTO-ENTMCNC: 32.8 G/DL (ref 33.6–35)
MCV RBC AUTO: 93.6 FL (ref 81.4–97.8)
MONOCYTES # BLD AUTO: 0.9 K/UL (ref 0–0.85)
MONOCYTES NFR BLD AUTO: 11 % (ref 0–13.4)
NEUTROPHILS # BLD AUTO: 6.41 K/UL (ref 2–7.15)
NEUTROPHILS NFR BLD: 78.2 % (ref 44–72)
NRBC # BLD AUTO: 0 K/UL
NRBC BLD AUTO-RTO: 0 /100 WBC
O2/TOTAL GAS SETTING VFR VENT: 40 %
PCO2 BLDA: 25.8 MMHG (ref 26–37)
PCO2 TEMP ADJ BLDA: 24.8 MMHG (ref 26–37)
PH BLDA: 7.48 [PH] (ref 7.4–7.5)
PH TEMP ADJ BLDA: 7.49 [PH] (ref 7.4–7.5)
PLATELET # BLD AUTO: 247 K/UL (ref 164–446)
PMV BLD AUTO: 12.6 FL (ref 9–12.9)
PO2 BLDA: 69 MMHG (ref 64–87)
PO2 TEMP ADJ BLDA: 65 MMHG (ref 64–87)
POTASSIUM SERPL-SCNC: 3.9 MMOL/L (ref 3.6–5.5)
PROT SERPL-MCNC: 6.1 G/DL (ref 6–8.2)
RBC # BLD AUTO: 3.75 M/UL (ref 4.2–5.4)
SAO2 % BLDA: 95 % (ref 93–99)
SIGNIFICANT IND 70042: NORMAL
SIGNIFICANT IND 70042: NORMAL
SITE SITE: NORMAL
SITE SITE: NORMAL
SODIUM SERPL-SCNC: 140 MMOL/L (ref 135–145)
SOURCE SOURCE: NORMAL
SOURCE SOURCE: NORMAL
SPECIMEN DRAWN FROM PATIENT: ABNORMAL
WBC # BLD AUTO: 8.2 K/UL (ref 4.8–10.8)

## 2017-01-29 PROCEDURE — 71010 DX-CHEST-PORTABLE (1 VIEW): CPT

## 2017-01-29 PROCEDURE — 85025 COMPLETE CBC W/AUTO DIFF WBC: CPT

## 2017-01-29 PROCEDURE — 700111 HCHG RX REV CODE 636 W/ 250 OVERRIDE (IP)

## 2017-01-29 PROCEDURE — 700101 HCHG RX REV CODE 250: Performed by: INTERNAL MEDICINE

## 2017-01-29 PROCEDURE — 700102 HCHG RX REV CODE 250 W/ 637 OVERRIDE(OP): Performed by: INTERNAL MEDICINE

## 2017-01-29 PROCEDURE — 700102 HCHG RX REV CODE 250 W/ 637 OVERRIDE(OP): Performed by: STUDENT IN AN ORGANIZED HEALTH CARE EDUCATION/TRAINING PROGRAM

## 2017-01-29 PROCEDURE — 700101 HCHG RX REV CODE 250: Performed by: STUDENT IN AN ORGANIZED HEALTH CARE EDUCATION/TRAINING PROGRAM

## 2017-01-29 PROCEDURE — 700105 HCHG RX REV CODE 258: Performed by: STUDENT IN AN ORGANIZED HEALTH CARE EDUCATION/TRAINING PROGRAM

## 2017-01-29 PROCEDURE — 700111 HCHG RX REV CODE 636 W/ 250 OVERRIDE (IP): Performed by: INTERNAL MEDICINE

## 2017-01-29 PROCEDURE — 36600 WITHDRAWAL OF ARTERIAL BLOOD: CPT

## 2017-01-29 PROCEDURE — A9270 NON-COVERED ITEM OR SERVICE: HCPCS | Performed by: INTERNAL MEDICINE

## 2017-01-29 PROCEDURE — A9270 NON-COVERED ITEM OR SERVICE: HCPCS | Performed by: HOSPITALIST

## 2017-01-29 PROCEDURE — 99291 CRITICAL CARE FIRST HOUR: CPT | Mod: GC | Performed by: INTERNAL MEDICINE

## 2017-01-29 PROCEDURE — 700105 HCHG RX REV CODE 258: Performed by: INTERNAL MEDICINE

## 2017-01-29 PROCEDURE — 94003 VENT MGMT INPAT SUBQ DAY: CPT

## 2017-01-29 PROCEDURE — 700111 HCHG RX REV CODE 636 W/ 250 OVERRIDE (IP): Performed by: HOSPITALIST

## 2017-01-29 PROCEDURE — 80053 COMPREHEN METABOLIC PANEL: CPT

## 2017-01-29 PROCEDURE — 94770 HCHG CO2 EXPIRED GAS DETERMINATION: CPT

## 2017-01-29 PROCEDURE — A9270 NON-COVERED ITEM OR SERVICE: HCPCS | Performed by: STUDENT IN AN ORGANIZED HEALTH CARE EDUCATION/TRAINING PROGRAM

## 2017-01-29 PROCEDURE — 82803 BLOOD GASES ANY COMBINATION: CPT

## 2017-01-29 PROCEDURE — 770022 HCHG ROOM/CARE - ICU (200)

## 2017-01-29 PROCEDURE — 83735 ASSAY OF MAGNESIUM: CPT

## 2017-01-29 PROCEDURE — 700102 HCHG RX REV CODE 250 W/ 637 OVERRIDE(OP): Performed by: HOSPITALIST

## 2017-01-29 RX ORDER — QUETIAPINE FUMARATE 25 MG/1
25 TABLET, FILM COATED ORAL 3 TIMES DAILY
Status: DISCONTINUED | OUTPATIENT
Start: 2017-01-29 | End: 2017-02-01

## 2017-01-29 RX ADMIN — QUETIAPINE FUMARATE 25 MG: 25 TABLET, FILM COATED ORAL at 09:56

## 2017-01-29 RX ADMIN — FAMOTIDINE 20 MG: 20 TABLET, FILM COATED ORAL at 07:49

## 2017-01-29 RX ADMIN — QUETIAPINE FUMARATE 25 MG: 25 TABLET, FILM COATED ORAL at 15:03

## 2017-01-29 RX ADMIN — DEXMEDETOMIDINE HYDROCHLORIDE 1.4 MCG/KG/HR: 100 INJECTION, SOLUTION, CONCENTRATE INTRAVENOUS at 18:41

## 2017-01-29 RX ADMIN — CHLORHEXIDINE GLUCONATE 15 ML: 1.2 RINSE ORAL at 07:48

## 2017-01-29 RX ADMIN — DEXMEDETOMIDINE HYDROCHLORIDE 1.5 MCG/KG/HR: 100 INJECTION, SOLUTION, CONCENTRATE INTRAVENOUS at 21:30

## 2017-01-29 RX ADMIN — QUETIAPINE FUMARATE 25 MG: 25 TABLET, FILM COATED ORAL at 20:31

## 2017-01-29 RX ADMIN — CALCIUM CARBONATE-CHOLECALCIFEROL TAB 250 MG-125 UNIT 1 TABLET: 250-125 TAB at 07:49

## 2017-01-29 RX ADMIN — PROPOFOL 20 MCG/KG/MIN: 10 INJECTION, EMULSION INTRAVENOUS at 07:49

## 2017-01-29 RX ADMIN — FUROSEMIDE 20 MG: 10 INJECTION, SOLUTION INTRAMUSCULAR; INTRAVENOUS at 05:05

## 2017-01-29 RX ADMIN — POTASSIUM CHLORIDE 20 MEQ: 1.5 POWDER, FOR SOLUTION ORAL at 07:49

## 2017-01-29 RX ADMIN — PRAVASTATIN SODIUM 20 MG: 20 TABLET ORAL at 20:31

## 2017-01-29 RX ADMIN — THERA TABS 1 TABLET: TAB at 07:49

## 2017-01-29 RX ADMIN — HEPARIN SODIUM 5000 UNITS: 5000 INJECTION, SOLUTION INTRAVENOUS; SUBCUTANEOUS at 21:29

## 2017-01-29 RX ADMIN — DOXYCYCLINE 100 MG: 100 INJECTION, POWDER, LYOPHILIZED, FOR SOLUTION INTRAVENOUS at 07:48

## 2017-01-29 RX ADMIN — FUROSEMIDE 20 MG: 10 INJECTION, SOLUTION INTRAMUSCULAR; INTRAVENOUS at 15:02

## 2017-01-29 RX ADMIN — HEPARIN SODIUM 5000 UNITS: 5000 INJECTION, SOLUTION INTRAVENOUS; SUBCUTANEOUS at 15:02

## 2017-01-29 RX ADMIN — CEFTRIAXONE SODIUM 1 G: 1 INJECTION, POWDER, FOR SOLUTION INTRAMUSCULAR; INTRAVENOUS at 07:48

## 2017-01-29 RX ADMIN — AMIODARONE HYDROCHLORIDE 0.5 MG/MIN: 50 INJECTION, SOLUTION INTRAVENOUS at 10:14

## 2017-01-29 RX ADMIN — NOREPINEPHRINE BITARTRATE 5 MCG/MIN: 1 INJECTION INTRAVENOUS at 07:48

## 2017-01-29 RX ADMIN — HEPARIN SODIUM 5000 UNITS: 5000 INJECTION, SOLUTION INTRAVENOUS; SUBCUTANEOUS at 05:05

## 2017-01-29 RX ADMIN — DOXYCYCLINE 100 MG: 100 INJECTION, POWDER, LYOPHILIZED, FOR SOLUTION INTRAVENOUS at 20:33

## 2017-01-29 RX ADMIN — DEXMEDETOMIDINE HYDROCHLORIDE 1.4 MCG/KG/HR: 100 INJECTION, SOLUTION, CONCENTRATE INTRAVENOUS at 12:28

## 2017-01-29 RX ADMIN — FAMOTIDINE 20 MG: 20 TABLET, FILM COATED ORAL at 20:31

## 2017-01-29 RX ADMIN — CHLORHEXIDINE GLUCONATE 15 ML: 1.2 RINSE ORAL at 20:31

## 2017-01-29 RX ADMIN — ASPIRIN 81 MG: 81 TABLET ORAL at 07:49

## 2017-01-29 RX ADMIN — DEXMEDETOMIDINE HYDROCHLORIDE 0.7 MCG/KG/HR: 100 INJECTION, SOLUTION, CONCENTRATE INTRAVENOUS at 04:48

## 2017-01-29 NOTE — CARE PLAN
Problem: Ventilation Defect:  Goal: Ability to achieve and maintain unassisted ventilation or tolerate decreased levels of ventilator support  Adult Ventilation Update    Total Vent Days: 6      Patient Lines/Drains/Airways Status    Active Airway      Name: Placement date: Placement time: Site: Days:     Airway Group ET Tube 7.5 01/28/17  0935    less than 1                 Plateau Pressure (Q Shift): 15 (01/28/17 1823)  Static Compliance (ml / cm H2O): 49 (01/29/17 0503)    Sputum/Suction   Cough: Productive (01/29/17 0400)  Sputum Amount: Small (01/29/17 0400)  Sputum Color: Tan (01/29/17 0400)  Sputum Consistency: Thick (01/29/17 0400)    Events/Summary/Plan: No changes made this shift. Will continue to monitor.

## 2017-01-29 NOTE — CARE PLAN
Problem: Mobility  Goal: Risk for activity intolerance will decrease  Outcome: PROGRESSING SLOWER THAN EXPECTED  Unable to mobilize patient at this time due to pt agitation, but pt performs self ROM and moves self in bed well when agitated.

## 2017-01-29 NOTE — PROGRESS NOTES
Pulmonary Critical Care Progress Note        Date of Service: 1/29/2017  Chief Complaint: Worsening dyspnea and acute hypoxic respiratory failure    History of Present Illness: The patient is a 66-year-old female with a past medical history significant for hypertension, hyperlipidemia, and mitral valve prolapse who was in her usual state of health on 1/24 when she noted a cough and sudden onset of shortness of breath.  She checked her oxygen saturation at home and found to be 85%. She presented to the emergency department at [facility] where unfortunately she decompensated quickly requiring up to 15 L facemask and then eventually requiring intubation. She was initially hypertensive and tachycardic with an oxygen saturation of 86% on room air at the outside hospital and was transported on high peak and expiratory pressures on the ventilator with an FIO2 of 100%, achieving saturations in the low 90s. She became progressively more hypotensive requiring a norepinephrine drip. A stat bedside echo was performed with Dr. Delarosa in the ER, which revealed a ruptured mitral valve leaflet with severe MR as the likely cause of her decompensation.     ROS: Unable to obtain as the patient is sedated on the ventilator     Interval Events:  24 hour interval history reviewed    - No overnight events since intubated   - Amio and Levo still running   - Will get agitated with decrease in sedation   - Awaiting evaluation with CVS       PFSH:  No change.    Respiratory:  Quinones Vent Mode: APVCMV, Rate (breaths/min): 24, Vt Target (mL): 400, PEEP/CPAP: 8, FiO2: 40, Static Compliance (ml / cm H2O): 55, Control VTE (exp VT): 402  Pulse Oximetry: 97 %    Exam: clear to auscultation without rales or wheezes  ImagingCXR  I have personally reviewed the chest x-ray my impression is   there is no change from prior image improved bilat pulm edema, RML opacity noted    CTA chest 1/25:  1.  No evidence pulmonary emboli.  2.  Moderate  right and small left pleural effusions.  3.  Compressive atelectasis both lower lobes especially the left and left lung volume loss with mediastinal shift towards left.  4.  Patchy groundglass opacities within the right upper lobe consistent with focal areas of pneumonitis.  5.  7.5 mm left upper lobe nodule and smaller nodules within the lungs.  Recent Labs      01/27/17   0456  01/28/17   0435   ISTATAPH  7.481  7.476   ISTATAPCO2  24.1*  25.9*   ISTATAPO2  56*  50*   ISTATATCO2  19*  20   [id number]  92*  88*   ISTATARTHCO3  18.0  19.1   ISTATARTBE  -4  -4   ISTATTEMP  96.4 F  98.1 F   ISTATFIO2  30  40   ISTATSPEC  Arterial  Arterial   ISTATAPHTC  7.500  7.480   [id number]  52*  49*       HemoDynamics:  Pulse: 60, Heart Rate (Monitored): 60  NIBP: 105/57 mmHg     Levophed - off  Dobutamine - off  Exam: 3/6 holosystolic murmur heard throughout the precordium, regular rate and rhythm  Imaging: echo Reviewed     ECHO from 1/24  CONCLUSIONS  Hyperdynamic left ventricular systolic function.  Left ventricular ejection fraction is visually estimated to be greater   than 75%.  Posterior mitral valve prolapse with probable flail leaflet.  Severe mitral regurgitation.  Mild tricuspid regurgitation.  Right ventricular systolic pressure is estimated to be 40 mmHg  Recent Labs      01/26/17   0500   TROPONINI  0.48*       Neuro:  GCS Total [place] Coma Score: 11     Propofol - off  Exam:opens eyes to verbal, easily becomes agitated with an elevated heart rate, follows occasionally  Imaging: Available data reviewed    Fluids:  Intake/Output       01/27/17 0700 - 01/28/17 0659 01/28/17 0700 - 01/29/17 0659 01/29/17 0700 - 01/30/17 0659      2299-2003 7062-4445 Total [id number] 1900-0659 Total 0301-2792 0858-0418 Total       Intake    I.V.  84.6  -- 84.6  --  62.1 62.1  --  -- --    Precedex Volume -- -- -- -- 22.9 22.9 -- -- --    Amiodarone Volume -- -- -- -- 17 17 -- -- --    Propofol Volume 64.9 -- 64.9 -- 12.8 12.8 -- --  --    Norepinephrine Volume 19.8 -- 19.8 -- 9.4 9.4 -- -- --    Other  60  60 120  --  30 30  --  -- --    Medications (P.O./ Enteral Liquids) 60 60 120 -- 30 30 -- -- --    Enteral  480  510 990  510  490 1000  --  -- --    Enteral Volume 420 480 900 480 400 880 -- -- --    Free Water / Tube Flush 60 30 90 30 90 120 -- -- --    Total Intake 624.6 570 1194.6 510 582.1 1092.1 -- -- --       Output    Urine  1200  1700 2900  1490  445 1935  --  -- --    Indwelling Cathether 1200 1700 2900 8560 916 4735 -- -- --    Drains  0  0 0  0  -- 0  --  -- --    Residual Amount (ml) (Discarded) 0 0 0 0 -- 0 -- -- --    Total Output 1200 1700 2900 3710 472 5385 -- -- --       Net I/O     -575.4 -1130 -1705.4 -980 137.1 -842.9 -- -- --           Recent Labs      17   0500  17   0040   SODIUM  133*  137  143  140   POTASSIUM  3.8  3.5*  3.6  3.9   CHLORIDE  104  108  109  107   CO2  19*  20  22  22   BUN  21  32*  36*  49*   CREATININE  1.09  0.98  0.79  0.91   MAGNESIUM  2.3  2.4  2.1  2.3   PHOSPHORUS  3.1  2.7   --    --    CALCIUM  8.1*  8.4*  8.8  8.8       GI/Nutrition:  Exam: abdomen is soft and non-tender, normal active bowel sounds  Imaging: Available data reviewed  CorTrak: at goal with tube feeds  Liver Function  Recent Labs      17   0430  175  17   0040   ALTSGPT  8  12  16   ASTSGOT  10*  15  18   ALKPHOSPHAT  63  67  65   TBILIRUBIN  0.5  0.6  0.7   GLUCOSE  121*  104*  125*       Heme:  Recent Labs      17   0430  175  17   0040   RBC  3.55*  3.61*  3.75*   HEMOGLOBIN  10.9*  11.2*  11.5*   HEMATOCRIT  33.1*  33.9*  35.1*   PLATELETCT  215  220  247       Infectious Disease:  Temp  Av.9 °C (98.5 °F)  Min: 36.7 °C (98.1 °F)  Max: 37.4 °C (99.4 °F)  Micro: antibiotics reviewed, cultures pending and cultures reviewed  Recent Labs      17   0430  17   0445  17   0040   WBC  7.2  6.5  8.2   NEUTSPOLYS   75.90*  73.50*  78.20*   LYMPHOCYTES  14.00*  13.20*  9.20*   MONOCYTES  8.60  11.50  11.00   EOSINOPHILS  0.80  1.10  0.70   BASOPHILS  0.30  0.20  0.40   ASTSGOT  10*  15  18   ALTSGPT  8  12  16   ALKPHOSPHAT  63  67  65   TBILIRUBIN  0.5  0.6  0.7     Current Facility-Administered Medications   Medication Dose Frequency Provider Last Rate Last Dose   • dexmedetomidine (PRECEDEX) 200 mcg in NS 50 mL infusion  0.1-0.7 mcg/kg/hr Continuous Lesley Atkins M.D. 10.7 mL/hr at 01/29/17 0448 0.7 mcg/kg/hr at 01/29/17 0448   • MD Alert...Amiodarone Protocol Pharmacist to Implement   PRN Lesley Atkins M.D.       • amiodarone (CORDARONE) 450 mg in D5W 250 mL Infusion  0.5-1 mg/min Continuous Denilson Cortez PHARMD 17 mL/hr at 01/28/17 1900 0.5 mg/min at 01/28/17 1900   • lorazepam (ATIVAN) injection 1 mg  1 mg Once Lesley Atkins M.D.       • furosemide (LASIX) injection 20 mg  20 mg BID DIURETIC Navid Ledesma M.D.   20 mg at 01/28/17 1601   • quetiapine (SEROQUEL) tablet 25 mg  25 mg QDAY PRN Lesley Atkins M.D.   25 mg at 01/27/17 1621   • ibuprofen (MOTRIN) oral suspension 400 mg  400 mg Q6HRS PRN Lesley Atkins M.D.   400 mg at 01/27/17 1032   • potassium chloride (KLOR-CON) 20 MEQ packet 20 mEq  20 mEq DAILY Navid Ledesma M.D.   20 mEq at 01/28/17 0834   • aspirin EC (ECOTRIN) tablet 81 mg  81 mg DAILY Navid Ledesma M.D.   81 mg at 01/28/17 0834   • heparin injection 5,000 Units  5,000 Units Q8HRS Kurtis Ramirez D.O.   5,000 Units at 01/28/17 2100   • propofol (DIPRIVAN) injection  5-80 mcg/kg/min Continuous Jeremy M Gonda, M.D. 7.3 mL/hr at 01/28/17 2301 20 mcg/kg/min at 01/28/17 2301   • norepinephrine (LEVOPHED) 8 mg in  mL Infusion  0.5-30 mcg/min Continuous James Pérez M.D. 9.4 mL/hr at 01/28/17 2030 5 mcg/min at 01/28/17 2030   • glucose 4 g chewable tablet 16 g  16 g Q15 MIN PRN James Pérez M.D.        And   • dextrose 50% (D50W) injection 25 mL  25 mL Q15 MIN PRN James CORTÉS  JASON Pérez       • multivitamin (THERAGRAN) tablet 1 Tab  1 Tab DAILY James Pérez M.D.   1 Tab at 01/28/17 0834   • Respiratory Care per Protocol   Continuous RT Jeremy M Gonda, M.D.       • lactulose 20 GM/30ML solution 30 mL  30 mL Q24HRS PRN Jeremy M Gonda, M.D.       • bisacodyl (DULCOLAX) suppository 10 mg  10 mg Q24HRS PRN Jeremy M Gonda, M.D.       • fleet enema 133 mL  1 Each Once PRN Jeremy M Gonda, M.D.       • chlorhexidine (PERIDEX) 0.12 % solution 15 mL  15 mL BID Jeremy M Gonda, M.D.   15 mL at 01/28/17 2021   • lidocaine (XYLOCAINE) 1%  injection  1-2 mL Q30 MIN PRN Jeremy M Gonda, M.D.   1 mL at 01/28/17 0418   • fentaNYL (SUBLIMAZE) injection 25 mcg  25 mcg Q HOUR PRN Jeremy M Gonda, M.D.   25 mcg at 01/25/17 0632    Or   • fentaNYL (SUBLIMAZE) injection 50 mcg  50 mcg Q HOUR PRN Jeremy M Gonda, M.D.   50 mcg at 01/27/17 2053    Or   • fentaNYL (SUBLIMAZE) injection 100 mcg  100 mcg Q HOUR PRN Jeremy M Gonda, M.D.   100 mcg at 01/28/17 0620   • ipratropium-albuterol (DUONEB) nebulizer solution 3 mL  3 mL Q2HRS PRN (RT) Jeremy M Gonda, M.D.       • famotidine (PEPCID) tablet 20 mg  20 mg Q12HRS Jeremy M Gonda, M.D.   20 mg at 01/28/17 2024    Or   • famotidine (PEPCID) injection 20 mg  20 mg Q12HRS Jeremy M Gonda, M.D.   20 mg at 01/28/17 0834   • cefTRIAXone (ROCEPHIN) 1 g in  mL IVPB  1 g Q24HRS Lesley Atkins M.D.   Stopped at 01/28/17 0904   • doxycycline (VIBRAMYCIN) 100 mg in  mL IVPB  100 mg Q12HRS Lesley Atkins M.D.   Stopped at 01/28/17 2122   • oyster shell calcium/vitamin D 250-125 MG-UNIT tablet 1 Tab  1 Tab QDAY with Breakfast James Pérez M.D.   1 Tab at 01/28/17 0834   • pravastatin (PRAVACHOL) tablet 20 mg  20 mg Q EVENING Jeremy M Gonda, M.D.   20 mg at 01/28/17 2024     Last reviewed on 1/24/2017 11:07 PM by Birdie Schumacher    Quality  Measures:  Medications reviewed, EKG reviewed, Labs reviewed and Radiology images reviewed  Frank catheter:  Critically Ill - Requiring Accurate Measurement of Urinary Output and Unconscious / Sedated Patient on a Ventilator  Central line in place: Need for access and Vasopressors    DVT Prophylaxis: Heparin  DVT prophylaxis - mechanical: SCDs  Ulcer prophylaxis: Yes  Antibiotics: Treating active infection/contamination beyond 24 hours perioperative coverage    Problems/Plan:    Acute Hypoxic Respiratory Failure from pulmonary edema and cardiogenic shock   - SBTs BID with excellent weaning parameters: extubated   - Re-intubated within 20-30 minutes due to tachy/unresponsive/hypoxic likely due to flash pulmonary edema due    - cont full vent support   - RT/O2 therapies   - Will start SBTs tomorrow  Acute Pulmonary Edema   - RT/O2 protocols   - cont vent support   - will gentle diuresis once stable  Acute Delirium   - will start scheduled Seroquel 25mg TID   - Cont Precedex  Tachycardia that ? A flutter   - Amio bolus and gtt--> back to sinus  Severe Mitral Valve Regurgitation likely related to valve prolapse   - cardiac cath on 1/25   - CTA r/o'd PE   - CTS eval today  Cardiogenic Shock likely related to severe mitral regurgitation   - s/p vasopressor/ionotropic therapy   - Cards following   - Cardiac cath on 1/25 was negative and heparin stopped   - CTA was negative  Acute Leukocytosis and concern for aspiration   - blood/sputum cultures sent and follow   - Empiric Ceftriaxone/Doxycycline for a total 7 days  Hyponatremia   - improved   - monitor   - reduce IVF  Elevated Troponin   - monitor   - f/u on cardiac catheterization   - heparin gtt   - ASA  Hyperglycemia   - ISS  Acute Metabolic Acidosis   - improved   - monitor    - keep MAPs > 65  Hx of mitral valve prolapse  Hx of systemic arterial hypertension  Hx of Dyslipidemia   - statin therapy  Prophylaxis   - heparin, bowel regimen, pepcid, enteral feeds    Discussed patient condition and risk of morbidity and/or mortality with Family, RN, RT, Pharmacy, ,  [employer] [facility] resident and Charge nurse / hot rounds and well as cardiology  The patient remains critically ill.  Critical care time = 32 minutes in directly providing and coordinating critical care and extensive data review.  No time overlap and excludes procedures.

## 2017-01-29 NOTE — CARE PLAN
"Problem: Pain Management  Goal: Pain level will decrease to patient’s comfort goal  Outcome: PROGRESSING AS EXPECTED  Patient shakes head \"no\" when asked if she has pain. Resting comfortably with only intermittent restlessness, on Precedex.         "

## 2017-01-29 NOTE — PROGRESS NOTES
0700 Bedside report received from IVY Wilkins. Pt intubated and sedated on propofol and precedex; BUE wrist restraints. No family present at this time. VSS. Bed alarm set.    0900 Per ID rounds, can increase precedex to discontinue propofol. Seroquel added for agitation.     0940 MD Mckeon at bedside to assess pt and speak to . Per MD, will need a new KAREN and will assess mitral valve to determine replacement or medical management.    1200 Continue to titrate precedex up for continued agitation. Pt has bloody mucus from ETT. Following commands and fairly alert but appropriate.    1600 Pt frequently complains of feeling hot and asks for ice packs; pt remains afebrile. VSS. Titrating norepi down as tolerated.     1900 Bedside report given to RN. VSS.  remains at bedside. BUE wrist restraints in use. Precedex for sedation.

## 2017-01-29 NOTE — PROGRESS NOTES
Cardiology Progress Note               Author: Kurtis Ramirez Date & Time created: 1/29/2017  7:31 AM     Interval History:  Patient is seen and examined.  No significant issue overnight.  Nurse reports that patient would get anxious and become tachypneic.  Tele- reviewed (NSR with few PACs)  - 1/28:  Extubated this morning, but have to re-intubated about 30 minutes later for airway protection; patient became distress and tachycardic in 150s; CXR shows increase pulm edema.     - This is a 66-year-old male with known history of stable (moderate to severe) mitral valve regurgitation, O2 (2 liter) dependent at night, who was transferred to [facility] for acute respiratory failure.  The patient    has been doing well, and actually went skiing with him the day before.  She becames more dyspneic yesterday with saturation 85.  She eventually was brought  to the ER in [place], [place], which her respiratory did decompensate quickly that required mask and eventually intubated for airway protection.  Elevated cardiac enzymes.  - Her cardiologist is Dr. Tom Landin at [facility].  She has been  followed regularly for her valvular disease.  She has had a series of echocardiograms in which the last one was in April of 2016, with EF of 55-60%.    Mitral valve prolapse with posterior leaflet involvement.  There is  moderate-to-severe MR.  Prior to that, there was an echo March 2015, with EF  of 55% with moderate-to-severe MR.  She had an echo back in April 2014, with    EF of 60-65% with moderate-to-severe MR.  KAREN was done back in May of 2014 and  indicated moderate mitral valve prolapse with moderate MR.  - Coronary angiogram (1/2017):  Relatively normal arteries.      Review of Systems   Unable to perform ROS: intubated       Physical Exam   Constitutional: She appears well-developed and well-nourished.   HENT:   Head: Atraumatic.   Cardiovascular: Normal rate and regular rhythm.    Pulmonary/Chest: She has rales.    Abdominal: Soft. Bowel sounds are normal.       Hemodynamics:  Temp (24hrs), Av.9 °C (98.5 °F), Min:36.7 °C (98.1 °F), Max:37.4 °C (99.4 °F)  Temperature: 37.1 °C (98.8 °F)  Pulse  Av.9  Min: 58  Max: 129Heart Rate (Monitored): 62  NIBP: (!) 95/50 mmHg     Respiratory:  Quinones Vent Mode: APVCMV, Rate (breaths/min): 24, PEEP/CPAP: 8, FiO2: 40, P Peak (PIP): 31, P MEAN: 12 Respiration: (!) 24, Pulse Oximetry: 97 %     Work Of Breathing / Effort: Vented  RUL Breath Sounds: Clear, RML Breath Sounds: Diminished, RLL Breath Sounds: Diminished, PRAMOD Breath Sounds: Clear, LLL Breath Sounds: Diminished  Fluids:        GI/Nutrition:  Orders Placed This Encounter   Procedures   • Diet NPO     Standing Status: Standing      Number of Occurrences: 1      Standing Expiration Date:      Order Specific Question:  Type:     Answer:  Now [1]     Order Specific Question:  Restrict to:     Answer:  Strict [1]     Lab Results:  Recent Labs      17   [id number]  17   0040   WBC  7.2  6.5  8.2   RBC  3.55*  3.61*  3.75*   HEMOGLOBIN  10.9*  11.2*  11.5*   HEMATOCRIT  33.1*  33.9*  35.1*   MCV  93.2  93.9  93.6   MCH  30.7  31.0  30.7   MCHC  32.9*  33.0*  32.8*   RDW  47.9  47.3  48.1   PLATELETCT  215  220  247   MPV  12.9  12.4  12.6     Recent Labs      17   0430  175  17   0040   SODIUM  137  143  140   POTASSIUM  3.5*  3.6  3.9   CHLORIDE  108  109  107   CO2  20  22  22   GLUCOSE  121*  104*  125*   BUN  32*  36*  49*   CREATININE  0.98  0.79  0.91   CALCIUM  8.4*  8.8  8.8                 Recent Labs      17   0430   TRIGLYCERIDE  168*         Medical Decision Making, by Problem:  Active Hospital Problems    Diagnosis   • Pulmonary edema [J81.1]       Plan:  Continue to wean Levophed.  No febrile or leukocytosis   Await for TCV (Dr. Leblanc) re-evaluation in regard to her mitral regurgitation.  It is possible that when the patient develops tachycardia, she is unable  to compensate, and would develop pulmonary edema from mitral regurgitation.     EKG reviewed, Labs reviewed, Medications reviewed and Radiology images reviewed

## 2017-01-29 NOTE — THERAPY
Note only: Orders received and acknowledged for a clinical bedside swallow evaluation. Pt was reintubated on 1/28/17, remains on vent at this time. HOLD CSE. Await reorders

## 2017-01-29 NOTE — PROGRESS NOTES
[facility] Progress Note      Admit Date: 1/24/2017  ICU Day: 5    Resident(s): Navid Ledesma  Attending: ALESIA ZARAGOZA/ Dr. Atkins    Date & Time:   1/29/2017   6:28 AM       Patient ID:    Name:             Kristen Salazar   YOB: 1950  Age:                 66 y.o.  female   MRN:               [id number]    Diagnosis:  Acute Hypoxemic respiratory failure   Acute pulmonary edema d/t severe MR  Hypertensive Emergency  Demand ischemia  Prolonged Qtc     HPI:  67 y/o F with PMHx HTN ,DLD, MVP presenting as a transfer for acute hypoxic respiratory failure with acute pulmonary edema.     Consultants:  Cardiology: Dr. Ramirez    PMA: Dr. Atkins    Interval Events:  KIMBERLY overnight.   Will discuss with Dr. Schmitt about plans for valve surgery.   2.4L UOP over 24 hours.   On amio drip since yesterday   Started scheduled seroquel for agitation    Physical Exam:  GEN: Vented, Sedated.   HEENT: NC/AT. ET tube securely in place. NGT in place.  CV: S1, S2, NSR, Systolic murmur +  RESP: CTABL, no w/r/r, no basilar crackles   ABD: Soft, NT, ND; +BS  EXT: No LE edema b/l. No signs of cyanosis  NEURO: Sedated, Vented.     Respiratory:  Quinones Vent Mode: APVCMV  Respiration: (!) 24, Pulse Oximetry: 97 %    Chest Tube Drains:    Recent Labs      01/27/17   0456  01/28/17   0435  01/29/17   0502   ISTATAPH  7.481  7.476  7.479   ISTATAPCO2  24.1*  25.9*  25.8*   ISTATAPO2  56*  50*  69   ISTATATCO2  19*  20  20   [id number]  92*  88*  95   ISTATARTHCO3  18.0  19.1  19.2   ISTATARTBE  -4  -4  -3   ISTATTEMP  96.4 F  98.1 F  36.1 C   ISTATFIO2  30  40  40   ISTATSPEC  Arterial  Arterial  Arterial   ISTATAPHTC  7.500  7.480  7.493   [id number]  52*  49*  65       HemoDynamics:  Pulse: 60, Heart Rate (Monitored): 60 NIBP: (!) 95/54 mmHg      Neuro:      Fluids:        Intake/Output Summary (Last 24 hours) at 01/26/17 0624  Last data filed at 01/26/17 0200   Gross per 24 hour   Intake 1438.35 ml   Output   1225 ml    Net 213.35 ml          Body mass index is 19.85 kg/(m^2).    Recent Labs      17   SODIUM  137  143  140   POTASSIUM  3.5*  3.6  3.9   CHLORIDE  108  109  107   CO2  20  22  22   BUN  32*  36*  49*   CREATININE  0.98  0.79  0.91   MAGNESIUM  2.4  2.1  2.3   PHOSPHORUS  2.7   --    --    CALCIUM  8.4*  8.8  8.8       GI/Nutrition:  Recent Labs      17   ALTSGPT  8  12  16   ASTSGOT  10*  15  18   ALKPHOSPHAT  63  67  65   TBILIRUBIN  0.5  0.6  0.7   GLUCOSE  121*  104*  125*       Heme:  Recent Labs      17   RBC  3.55*  3.61*  3.75*   HEMOGLOBIN  10.9*  11.2*  11.5*   HEMATOCRIT  33.1*  33.9*  35.1*   PLATELETCT  215  220  247       Infectious Disease:  Temp  Av.9 °C (98.5 °F)  Min: 36.7 °C (98.1 °F)  Max: 37.4 °C (99.4 °F)  Recent Labs      17   WBC  7.2  6.5  8.2   NEUTSPOLYS  75.90*  73.50*  78.20*   LYMPHOCYTES  14.00*  13.20*  9.20*   MONOCYTES  8.60  11.50  11.00   EOSINOPHILS  0.80  1.10  0.70   BASOPHILS  0.30  0.20  0.40   ASTSGOT  10*  15  18   ALTSGPT  8  12  16   ALKPHOSPHAT  63  67  65   TBILIRUBIN  0.5  0.6  0.7       Meds:  • dexmedetomidine (PRECEDEX) infusion 4 mcg/ml  0.1-0.7 mcg/kg/hr 0.7 mcg/kg/hr (17)   • MD Alert...Amiodarone Protocol Pharmacist to Implement       • amiodarone infusion  0.5-1 mg/min 0.5 mg/min (170)   • lorazepam  1 mg     • furosemide  20 mg     • quetiapine  25 mg     • ibuprofen  400 mg     • potassium chloride  20 mEq     • aspirin EC  81 mg     • heparin  5,000 Units     • propofol  5-80 mcg/kg/min 20 mcg/kg/min (17)   • NORepinephrine (LEVOPHED) infusion  0.5-30 mcg/min 5 mcg/min (17)   • glucose 4 g  16 g      And   • dextrose 50%  25 mL     • multivitamin  1 Tab     • Respiratory Care per Protocol       • lactulose  30 mL     •  bisacodyl  10 mg     • fleet  1 Each     • chlorhexidine  15 mL     • lidocaine  1-2 mL     • fentaNYL  25 mcg      Or   • fentaNYL  50 mcg      Or   • fentaNYL  100 mcg     • ipratropium-albuterol  3 mL     • famotidine  20 mg      Or   • famotidine  20 mg     • cefTRIAXone (ROCEPHIN) IVPB  1 g Stopped (01/28/17 0904)   • doxycycline  100 mg Stopped (01/28/17 2122)   • oyster shell calcium/vitamin D  1 Tab     • pravastatin  20 mg          Problem and Plan:    Acute Hypoxemic respiratory failure   Acute pulmonary edema d/t severe MR  Hypertensive Emergency  Demand ischemia  Prolonged Qtc   CAP?  - full vent support, extubation tried on 1/28 but patient had to be re-intubated due to distress  - due to severe MR?   - echo:EF 75%, severe MR, RVSP 40  - cardiac cath: normal coronary arteries  - CTA: no PE  - KAREN: EF 65%, mod MR, no rupture of cord or tendinae  - renal duplex neg for stenosis  PLAN  - cont levo  - lasix as BP can tolerate  - avoid qtc prolonging drugs, keep Mg >2  - empirically on C3 and doxy  - pending Dr. Oskar kinney on valve repair      Chronic medical issues:   DLD: cont pravastatin  Chronic back/knee/neck pain: pain meds held  HTN: losartan held    DISPO: ICU    CODE STATUS: Full    Quality Measures:  Frank Catheter: Yes  DVT Prophylaxis: Heparin  Ulcer Prophylaxis: pepcid  Antibiotics:Rocephin, doxy  Lines: Central    Procedures:  1/24 Intubation  1/24 Central line  1/25 cardiac cath  1/28 extubated  1/28 re-intubated    Imaging:  DX-CHEST-PORTABLE (1 VIEW)   Final Result      Interval improvement of perihilar infiltrates and worsening of LEFT basilar atelectasis.      DX-CHEST-LIMITED (1 VIEW)   Final Result      1.  Satisfactory re-intubation.   2.  Other tubes and lines are stable.   3.  Increase in central hazy parenchymal opacity suspicious for edema although pneumonitis is not excluded.      DX-CHEST-PORTABLE (1 VIEW)   Final Result      No significant change from prior exam.      US-RENAL  ARTERY DUPLEX   Final Result      No evidence of main renal artery stenosis.      DX-CHEST-PORTABLE (1 VIEW)   Final Result         1. No significant interval change.      TRANSESOPHAGEAL ECHO W/O CONT (Order not available for Rehab [facility])   Final Result      DX-CHEST-PORTABLE (1 VIEW)   Final Result         1. Decrease groundglass and airspace opacities in both lungs.      CT-CTA CHEST PULMONARY ARTERY W/ RECONS   Final Result      1.  No evidence pulmonary emboli.   2.  Moderate right and small left pleural effusions.   3.  Compressive atelectasis both lower lobes especially the left and left lung volume loss with mediastinal shift towards left.   4.  Patchy groundglass opacities within the right upper lobe consistent with focal areas of pneumonitis.   5.  7.5 mm left upper lobe nodule and smaller nodules within the lungs.   Nodule measuring between 7 and 8 mm:   Low Risk Patient:  Initial follow up CT at 6-12 months, then at 18-24 months if no change.      High Risk Patient:  Initial follow up CT at 3-6 months, then at 9-12 and 24 months if no change.         Low Risk - Minimal or absent history of smoking and of other known risk factors.   High Risk - History of smoking or of other known risk factors.   Fleischner Society Guidelines for Pulmonary Nodules:  Radiology, 237:2005         DX-CHEST-PORTABLE (1 VIEW)   Final Result         1. Interval placement of feeding tube. No other significant interval change.      DX-ABDOMEN FOR TUBE PLACEMENT   Final Result         Feeding tube with tip in the stomach.      ECHOCARDIOGRAM COMP W/O CONT   Final Result      DX-CHEST-PORTABLE (1 VIEW)   Final Result         1. Right central venous catheter with tip in the mid SVC.      OUTSIDE IMAGES-DX CHEST   Preliminary Result      OUTSIDE IMAGES-DX CHEST   Preliminary Result      DX-CHEST-PORTABLE (1 VIEW)   Final Result         1. Extensive groundglass and airspace opacities throughout both lungs, worse in the bilateral  lower lungs. This could be seen with multifocal pneumonia, ARDS or pulmonary edema.   2. Endotracheal tube with tip in the mid thoracic trachea.

## 2017-01-30 ENCOUNTER — APPOINTMENT (OUTPATIENT)
Dept: RADIOLOGY | Facility: MEDICAL CENTER | Age: 67
DRG: 216 | End: 2017-01-30
Attending: INTERNAL MEDICINE
Payer: MEDICARE

## 2017-01-30 PROBLEM — J18.9 CAP (COMMUNITY ACQUIRED PNEUMONIA): Status: ACTIVE | Noted: 2017-01-30

## 2017-01-30 PROBLEM — R00.1 BRADYCARDIA: Status: ACTIVE | Noted: 2017-01-30

## 2017-01-30 PROBLEM — D64.9 NORMOCYTIC ANEMIA: Status: ACTIVE | Noted: 2017-01-30

## 2017-01-30 PROBLEM — E78.5 HYPERLIPIDEMIA: Status: ACTIVE | Noted: 2017-01-30

## 2017-01-30 PROBLEM — J96.91 RESPIRATORY FAILURE WITH HYPOXIA (HCC): Status: ACTIVE | Noted: 2017-01-30

## 2017-01-30 PROBLEM — I34.0 MITRAL REGURGITATION: Status: ACTIVE | Noted: 2017-01-30

## 2017-01-30 PROBLEM — I10 HYPERTENSION: Status: ACTIVE | Noted: 2017-01-30

## 2017-01-30 PROBLEM — I95.9 HYPOTENSION: Status: ACTIVE | Noted: 2017-01-30

## 2017-01-30 LAB
ABO GROUP BLD: NORMAL
ALBUMIN SERPL BCP-MCNC: 3.3 G/DL (ref 3.2–4.9)
ALBUMIN/GLOB SERPL: 1.2 G/DL
ALP SERPL-CCNC: 65 U/L (ref 30–99)
ALT SERPL-CCNC: 23 U/L (ref 2–50)
ANION GAP SERPL CALC-SCNC: 10 MMOL/L (ref 0–11.9)
AST SERPL-CCNC: 23 U/L (ref 12–45)
BASE EXCESS BLDA CALC-SCNC: -3 MMOL/L (ref -4–3)
BASOPHILS # BLD AUTO: 0.2 % (ref 0–1.8)
BASOPHILS # BLD: 0.01 K/UL (ref 0–0.12)
BILIRUB SERPL-MCNC: 0.5 MG/DL (ref 0.1–1.5)
BLD GP AB SCN SERPL QL: NORMAL
BODY TEMPERATURE: ABNORMAL DEGREES
BUN SERPL-MCNC: 52 MG/DL (ref 8–22)
CALCIUM SERPL-MCNC: 9.1 MG/DL (ref 8.5–10.5)
CHLORIDE SERPL-SCNC: 107 MMOL/L (ref 96–112)
CO2 BLDA-SCNC: 20 MMOL/L (ref 20–33)
CO2 SERPL-SCNC: 22 MMOL/L (ref 20–33)
COMPONENT R 8504R: NORMAL
CREAT SERPL-MCNC: 0.74 MG/DL (ref 0.5–1.4)
CRP SERPL HS-MCNC: 7.54 MG/DL (ref 0–0.75)
EOSINOPHIL # BLD AUTO: 0.11 K/UL (ref 0–0.51)
EOSINOPHIL NFR BLD: 2.1 % (ref 0–6.9)
ERYTHROCYTE [DISTWIDTH] IN BLOOD BY AUTOMATED COUNT: 48.5 FL (ref 35.9–50)
GFR SERPL CREATININE-BSD FRML MDRD: >60 ML/MIN/1.73 M 2
GLOBULIN SER CALC-MCNC: 2.7 G/DL (ref 1.9–3.5)
GLUCOSE BLD-MCNC: 87 MG/DL (ref 65–99)
GLUCOSE SERPL-MCNC: 117 MG/DL (ref 65–99)
HCO3 BLDA-SCNC: 19.3 MMOL/L (ref 17–25)
HCT VFR BLD AUTO: 33.7 % (ref 37–47)
HGB BLD-MCNC: 11 G/DL (ref 12–16)
IMM GRANULOCYTES # BLD AUTO: 0.03 K/UL (ref 0–0.11)
IMM GRANULOCYTES NFR BLD AUTO: 0.6 % (ref 0–0.9)
LYMPHOCYTES # BLD AUTO: 0.99 K/UL (ref 1–4.8)
LYMPHOCYTES NFR BLD: 18.5 % (ref 22–41)
MAGNESIUM SERPL-MCNC: 2.3 MG/DL (ref 1.5–2.5)
MCH RBC QN AUTO: 30.8 PG (ref 27–33)
MCHC RBC AUTO-ENTMCNC: 32.6 G/DL (ref 33.6–35)
MCV RBC AUTO: 94.4 FL (ref 81.4–97.8)
MONOCYTES # BLD AUTO: 0.93 K/UL (ref 0–0.85)
MONOCYTES NFR BLD AUTO: 17.4 % (ref 0–13.4)
NEUTROPHILS # BLD AUTO: 3.27 K/UL (ref 2–7.15)
NEUTROPHILS NFR BLD: 61.2 % (ref 44–72)
NRBC # BLD AUTO: 0 K/UL
NRBC BLD AUTO-RTO: 0 /100 WBC
O2/TOTAL GAS SETTING VFR VENT: 40 %
PCO2 BLDA: 25.5 MMHG (ref 26–37)
PCO2 TEMP ADJ BLDA: 25.5 MMHG (ref 26–37)
PH BLDA: 7.49 [PH] (ref 7.4–7.5)
PH TEMP ADJ BLDA: 7.49 [PH] (ref 7.4–7.5)
PLATELET # BLD AUTO: 232 K/UL (ref 164–446)
PMV BLD AUTO: 12.4 FL (ref 9–12.9)
PO2 BLDA: 88 MMHG (ref 64–87)
PO2 TEMP ADJ BLDA: 88 MMHG (ref 64–87)
POTASSIUM SERPL-SCNC: 3.6 MMOL/L (ref 3.6–5.5)
PREALB SERPL-MCNC: 14 MG/DL (ref 18–38)
PROT SERPL-MCNC: 6 G/DL (ref 6–8.2)
RBC # BLD AUTO: 3.57 M/UL (ref 4.2–5.4)
RH BLD: NORMAL
SAO2 % BLDA: 98 % (ref 93–99)
SODIUM SERPL-SCNC: 139 MMOL/L (ref 135–145)
SPECIMEN DRAWN FROM PATIENT: ABNORMAL
TRIGL SERPL-MCNC: 113 MG/DL (ref 0–149)
WBC # BLD AUTO: 5.3 K/UL (ref 4.8–10.8)

## 2017-01-30 PROCEDURE — 700105 HCHG RX REV CODE 258: Performed by: INTERNAL MEDICINE

## 2017-01-30 PROCEDURE — 700111 HCHG RX REV CODE 636 W/ 250 OVERRIDE (IP): Performed by: HOSPITALIST

## 2017-01-30 PROCEDURE — 700111 HCHG RX REV CODE 636 W/ 250 OVERRIDE (IP): Performed by: INTERNAL MEDICINE

## 2017-01-30 PROCEDURE — 93882 EXTRACRANIAL UNI/LTD STUDY: CPT

## 2017-01-30 PROCEDURE — 86140 C-REACTIVE PROTEIN: CPT

## 2017-01-30 PROCEDURE — 86900 BLOOD TYPING SEROLOGIC ABO: CPT

## 2017-01-30 PROCEDURE — 80053 COMPREHEN METABOLIC PANEL: CPT

## 2017-01-30 PROCEDURE — 94003 VENT MGMT INPAT SUBQ DAY: CPT

## 2017-01-30 PROCEDURE — 770022 HCHG ROOM/CARE - ICU (200)

## 2017-01-30 PROCEDURE — 700102 HCHG RX REV CODE 250 W/ 637 OVERRIDE(OP): Performed by: INTERNAL MEDICINE

## 2017-01-30 PROCEDURE — 84478 ASSAY OF TRIGLYCERIDES: CPT

## 2017-01-30 PROCEDURE — 700101 HCHG RX REV CODE 250: Performed by: INTERNAL MEDICINE

## 2017-01-30 PROCEDURE — 71010 DX-CHEST-PORTABLE (1 VIEW): CPT

## 2017-01-30 PROCEDURE — A9270 NON-COVERED ITEM OR SERVICE: HCPCS | Performed by: INTERNAL MEDICINE

## 2017-01-30 PROCEDURE — 85025 COMPLETE CBC W/AUTO DIFF WBC: CPT

## 2017-01-30 PROCEDURE — 82962 GLUCOSE BLOOD TEST: CPT

## 2017-01-30 PROCEDURE — 84134 ASSAY OF PREALBUMIN: CPT

## 2017-01-30 PROCEDURE — 700111 HCHG RX REV CODE 636 W/ 250 OVERRIDE (IP)

## 2017-01-30 PROCEDURE — 99291 CRITICAL CARE FIRST HOUR: CPT | Mod: GC | Performed by: INTERNAL MEDICINE

## 2017-01-30 PROCEDURE — 700101 HCHG RX REV CODE 250: Performed by: NURSE PRACTITIONER

## 2017-01-30 PROCEDURE — 86901 BLOOD TYPING SEROLOGIC RH(D): CPT

## 2017-01-30 PROCEDURE — 700102 HCHG RX REV CODE 250 W/ 637 OVERRIDE(OP): Performed by: STUDENT IN AN ORGANIZED HEALTH CARE EDUCATION/TRAINING PROGRAM

## 2017-01-30 PROCEDURE — 36600 WITHDRAWAL OF ARTERIAL BLOOD: CPT

## 2017-01-30 PROCEDURE — 86850 RBC ANTIBODY SCREEN: CPT

## 2017-01-30 PROCEDURE — 94150 VITAL CAPACITY TEST: CPT

## 2017-01-30 PROCEDURE — A9270 NON-COVERED ITEM OR SERVICE: HCPCS | Performed by: STUDENT IN AN ORGANIZED HEALTH CARE EDUCATION/TRAINING PROGRAM

## 2017-01-30 PROCEDURE — 82803 BLOOD GASES ANY COMBINATION: CPT

## 2017-01-30 PROCEDURE — 700102 HCHG RX REV CODE 250 W/ 637 OVERRIDE(OP): Performed by: HOSPITALIST

## 2017-01-30 PROCEDURE — A9270 NON-COVERED ITEM OR SERVICE: HCPCS | Performed by: HOSPITALIST

## 2017-01-30 PROCEDURE — 93880 EXTRACRANIAL BILAT STUDY: CPT

## 2017-01-30 PROCEDURE — 83735 ASSAY OF MAGNESIUM: CPT

## 2017-01-30 RX ORDER — POTASSIUM CHLORIDE 1.5 G/1.58G
20 POWDER, FOR SOLUTION ORAL 2 TIMES DAILY
Status: DISCONTINUED | OUTPATIENT
Start: 2017-01-30 | End: 2017-01-31

## 2017-01-30 RX ADMIN — QUETIAPINE FUMARATE 25 MG: 25 TABLET, FILM COATED ORAL at 08:35

## 2017-01-30 RX ADMIN — DEXMEDETOMIDINE HYDROCHLORIDE 0.8 MCG/KG/HR: 100 INJECTION, SOLUTION, CONCENTRATE INTRAVENOUS at 17:04

## 2017-01-30 RX ADMIN — DEXMEDETOMIDINE HYDROCHLORIDE 1.5 MCG/KG/HR: 100 INJECTION, SOLUTION, CONCENTRATE INTRAVENOUS at 00:28

## 2017-01-30 RX ADMIN — DEXMEDETOMIDINE HYDROCHLORIDE 1 MCG/KG/HR: 100 INJECTION, SOLUTION, CONCENTRATE INTRAVENOUS at 05:57

## 2017-01-30 RX ADMIN — PRAVASTATIN SODIUM 20 MG: 20 TABLET ORAL at 20:39

## 2017-01-30 RX ADMIN — QUETIAPINE FUMARATE 25 MG: 25 TABLET, FILM COATED ORAL at 20:39

## 2017-01-30 RX ADMIN — CEFTRIAXONE SODIUM 1 G: 1 INJECTION, POWDER, FOR SOLUTION INTRAMUSCULAR; INTRAVENOUS at 08:39

## 2017-01-30 RX ADMIN — FENTANYL CITRATE 25 MCG: 50 INJECTION, SOLUTION INTRAMUSCULAR; INTRAVENOUS at 18:33

## 2017-01-30 RX ADMIN — AMIODARONE HYDROCHLORIDE 0.5 MG/MIN: 50 INJECTION, SOLUTION INTRAVENOUS at 05:03

## 2017-01-30 RX ADMIN — QUETIAPINE FUMARATE 25 MG: 25 TABLET, FILM COATED ORAL at 17:03

## 2017-01-30 RX ADMIN — CHLORHEXIDINE GLUCONATE 15 ML: 1.2 RINSE ORAL at 08:35

## 2017-01-30 RX ADMIN — FAMOTIDINE 20 MG: 20 TABLET, FILM COATED ORAL at 20:39

## 2017-01-30 RX ADMIN — THERA TABS 1 TABLET: TAB at 08:35

## 2017-01-30 RX ADMIN — MUPIROCIN 1 APPLICATION: 20 OINTMENT TOPICAL at 17:04

## 2017-01-30 RX ADMIN — CHLORHEXIDINE GLUCONATE 15 ML: 1.2 RINSE ORAL at 20:39

## 2017-01-30 RX ADMIN — DOXYCYCLINE 100 MG: 100 INJECTION, POWDER, LYOPHILIZED, FOR SOLUTION INTRAVENOUS at 20:39

## 2017-01-30 RX ADMIN — POTASSIUM CHLORIDE 20 MEQ: 1.5 POWDER, FOR SOLUTION ORAL at 08:39

## 2017-01-30 RX ADMIN — FUROSEMIDE 20 MG: 10 INJECTION, SOLUTION INTRAMUSCULAR; INTRAVENOUS at 05:02

## 2017-01-30 RX ADMIN — DEXMEDETOMIDINE HYDROCHLORIDE 0.8 MCG/KG/HR: 100 INJECTION, SOLUTION, CONCENTRATE INTRAVENOUS at 20:54

## 2017-01-30 RX ADMIN — DEXMEDETOMIDINE HYDROCHLORIDE 0.8 MCG/KG/HR: 100 INJECTION, SOLUTION, CONCENTRATE INTRAVENOUS at 11:34

## 2017-01-30 RX ADMIN — DOPAMINE HCL 2 MCG/KG/MIN: 80 INJECTION, SOLUTION INTRAVENOUS at 11:54

## 2017-01-30 RX ADMIN — DOXYCYCLINE 100 MG: 100 INJECTION, POWDER, LYOPHILIZED, FOR SOLUTION INTRAVENOUS at 10:59

## 2017-01-30 RX ADMIN — DEXMEDETOMIDINE HYDROCHLORIDE 1.2 MCG/KG/HR: 100 INJECTION, SOLUTION, CONCENTRATE INTRAVENOUS at 03:20

## 2017-01-30 RX ADMIN — FAMOTIDINE 20 MG: 10 INJECTION INTRAVENOUS at 08:35

## 2017-01-30 RX ADMIN — POTASSIUM CHLORIDE 20 MEQ: 1.5 POWDER, FOR SOLUTION ORAL at 20:39

## 2017-01-30 RX ADMIN — CALCIUM CARBONATE-CHOLECALCIFEROL TAB 250 MG-125 UNIT 1 TABLET: 250-125 TAB at 08:35

## 2017-01-30 RX ADMIN — MUPIROCIN 1 APPLICATION: 20 OINTMENT TOPICAL at 20:40

## 2017-01-30 RX ADMIN — HEPARIN SODIUM 5000 UNITS: 5000 INJECTION, SOLUTION INTRAVENOUS; SUBCUTANEOUS at 05:02

## 2017-01-30 RX ADMIN — ASPIRIN 81 MG: 81 TABLET ORAL at 08:35

## 2017-01-30 RX ADMIN — FUROSEMIDE 20 MG: 10 INJECTION, SOLUTION INTRAMUSCULAR; INTRAVENOUS at 17:04

## 2017-01-30 ASSESSMENT — LIFESTYLE VARIABLES
HAVE PEOPLE ANNOYED YOU BY CRITICIZING YOUR DRINKING: NO
TOTAL SCORE: 0
TOTAL SCORE: 0
CONSUMPTION TOTAL: NEGATIVE
EVER FELT BAD OR GUILTY ABOUT YOUR DRINKING: NO
EVER HAD A DRINK FIRST THING IN THE MORNING TO STEADY YOUR NERVES TO GET RID OF A HANGOVER: NO
AVERAGE NUMBER OF DAYS PER WEEK YOU HAVE A DRINK CONTAINING ALCOHOL: 7
EVER_SMOKED: YES
ALCOHOL_USE: YES
ON A TYPICAL DAY WHEN YOU DRINK ALCOHOL HOW MANY DRINKS DO YOU HAVE: 1
TOTAL SCORE: 0
HOW MANY TIMES IN THE PAST YEAR HAVE YOU HAD 5 OR MORE DRINKS IN A DAY: 0
HAVE YOU EVER FELT YOU SHOULD CUT DOWN ON YOUR DRINKING: NO

## 2017-01-30 ASSESSMENT — PULMONARY FUNCTION TESTS
FVC: 2.1
FVC: 2.1

## 2017-01-30 NOTE — RESPIRATORY CARE
Ventilator Weaning Update    Patient is on vent day 7  SBT was tolerated for a minimum of 1 hours on settings of 5/8    Wean parameters for this SBT were:  #FVC / Vital Capacity (liters) : 2.1 (01/30/17 1509)  NIF (cm H2O) : -34 (01/30/17 1509)  Rapid Shallow Breathing Index (RR/VT): 17 (01/30/17 1509)  RR (bpm): 13 (01/30/17 1509)  Spontaneous VE: 6.1 (01/30/17 1509)  Spontaneous VT: 632 (01/30/17 1509)                               Events/Summary/Plan: SBT for excercise moyt looking to extubate at this time (01/30/17 0700)

## 2017-01-30 NOTE — CARE PLAN
Problem: Ventilation Defect:  Goal: Ability to achieve and maintain unassisted ventilation or tolerate decreased levels of ventilator support  Outcome: PROGRESSING AS EXPECTED  Adult Ventilation Update    Total Vent Days: 7      Patient Lines/Drains/Airways Status    Active Airway      Name: Placement date: Placement time: Site: Days:     Airway Group ET Tube 7.5 01/28/17  0935    1

## 2017-01-30 NOTE — CARE PLAN
Problem: Ventilation Defect:  Goal: Ability to achieve and maintain unassisted ventilation or tolerate decreased levels of ventilator support  Outcome: PROGRESSING AS EXPECTED  Intervention: Support and monitor invasive and noninvasive mechanical ventilation  Adult Ventilation Update    Total Vent Days: 2      Patient Lines/Drains/Airways Status    Active Airway      Name: Placement date: Placement time: Site: Days:     Airway Group ET Tube 7.5 01/28/17  0935    1                     #FVC / Vital Capacity (liters) : 1.8 (01/28/17 0835)  NIF (cm H2O) : -30 (01/28/17 0835)     Plateau Pressure (Q Shift): 15 (01/28/17 1823)  Static Compliance (ml / cm H2O): 64 (01/29/17 1433)    Patient failed trials because of Barriers to Wean: Other (Comments) (PT received Fentanyl, extremely tired) (01/28/17 0627)  Barriers to SBT Weaning Trial Stopped due to:: Apnea > 30 seconds X 4 (01/28/17 0627)  Length of Weaning Trial Length of Weaning Trial (Hours): 0 (01/27/17 1008)             Sputum/Suction   Cough: Productive (01/29/17 1433)  Sputum Amount: Small (01/29/17 1433)  Sputum Color: White (01/29/17 1433)  Sputum Consistency: Thin (01/29/17 1433)    Mobility Group  Activity Performed: Unable to mobilize (01/29/17 1200)  Time Activity Tolerated: 5 min (01/27/17 2000)  Distance Per Occurrence (ft.): 0 feet (01/27/17 2000)  # of Times Distance was Traveled: 0 (01/27/17 2000)  Assistance / Tolerance: Assistance of Two or More (01/27/17 2000)  Pt Calls for Assistance: No (01/29/17 1200)  Staff Present for Mobilization: RN (01/27/17 2000)  Assistive Devices: Hand held assist (01/27/17 2000)  Reason Not Mobilized: Other (comment) (01/29/17 1200)  Mobilization Comments: agitation (01/29/17 1200)    Events/Summary/Plan: PT emergently reintubated (01/28/17 0943)        Intervention: Monitor ventilator weaning response  No SBT's at this time  Intervention: Perform ventilator associated pneumonia prevention interventions  See VAP  flowsheet

## 2017-01-30 NOTE — PROGRESS NOTES
Cardiology Progress Note:    Amr M Mohsen  Date & Time note created:    1/30/2017   12:16 PM       Patient ID:   Name:             Kristen Salazar   YOB: 1950  Age:                 66 y.o.  female   MRN:               [id number]                                                               Interval History:    The patient was seen and examined. The chart and telemetry were reviewed. KAREN today    Review of Systems:      Unable to obtain since patient is intubated          Past Medical History:   Past Medical History   Diagnosis Date   • Allergy    • Arthritis    • Osteoporosis    • Heart murmur    • Fracture      Active Hospital Problems    Diagnosis   • Mitral regurgitation [I34.0]   • Respiratory failure with hypoxia (CMS-HCC) [J96.91]   • Hyperlipidemia [E78.5]   • Hypertension [I10]   • CAP (community acquired pneumonia) [J18.9]   • Bradycardia [R00.1]   • Hypotension [I95.9]   • Normocytic anemia [D64.9]   • Pulmonary edema [J81.1]       Past Surgical History:  Past Surgical History   Procedure Laterality Date   • Knee arthroscopy         Hospital Medications:    Current facility-administered medications:   •  potassium chloride (KLOR-CON) 20 MEQ packet 20 mEq, 20 mEq, Oral, BID, Masoud Aguirre M.D.  •  DOPamine 3.2 mg/mL in D5W 250 mL infusion, 0.5-20 mcg/kg/min, Intravenous, Continuous, Lj Philip M.D., Last Rate: 2.3 mL/hr at 01/30/17 1154, 2 mcg/kg/min at 01/30/17 1154  •  mupirocin (BACTROBAN) 2 % ointment, , Topical, BID, Hannah Sumner, A.P.N.  •  cefUROXime (ZINACEF) 1,500 mg in  mL IVPB, 1.5 g, Intravenous, Once, RACHEL Valderrama.P.N.  •  MD ALERT... vancomycin per pharmacy protocol 1 Each, 1 Each, Other, PRN, Hannah Sumner, A.P.N.  •  quetiapine (SEROQUEL) tablet 25 mg, 25 mg, Oral, TID, Lesley Atkins M.D., 25 mg at 01/30/17 0835  •  dexmedetomidine (PRECEDEX) 200 mcg in NS 50 mL infusion, 0.1-1.5 mcg/kg/hr, Intravenous, Continuous, Lesley Atkins M.D.,  Last Rate: 12.2 mL/hr at 01/30/17 1134, 0.8 mcg/kg/hr at 01/30/17 1134  •  furosemide (LASIX) injection 20 mg, 20 mg, Intravenous, BID DIURETIC, Navid Ledesma M.D., 20 mg at 01/30/17 0502  •  ibuprofen (MOTRIN) oral suspension 400 mg, 400 mg, Oral, Q6HRS PRN, Lesley Atkins M.D., 400 mg at 01/27/17 1032  •  aspirin EC (ECOTRIN) tablet 81 mg, 81 mg, Oral, DAILY, Navid Ledesma M.D., 81 mg at 01/30/17 0835  •  Action is required: Protocol 1073 Hypoglycemia has been implemented, , , Once **AND** Protocol 1073 Inclusion Criteria, , , CONTINUOUS **AND** Protocol 1073 NOTIFY, , , Once **AND** Protocol 1073 Initiate protocol immediately if FSBG is less than or equal to 70 mg/dL, , , CONTINUOUS **AND** glucose 4 g chewable tablet 16 g, 16 g, Oral, Q15 MIN PRN **AND** dextrose 50% (D50W) injection 25 mL, 25 mL, Intravenous, Q15 MIN PRN, James Pérez M.D.  •  multivitamin (THERAGRAN) tablet 1 Tab, 1 Tab, Oral, DAILY, James Pérez M.D., 1 Tab at 01/30/17 0835  •  Respiratory Care per Protocol, , Nebulization, Continuous RT, Jeremy M Gonda, M.D.  •  lactulose 20 GM/30ML solution 30 mL, 30 mL, Oral, Q24HRS PRN, Jeremy M Gonda, M.D.  •  bisacodyl (DULCOLAX) suppository 10 mg, 10 mg, Rectal, Q24HRS PRN, Jeremy M Gonda, M.D.  •  fleet enema 133 mL, 1 Each, Rectal, Once PRN, Jeremy M Gonda, M.D.  •  chlorhexidine (PERIDEX) 0.12 % solution 15 mL, 15 mL, Mouth/Throat, BID, Jeremy M Gonda, M.D., 15 mL at 01/30/17 0835  •  lidocaine (XYLOCAINE) 1%  injection, 1-2 mL, Tracheal Tube, Q30 MIN PRN, Jeremy M Gonda, M.D., 1 mL at 01/28/17 0418  •  fentaNYL (SUBLIMAZE) injection 25 mcg, 25 mcg, Intravenous, Q HOUR PRN, 25 mcg at 01/25/17 0632 **OR** fentaNYL (SUBLIMAZE) injection 50 mcg, 50 mcg, Intravenous, Q HOUR PRN, 50 mcg at 01/27/17 2053 **OR** fentaNYL (SUBLIMAZE) injection 100 mcg, 100 mcg, Intravenous, Q HOUR PRN, Jeremy M Gonda, M.D., 100 mcg at 01/28/17 0620  •  ipratropium-albuterol (DUONEB) nebulizer solution 3 mL, 3  mL, Nebulization, Q2HRS PRN (RT), Jeremy M Gonda, M.D., Stopped at 01/30/17 1020  •  famotidine (PEPCID) tablet 20 mg, 20 mg, Oral, Q12HRS, 20 mg at 01/29/17 2031 **OR** [DISCONTINUED] famotidine (PEPCID) injection 20 mg, 20 mg, Intravenous, Q12HRS, Jeremy M Gonda, M.D., 20 mg at 01/30/17 0835  •  cefTRIAXone (ROCEPHIN) 1 g in  mL IVPB, 1 g, Intravenous, Q24HRS, Lesley Atkins M.D., Stopped at 01/30/17 0909  •  doxycycline (VIBRAMYCIN) 100 mg in  mL IVPB, 100 mg, Intravenous, Q12HRS, Lesley Atkins M.D., Last Rate: 100 mL/hr at 01/30/17 1059, 100 mg at 01/30/17 1059  •  oyster shell calcium/vitamin D 250-125 MG-UNIT tablet 1 Tab, 1 Tab, Oral, QDAY with Breakfast, James Pérez M.D., 1 Tab at 01/30/17 0835  •  pravastatin (PRAVACHOL) tablet 20 mg, 20 mg, Oral, Q EVENING, Jeremy M Gonda, M.D., 20 mg at 01/29/17 2031    Outpatient Medications:  Prescriptions prior to admission   Medication Sig Dispense Refill Last Dose   • Calcium Carb-Cholecalciferol (OYSTER SHELL CALCIUM/VITAMIN D) 250-125 MG-UNIT Tab tablet Take 1 Tab by mouth every day.   1/24/2017 at 0800   • pravastatin (PRAVACHOL) 20 MG Tab Take 20 mg by mouth every day.   1/24/2017 at 0800   • losartan (COZAAR) 25 MG Tab Take 25 mg by mouth every day.   1/24/2017 at 0800   • multivitamin (THERAGRAN) TABS Take 1 Tab by mouth every day.   1/24/2017 at 0800   • glucosamine Sulfate 500 MG CAPS Take 500 mg by mouth 3 times a day, with meals.   1/24/2017 at 0800       Medication Allergy:  Allergies   Allergen Reactions   • Codeine      vomiting   • Latex        Family History:  History reviewed. No pertinent family history.    Social History:  Social History     Social History   • Marital Status:      Spouse Name: N/A   • Number of Children: N/A   • Years of Education: N/A     Occupational History   • Not on file.     Social History Main Topics   • Smoking status: Never Smoker    • Smokeless tobacco: Never Used   • Alcohol Use: Yes       "Comment: sometimes   • Drug Use: No   • Sexual Activity: Not on file     Other Topics Concern   • Not on file     Social History Narrative         Physical Exam:  Vitals/ General Appearance:   Weight/BMI: Body mass index is 19.85 kg/(m^2).  Blood pressure 105/74, pulse 54, temperature 35.9 °C (96.6 °F), resp. rate 17, height 1.753 m (5' 9\"), weight 61 kg (134 lb 7.7 oz), SpO2 99 %.  Filed Vitals:    01/30/17 0800 01/30/17 0845 01/30/17 0900 01/30/17 1021   BP:       Pulse: 54  54    Temp:       Resp: 17      Height:       Weight:       SpO2: 99% 99%  99%     Oxygen Therapy:  Pulse Oximetry: 99 %, FIO2%: 40, O2 Delivery: Ventilator    Constitutional:   Well developed, Well nourished, No acute distress  HENMT:  Normocephalic, Atraumatic, Oropharynx moist mucous membranes, No oral exudates, Nose normal.  No thyromegaly.  Eyes:  EOMI, Conjunctiva normal, No discharge.  Neck:  Normal range of motion, No cervical tenderness,  no JVD.  Cardiovascular:  Normal heart rate, Normal rhythm, III/VI systolic murmur over the base murmurs, No rubs, No gallops.   Extremitites with intact distal pulses, no cyanosis, or 1+edema.  Lungs:  Decreased breath sounds bilaterally  Abdomen: Bowel sounds normal, Soft, No masses, No hepatosplenomegaly.  Skin: Warm, Dry, No erythema, No rash, no induration.        MDM (Data Review):     Records reviewed and summarized in current documentation    Lab Data Review:  Recent Labs      01/28/17   0445  01/29/17   0040  01/30/17   0115   WBC  6.5  8.2  5.3   RBC  3.61*  3.75*  3.57*   HEMOGLOBIN  11.2*  11.5*  11.0*   HEMATOCRIT  33.9*  35.1*  33.7*   MCV  93.9  93.6  94.4   MCH  31.0  30.7  30.8   MCHC  33.0*  32.8*  32.6*   RDW  47.3  48.1  48.5   PLATELETCT  220  247  232   MPV  12.4  12.6  12.4     Recent Labs      01/28/17   0445  01/29/17   0040  01/30/17   0115   SODIUM  143  140  139   POTASSIUM  3.6  3.9  3.6   CHLORIDE  109  107  107   CO2  22  22  22   GLUCOSE  104*  125*  117*   BUN  36*  " 49*  52*   CREATININE  0.79  0.91  0.74   CALCIUM  8.8  8.8  9.1                 Recent Labs      01/30/17   0115   TRIGLYCERIDE  113     Recent Labs      01/28/17   0445  01/29/17   0040  01/30/17   0115   SODIUM  143  140  139   POTASSIUM  3.6  3.9  3.6   CHLORIDE  109  107  107   CO2  22  22  22   GLUCOSE  104*  125*  117*   BUN  36*  49*  52*     Recent Labs      01/28/17   0445  01/29/17   0040  01/30/17   0115   SODIUM  143  140  139   POTASSIUM  3.6  3.9  3.6   CHLORIDE  109  107  107   CO2  22  22  22   BUN  36*  49*  52*   CREATININE  0.79  0.91  0.74   MAGNESIUM  2.1  2.3  2.3   CALCIUM  8.8  8.8  9.1         Results for orders placed or performed during the hospital encounter of 01/24/17   ECHOCARDIOGRAM COMP W/O CONT   Result Value Ref Range    Eject.Frac. MOD BP 80.65     Eject.Frac. MOD 4C 76.68     Eject.Frac. MOD 2C 83.14     Left Ventrical Ejection Fraction 75                MDM (Assessment and Plan):       The patient is a 66-year-old woman, a patient of Dr. Landin with moderate severe mitral regurgitation due to prolapse who presented with acute pulmonary edema.  She was intubated on presentation.  She has normal coronary anatomy based on a coronary angiogram.  On attempt for extubation she quickly deteriorated, she had supraventricular tachycardic rhythm and pulmonary edema and was reintubated    Recommend switching to Levophed to dopamine or epinephrine to support the blood pressure without increasing afterload that may lead to worsening of the mitral regurgitation  I suggest stopping the amiodarone.  The patient developed SVT in the setting of decompensation after being extubated.  Especially with hypotension and sinus bradycardia it would be better to avoid amiodarone provided that the rhythm could possibly be sinus tachycardia based on the EKG available  Plan for transesophageal echocardiogram today for evaluation of mitral regurgitation with possible mitral valve  repair/replacement  Appreciate the surgical team involvement          Active Hospital Problems    Diagnosis   • Mitral regurgitation [I34.0]   • Respiratory failure with hypoxia (CMS-HCC) [J96.91]   • Hyperlipidemia [E78.5]   • Hypertension [I10]   • CAP (community acquired pneumonia) [J18.9]   • Bradycardia [R00.1]   • Hypotension [I95.9]   • Normocytic anemia [D64.9]   • Pulmonary edema [J81.1]

## 2017-01-30 NOTE — RESPIRATORY CARE
Ventilator Weaning Update    Patient is on vent day 7  SBT was tolerated for a minimum of 1 hours on settings of 5/8    Wean parameters for this SBT were:  #FVC / Vital Capacity (liters) : 2.1 (01/30/17 0845)  NIF (cm H2O) : -46 (01/30/17 0845)  Rapid Shallow Breathing Index (RR/VT): 17 (01/30/17 0845)  RR (bpm): 11 (01/30/17 0845)  Spontaneous VE: 5.5 (01/30/17 0845)  Spontaneous VT: 650 (01/30/17 0845)        Adult Noninvasive Ventilation                        Events/Summary/Plan: SBT for excercise moyt looking to extubate at this time (01/30/17 0700)

## 2017-01-30 NOTE — DISCHARGE PLANNING
Request from [facility] to speak with patient and spouse regarding wishes to change financial will.  I met with this family and patient currently has financial trust she wishes to change at the last minute before open heart surgery tomorrow.   Spouse states they have an  and financial counseling that assisted them with this document.  I politely deferred this family to their   for further assistance.  The trust is in a folder at home in [place] and patient not able to communicate  name and contact information.  Spouse would need to drive home to retrieve this information.    SW unable to assist in this matter.  Deferred family to .

## 2017-01-30 NOTE — DISCHARGE PLANNING
Remains intubated.  Day 7.  8/40%.   SB-SR.  Sedated on precedex.   KAREN today.   No fevers.   On levo.  Doxy and rocephin day 7 of 10.     Con to follow for extubation.

## 2017-01-30 NOTE — CARE PLAN
Problem: Pre Op  Goal: Optimal preparation for CABG/Heart Valve surgery  Intervention: Pre Op education to patient/significant other. Provide patient [facility] Patient Guideline for Cardiac Surgery (See Pt. Ed.)  Discussed anatomy and physiology of cardiac surgery with patient and family to include pre-op regimen. Reviewed post-op expectations to include  the use of incentive spirometry with return demonstration, ventilator management, cardiac monitoring, tubes and drains, early ambulation, and expected length of stay. Also provided information on Cardiac Rehab and how to schedule an appointment. Patient and family state full understanding of all information given.Reviewed prevention of bacterial endocarditis with patient and family, handout given. Patient and family state full understanding of all information presented.  Intervention: Baseline assessment documented to include IS volume, weight, bilateral BP and peripheral pulses.  N/a, intubated  Intervention: NPO at midnight except cardiac medications. (No ASA, coumadin or Plavix)  Instructed patient nothing to eat or drink after midnight the night prior to scheduled surgery date--receiving tube feeds  Intervention: Shower with chlorhexidine x 2  Will be assisted by nursing staff.    Patient communicating that she wants to make changes to her will prior to surgery.  Patient's  Eliud states he could retrieve from home in [place].   Explained to patient and  that I will ask social work to meet with them in order to faciliate.    Spoke with Manohar ANTON.  He will meet with patient and her  today.

## 2017-01-30 NOTE — PROGRESS NOTES
[facility] Progress Note               Author: Lj Cedrick Date & Time created: 1/30/2017  9:52 AM     Interval History:  No new overnight events. Tolerated SBT well for an hour this morning.    1. Hypotension/Bradycardia -  - Pt is not in afib, his prior extubation was possibly the triggering event.  - Discussed case with cardiology who recommended to discontinue amiodarone. Change his pressors to dopamine from norepi to reduce his afterload.    2. Acute pulmonary edema secondary to severe MR  - On lasix 20mg bid.  - Cardiology planning repeat KAREN per Dr. Leblanc recommendations.    Review of Systems:  Review of Systems   Unable to perform ROS: intubated       Physical Exam:  Physical Exam   Constitutional: She appears well-developed and well-nourished.   Eyes: Conjunctivae and EOM are normal. Pupils are equal, round, and reactive to light.   Cardiovascular: Normal rate and regular rhythm.    Murmur heard.  Pulmonary/Chest: She has no wheezes. She has no rales.   Intubated.   Abdominal: Soft. Bowel sounds are normal.   Neurological: She is alert.   Responding to commands.   Skin: Skin is warm and dry.       Labs:  Recent Results (from the past 24 hour(s))   TRIGLYCERIDE- EVERY 3 DAYS    Collection Time: 01/30/17  1:15 AM   Result Value Ref Range    Triglycerides 113 0 - 149 mg/dL   COMP METABOLIC PANEL    Collection Time: 01/30/17  1:15 AM   Result Value Ref Range    Sodium 139 135 - 145 mmol/L    Potassium 3.6 3.6 - 5.5 mmol/L    Chloride 107 96 - 112 mmol/L    Co2 22 20 - 33 mmol/L    Anion Gap 10.0 0.0 - 11.9    Glucose 117 (H) 65 - 99 mg/dL    Bun 52 (H) 8 - 22 mg/dL    Creatinine 0.74 0.50 - 1.40 mg/dL    Calcium 9.1 8.5 - 10.5 mg/dL    AST(SGOT) 23 12 - 45 U/L    ALT(SGPT) 23 2 - 50 U/L    Alkaline Phosphatase 65 30 - 99 U/L    Total Bilirubin 0.5 0.1 - 1.5 mg/dL    Albumin 3.3 3.2 - 4.9 g/dL    Total Protein 6.0 6.0 - 8.2 g/dL    Globulin 2.7 1.9 - 3.5 g/dL    A-G Ratio 1.2 g/dL   CBC with Differential     Collection Time: 01/30/17  1:15 AM   Result Value Ref Range    WBC 5.3 4.8 - 10.8 K/uL    RBC 3.57 (L) 4.20 - 5.40 M/uL    Hemoglobin 11.0 (L) 12.0 - 16.0 g/dL    Hematocrit 33.7 (L) 37.0 - 47.0 %    MCV 94.4 81.4 - 97.8 fL    MCH 30.8 27.0 - 33.0 pg    MCHC 32.6 (L) 33.6 - 35.0 g/dL    RDW 48.5 35.9 - 50.0 fL    Platelet Count 232 164 - 446 K/uL    MPV 12.4 9.0 - 12.9 fL    Neutrophils-Polys 61.20 44.00 - 72.00 %    Lymphocytes 18.50 (L) 22.00 - 41.00 %    Monocytes 17.40 (H) 0.00 - 13.40 %    Eosinophils 2.10 0.00 - 6.90 %    Basophils 0.20 0.00 - 1.80 %    Immature Granulocytes 0.60 0.00 - 0.90 %    Nucleated RBC 0.00 /100 WBC    Neutrophils (Absolute) 3.27 2.00 - 7.15 K/uL    Lymphs (Absolute) 0.99 (L) 1.00 - 4.80 K/uL    Monos (Absolute) 0.93 (H) 0.00 - 0.85 K/uL    Eos (Absolute) 0.11 0.00 - 0.51 K/uL    Baso (Absolute) 0.01 0.00 - 0.12 K/uL    Immature Granulocytes (abs) 0.03 0.00 - 0.11 K/uL    NRBC (Absolute) 0.00 K/uL   Magnesium    Collection Time: 01/30/17  1:15 AM   Result Value Ref Range    Magnesium 2.3 1.5 - 2.5 mg/dL   CRP QUANTITIVE (NON-CARDIAC)    Collection Time: 01/30/17  1:15 AM   Result Value Ref Range    Stat C-Reactive Protein 7.54 (H) 0.00 - 0.75 mg/dL   PREALBUMIN    Collection Time: 01/30/17  1:15 AM   Result Value Ref Range    Pre-Albumin 14.0 (L) 18.0 - 38.0 mg/dL   ESTIMATED GFR    Collection Time: 01/30/17  1:15 AM   Result Value Ref Range    GFR If African American >60 >60 mL/min/1.73 m 2    GFR If Non African American >60 >60 mL/min/1.73 m 2   ISTAT ARTERIAL BLOOD GAS    Collection Time: 01/30/17  4:10 AM   Result Value Ref Range    Ph 7.487 7.400 - 7.500    Pco2 25.5 (L) 26.0 - 37.0 mmHg    Po2 88 (H) 64 - 87 mmHg    Tco2 20 20 - 33 mmol/L    S02 98 93 - 99 %    Hco3 19.3 17.0 - 25.0 mmol/L    BE -3 -4 - 3 mmol/L    Body Temp 98.6 F degrees    O2 Therapy 40 %    Ph Temp Alexandr 7.487 7.400 - 7.500    Pco2 Temp Co 25.5 (L) 26.0 - 37.0 mmHg    Po2 Temp Cor 88 (H) 64 - 87 mmHg     Specimen Arterial        Hemodynamics:  Temp (24hrs), Av.9 °C (96.6 °F), Min:35.8 °C (96.4 °F), Max:36.1 °C (97 °F)  Temperature: 35.9 °C (96.6 °F)  Pulse  Av.6  Min: 52  Max: 129Heart Rate (Monitored): (!) 54  NIBP: (!) 91/50 mmHg     Respiratory:  Quinones Vent Mode: APVCMV, Rate (breaths/min): 24, PEEP/CPAP: 8, FiO2: 40, P Peak (PIP): 19, P MEAN: 11 Respiration: 17, Pulse Oximetry: 99 %     Work Of Breathing / Effort: Vented  RUL Breath Sounds: Clear, RML Breath Sounds: Clear, RLL Breath Sounds: Diminished, PRAMOD Breath Sounds: Clear, LLL Breath Sounds: Diminished  Fluids:    Intake/Output Summary (Last 24 hours) at 17 0952  Last data filed at 17 0600   Gross per 24 hour   Intake 2166.99 ml   Output   1765 ml   Net 401.99 ml        GI/Nutrition:  Orders Placed This Encounter   Procedures   • Diet NPO     Standing Status: Standing      Number of Occurrences: 1      Standing Expiration Date:      Order Specific Question:  Type:     Answer:  Now [1]     Order Specific Question:  Restrict to:     Answer:  Strict [1]     Medications:  Current Facility-Administered Medications   Medication Last Dose   • potassium chloride (KLOR-CON) 20 MEQ packet 20 mEq     • enoxaparin (LOVENOX) inj 40 mg     • DOPamine 3.2 mg/mL in D5W 250 mL infusion     • quetiapine (SEROQUEL) tablet 25 mg 25 mg at 17 0835   • dexmedetomidine (PRECEDEX) 200 mcg in NS 50 mL infusion 0.8 mcg/kg/hr at 17 0644   • furosemide (LASIX) injection 20 mg 20 mg at 17 0502   • ibuprofen (MOTRIN) oral suspension 400 mg 400 mg at 17 1032   • aspirin EC (ECOTRIN) tablet 81 mg 81 mg at 17 0835   • glucose 4 g chewable tablet 16 g      And   • dextrose 50% (D50W) injection 25 mL     • multivitamin (THERAGRAN) tablet 1 Tab 1 Tab at 17 0835   • Respiratory Care per Protocol     • lactulose 20 GM/30ML solution 30 mL     • bisacodyl (DULCOLAX) suppository 10 mg     • fleet enema 133 mL     • chlorhexidine (PERIDEX)  0.12 % solution 15 mL 15 mL at 01/30/17 0835   • lidocaine (XYLOCAINE) 1%  injection 1 mL at 01/28/17 0418   • fentaNYL (SUBLIMAZE) injection 25 mcg 25 mcg at 01/25/17 0632    Or   • fentaNYL (SUBLIMAZE) injection 50 mcg 50 mcg at 01/27/17 2053    Or   • fentaNYL (SUBLIMAZE) injection 100 mcg 100 mcg at 01/28/17 0620   • ipratropium-albuterol (DUONEB) nebulizer solution 3 mL 3 mL at 01/29/17 1855   • famotidine (PEPCID) tablet 20 mg 20 mg at 01/29/17 2031   • cefTRIAXone (ROCEPHIN) 1 g in  mL IVPB 1 g at 01/30/17 0839   • doxycycline (VIBRAMYCIN) 100 mg in  mL IVPB Stopped at 01/29/17 2133   • oyster shell calcium/vitamin D 250-125 MG-UNIT tablet 1 Tab 1 Tab at 01/30/17 0835   • pravastatin (PRAVACHOL) tablet 20 mg 20 mg at 01/29/17 2031     Medical Decision Making, by Problem:  Active Hospital Problems    Diagnosis   • Mitral regurgitation [I34.0]   • Respiratory failure with hypoxia (CMS-HCC) [J96.91]   • Hyperlipidemia [E78.5]   • Hypertension [I10]   • CAP (community acquired pneumonia) [J18.9]   • Bradycardia [R00.1]   • Hypotension [I95.9]   • Normocytic anemia [D64.9]   • Pulmonary edema [J81.1]     Quality  Measures:    Frank catheter: Critically Ill - Requiring Accurate Measurement of Urinary Output  Central line in place: Vasopressors    DVT Prophylaxis: Heparin    Ulcer prophylaxis: Yes  Antibiotics: Treating active infection/contamination beyond 24 hours perioperative coverage      Plan:    1. Hypotension/Bradycardia -  - Pt is not in afib, his prior extubation was possibly the triggering event.  - Discussed case with cardiology (Dr Ramírez) who recommended to discontinue amiodarone. Change his pressors to dopamine from norepi to reduce his afterload.    2. Acute pulmonary edema secondary to severe MR  - Echo:EF 75%, severe MR, RVSP 40  - Cardiac cath (1/25/2017): normal coronary arteries  - CTA: no PE  - KAREN: EF 65%, mod MR, no rupture of cord or tendinae  - Renal duplex neg for stenosis  -  On lasix 20mg bid.  - Cardiology planning repeat KAREN per Dr. Leblanc recommendations. Dr. Schmitt planning on valve repair.    3. Acute hypoxemic respiratory failure  * ? CAP  - full vent support, extubation tried on 1/28 but patient had to be re-intubated due to distress  - On C3 and doxy day 6.    4. Prolonged Qtc  - Avoid qtc prolonging drugs, keep Mg >2.  - CTM (currently on quetiapine).    5. Normocytic anemia  - Hb stable. Occult blood positive in past.  - CTM    6. Nutrition  - Tube feeds on hold given anticipated procedure.    Chronic medical issues:   DLD: cont pravastatin.  Chronic back/knee/neck pain: pain meds held.  HTN: Cont lasix for her MR but hold losartan held.    DISPO: ICU

## 2017-01-30 NOTE — THERAPY
Pt was reintubated on 1/28/17, remains on vent at this time. SLP continues to hold swallow evaluation until pt is appropriate and new orders are received.  Thank you.

## 2017-01-30 NOTE — PROGRESS NOTES
Patients KAREN reviewed, she has severe MR. We will set her up for surgery tomorrow MV repair vs replacement, MAZE procedure w/ KAREN.

## 2017-01-30 NOTE — PROGRESS NOTES
Pulmonary Critical Care Progress Note        Chief Complaint: Worsening dyspnea and acute hypoxic respiratory failure    History of Present Illness: The patient is a 66-year-old female with a past medical history significant for hypertension, hyperlipidemia, and mitral valve prolapse who was in her usual state of health on 1/24 when she noted a cough and sudden onset of shortness of breath.  She checked her oxygen saturation at home and found to be 85%. She presented to the emergency department at [facility] where unfortunately she decompensated quickly requiring up to 15 L facemask and then eventually requiring intubation. She was initially hypertensive and tachycardic with an oxygen saturation of 86% on room air at the outside hospital and was transported on high peak and expiratory pressures on the ventilator with an FIO2 of 100%, achieving saturations in the low 90s. She became progressively more hypotensive requiring a norepinephrine drip. A stat bedside echo was performed with Dr. Delarosa in the ER, which revealed a ruptured mitral valve leaflet with severe MR as the likely cause of her decompensation.     ROS: Unable to obtain as the patient is sedated on the ventilator     Interval Events:  24 hour interval history reviewed    - +450cc over last 24hr, (-)1L since admit   - No overnight events since intubated   - Amio and Levo still running - possible transition to dopamine   - KAREN today   - evaluation with [facility]       PFSH:  No change.    Respiratory:  FiO2: 40  Pulse Oximetry: 97 %    Exam: clear to auscultation without rales or wheezes  ImagingAvailable data reviewed     CTA chest 1/25:  1.  No evidence pulmonary emboli.  2.  Moderate right and small left pleural effusions.  3.  Compressive atelectasis both lower lobes especially the left and left lung volume loss with mediastinal shift towards left.  4.  Patchy groundglass opacities within the right upper lobe consistent with focal areas of  pneumonitis.  5.  7.5 mm left upper lobe nodule and smaller nodules within the lungs.  Recent Labs      01/28/17   0435  01/29/17   0502  01/30/17   0410   ISTATAPH  7.476  7.479  7.487   ISTATAPCO2  25.9*  25.8*  25.5*   ISTATAPO2  50*  69  88*   ISTATATCO2  20  20  20   [id number]  88*  95  98   ISTATARTHCO3  19.1  19.2  19.3   ISTATARTBE  -4  -3  -3   ISTATTEMP  98.1 F  36.1 C  98.6 F   ISTATFIO2  40  40  40   ISTATSPEC  Arterial  Arterial  Arterial   ISTATAPHTC  7.480  7.493  7.487   [id number]  49*  65  88*       HemoDynamics:  Pulse: (!) 52, Heart Rate (Monitored): (!) 53  NIBP: 107/66 mmHg     Exam: 3/6 holosystolic murmur heard throughout the precordium, regular rate and rhythm  Imaging: echo Reviewed           Neuro:  GCS Total Kali Coma Score: 11     Exam:opens eyes to verbal stimuli  Imaging: Available data reviewed    Fluids:  Intake/Output       01/28/17 0700 - 01/29/17 0659 01/29/17 0700 - 01/30/17 0659 01/30/17 0700 - 01/31/17 0659      2204-5648 2910-3122 Total 9182-2768 7992-2108 Total 7773-8091 8639-3523 Total       Intake    I.V.  311.3  754.9 1066.2  451.9  625.1 1077  --  -- --    Precedex Volume -- 223.5 223.5 161.9 293.5 455.4 -- -- --    Amiodarone Volume 311.3 204 515.3 170 238 408 -- -- --    Propofol Volume -- 77.9 77.9 26 -- 26 -- -- --    Norepinephrine Volume -- 249.5 249.5 94 93.6 187.6 -- -- --    Other  --  30 30  150  30 180  --  -- --    Medications (P.O./ Enteral Liquids) -- 30 30 150 30 180 -- -- --    Enteral  510  600 1110  570  570 1140  --  -- --    Enteral Volume 480 480 960 480 480 960 -- -- --    Free Water / Tube Flush 30 120 150 90 90 180 -- -- --    Total Intake 821.3 1384.9 2206.2 1171.9 1225.1 2397 -- -- --       Output    Urine  1490  920 2410  1265  675 1940  --  -- --    Indwelling Cathether 3206 418 2363 9541 671 1337 -- -- --    Drains  0  -- 0  0  -- 0  --  -- --    Residual Amount (ml) (Discarded) 0 -- 0 0 -- 0 -- -- --    Total Output 1672 660 2705 1265  670 -- -- --       Net I/O     -668.7 464.9 -203.8 -93.1 550.1 457 -- -- --           Recent Labs      17   SODIUM  143  140  139   POTASSIUM  3.6  3.9  3.6   CHLORIDE  109  107  107   CO2  22  22  22   BUN  36*  49*  52*   CREATININE  0.79  0.91  0.74   MAGNESIUM  2.1  2.3  2.3   CALCIUM  8.8  8.8  9.1       GI/Nutrition:  Exam: abdomen is soft and non-tender, normal active bowel sounds  Imaging: Available data reviewed  CorTrak: at goal with tube feeds  Liver Function  Recent Labs      17   ALTSGPT  12  16  23   ASTSGOT  15  18  23   ALKPHOSPHAT  67  65  65   TBILIRUBIN  0.6  0.7  0.5   PREALBUMIN   --    --   14.0*   GLUCOSE  104*  125*  117*       Heme:  Recent Labs      17   RBC  3.61*  3.75*  3.57*   HEMOGLOBIN  11.2*  11.5*  11.0*   HEMATOCRIT  33.9*  35.1*  33.7*   PLATELETCT  220  247  232       Infectious Disease:  Temp  Av.8 °C (96.4 °F)  Min: 35.1 °C (95.2 °F)  Max: 36.1 °C (97 °F)  Micro: antibiotics reviewed, cultures pending and cultures reviewed  Recent Labs      17   WBC  6.5  8.2  5.3   NEUTSPOLYS  73.50*  78.20*  61.20   LYMPHOCYTES  13.20*  9.20*  18.50*   MONOCYTES  11.50  11.00  17.40*   EOSINOPHILS  1.10  0.70  2.10   BASOPHILS  0.20  0.40  0.20   ASTSGOT  15  18  23   ALTSGPT  12  16  23   ALKPHOSPHAT  67  65  65   TBILIRUBIN  0.6  0.7  0.5     Current Facility-Administered Medications   Medication Dose Frequency Provider Last Rate Last Dose   • quetiapine (SEROQUEL) tablet 25 mg  25 mg TID Lesley Atkins M.D.   25 mg at 17   • dexmedetomidine (PRECEDEX) 200 mcg in NS 50 mL infusion  0.1-1.5 mcg/kg/hr Continuous Lesley Atkins M.D. 15.3 mL/hr at 1757 1 mcg/kg/hr at 1757   • amiodarone (CORDARONE) 450 mg in D5W 250 mL Infusion  0.5-1 mg/min Continuous Deinlson LOWE  RZO Crotez 17 mL/hr at 01/30/17 0503 0.5 mg/min at 01/30/17 0503   • furosemide (LASIX) injection 20 mg  20 mg BID DIURETIC Navid Ledesma M.D.   20 mg at 01/30/17 0502   • ibuprofen (MOTRIN) oral suspension 400 mg  400 mg Q6HRS PRN Lesley Atkins M.D.   400 mg at 01/27/17 1032   • potassium chloride (KLOR-CON) 20 MEQ packet 20 mEq  20 mEq DAILY Navid Ledesma M.D.   20 mEq at 01/29/17 0749   • aspirin EC (ECOTRIN) tablet 81 mg  81 mg DAILY Navid Ledesma M.D.   81 mg at 01/29/17 0749   • heparin injection 5,000 Units  5,000 Units Q8HRS Kurtis Ramirez D.O.   5,000 Units at 01/30/17 0502   • propofol (DIPRIVAN) injection  5-80 mcg/kg/min Continuous Jeremy M Gonda, M.D.   Stopped at 01/29/17 0934   • norepinephrine (LEVOPHED) 8 mg in  mL Infusion  0.5-30 mcg/min Continuous James Pérez M.D. 5.6 mL/hr at 01/30/17 0000 3 mcg/min at 01/30/17 0000   • glucose 4 g chewable tablet 16 g  16 g Q15 MIN PRN James Pérez M.D.        And   • dextrose 50% (D50W) injection 25 mL  25 mL Q15 MIN PRN James Pérez M.D.       • multivitamin (THERAGRAN) tablet 1 Tab  1 Tab DAILY James Pérez M.D.   1 Tab at 01/29/17 0749   • Respiratory Care per Protocol   Continuous RT Jeremy M Gonda, M.D.       • lactulose 20 GM/30ML solution 30 mL  30 mL Q24HRS PRN Jeremy M Gonda, M.D.       • bisacodyl (DULCOLAX) suppository 10 mg  10 mg Q24HRS PRN Jeremy M Gonda, M.D.       • fleet enema 133 mL  1 Each Once PRN Jeremy M Gonda, M.D.       • chlorhexidine (PERIDEX) 0.12 % solution 15 mL  15 mL BID Jeremy M Gonda, M.D.   15 mL at 01/29/17 2031   • lidocaine (XYLOCAINE) 1%  injection  1-2 mL Q30 MIN PRN Jeremy M Gonda, M.D.   1 mL at 01/28/17 0418   • fentaNYL (SUBLIMAZE) injection 25 mcg  25 mcg Q HOUR PRN Jeremy M Gonda, M.D.   25 mcg at 01/25/17 0632    Or   • fentaNYL (SUBLIMAZE) injection 50 mcg  50 mcg Q HOUR PRN Jeremy M Gonda, M.D.   50 mcg at 01/27/17 2053    Or   • fentaNYL (SUBLIMAZE) injection 100 mcg  100 mcg  Q HOUR PRN Jeremy M Gonda, M.D.   100 mcg at 01/28/17 0620   • ipratropium-albuterol (DUONEB) nebulizer solution 3 mL  3 mL Q2HRS PRN (RT) Jeremy M Gonda, M.D.   3 mL at 01/29/17 1855   • famotidine (PEPCID) tablet 20 mg  20 mg Q12HRS Jeremy M Gonda, M.D.   20 mg at 01/29/17 2031    Or   • famotidine (PEPCID) injection 20 mg  20 mg Q12HRS Jeremy M Gonda, M.D.   20 mg at 01/28/17 0834   • cefTRIAXone (ROCEPHIN) 1 g in  mL IVPB  1 g Q24HRS Lesley Atkins M.D.   Stopped at 01/29/17 0818   • doxycycline (VIBRAMYCIN) 100 mg in  mL IVPB  100 mg Q12HRS Lesley Atkins M.D.   Stopped at 01/29/17 2133   • oyster shell calcium/vitamin D 250-125 MG-UNIT tablet 1 Tab  1 Tab QDAY with Breakfast aJmes Pérez M.D.   1 Tab at 01/29/17 0749   • pravastatin (PRAVACHOL) tablet 20 mg  20 mg Q EVENING Jeremy M Gonda, M.D.   20 mg at 01/29/17 2031     Last reviewed on 1/24/2017 11:07 PM by Birdie Schumacher    Quality  Measures:  Medications reviewed, EKG reviewed, Labs reviewed and Radiology images reviewed  Frank catheter: Critically Ill - Requiring Accurate Measurement of Urinary Output and Unconscious / Sedated Patient on a Ventilator  Central line in place: Need for access and Vasopressors    DVT Prophylaxis: Heparin  DVT prophylaxis - mechanical: SCDs  Ulcer prophylaxis: Yes  Antibiotics: Treating active infection/contamination beyond 24 hours perioperative coverage      Lines  RIJ TLC 1/24    Gtt  Amiodarone  precedex 0.8  Levophed 3    Abx  Rocephin  Doxycycline    Bcx 1/24 ngtd  Ucx 1/24 ngtd  Flu (-) 1/24    Echo 1/26 E 65%    Lasix 20 bid    Problems/Plan:    Acute Hypoxic Respiratory Failure from pulmonary edema and cardiogenic shock   - intubated 1/24, failed extubation 1/28 and re-intubated   - Re-intubated within 20-30 minutes due to tachy/unresponsive/hypoxic likely due to flash pulm edema   - cont full vent support   - RT/O2 therapies   - SBTs  Acute Pulmonary Edema   - RT/O2 protocols   - cont vent  support   - will gentle diuresis once stable  Acute Delirium   - Seroquel 25mg TID   - Cont Precedex  Tachycardia that ? A flutter   - Amio bolus and gtt--> back to sinus  Severe Mitral Valve Regurgitation likely related to valve prolapse   - cardiac cath on 1/25   - CTA r/o'd PE   - CTS eval  Cardiogenic Shock likely related to severe mitral regurgitation   - s/p vasopressor/ionotropic therapy   - Cards following   - Cardiac cath on 1/25 was negative and heparin stopped   - CTA was negative  Acute Leukocytosis and concern for aspiration   - blood/sputum cultures sent and follow   - Empiric Ceftriaxone/Doxycycline day 6 of 10  Elevated Troponin   - cards following   - ASA  Hyperglycemia   - ISS  Hx of mitral valve prolapse  Hx of systemic arterial hypertension  Hx of Dyslipidemia   - statin therapy  Prophylaxis   - heparin, bowel regimen, pepcid, enteral feeds    Discussed patient condition and risk of morbidity and/or mortality with Family, RN, RT, Pharmacy, , [employer] resident and Charge nurse / hot rounds and well as cardiology  The patient remains critically ill.  Critical care time = 35 minutes in directly providing and coordinating critical care and extensive data review.  No time overlap and excludes procedures.

## 2017-01-31 ENCOUNTER — APPOINTMENT (OUTPATIENT)
Dept: RADIOLOGY | Facility: MEDICAL CENTER | Age: 67
DRG: 216 | End: 2017-01-31
Attending: INTERNAL MEDICINE
Payer: MEDICARE

## 2017-01-31 ENCOUNTER — APPOINTMENT (OUTPATIENT)
Dept: RADIOLOGY | Facility: MEDICAL CENTER | Age: 67
DRG: 216 | End: 2017-01-31
Attending: THORACIC SURGERY (CARDIOTHORACIC VASCULAR SURGERY)
Payer: MEDICARE

## 2017-01-31 ENCOUNTER — APPOINTMENT (OUTPATIENT)
Dept: RADIOLOGY | Facility: MEDICAL CENTER | Age: 67
DRG: 216 | End: 2017-01-31
Attending: NURSE PRACTITIONER
Payer: MEDICARE

## 2017-01-31 LAB
ACT BLD: 348 SEC (ref 74–137)
ACT BLD: 415 SEC (ref 74–137)
ACT BLD: 446 SEC (ref 74–137)
ACT BLD: 502 SEC (ref 74–137)
ALBUMIN SERPL BCP-MCNC: 3.6 G/DL (ref 3.2–4.9)
ALBUMIN/GLOB SERPL: 1.3 G/DL
ALP SERPL-CCNC: 73 U/L (ref 30–99)
ALT SERPL-CCNC: 29 U/L (ref 2–50)
ANION GAP SERPL CALC-SCNC: 9 MMOL/L (ref 0–11.9)
APTT PPP: 29.2 SEC (ref 24.7–36)
APTT PPP: 30.2 SEC (ref 24.7–36)
AST SERPL-CCNC: 24 U/L (ref 12–45)
BASE EXCESS BLDA CALC-SCNC: -2 MMOL/L (ref -4–3)
BASE EXCESS BLDA CALC-SCNC: -5 MMOL/L (ref -4–3)
BASE EXCESS BLDA CALC-SCNC: -5 MMOL/L (ref -4–3)
BASE EXCESS BLDA CALC-SCNC: -6 MMOL/L (ref -4–3)
BASE EXCESS BLDA CALC-SCNC: -7 MMOL/L (ref -4–3)
BASOPHILS # BLD AUTO: 0.3 % (ref 0–1.8)
BASOPHILS # BLD: 0.02 K/UL (ref 0–0.12)
BILIRUB SERPL-MCNC: 0.4 MG/DL (ref 0.1–1.5)
BODY TEMPERATURE: ABNORMAL DEGREES
BUN SERPL-MCNC: 62 MG/DL (ref 8–22)
CA-I BLD ISE-SCNC: 1 MMOL/L (ref 1.1–1.3)
CA-I BLD ISE-SCNC: 1.05 MMOL/L (ref 1.1–1.3)
CA-I BLD ISE-SCNC: 1.1 MMOL/L (ref 1.1–1.3)
CA-I BLD ISE-SCNC: 1.11 MMOL/L (ref 1.1–1.3)
CA-I BLD ISE-SCNC: 1.21 MMOL/L (ref 1.1–1.3)
CALCIUM SERPL-MCNC: 9.4 MG/DL (ref 8.5–10.5)
CHLORIDE SERPL-SCNC: 111 MMOL/L (ref 96–112)
CO2 BLDA-SCNC: 20 MMOL/L (ref 20–33)
CO2 BLDA-SCNC: 21 MMOL/L (ref 20–33)
CO2 BLDA-SCNC: 23 MMOL/L (ref 20–33)
CO2 BLDA-SCNC: 24 MMOL/L (ref 20–33)
CO2 BLDA-SCNC: 25 MMOL/L (ref 20–33)
CO2 SERPL-SCNC: 23 MMOL/L (ref 20–33)
COMPONENT P 8504P: NORMAL
CREAT SERPL-MCNC: 0.92 MG/DL (ref 0.5–1.4)
EOSINOPHIL # BLD AUTO: 0.13 K/UL (ref 0–0.51)
EOSINOPHIL NFR BLD: 1.8 % (ref 0–6.9)
ERYTHROCYTE [DISTWIDTH] IN BLOOD BY AUTOMATED COUNT: 48.3 FL (ref 35.9–50)
GFR SERPL CREATININE-BSD FRML MDRD: >60 ML/MIN/1.73 M 2
GLOBULIN SER CALC-MCNC: 2.8 G/DL (ref 1.9–3.5)
GLUCOSE BLD-MCNC: 104 MG/DL (ref 65–99)
GLUCOSE BLD-MCNC: 112 MG/DL (ref 65–99)
GLUCOSE BLD-MCNC: 135 MG/DL (ref 65–99)
GLUCOSE BLD-MCNC: 146 MG/DL (ref 65–99)
GLUCOSE BLD-MCNC: 172 MG/DL (ref 65–99)
GLUCOSE SERPL-MCNC: 105 MG/DL (ref 65–99)
HCO3 BLDA-SCNC: 18.8 MMOL/L (ref 17–25)
HCO3 BLDA-SCNC: 18.8 MMOL/L (ref 17–25)
HCO3 BLDA-SCNC: 19.3 MMOL/L (ref 17–25)
HCO3 BLDA-SCNC: 19.4 MMOL/L (ref 17–25)
HCO3 BLDA-SCNC: 20.2 MMOL/L (ref 17–25)
HCO3 BLDA-SCNC: 20.2 MMOL/L (ref 17–25)
HCO3 BLDA-SCNC: 21.9 MMOL/L (ref 17–25)
HCO3 BLDA-SCNC: 22.5 MMOL/L (ref 17–25)
HCO3 BLDA-SCNC: 23.7 MMOL/L (ref 17–25)
HCT VFR BLD AUTO: 36.2 % (ref 37–47)
HCT VFR BLD CALC: 23 % (ref 37–47)
HCT VFR BLD CALC: 24 % (ref 37–47)
HCT VFR BLD CALC: 24 % (ref 37–47)
HCT VFR BLD CALC: 28 % (ref 37–47)
HCT VFR BLD CALC: 31 % (ref 37–47)
HCT VFR BLD CALC: 31 % (ref 37–47)
HGB BLD-MCNC: 10.5 G/DL (ref 12–16)
HGB BLD-MCNC: 10.5 G/DL (ref 12–16)
HGB BLD-MCNC: 11.6 G/DL (ref 12–16)
HGB BLD-MCNC: 7.8 G/DL (ref 12–16)
HGB BLD-MCNC: 8.2 G/DL (ref 12–16)
HGB BLD-MCNC: 8.2 G/DL (ref 12–16)
HGB BLD-MCNC: 9.5 G/DL (ref 12–16)
IMM GRANULOCYTES # BLD AUTO: 0.05 K/UL (ref 0–0.11)
IMM GRANULOCYTES NFR BLD AUTO: 0.7 % (ref 0–0.9)
INR PPP: 1.06 (ref 0.87–1.13)
INR PPP: 1.38 (ref 0.87–1.13)
LYMPHOCYTES # BLD AUTO: 0.94 K/UL (ref 1–4.8)
LYMPHOCYTES NFR BLD: 13.1 % (ref 22–41)
MAGNESIUM SERPL-MCNC: 2.3 MG/DL (ref 1.5–2.5)
MAGNESIUM SERPL-MCNC: 2.7 MG/DL (ref 1.5–2.5)
MCH RBC QN AUTO: 30.6 PG (ref 27–33)
MCHC RBC AUTO-ENTMCNC: 32 G/DL (ref 33.6–35)
MCV RBC AUTO: 95.5 FL (ref 81.4–97.8)
MONOCYTES # BLD AUTO: 0.78 K/UL (ref 0–0.85)
MONOCYTES NFR BLD AUTO: 10.9 % (ref 0–13.4)
NEUTROPHILS # BLD AUTO: 5.25 K/UL (ref 2–7.15)
NEUTROPHILS NFR BLD: 73.2 % (ref 44–72)
NRBC # BLD AUTO: 0 K/UL
NRBC BLD AUTO-RTO: 0 /100 WBC
O2/TOTAL GAS SETTING VFR VENT: 100 %
O2/TOTAL GAS SETTING VFR VENT: 80 %
O2/TOTAL GAS SETTING VFR VENT: 80 %
O2/TOTAL GAS SETTING VFR VENT: 90 %
PCO2 BLDA: 32.5 MMHG (ref 26–37)
PCO2 BLDA: 34.4 MMHG (ref 26–37)
PCO2 BLDA: 35 MMHG (ref 26–37)
PCO2 BLDA: 35.9 MMHG (ref 26–37)
PCO2 BLDA: 36.2 MMHG (ref 26–37)
PCO2 BLDA: 37.4 MMHG (ref 26–37)
PCO2 BLDA: 38.2 MMHG (ref 26–37)
PCO2 BLDA: 38.2 MMHG (ref 26–37)
PCO2 BLDA: 44.5 MMHG (ref 26–37)
PCO2 TEMP ADJ BLDA: 32.4 MMHG (ref 26–37)
PCO2 TEMP ADJ BLDA: 32.5 MMHG (ref 26–37)
PCO2 TEMP ADJ BLDA: 33 MMHG (ref 26–37)
PCO2 TEMP ADJ BLDA: 34.1 MMHG (ref 26–37)
PCO2 TEMP ADJ BLDA: 35.3 MMHG (ref 26–37)
PCO2 TEMP ADJ BLDA: 36.2 MMHG (ref 26–37)
PCO2 TEMP ADJ BLDA: 36.4 MMHG (ref 26–37)
PCO2 TEMP ADJ BLDA: 38.2 MMHG (ref 26–37)
PCO2 TEMP ADJ BLDA: 44.5 MMHG (ref 26–37)
PH BLDA: 7.31 [PH] (ref 7.4–7.5)
PH BLDA: 7.31 [PH] (ref 7.4–7.5)
PH BLDA: 7.33 [PH] (ref 7.4–7.5)
PH BLDA: 7.33 [PH] (ref 7.4–7.5)
PH BLDA: 7.34 [PH] (ref 7.4–7.5)
PH BLDA: 7.34 [PH] (ref 7.4–7.5)
PH BLDA: 7.37 [PH] (ref 7.4–7.5)
PH BLDA: 7.4 [PH] (ref 7.4–7.5)
PH BLDA: 7.44 [PH] (ref 7.4–7.5)
PH TEMP ADJ BLDA: 7.31 [PH] (ref 7.4–7.5)
PH TEMP ADJ BLDA: 7.33 [PH] (ref 7.4–7.5)
PH TEMP ADJ BLDA: 7.33 [PH] (ref 7.4–7.5)
PH TEMP ADJ BLDA: 7.34 [PH] (ref 7.4–7.5)
PH TEMP ADJ BLDA: 7.36 [PH] (ref 7.4–7.5)
PH TEMP ADJ BLDA: 7.37 [PH] (ref 7.4–7.5)
PH TEMP ADJ BLDA: 7.39 [PH] (ref 7.4–7.5)
PH TEMP ADJ BLDA: 7.4 [PH] (ref 7.4–7.5)
PH TEMP ADJ BLDA: 7.44 [PH] (ref 7.4–7.5)
PLATELET # BLD AUTO: 203 K/UL (ref 164–446)
PLATELET # BLD AUTO: 291 K/UL (ref 164–446)
PMV BLD AUTO: 12.2 FL (ref 9–12.9)
PO2 BLDA: 100 MMHG (ref 64–87)
PO2 BLDA: 284 MMHG (ref 64–87)
PO2 BLDA: 357 MMHG (ref 64–87)
PO2 BLDA: 379 MMHG (ref 64–87)
PO2 BLDA: 63 MMHG (ref 64–87)
PO2 BLDA: 69 MMHG (ref 64–87)
PO2 BLDA: 75 MMHG (ref 64–87)
PO2 BLDA: 78 MMHG (ref 64–87)
PO2 BLDA: 92 MMHG (ref 64–87)
PO2 TEMP ADJ BLDA: 284 MMHG (ref 64–87)
PO2 TEMP ADJ BLDA: 357 MMHG (ref 64–87)
PO2 TEMP ADJ BLDA: 379 MMHG (ref 64–87)
PO2 TEMP ADJ BLDA: 58 MMHG (ref 64–87)
PO2 TEMP ADJ BLDA: 64 MMHG (ref 64–87)
PO2 TEMP ADJ BLDA: 71 MMHG (ref 64–87)
PO2 TEMP ADJ BLDA: 75 MMHG (ref 64–87)
PO2 TEMP ADJ BLDA: 84 MMHG (ref 64–87)
PO2 TEMP ADJ BLDA: 92 MMHG (ref 64–87)
POTASSIUM BLD-SCNC: 3.4 MMOL/L (ref 3.6–5.5)
POTASSIUM BLD-SCNC: 3.4 MMOL/L (ref 3.6–5.5)
POTASSIUM BLD-SCNC: 3.7 MMOL/L (ref 3.6–5.5)
POTASSIUM BLD-SCNC: 4.2 MMOL/L (ref 3.6–5.5)
POTASSIUM BLD-SCNC: 4.7 MMOL/L (ref 3.6–5.5)
POTASSIUM SERPL-SCNC: 3.6 MMOL/L (ref 3.6–5.5)
POTASSIUM SERPL-SCNC: 3.7 MMOL/L (ref 3.6–5.5)
PROT SERPL-MCNC: 6.4 G/DL (ref 6–8.2)
PROTHROMBIN TIME: 14.1 SEC (ref 12–14.6)
PROTHROMBIN TIME: 17.4 SEC (ref 12–14.6)
RBC # BLD AUTO: 3.79 M/UL (ref 4.2–5.4)
SAO2 % BLDA: 100 % (ref 93–99)
SAO2 % BLDA: 90 % (ref 93–99)
SAO2 % BLDA: 92 % (ref 93–99)
SAO2 % BLDA: 94 % (ref 93–99)
SAO2 % BLDA: 95 % (ref 93–99)
SAO2 % BLDA: 97 % (ref 93–99)
SAO2 % BLDA: 97 % (ref 93–99)
SODIUM BLD-SCNC: 143 MMOL/L (ref 135–145)
SODIUM BLD-SCNC: 143 MMOL/L (ref 135–145)
SODIUM BLD-SCNC: 145 MMOL/L (ref 135–145)
SODIUM BLD-SCNC: 145 MMOL/L (ref 135–145)
SODIUM BLD-SCNC: 146 MMOL/L (ref 135–145)
SODIUM BLD-SCNC: 149 MMOL/L (ref 135–145)
SODIUM SERPL-SCNC: 143 MMOL/L (ref 135–145)
SPECIMEN DRAWN FROM PATIENT: ABNORMAL
WBC # BLD AUTO: 7.2 K/UL (ref 4.8–10.8)

## 2017-01-31 PROCEDURE — 700101 HCHG RX REV CODE 250: Performed by: INTERNAL MEDICINE

## 2017-01-31 PROCEDURE — 700111 HCHG RX REV CODE 636 W/ 250 OVERRIDE (IP): Performed by: PHARMACIST

## 2017-01-31 PROCEDURE — 700111 HCHG RX REV CODE 636 W/ 250 OVERRIDE (IP): Performed by: HOSPITALIST

## 2017-01-31 PROCEDURE — 93325 DOPPLER ECHO COLOR FLOW MAPG: CPT

## 2017-01-31 PROCEDURE — 700101 HCHG RX REV CODE 250

## 2017-01-31 PROCEDURE — 93321 DOPPLER ECHO F-UP/LMTD STD: CPT

## 2017-01-31 PROCEDURE — 85730 THROMBOPLASTIN TIME PARTIAL: CPT | Mod: 91

## 2017-01-31 PROCEDURE — 700105 HCHG RX REV CODE 258: Performed by: ANESTHESIOLOGY

## 2017-01-31 PROCEDURE — 700102 HCHG RX REV CODE 250 W/ 637 OVERRIDE(OP): Performed by: INTERNAL MEDICINE

## 2017-01-31 PROCEDURE — C1729 CATH, DRAINAGE: HCPCS | Performed by: THORACIC SURGERY (CARDIOTHORACIC VASCULAR SURGERY)

## 2017-01-31 PROCEDURE — 02UG0JZ SUPPLEMENT MITRAL VALVE WITH SYNTHETIC SUBSTITUTE, OPEN APPROACH: ICD-10-PCS | Performed by: THORACIC SURGERY (CARDIOTHORACIC VASCULAR SURGERY)

## 2017-01-31 PROCEDURE — 5A1221Z PERFORMANCE OF CARDIAC OUTPUT, CONTINUOUS: ICD-10-PCS | Performed by: THORACIC SURGERY (CARDIOTHORACIC VASCULAR SURGERY)

## 2017-01-31 PROCEDURE — 82330 ASSAY OF CALCIUM: CPT | Mod: 91

## 2017-01-31 PROCEDURE — 770022 HCHG ROOM/CARE - ICU (200)

## 2017-01-31 PROCEDURE — 88304 TISSUE EXAM BY PATHOLOGIST: CPT

## 2017-01-31 PROCEDURE — A9270 NON-COVERED ITEM OR SERVICE: HCPCS | Performed by: INTERNAL MEDICINE

## 2017-01-31 PROCEDURE — 71010 DX-CHEST-PORTABLE (1 VIEW): CPT

## 2017-01-31 PROCEDURE — 82962 GLUCOSE BLOOD TEST: CPT | Mod: 91

## 2017-01-31 PROCEDURE — 85014 HEMATOCRIT: CPT

## 2017-01-31 PROCEDURE — P9016 RBC LEUKOCYTES REDUCED: HCPCS

## 2017-01-31 PROCEDURE — A4606 OXYGEN PROBE USED W OXIMETER: HCPCS | Performed by: THORACIC SURGERY (CARDIOTHORACIC VASCULAR SURGERY)

## 2017-01-31 PROCEDURE — 85049 AUTOMATED PLATELET COUNT: CPT

## 2017-01-31 PROCEDURE — 501745 HCHG WIRE, SURGICAL STEEL: Performed by: THORACIC SURGERY (CARDIOTHORACIC VASCULAR SURGERY)

## 2017-01-31 PROCEDURE — 502240 HCHG MISC OR SUPPLY RC 0272: Performed by: THORACIC SURGERY (CARDIOTHORACIC VASCULAR SURGERY)

## 2017-01-31 PROCEDURE — A6402 STERILE GAUZE <= 16 SQ IN: HCPCS | Performed by: THORACIC SURGERY (CARDIOTHORACIC VASCULAR SURGERY)

## 2017-01-31 PROCEDURE — 502627 HCHG HEMOSTAT, SURGICEL 4X4: Performed by: THORACIC SURGERY (CARDIOTHORACIC VASCULAR SURGERY)

## 2017-01-31 PROCEDURE — 700105 HCHG RX REV CODE 258: Performed by: INTERNAL MEDICINE

## 2017-01-31 PROCEDURE — 02BG0ZZ EXCISION OF MITRAL VALVE, OPEN APPROACH: ICD-10-PCS | Performed by: THORACIC SURGERY (CARDIOTHORACIC VASCULAR SURGERY)

## 2017-01-31 PROCEDURE — 80053 COMPREHEN METABOLIC PANEL: CPT

## 2017-01-31 PROCEDURE — C1898 LEAD, PMKR, OTHER THAN TRANS: HCPCS | Performed by: THORACIC SURGERY (CARDIOTHORACIC VASCULAR SURGERY)

## 2017-01-31 PROCEDURE — 700105 HCHG RX REV CODE 258: Performed by: NURSE PRACTITIONER

## 2017-01-31 PROCEDURE — C1894 INTRO/SHEATH, NON-LASER: HCPCS | Performed by: THORACIC SURGERY (CARDIOTHORACIC VASCULAR SURGERY)

## 2017-01-31 PROCEDURE — 02L70ZK OCCLUSION OF LEFT ATRIAL APPENDAGE, OPEN APPROACH: ICD-10-PCS | Performed by: THORACIC SURGERY (CARDIOTHORACIC VASCULAR SURGERY)

## 2017-01-31 PROCEDURE — 503000 HCHG SUTURE, OHS: Performed by: THORACIC SURGERY (CARDIOTHORACIC VASCULAR SURGERY)

## 2017-01-31 PROCEDURE — 700111 HCHG RX REV CODE 636 W/ 250 OVERRIDE (IP): Performed by: NURSE PRACTITIONER

## 2017-01-31 PROCEDURE — 82947 ASSAY GLUCOSE BLOOD QUANT: CPT

## 2017-01-31 PROCEDURE — 700102 HCHG RX REV CODE 250 W/ 637 OVERRIDE(OP): Performed by: NURSE PRACTITIONER

## 2017-01-31 PROCEDURE — 84132 ASSAY OF SERUM POTASSIUM: CPT

## 2017-01-31 PROCEDURE — 86923 COMPATIBILITY TEST ELECTRIC: CPT

## 2017-01-31 PROCEDURE — 700101 HCHG RX REV CODE 250: Performed by: NURSE PRACTITIONER

## 2017-01-31 PROCEDURE — 160024 HCHG STAT PERFUSION STAT: Performed by: THORACIC SURGERY (CARDIOTHORACIC VASCULAR SURGERY)

## 2017-01-31 PROCEDURE — 500890 HCHG PACK, OPEN HEART: Performed by: THORACIC SURGERY (CARDIOTHORACIC VASCULAR SURGERY)

## 2017-01-31 PROCEDURE — 700101 HCHG RX REV CODE 250: Performed by: ANESTHESIOLOGY

## 2017-01-31 PROCEDURE — 85025 COMPLETE CBC W/AUTO DIFF WBC: CPT

## 2017-01-31 PROCEDURE — 500002 HCHG ADHESIVE, DERMABOND: Performed by: THORACIC SURGERY (CARDIOTHORACIC VASCULAR SURGERY)

## 2017-01-31 PROCEDURE — 700111 HCHG RX REV CODE 636 W/ 250 OVERRIDE (IP): Performed by: INTERNAL MEDICINE

## 2017-01-31 PROCEDURE — 110371 HCHG SHELL REV 272: Performed by: THORACIC SURGERY (CARDIOTHORACIC VASCULAR SURGERY)

## 2017-01-31 PROCEDURE — 93010 ELECTROCARDIOGRAM REPORT: CPT | Performed by: INTERNAL MEDICINE

## 2017-01-31 PROCEDURE — 36430 TRANSFUSION BLD/BLD COMPNT: CPT

## 2017-01-31 PROCEDURE — 94003 VENT MGMT INPAT SUBQ DAY: CPT

## 2017-01-31 PROCEDURE — 160009 HCHG ANES TIME/MIN: Performed by: THORACIC SURGERY (CARDIOTHORACIC VASCULAR SURGERY)

## 2017-01-31 PROCEDURE — 700111 HCHG RX REV CODE 636 W/ 250 OVERRIDE (IP)

## 2017-01-31 PROCEDURE — 93005 ELECTROCARDIOGRAM TRACING: CPT | Performed by: NURSE PRACTITIONER

## 2017-01-31 PROCEDURE — 99291 CRITICAL CARE FIRST HOUR: CPT | Mod: GC | Performed by: INTERNAL MEDICINE

## 2017-01-31 PROCEDURE — 700105 HCHG RX REV CODE 258: Performed by: PHARMACIST

## 2017-01-31 PROCEDURE — 503001 HCHG PERFUSION: Performed by: THORACIC SURGERY (CARDIOTHORACIC VASCULAR SURGERY)

## 2017-01-31 PROCEDURE — 700111 HCHG RX REV CODE 636 W/ 250 OVERRIDE (IP): Performed by: ANESTHESIOLOGY

## 2017-01-31 PROCEDURE — 501519 HCHG SUTURE, E PACK: Performed by: THORACIC SURGERY (CARDIOTHORACIC VASCULAR SURGERY)

## 2017-01-31 PROCEDURE — P9034 PLATELETS, PHERESIS: HCPCS

## 2017-01-31 PROCEDURE — 94770 HCHG CO2 EXPIRED GAS DETERMINATION: CPT

## 2017-01-31 PROCEDURE — 500385 HCHG DRAIN, PLEUROVAC ADUL: Performed by: THORACIC SURGERY (CARDIOTHORACIC VASCULAR SURGERY)

## 2017-01-31 PROCEDURE — 37799 UNLISTED PX VASCULAR SURGERY: CPT

## 2017-01-31 PROCEDURE — A6403 STERILE GAUZE>16 <= 48 SQ IN: HCPCS | Performed by: THORACIC SURGERY (CARDIOTHORACIC VASCULAR SURGERY)

## 2017-01-31 PROCEDURE — 110382 HCHG SHELL REV 271: Performed by: THORACIC SURGERY (CARDIOTHORACIC VASCULAR SURGERY)

## 2017-01-31 PROCEDURE — C2618 PROBE/NEEDLE, CRYO: HCPCS | Performed by: THORACIC SURGERY (CARDIOTHORACIC VASCULAR SURGERY)

## 2017-01-31 PROCEDURE — 160042 HCHG SURGERY MINUTES - EA ADDL 1 MIN LEVEL 5: Performed by: THORACIC SURGERY (CARDIOTHORACIC VASCULAR SURGERY)

## 2017-01-31 PROCEDURE — 93312 ECHO TRANSESOPHAGEAL: CPT

## 2017-01-31 PROCEDURE — 85610 PROTHROMBIN TIME: CPT | Mod: 91

## 2017-01-31 PROCEDURE — 82803 BLOOD GASES ANY COMBINATION: CPT

## 2017-01-31 PROCEDURE — 88305 TISSUE EXAM BY PATHOLOGIST: CPT

## 2017-01-31 PROCEDURE — 160048 HCHG OR STATISTICAL LEVEL 1-5: Performed by: THORACIC SURGERY (CARDIOTHORACIC VASCULAR SURGERY)

## 2017-01-31 PROCEDURE — 83735 ASSAY OF MAGNESIUM: CPT

## 2017-01-31 PROCEDURE — B24BZZ4 ULTRASONOGRAPHY OF HEART WITH AORTA, TRANSESOPHAGEAL: ICD-10-PCS | Performed by: THORACIC SURGERY (CARDIOTHORACIC VASCULAR SURGERY)

## 2017-01-31 PROCEDURE — 501506 HCHG SUTURE GUIDE, VALVE REPLACEMENT: Performed by: THORACIC SURGERY (CARDIOTHORACIC VASCULAR SURGERY)

## 2017-01-31 PROCEDURE — A9270 NON-COVERED ITEM OR SERVICE: HCPCS | Performed by: NURSE PRACTITIONER

## 2017-01-31 PROCEDURE — 30233N1 TRANSFUSION OF NONAUTOLOGOUS RED BLOOD CELLS INTO PERIPHERAL VEIN, PERCUTANEOUS APPROACH: ICD-10-PCS | Performed by: INTERNAL MEDICINE

## 2017-01-31 PROCEDURE — 500734 HCHG INSERT, STEALTH: Performed by: THORACIC SURGERY (CARDIOTHORACIC VASCULAR SURGERY)

## 2017-01-31 PROCEDURE — 700105 HCHG RX REV CODE 258

## 2017-01-31 PROCEDURE — 85347 COAGULATION TIME ACTIVATED: CPT | Mod: 91

## 2017-01-31 PROCEDURE — 84295 ASSAY OF SERUM SODIUM: CPT

## 2017-01-31 PROCEDURE — 160031 HCHG SURGERY MINUTES - 1ST 30 MINS LEVEL 5: Performed by: THORACIC SURGERY (CARDIOTHORACIC VASCULAR SURGERY)

## 2017-01-31 PROCEDURE — 503049: Performed by: THORACIC SURGERY (CARDIOTHORACIC VASCULAR SURGERY)

## 2017-01-31 PROCEDURE — 500053 HCHG BANDAGE, ELASTIC 4: Performed by: THORACIC SURGERY (CARDIOTHORACIC VASCULAR SURGERY)

## 2017-01-31 PROCEDURE — P9047 ALBUMIN (HUMAN), 25%, 50ML: HCPCS

## 2017-01-31 PROCEDURE — 700102 HCHG RX REV CODE 250 W/ 637 OVERRIDE(OP): Performed by: ANESTHESIOLOGY

## 2017-01-31 DEVICE — ANNULO. RING MITRAL 4600-34 ---ALWAYS SHIP OVERNIGHT---: Type: IMPLANTABLE DEVICE | Status: FUNCTIONAL

## 2017-01-31 RX ORDER — POLYVINYL ALCOHOL 14 MG/ML
1 SOLUTION/ DROPS OPHTHALMIC PRN
Status: DISCONTINUED | OUTPATIENT
Start: 2017-01-31 | End: 2017-02-09 | Stop reason: HOSPADM

## 2017-01-31 RX ORDER — ALUMINA, MAGNESIA, AND SIMETHICONE 2400; 2400; 240 MG/30ML; MG/30ML; MG/30ML
30 SUSPENSION ORAL EVERY 4 HOURS PRN
Status: DISCONTINUED | OUTPATIENT
Start: 2017-01-31 | End: 2017-02-09 | Stop reason: HOSPADM

## 2017-01-31 RX ORDER — MORPHINE SULFATE 4 MG/ML
2-5 INJECTION, SOLUTION INTRAMUSCULAR; INTRAVENOUS
Status: DISCONTINUED | OUTPATIENT
Start: 2017-01-31 | End: 2017-02-01

## 2017-01-31 RX ORDER — SODIUM CHLORIDE, SODIUM GLUCONATE, SODIUM ACETATE, POTASSIUM CHLORIDE AND MAGNESIUM CHLORIDE 526; 502; 368; 37; 30 MG/100ML; MG/100ML; MG/100ML; MG/100ML; MG/100ML
INJECTION, SOLUTION INTRAVENOUS PRN
Status: DISCONTINUED | OUTPATIENT
Start: 2017-01-31 | End: 2017-02-01

## 2017-01-31 RX ORDER — POTASSIUM CHLORIDE 1.5 G/1.58G
40 POWDER, FOR SOLUTION ORAL 2 TIMES DAILY
Status: DISCONTINUED | OUTPATIENT
Start: 2017-01-31 | End: 2017-01-31

## 2017-01-31 RX ORDER — LACTULOSE 20 G/30ML
30 SOLUTION ORAL
Status: DISCONTINUED | OUTPATIENT
Start: 2017-01-31 | End: 2017-02-09 | Stop reason: HOSPADM

## 2017-01-31 RX ORDER — AMOXICILLIN 250 MG
1 CAPSULE ORAL
Status: DISCONTINUED | OUTPATIENT
Start: 2017-01-31 | End: 2017-02-09 | Stop reason: HOSPADM

## 2017-01-31 RX ORDER — OXYCODONE HYDROCHLORIDE 5 MG/1
5 TABLET ORAL
Status: DISCONTINUED | OUTPATIENT
Start: 2017-01-31 | End: 2017-02-09 | Stop reason: HOSPADM

## 2017-01-31 RX ORDER — HYDROCODONE BITARTRATE AND ACETAMINOPHEN 5; 325 MG/1; MG/1
1-2 TABLET ORAL EVERY 4 HOURS PRN
Status: DISCONTINUED | OUTPATIENT
Start: 2017-01-31 | End: 2017-02-09

## 2017-01-31 RX ORDER — BISACODYL 10 MG
10 SUPPOSITORY, RECTAL RECTAL
Status: DISCONTINUED | OUTPATIENT
Start: 2017-01-31 | End: 2017-02-09 | Stop reason: HOSPADM

## 2017-01-31 RX ORDER — TRAMADOL HYDROCHLORIDE 50 MG/1
50 TABLET ORAL EVERY 4 HOURS PRN
Status: DISCONTINUED | OUTPATIENT
Start: 2017-01-31 | End: 2017-02-09 | Stop reason: HOSPADM

## 2017-01-31 RX ORDER — NITROGLYCERIN 20 MG/100ML
0-100 INJECTION INTRAVENOUS CONTINUOUS
Status: DISCONTINUED | OUTPATIENT
Start: 2017-01-31 | End: 2017-02-01

## 2017-01-31 RX ORDER — ACETAMINOPHEN 325 MG/1
650 TABLET ORAL EVERY 4 HOURS PRN
Status: DISCONTINUED | OUTPATIENT
Start: 2017-01-31 | End: 2017-02-04

## 2017-01-31 RX ORDER — SODIUM CHLORIDE, SODIUM LACTATE, POTASSIUM CHLORIDE, CALCIUM CHLORIDE 600; 310; 30; 20 MG/100ML; MG/100ML; MG/100ML; MG/100ML
INJECTION, SOLUTION INTRAVENOUS
Status: COMPLETED
Start: 2017-01-31 | End: 2017-01-31

## 2017-01-31 RX ORDER — DEXTROSE MONOHYDRATE 25 G/50ML
25 INJECTION, SOLUTION INTRAVENOUS PRN
Status: DISCONTINUED | OUTPATIENT
Start: 2017-01-31 | End: 2017-02-05

## 2017-01-31 RX ORDER — OXYCODONE HYDROCHLORIDE 10 MG/1
10 TABLET ORAL
Status: DISCONTINUED | OUTPATIENT
Start: 2017-01-31 | End: 2017-02-09 | Stop reason: HOSPADM

## 2017-01-31 RX ORDER — ENEMA 19; 7 G/133ML; G/133ML
1 ENEMA RECTAL
Status: DISCONTINUED | OUTPATIENT
Start: 2017-01-31 | End: 2017-02-09 | Stop reason: HOSPADM

## 2017-01-31 RX ORDER — POTASSIUM CHLORIDE 14.9 MG/ML
20 INJECTION INTRAVENOUS ONCE
Status: COMPLETED | OUTPATIENT
Start: 2017-01-31 | End: 2017-01-31

## 2017-01-31 RX ORDER — AMOXICILLIN 250 MG
1 CAPSULE ORAL NIGHTLY
Status: DISCONTINUED | OUTPATIENT
Start: 2017-01-31 | End: 2017-02-09 | Stop reason: HOSPADM

## 2017-01-31 RX ORDER — SODIUM CHLORIDE 9 MG/ML
INJECTION, SOLUTION INTRAVENOUS
Status: COMPLETED
Start: 2017-01-31 | End: 2017-01-31

## 2017-01-31 RX ORDER — ONDANSETRON 2 MG/ML
4 INJECTION INTRAMUSCULAR; INTRAVENOUS EVERY 6 HOURS PRN
Status: DISCONTINUED | OUTPATIENT
Start: 2017-01-31 | End: 2017-02-09 | Stop reason: HOSPADM

## 2017-01-31 RX ORDER — SODIUM CHLORIDE 9 MG/ML
INJECTION, SOLUTION INTRAVENOUS CONTINUOUS
Status: DISCONTINUED | OUTPATIENT
Start: 2017-01-31 | End: 2017-02-08

## 2017-01-31 RX ORDER — DIPHENHYDRAMINE HCL 25 MG
25 TABLET ORAL
Status: DISCONTINUED | OUTPATIENT
Start: 2017-01-31 | End: 2017-02-09 | Stop reason: HOSPADM

## 2017-01-31 RX ORDER — ESMOLOL HYDROCHLORIDE 10 MG/ML
0-300 INJECTION, SOLUTION INTRAVENOUS CONTINUOUS
Status: DISCONTINUED | OUTPATIENT
Start: 2017-01-31 | End: 2017-02-01

## 2017-01-31 RX ORDER — ACETAMINOPHEN 650 MG/1
650 SUPPOSITORY RECTAL EVERY 4 HOURS PRN
Status: DISCONTINUED | OUTPATIENT
Start: 2017-01-31 | End: 2017-02-04

## 2017-01-31 RX ORDER — DOCUSATE SODIUM 100 MG/1
100 CAPSULE, LIQUID FILLED ORAL EVERY MORNING
Status: DISCONTINUED | OUTPATIENT
Start: 2017-01-31 | End: 2017-02-09 | Stop reason: HOSPADM

## 2017-01-31 RX ADMIN — DOXYCYCLINE 100 MG: 100 INJECTION, POWDER, LYOPHILIZED, FOR SOLUTION INTRAVENOUS at 22:12

## 2017-01-31 RX ADMIN — DOXYCYCLINE 100 MG: 100 INJECTION, POWDER, LYOPHILIZED, FOR SOLUTION INTRAVENOUS at 10:24

## 2017-01-31 RX ADMIN — CEFTRIAXONE SODIUM 1 G: 1 INJECTION, POWDER, FOR SOLUTION INTRAMUSCULAR; INTRAVENOUS at 08:51

## 2017-01-31 RX ADMIN — MAGNESIUM SULFATE IN DEXTROSE 1 G: 10 INJECTION, SOLUTION INTRAVENOUS at 18:33

## 2017-01-31 RX ADMIN — SODIUM BICARBONATE 50 MEQ: 84 INJECTION, SOLUTION INTRAVENOUS at 18:31

## 2017-01-31 RX ADMIN — MORPHINE SULFATE 2 MG: 4 INJECTION INTRAVENOUS at 19:27

## 2017-01-31 RX ADMIN — EPINEPHRINE 0.02 MCG/KG/MIN: 1 INJECTION, SOLUTION INTRAMUSCULAR; SUBCUTANEOUS at 18:32

## 2017-01-31 RX ADMIN — POTASSIUM CHLORIDE 20 MEQ: 14.9 INJECTION, SOLUTION INTRAVENOUS at 19:05

## 2017-01-31 RX ADMIN — MUPIROCIN 1 APPLICATION: 20 OINTMENT TOPICAL at 09:07

## 2017-01-31 RX ADMIN — DEXMEDETOMIDINE HYDROCHLORIDE 0.8 MCG/KG/HR: 100 INJECTION, SOLUTION, CONCENTRATE INTRAVENOUS at 05:21

## 2017-01-31 RX ADMIN — MORPHINE SULFATE 2 MG: 4 INJECTION INTRAVENOUS at 19:33

## 2017-01-31 RX ADMIN — SODIUM CHLORIDE 1000 ML: 9 INJECTION, SOLUTION INTRAVENOUS at 08:45

## 2017-01-31 RX ADMIN — QUETIAPINE FUMARATE 25 MG: 25 TABLET, FILM COATED ORAL at 20:54

## 2017-01-31 RX ADMIN — FAMOTIDINE 20 MG: 20 TABLET, FILM COATED ORAL at 20:55

## 2017-01-31 RX ADMIN — PHENYLEPHRINE HYDROCHLORIDE 5 MCG/MIN: 10 INJECTION INTRAVENOUS at 22:27

## 2017-01-31 RX ADMIN — SODIUM CHLORIDE, SODIUM GLUCONATE, SODIUM ACETATE, POTASSIUM CHLORIDE AND MAGNESIUM CHLORIDE 1000 ML: 526; 502; 368; 37; 30 INJECTION, SOLUTION INTRAVENOUS at 19:30

## 2017-01-31 RX ADMIN — CHLORHEXIDINE GLUCONATE 15 ML: 1.2 RINSE ORAL at 09:07

## 2017-01-31 RX ADMIN — FUROSEMIDE 20 MG: 10 INJECTION, SOLUTION INTRAMUSCULAR; INTRAVENOUS at 05:13

## 2017-01-31 RX ADMIN — SODIUM CHLORIDE, SODIUM GLUCONATE, SODIUM ACETATE, POTASSIUM CHLORIDE AND MAGNESIUM CHLORIDE 1000 ML: 526; 502; 368; 37; 30 INJECTION, SOLUTION INTRAVENOUS at 17:15

## 2017-01-31 RX ADMIN — HYDROCODONE BITARTRATE AND ACETAMINOPHEN 2 TABLET: 5; 325 TABLET ORAL at 20:07

## 2017-01-31 RX ADMIN — MUPIROCIN 1 APPLICATION: 20 OINTMENT TOPICAL at 21:00

## 2017-01-31 RX ADMIN — MORPHINE SULFATE 4 MG: 4 INJECTION INTRAVENOUS at 22:16

## 2017-01-31 RX ADMIN — PRAVASTATIN SODIUM 20 MG: 20 TABLET ORAL at 20:55

## 2017-01-31 RX ADMIN — DEXMEDETOMIDINE HYDROCHLORIDE 0.6 MCG/KG/HR: 100 INJECTION, SOLUTION, CONCENTRATE INTRAVENOUS at 20:24

## 2017-01-31 RX ADMIN — STANDARDIZED SENNA CONCENTRATE AND DOCUSATE SODIUM 1 TABLET: 8.6; 5 TABLET, FILM COATED ORAL at 20:54

## 2017-01-31 RX ADMIN — FENTANYL CITRATE 100 MCG: 50 INJECTION, SOLUTION INTRAMUSCULAR; INTRAVENOUS at 00:57

## 2017-01-31 RX ADMIN — DEXMEDETOMIDINE HYDROCHLORIDE 0.8 MCG/KG/HR: 100 INJECTION, SOLUTION, CONCENTRATE INTRAVENOUS at 01:38

## 2017-01-31 RX ADMIN — CHLORHEXIDINE GLUCONATE 15 ML: 1.2 RINSE ORAL at 20:55

## 2017-01-31 RX ADMIN — MORPHINE SULFATE 2 MG: 4 INJECTION INTRAVENOUS at 17:56

## 2017-01-31 ASSESSMENT — PAIN SCALES - GENERAL
PAINLEVEL_OUTOF10: 7
PAINLEVEL_OUTOF10: 8
PAINLEVEL_OUTOF10: 0
PAINLEVEL_OUTOF10: 0
PAINLEVEL_OUTOF10: 7
PAINLEVEL_OUTOF10: 7
PAINLEVEL_OUTOF10: 4
PAINLEVEL_OUTOF10: 0
PAINLEVEL_OUTOF10: 6

## 2017-01-31 NOTE — CARE PLAN
Problem: Safety  Goal: Will remain free from injury  Outcome: PROGRESSING AS EXPECTED  Pt educated on safety precautions, safety precautions in place. Call light within reach.     Problem: Pain Management  Goal: Pain level will decrease to patient’s comfort goal  Outcome: PROGRESSING AS EXPECTED  Pt medicated per MAR. Pt educated on non pharm techniques.

## 2017-01-31 NOTE — CARE PLAN
Problem: Ventilation Defect:  Goal: Ability to achieve and maintain unassisted ventilation or tolerate decreased levels of ventilator support  Adult Ventilation Update    Total Vent Days: 7      Patient Lines/Drains/Airways Status    Active Airway      Name: Placement date: Placement time: Site: Days:     Airway Group ET Tube 7.5 01/28/17  0935    2                 In the last 24 hours, the patient tolerated SBT for 2 on settings of 5/8.    #FVC / Vital Capacity (liters) : 2.1 (01/30/17 1509)  NIF (cm H2O) : -34 (01/30/17 1509)  Rapid Shallow Breathing Index (RR/VT): 17 (01/30/17 1509)  Plateau Pressure (Q Shift): 16 (01/30/17 0650)  Static Compliance (ml / cm H2O): 71 (01/30/17 1509)    Patient failed trials because of Barriers to Wean: Other (Comments) (Upcoming surgery) (01/30/17 1509)  Barriers to SBT Weaning Trial Stopped due to:: Pt weaned for 1 hour and returned to rest settings per protocol (01/30/17 1509)  Length of Weaning Trial Length of Weaning Trial (Hours): 1 (01/30/17 1509)         Sputum/Suction   Cough: Productive (01/30/17 0800)  Sputum Amount: Small (01/30/17 0800)  Sputum Color: White (01/30/17 0800)  Sputum Consistency: Thick (01/30/17 0800)    Mobility Group  Activity Performed: Unable to mobilize (01/29/17 2000)  Time Activity Tolerated: 5 min (01/27/17 2000)  Distance Per Occurrence (ft.): 0 feet (01/27/17 2000)  # of Times Distance was Traveled: 0 (01/27/17 2000)  Assistance / Tolerance: Assistance of Two or More (01/27/17 2000)  Pt Calls for Assistance: No (01/30/17 0800)  Staff Present for Mobilization: RN (01/27/17 2000)  Assistive Devices: Hand held assist (01/27/17 2000)  Reason Not Mobilized: Other (comment) (agitation) (01/29/17 2000)  Mobilization Comments: agitation (01/29/17 1600)    Events/Summary/Plan: SBT for excercise moyt looking to extubate at this time (01/30/17 0700)

## 2017-01-31 NOTE — PROGRESS NOTES
Pt transferred to Pre-op with two RN, transport and Rt at bedside. Report given to receiving RN and all questions answered.  escorted back to T615. Pt left in stable condition.

## 2017-01-31 NOTE — PROGRESS NOTES
Pulmonary Critical Care Progress Note        Chief Complaint: Worsening dyspnea and acute hypoxic respiratory failure    History of Present Illness: The patient is a 66-year-old female with a past medical history significant for hypertension, hyperlipidemia, and mitral valve prolapse who was in her usual state of health on 1/24 when she noted a cough and sudden onset of shortness of breath.  She checked her oxygen saturation at home and found to be 85%. She presented to the emergency department at [facility] where unfortunately she decompensated quickly requiring up to 15 L facemask and then eventually requiring intubation. She was initially hypertensive and tachycardic with an oxygen saturation of 86% on room air at the outside hospital and was transported on high peak and expiratory pressures on the ventilator with an FIO2 of 100%, achieving saturations in the low 90s. She became progressively more hypotensive requiring a norepinephrine drip. A stat bedside echo was performed with Dr. Delarosa in the ER, which revealed a ruptured mitral valve leaflet with severe MR as the likely cause of her decompensation.     ROS: Unable to obtain as the patient is sedated on the ventilator     Interval Events:  24 hour interval history reviewed    - (-)1.4L over last 24hr, (-)2.4L since admit   - tmax 98.5   - No overnight events since intubated   - on dopamine   - plan for MV repair/replace today      PFSH:  No change.    Respiratory:  Quinones Vent Mode: APVCMV, Rate (breaths/min): 24, Vt Target (mL): 400, PEEP/CPAP: 8, FiO2: 40, Static Compliance (ml / cm H2O): 55.2, Control VTE (exp VT): 402  Pulse Oximetry: 99 %    Exam: clear to auscultation without rales or wheezes  ImagingAvailable data reviewed     CTA chest 1/25:  1.  No evidence pulmonary emboli.  2.  Moderate right and small left pleural effusions.  3.  Compressive atelectasis both lower lobes especially the left and left lung volume loss with mediastinal  shift towards left.  4.  Patchy groundglass opacities within the right upper lobe consistent with focal areas of pneumonitis.  5.  7.5 mm left upper lobe nodule and smaller nodules within the lungs.  Recent Labs      01/29/17   0502  01/30/17   0410   ISTATAPH  7.479  7.487   ISTATAPCO2  25.8*  25.5*   ISTATAPO2  69  88*   ISTATATCO2  20  20   [id number]  95  98   ISTATARTHCO3  19.2  19.3   ISTATARTBE  -3  -3   ISTATTEMP  36.1 C  98.6 F   ISTATFIO2  40  40   ISTATSPEC  Arterial  Arterial   ISTATAPHTC  7.493  7.487   [id number]  65  88*       HemoDynamics:  Pulse: 65, Heart Rate (Monitored): 65  NIBP: 122/64 mmHg     Exam: 3/6 holosystolic murmur heard throughout the precordium, regular rate and rhythm  Imaging: echo Reviewed           Neuro:  GCS Total Kali Coma Score: 11     Exam:opens eyes to verbal stimuli  Imaging: Available data reviewed    Fluids:  Intake/Output       01/29/17 0700 - 01/30/17 0659 01/30/17 0700 - 01/31/17 0659 01/31/17 0700 - 02/01/17 0659      8773-4963 2091-6562 Total 5149-2119 1104-7482 Total 9394-3699 6777-6050 Total       Intake    I.V.  451.9  625.1 1077  276.6  308.4 585.1  --  -- --    Precedex Volume 161.9 293.5 455.4 148.7 146.4 295.1 -- -- --    Amiodarone Volume 170 238 408 94.9 -- 94.9 -- -- --    Propofol Volume 26 -- 26 -- -- -- -- -- --    Norepinephrine Volume 94 93.6 187.6 33 -- 33 -- -- --    Dopamine Volume -- -- -- -- 162 162 -- -- --    Other  150  30 180  60  90 150  --  -- --    Medications (P.O./ Enteral Liquids) 150 30 180 60 90 150 -- -- --    Enteral  570  570 1140  350  360 710  --  -- --    Enteral Volume 480 480 960 320 240 560 -- -- --    Free Water / Tube Flush 90 90 180 30 120 150 -- -- --    Total Intake 1171.9 1225.1 2397 686.6 758.4 1445.1 -- -- --       Output    Urine  1265  675 1940  --  1450 1450  --  -- --    Indwelling Cathether 6941 417 8637 -- 1450 1450 -- -- --    Drains  0  -- 0  --  0 0  --  -- --    Residual Amount (ml) (Discarded) 0 -- 0  -- 0 0 -- -- --    Stool/Urine  --  -- --  1425  -- 1425  --  -- --    Measurable Stool (ml) -- -- -- 1425 -- 1425 -- -- --    Total Output 3378 433 6586 1425 1450 2875 -- -- --       Net I/O     -93.1 550.1 457 -738.4 -691.6 -1430 -- -- --           Recent Labs      17   0502   SODIUM  140  139  143   POTASSIUM  3.9  3.6  3.6   CHLORIDE  107  107  111   CO2  22  22  23   BUN  49*  52*  62*   CREATININE  0.91  0.74  0.92   MAGNESIUM  2.3  2.3  2.3   CALCIUM  8.8  9.1  9.4       GI/Nutrition:  Exam: abdomen is soft and non-tender, normal active bowel sounds  Imaging: Available data reviewed  CorTrak: at goal with tube feeds  Liver Function  Recent Labs      17   0502   ALTSGPT  16  23  29   ASTSGOT  18  23  24   ALKPHOSPHAT  65  65  73   TBILIRUBIN  0.7  0.5  0.4   PREALBUMIN   --   14.0*   --    GLUCOSE  125*  117*  105*       Heme:  Recent Labs      17   0502  17   0503   RBC  3.75*  3.57*  3.79*   --    HEMOGLOBIN  11.5*  11.0*  11.6*   --    HEMATOCRIT  35.1*  33.7*  36.2*   --    PLATELETCT  247  232  291   --    PROTHROMBTM   --    --    --   14.1   APTT   --    --    --   29.2   INR   --    --    --   1.06       Infectious Disease:  Temp  Av.5 °C (97.7 °F)  Min: 35.9 °C (96.6 °F)  Max: 36.9 °C (98.5 °F)  Micro: antibiotics reviewed, cultures pending and cultures reviewed  Recent Labs      17   0502   WBC  8.2  5.3  7.2   NEUTSPOLYS  78.20*  61.20  73.20*   LYMPHOCYTES  9.20*  18.50*  13.10*   MONOCYTES  11.00  17.40*  10.90   EOSINOPHILS  0.70  2.10  1.80   BASOPHILS  0.40  0.20  0.30   ASTSGOT  18  23  24   ALTSGPT  16  23  29   ALKPHOSPHAT  65  65  73   TBILIRUBIN  0.7  0.5  0.4     Current Facility-Administered Medications   Medication Dose Frequency Provider Last Rate Last Dose   • potassium chloride (KLOR-CON) 20 MEQ packet 20 mEq  20  mEq BID Masoud Aguirre M.D.   20 mEq at 01/30/17 2039   • DOPamine 3.2 mg/mL in D5W 250 mL infusion  0.5-20 mcg/kg/min Continuous Lj Philip M.D. 9.2 mL/hr at 01/31/17 0616 8 mcg/kg/min at 01/31/17 0616   • mupirocin (BACTROBAN) 2 % ointment   BID RACHEL Valderrama.P.N.   1 Application at 01/30/17 2040   • cefUROXime (ZINACEF) 1,500 mg in  mL IVPB  1.5 g Once RACHEL Valderrama.P.N.       • MD ALERT... vancomycin per pharmacy protocol 1 Each  1 Each PRN RACHEL Valderrama.P.N.       • vancomycin 900 mg in  mL IVPB  15 mg/kg Once Charan Jo, PHARMD       • quetiapine (SEROQUEL) tablet 25 mg  25 mg TID Lesley Atkins M.D.   25 mg at 01/30/17 2039   • dexmedetomidine (PRECEDEX) 200 mcg in NS 50 mL infusion  0.1-1.5 mcg/kg/hr Continuous Lesley Atkins M.D. 12.2 mL/hr at 01/31/17 0521 0.8 mcg/kg/hr at 01/31/17 0521   • furosemide (LASIX) injection 20 mg  20 mg BID DIURETIC Navid Ledesma M.D.   20 mg at 01/31/17 0513   • ibuprofen (MOTRIN) oral suspension 400 mg  400 mg Q6HRS PRN Lesley Atkins M.D.   400 mg at 01/27/17 1032   • aspirin EC (ECOTRIN) tablet 81 mg  81 mg DAILY Navid Ledesma M.D.   81 mg at 01/30/17 0835   • glucose 4 g chewable tablet 16 g  16 g Q15 MIN PRN James Pérez M.D.        And   • dextrose 50% (D50W) injection 25 mL  25 mL Q15 MIN PRN James Pérez M.D.       • multivitamin (THERAGRAN) tablet 1 Tab  1 Tab DAILY James Pérez M.D.   1 Tab at 01/30/17 0835   • Respiratory Care per Protocol   Continuous RT Jeremy M Gonda, M.D.       • lactulose 20 GM/30ML solution 30 mL  30 mL Q24HRS PRN Jeremy M Gonda, M.D.       • bisacodyl (DULCOLAX) suppository 10 mg  10 mg Q24HRS PRN Jeremy M Gonda, M.D.       • fleet enema 133 mL  1 Each Once PRN Jeremy M Gonda, M.D.       • chlorhexidine (PERIDEX) 0.12 % solution 15 mL  15 mL BID Jeremy M Gonda, M.D.   15 mL at 01/30/17 2039   • lidocaine (XYLOCAINE) 1%  injection  1-2 mL Q30 MIN PRN Jeremy M Gonda, M.D.   1  mL at 01/28/17 0418   • fentaNYL (SUBLIMAZE) injection 25 mcg  25 mcg Q HOUR PRN Jeremy M Gonda, M.D.   25 mcg at 01/30/17 1833    Or   • fentaNYL (SUBLIMAZE) injection 50 mcg  50 mcg Q HOUR PRN Jeremy M Gonda, M.D.   50 mcg at 01/27/17 2053    Or   • fentaNYL (SUBLIMAZE) injection 100 mcg  100 mcg Q HOUR PRN Jeremy M Gonda, M.D.   100 mcg at 01/31/17 0057   • ipratropium-albuterol (DUONEB) nebulizer solution 3 mL  3 mL Q2HRS PRN (RT) Jeremy M Gonda, M.D.   Stopped at 01/30/17 1020   • famotidine (PEPCID) tablet 20 mg  20 mg Q12HRS Jeremy M Gonda, M.D.   20 mg at 01/30/17 2039   • cefTRIAXone (ROCEPHIN) 1 g in  mL IVPB  1 g Q24HRS Lesley Atkins M.D.   Stopped at 01/30/17 0909   • doxycycline (VIBRAMYCIN) 100 mg in  mL IVPB  100 mg Q12HRS Lesley Atkins M.D.   Stopped at 01/30/17 2139   • oyster shell calcium/vitamin D 250-125 MG-UNIT tablet 1 Tab  1 Tab QDAY with Breakfast James Pérez M.D.   Stopped at 01/31/17 0730   • pravastatin (PRAVACHOL) tablet 20 mg  20 mg Q EVENING Jeremy M Gonda, M.D.   20 mg at 01/30/17 2039     Last reviewed on 1/30/2017  8:02 PM by Purvi Montanez R.N.    Quality  Measures:  Medications reviewed, EKG reviewed, Labs reviewed and Radiology images reviewed  Frank catheter: Critically Ill - Requiring Accurate Measurement of Urinary Output and Unconscious / Sedated Patient on a Ventilator  Central line in place: Need for access and Vasopressors    DVT Prophylaxis: Heparin  DVT prophylaxis - mechanical: SCDs  Ulcer prophylaxis: Yes  Antibiotics: Treating active infection/contamination beyond 24 hours perioperative coverage      Lines  RIJ TLC 1/24    Gtt  precedex 0.8  Dopamine 8    Abx  Rocephin  Doxycycline    Bcx 1/24 ngtd  Ucx 1/24 ngtd  Flu (-) 1/24    Echo 1/26 E 65%    Lasix 20 bid    Problems/Plan:    Acute Hypoxic Respiratory Failure from pulmonary edema and cardiogenic shock   - intubated 1/24, failed extubation 1/28 and re-intubated   - Re-intubated within  20-30 minutes due to tachy/unresponsive/hypoxic likely due to flash pulm edema   - cont full vent support   - RT/O2 therapies   - SBTs  Acute Pulmonary Edema   - RT/O2 protocols   - cont vent support   - will gentle diuresis once stable  Acute Delirium   - Seroquel 25mg TID   - Cont Precedex  Tachycardia that ? A flutter   - Amio bolus and gtt--> back to sinus  Severe Mitral Valve Regurgitation    - cardiac cath on 1/25   - for repair/replacement today  Cardiogenic Shock likely related to severe mitral regurgitation   - s/p vasopressor/ionotropic therapy   - Cards following   - Cardiac cath on 1/25 was negative and heparin stopped   - CTA was negative  Acute Leukocytosis and concern for aspiration   - blood/sputum cultures sent and follow   - Empiric Ceftriaxone/Doxycycline day 7 of 10  Elevated Troponin   - cards following   - ASA  Hyperglycemia   - ISS  Hx of mitral valve prolapse  Hx of systemic arterial hypertension  Hx of Dyslipidemia   - statin therapy  Prophylaxis   - heparin, bowel regimen, pepcid, enteral feeds    Discussed patient condition and risk of morbidity and/or mortality with Family, RN, RT, Pharmacy, , [employer] [facility] resident and Charge nurse / hot rounds and well as cardiology  The patient remains critically ill.  Critical care time = 39 minutes in directly providing and coordinating critical care and extensive data review.  No time overlap and excludes procedures.

## 2017-01-31 NOTE — CONSULTS
DATE OF SERVICE:  01/30/2017    REFERRING PHYSICIAN:  Dr. Ramirez, ([facility]).    REASON FOR CONSULTATION:  Consideration for mitral valve repair or   replacement.    CHIEF COMPLAINT:  Shortness of breath.    HISTORY OF PRESENT ILLNESS:  Please note that all the information contained in   this dictation was obtained from the chart since the patient is   endotracheally intubated.  The patient is a 66-year-old female with known   mitral valve prolapse.  She was admitted to the hospital in respiratory   failure and required endotracheal intubation and mechanical ventilation.  A   transesophageal echocardiography showed severe P2 prolapse of the mitral valve   and severe mitral regurgitation.  Her left ventricular ejection fraction was   65%.  Cardiac catheterization did not show any hemodynamically significant   coronary artery disease.  There was severe left atrial enlargement and mitral   regurgitation.    PAST MEDICAL HISTORY:  Significant for hypertension, dyslipidemia, subdural   hematoma.    ALLERGIES:  TO CODEINE AND LATEX.    MEDICATIONS:  Pravastatin and losartan.    FAMILY HISTORY:  Noncontributory.    PSYCHOSOCIAL HISTORY:  She has a 10-pack-year history of smoking.  She does   not use alcohol.    REVIEW OF SYSTEMS:  NEUROLOGIC:  There is no history of strokes.  She has had the subdural   hematoma evacuated.  RESPIRATORY:  Shortness of breath.  CARDIAC:  As per history of present illness.  The remainder of the review of   systems is negative.    PHYSICAL EXAMINATION:  GENERAL:  Well-developed, well-nourished 66-year-old female in respiratory   distress.  She is sedated and mechanically ventilated.  VITAL SIGNS:  She is afebrile and her vital signs are stable, although she is   requiring inotropic support.  Height 5 feet 9 inches, weight 134 pounds.  NECK:  Supple, without jugular venous distention.  There is no cervical   lymphadenopathy.  HEENT:  Normocephalic, atraumatic.  Extraocular  muscles intact.  Her nasal and   oral mucosa are pale and dry.  SKIN:  Normal.  RESPIRATORY:  Coarse crackles bilaterally.  CARDIAC:  Regular rate and rhythm with a 2/6 holosystolic murmur.  ABDOMEN:  Soft, nondistended, nontender.  EXTREMITIES:  There is no clubbing, cyanosis or edema.  VASCULAR:  There are weak peripheral pulses bilaterally.  NEUROLOGIC:  Unable to assess.    IMPRESSION:  Acute respiratory failure, severe mitral regurgitation (3-4+,   degenerative), severe mitral valve prolapse, hypertension, dyslipidemia,   history of subdural hematoma.    PLAN:  I recommend that she undergo mitral valve repair or replacement,   possible maze procedure, and intraoperative transesophageal echocardiography.    Risks, benefits, potential complications and alternative treatments were   discussed with the patient's  in detail including her risks should she   decide not to undergo my recommended treatment.  All of his questions were   answered to his satisfaction and he is willing to have his wife proceed with   the operation.  Risks include death, stroke, bleeding, infection,   perioperative myocardial infarction, dysrhythmias, organ failure, possible   return to the operating room for bleeding, drug or transfusion reactions.  Her   operative mortality risk is in the 5-10% range considering her condition.    The operation will be performed on Tuesday, 01/31/2017 at noon.  Findings and   recommendations were discussed with the patient's cardiologist, Dr. Ramirez.    Thank you for this very challenging consultation and participation in the   patient's care.       ____________________________________     MD JASMINA MAYER / JANET    DD:  01/30/2017 13:40:26  DT:  01/30/2017 17:52:52    D#:  [id number]  Job#:  [id number]    cc: Kurtis ADLER DO, DO

## 2017-01-31 NOTE — CARE PLAN
Problem: Pre Op  Goal: Optimal preparation for CABG/Heart Valve surgery  Outcome: PROGRESSING AS EXPECTED  Intervention: Pre Op education to patient/significant other. Provide patient [facility] Patient Guideline for Cardiac Surgery (See Pt. Ed.)  Done   Intervention: Consents obtained for Surgery, Anesthesia and Transfusion/Bloodless Program Participant  Consents signed by spouse.   Intervention: Pre op checklist completed. Admit profile completed. Advanced directive verified.  Admit profile completed.   Intervention: Pre op labs per MD order: CBC, CMP, INR, PTT, COD if not done in past 72 hours. HbA1C if diabetic.  Done   Intervention: Pre op diagnostics per MD order (EKG, CXR, Bilateral carotid doppler study and vein mapping)  Done   Intervention: Baseline assessment documented to include IS volume, weight, bilateral BP and peripheral pulses.  Done  Intervention: NPO at midnight except cardiac medications. (No ASA, coumadin or Plavix)  Done  Intervention: Shower with chlorhexidine x 2  Done   Intervention: Prep with clippers  Done   Intervention: Remove dentures, valuables and jewelry  Done   Intervention: Determine if patient is taking Beta Blockers  Contraindicated per Hannah Sumner  Intervention: If diabetic, administer insulin night before surgery  Not diabetic  Intervention: If diabetic, FSBS in am and follow sliding scale if indicated  Not diabetic

## 2017-01-31 NOTE — PROGRESS NOTES
Pt transferred from T605 to T615 on transport monitor x2 RN and RT. Pt tolerated well.  at bedside. Assessment complete. Pt complain of R ear pain. Pulses palpable x4 extremities. Heart sounds normal. Lungs clear to diminished. Bowel sounds positive. No other questions at this time.

## 2017-02-01 ENCOUNTER — APPOINTMENT (OUTPATIENT)
Dept: RADIOLOGY | Facility: MEDICAL CENTER | Age: 67
DRG: 216 | End: 2017-02-01
Attending: INTERNAL MEDICINE
Payer: MEDICARE

## 2017-02-01 ENCOUNTER — APPOINTMENT (OUTPATIENT)
Dept: RADIOLOGY | Facility: MEDICAL CENTER | Age: 67
DRG: 216 | End: 2017-02-01
Attending: THORACIC SURGERY (CARDIOTHORACIC VASCULAR SURGERY)
Payer: MEDICARE

## 2017-02-01 LAB
ACT BLD: 132 SEC (ref 74–137)
ACT BLD: 157 SEC (ref 74–137)
ACT BLD: 461 SEC (ref 74–137)
ACT BLD: 657 SEC (ref 74–137)
ALBUMIN SERPL BCP-MCNC: 3 G/DL (ref 3.2–4.9)
ALBUMIN/GLOB SERPL: 1.6 G/DL
ALP SERPL-CCNC: 57 U/L (ref 30–99)
ALT SERPL-CCNC: 24 U/L (ref 2–50)
ANION GAP SERPL CALC-SCNC: 6 MMOL/L (ref 0–11.9)
ANISOCYTOSIS BLD QL SMEAR: ABNORMAL
AST SERPL-CCNC: 50 U/L (ref 12–45)
BASE EXCESS BLDA CALC-SCNC: -3 MMOL/L (ref -4–3)
BASE EXCESS BLDA CALC-SCNC: -4 MMOL/L (ref -4–3)
BASE EXCESS BLDA CALC-SCNC: 0 MMOL/L (ref -4–3)
BASE EXCESS BLDV CALC-SCNC: 0 MMOL/L (ref -4–3)
BASOPHILS # BLD AUTO: 0 % (ref 0–1.8)
BASOPHILS # BLD: 0 K/UL (ref 0–0.12)
BILIRUB SERPL-MCNC: 0.4 MG/DL (ref 0.1–1.5)
BODY TEMPERATURE: ABNORMAL DEGREES
BUN SERPL-MCNC: 50 MG/DL (ref 8–22)
CA-I BLD ISE-SCNC: 1.01 MMOL/L (ref 1.1–1.3)
CA-I BLD ISE-SCNC: 1.06 MMOL/L (ref 1.1–1.3)
CA-I BLD ISE-SCNC: 1.08 MMOL/L (ref 1.1–1.3)
CALCIUM SERPL-MCNC: 8.3 MG/DL (ref 8.5–10.5)
CHLORIDE SERPL-SCNC: 116 MMOL/L (ref 96–112)
CO2 BLDA-SCNC: 22 MMOL/L (ref 20–33)
CO2 BLDA-SCNC: 22 MMOL/L (ref 20–33)
CO2 BLDA-SCNC: 23 MMOL/L (ref 20–33)
CO2 BLDA-SCNC: 23 MMOL/L (ref 20–33)
CO2 BLDA-SCNC: 24 MMOL/L (ref 20–33)
CO2 BLDA-SCNC: 28 MMOL/L (ref 20–33)
CO2 BLDV-SCNC: 26 MMOL/L (ref 20–33)
CO2 SERPL-SCNC: 23 MMOL/L (ref 20–33)
CREAT SERPL-MCNC: 0.7 MG/DL (ref 0.5–1.4)
EKG IMPRESSION: NORMAL
EKG IMPRESSION: NORMAL
EOSINOPHIL # BLD AUTO: 0 K/UL (ref 0–0.51)
EOSINOPHIL NFR BLD: 0 % (ref 0–6.9)
ERYTHROCYTE [DISTWIDTH] IN BLOOD BY AUTOMATED COUNT: 54.4 FL (ref 35.9–50)
GFR SERPL CREATININE-BSD FRML MDRD: >60 ML/MIN/1.73 M 2
GLOBULIN SER CALC-MCNC: 1.9 G/DL (ref 1.9–3.5)
GLUCOSE BLD-MCNC: 104 MG/DL (ref 65–99)
GLUCOSE BLD-MCNC: 105 MG/DL (ref 65–99)
GLUCOSE BLD-MCNC: 105 MG/DL (ref 65–99)
GLUCOSE BLD-MCNC: 107 MG/DL (ref 65–99)
GLUCOSE BLD-MCNC: 118 MG/DL (ref 65–99)
GLUCOSE BLD-MCNC: 129 MG/DL (ref 65–99)
GLUCOSE BLD-MCNC: 172 MG/DL (ref 65–99)
GLUCOSE BLD-MCNC: 59 MG/DL (ref 65–99)
GLUCOSE BLD-MCNC: 64 MG/DL (ref 65–99)
GLUCOSE BLD-MCNC: 65 MG/DL (ref 65–99)
GLUCOSE BLD-MCNC: 65 MG/DL (ref 65–99)
GLUCOSE BLD-MCNC: 66 MG/DL (ref 65–99)
GLUCOSE BLD-MCNC: 70 MG/DL (ref 65–99)
GLUCOSE BLD-MCNC: 75 MG/DL (ref 65–99)
GLUCOSE BLD-MCNC: 81 MG/DL (ref 65–99)
GLUCOSE BLD-MCNC: 81 MG/DL (ref 65–99)
GLUCOSE BLD-MCNC: 82 MG/DL (ref 65–99)
GLUCOSE BLD-MCNC: 83 MG/DL (ref 65–99)
GLUCOSE BLD-MCNC: 83 MG/DL (ref 65–99)
GLUCOSE BLD-MCNC: 89 MG/DL (ref 65–99)
GLUCOSE BLD-MCNC: 90 MG/DL (ref 65–99)
GLUCOSE SERPL-MCNC: 121 MG/DL (ref 65–99)
HCO3 BLDA-SCNC: 21 MMOL/L (ref 17–25)
HCO3 BLDA-SCNC: 21.2 MMOL/L (ref 17–25)
HCO3 BLDA-SCNC: 21.5 MMOL/L (ref 17–25)
HCO3 BLDA-SCNC: 21.6 MMOL/L (ref 17–25)
HCO3 BLDA-SCNC: 22.5 MMOL/L (ref 17–25)
HCO3 BLDA-SCNC: 26.1 MMOL/L (ref 17–25)
HCO3 BLDV-SCNC: 24.4 MMOL/L (ref 24–28)
HCT VFR BLD AUTO: 31.8 % (ref 37–47)
HCT VFR BLD CALC: 22 % (ref 37–47)
HCT VFR BLD CALC: 23 % (ref 37–47)
HGB BLD-MCNC: 10.2 G/DL (ref 12–16)
HGB BLD-MCNC: 7.5 G/DL (ref 12–16)
HGB BLD-MCNC: 7.8 G/DL (ref 12–16)
INR PPP: 1.25 (ref 0.87–1.13)
LV EJECT FRACT  99904: 55
LYMPHOCYTES # BLD AUTO: 0.14 K/UL (ref 1–4.8)
LYMPHOCYTES NFR BLD: 0.8 % (ref 22–41)
MANUAL DIFF BLD: NORMAL
MCH RBC QN AUTO: 30.6 PG (ref 27–33)
MCHC RBC AUTO-ENTMCNC: 32.1 G/DL (ref 33.6–35)
MCV RBC AUTO: 95.5 FL (ref 81.4–97.8)
METAMYELOCYTES NFR BLD MANUAL: 0.9 %
MICROCYTES BLD QL SMEAR: ABNORMAL
MONOCYTES # BLD AUTO: 0.62 K/UL (ref 0–0.85)
MONOCYTES NFR BLD AUTO: 3.5 % (ref 0–13.4)
MORPHOLOGY BLD-IMP: NORMAL
NEUTROPHILS # BLD AUTO: 16.68 K/UL (ref 2–7.15)
NEUTROPHILS NFR BLD: 94.8 % (ref 44–72)
NRBC # BLD AUTO: 0 K/UL
NRBC BLD AUTO-RTO: 0 /100 WBC
O2/TOTAL GAS SETTING VFR VENT: 60 %
O2/TOTAL GAS SETTING VFR VENT: 70 %
O2/TOTAL GAS SETTING VFR VENT: 80 %
O2/TOTAL GAS SETTING VFR VENT: 80 %
O2/TOTAL GAS SETTING VFR VENT: 90 %
PCO2 BLDA: 35.7 MMHG (ref 26–37)
PCO2 BLDA: 38.8 MMHG (ref 26–37)
PCO2 BLDA: 39 MMHG (ref 26–37)
PCO2 BLDA: 39.1 MMHG (ref 26–37)
PCO2 BLDA: 40 MMHG (ref 26–37)
PCO2 BLDA: 49.7 MMHG (ref 26–37)
PCO2 BLDV: 37.7 MMHG (ref 41–51)
PCO2 TEMP ADJ BLDA: 34.1 MMHG (ref 26–37)
PCO2 TEMP ADJ BLDA: 37.1 MMHG (ref 26–37)
PCO2 TEMP ADJ BLDA: 37.1 MMHG (ref 26–37)
PCO2 TEMP ADJ BLDA: 37.4 MMHG (ref 26–37)
PCO2 TEMP ADJ BLDA: 37.9 MMHG (ref 26–37)
PCO2 TEMP ADJ BLDA: 49.7 MMHG (ref 26–37)
PCO2 TEMP ADJ BLDV: 37.7 MMHG (ref 41–51)
PH BLDA: 7.33 [PH] (ref 7.4–7.5)
PH BLDA: 7.34 [PH] (ref 7.4–7.5)
PH BLDA: 7.35 [PH] (ref 7.4–7.5)
PH BLDA: 7.35 [PH] (ref 7.4–7.5)
PH BLDA: 7.36 [PH] (ref 7.4–7.5)
PH BLDA: 7.38 [PH] (ref 7.4–7.5)
PH BLDV: 7.42 [PH] (ref 7.31–7.45)
PH TEMP ADJ BLDA: 7.33 [PH] (ref 7.4–7.5)
PH TEMP ADJ BLDA: 7.36 [PH] (ref 7.4–7.5)
PH TEMP ADJ BLDA: 7.37 [PH] (ref 7.4–7.5)
PH TEMP ADJ BLDA: 7.37 [PH] (ref 7.4–7.5)
PH TEMP ADJ BLDA: 7.38 [PH] (ref 7.4–7.5)
PH TEMP ADJ BLDA: 7.4 [PH] (ref 7.4–7.5)
PH TEMP ADJ BLDV: 7.42 [PH] (ref 7.31–7.45)
PLATELET # BLD AUTO: 257 K/UL (ref 164–446)
PLATELET BLD QL SMEAR: NORMAL
PMV BLD AUTO: 11.6 FL (ref 9–12.9)
PO2 BLDA: 115 MMHG (ref 64–87)
PO2 BLDA: 391 MMHG (ref 64–87)
PO2 BLDA: 70 MMHG (ref 64–87)
PO2 BLDA: 70 MMHG (ref 64–87)
PO2 BLDA: 74 MMHG (ref 64–87)
PO2 BLDA: 78 MMHG (ref 64–87)
PO2 BLDV: 51 MMHG (ref 25–40)
PO2 TEMP ADJ BLDA: 109 MMHG (ref 64–87)
PO2 TEMP ADJ BLDA: 391 MMHG (ref 64–87)
PO2 TEMP ADJ BLDA: 64 MMHG (ref 64–87)
PO2 TEMP ADJ BLDA: 66 MMHG (ref 64–87)
PO2 TEMP ADJ BLDA: 68 MMHG (ref 64–87)
PO2 TEMP ADJ BLDA: 72 MMHG (ref 64–87)
PO2 TEMP ADJ BLDV: 51 MMHG (ref 25–40)
POTASSIUM BLD-SCNC: 3.6 MMOL/L (ref 3.6–5.5)
POTASSIUM BLD-SCNC: 4.1 MMOL/L (ref 3.6–5.5)
POTASSIUM BLD-SCNC: 5 MMOL/L (ref 3.6–5.5)
POTASSIUM SERPL-SCNC: 3.5 MMOL/L (ref 3.6–5.5)
POTASSIUM SERPL-SCNC: 4 MMOL/L (ref 3.6–5.5)
POTASSIUM SERPL-SCNC: 4.4 MMOL/L (ref 3.6–5.5)
PROT SERPL-MCNC: 4.9 G/DL (ref 6–8.2)
PROTHROMBIN TIME: 16.1 SEC (ref 12–14.6)
RBC # BLD AUTO: 3.33 M/UL (ref 4.2–5.4)
RBC BLD AUTO: PRESENT
SAO2 % BLDA: 100 % (ref 93–99)
SAO2 % BLDA: 93 % (ref 93–99)
SAO2 % BLDA: 93 % (ref 93–99)
SAO2 % BLDA: 94 % (ref 93–99)
SAO2 % BLDA: 95 % (ref 93–99)
SAO2 % BLDA: 98 % (ref 93–99)
SAO2 % BLDV: 87 %
SODIUM BLD-SCNC: 143 MMOL/L (ref 135–145)
SODIUM BLD-SCNC: 144 MMOL/L (ref 135–145)
SODIUM SERPL-SCNC: 145 MMOL/L (ref 135–145)
SPECIMEN DRAWN FROM PATIENT: ABNORMAL
WBC # BLD AUTO: 17.6 K/UL (ref 4.8–10.8)

## 2017-02-01 PROCEDURE — A9270 NON-COVERED ITEM OR SERVICE: HCPCS | Performed by: INTERNAL MEDICINE

## 2017-02-01 PROCEDURE — 700111 HCHG RX REV CODE 636 W/ 250 OVERRIDE (IP): Performed by: THORACIC SURGERY (CARDIOTHORACIC VASCULAR SURGERY)

## 2017-02-01 PROCEDURE — 700111 HCHG RX REV CODE 636 W/ 250 OVERRIDE (IP)

## 2017-02-01 PROCEDURE — A9270 NON-COVERED ITEM OR SERVICE: HCPCS | Performed by: NURSE PRACTITIONER

## 2017-02-01 PROCEDURE — 700111 HCHG RX REV CODE 636 W/ 250 OVERRIDE (IP): Performed by: INTERNAL MEDICINE

## 2017-02-01 PROCEDURE — 93005 ELECTROCARDIOGRAM TRACING: CPT | Performed by: NURSE PRACTITIONER

## 2017-02-01 PROCEDURE — A9270 NON-COVERED ITEM OR SERVICE: HCPCS

## 2017-02-01 PROCEDURE — 700102 HCHG RX REV CODE 250 W/ 637 OVERRIDE(OP): Performed by: STUDENT IN AN ORGANIZED HEALTH CARE EDUCATION/TRAINING PROGRAM

## 2017-02-01 PROCEDURE — 700102 HCHG RX REV CODE 250 W/ 637 OVERRIDE(OP): Performed by: INTERNAL MEDICINE

## 2017-02-01 PROCEDURE — 93325 DOPPLER ECHO COLOR FLOW MAPG: CPT

## 2017-02-01 PROCEDURE — 700105 HCHG RX REV CODE 258: Performed by: INTERNAL MEDICINE

## 2017-02-01 PROCEDURE — 94669 MECHANICAL CHEST WALL OSCILL: CPT

## 2017-02-01 PROCEDURE — 82803 BLOOD GASES ANY COMBINATION: CPT

## 2017-02-01 PROCEDURE — 700111 HCHG RX REV CODE 636 W/ 250 OVERRIDE (IP): Performed by: NURSE PRACTITIONER

## 2017-02-01 PROCEDURE — 37799 UNLISTED PX VASCULAR SURGERY: CPT

## 2017-02-01 PROCEDURE — 80053 COMPREHEN METABOLIC PANEL: CPT

## 2017-02-01 PROCEDURE — 85007 BL SMEAR W/DIFF WBC COUNT: CPT

## 2017-02-01 PROCEDURE — 85610 PROTHROMBIN TIME: CPT

## 2017-02-01 PROCEDURE — 94770 HCHG CO2 EXPIRED GAS DETERMINATION: CPT

## 2017-02-01 PROCEDURE — 85027 COMPLETE CBC AUTOMATED: CPT

## 2017-02-01 PROCEDURE — 93321 DOPPLER ECHO F-UP/LMTD STD: CPT

## 2017-02-01 PROCEDURE — A9270 NON-COVERED ITEM OR SERVICE: HCPCS | Performed by: STUDENT IN AN ORGANIZED HEALTH CARE EDUCATION/TRAINING PROGRAM

## 2017-02-01 PROCEDURE — 84132 ASSAY OF SERUM POTASSIUM: CPT

## 2017-02-01 PROCEDURE — 94667 MNPJ CHEST WALL 1ST: CPT

## 2017-02-01 PROCEDURE — 93312 ECHO TRANSESOPHAGEAL: CPT

## 2017-02-01 PROCEDURE — 700101 HCHG RX REV CODE 250: Performed by: INTERNAL MEDICINE

## 2017-02-01 PROCEDURE — 94003 VENT MGMT INPAT SUBQ DAY: CPT

## 2017-02-01 PROCEDURE — 700102 HCHG RX REV CODE 250 W/ 637 OVERRIDE(OP): Performed by: NURSE PRACTITIONER

## 2017-02-01 PROCEDURE — 700101 HCHG RX REV CODE 250: Performed by: NURSE PRACTITIONER

## 2017-02-01 PROCEDURE — 700102 HCHG RX REV CODE 250 W/ 637 OVERRIDE(OP)

## 2017-02-01 PROCEDURE — 93010 ELECTROCARDIOGRAM REPORT: CPT | Performed by: INTERNAL MEDICINE

## 2017-02-01 PROCEDURE — 700105 HCHG RX REV CODE 258

## 2017-02-01 PROCEDURE — 99291 CRITICAL CARE FIRST HOUR: CPT | Mod: GC | Performed by: INTERNAL MEDICINE

## 2017-02-01 PROCEDURE — 700105 HCHG RX REV CODE 258: Performed by: NURSE PRACTITIONER

## 2017-02-01 PROCEDURE — 770020 HCHG ROOM/CARE - TELE (206)

## 2017-02-01 PROCEDURE — 71010 DX-CHEST-PORTABLE (1 VIEW): CPT

## 2017-02-01 PROCEDURE — 82962 GLUCOSE BLOOD TEST: CPT

## 2017-02-01 RX ORDER — QUETIAPINE FUMARATE 25 MG/1
25 TABLET, FILM COATED ORAL EVERY EVENING
Status: DISCONTINUED | OUTPATIENT
Start: 2017-02-01 | End: 2017-02-03

## 2017-02-01 RX ORDER — POTASSIUM CHLORIDE 14.9 MG/ML
20 INJECTION INTRAVENOUS ONCE
Status: COMPLETED | OUTPATIENT
Start: 2017-02-01 | End: 2017-02-01

## 2017-02-01 RX ORDER — POTASSIUM CHLORIDE 7.45 MG/ML
10 INJECTION INTRAVENOUS ONCE
Status: COMPLETED | OUTPATIENT
Start: 2017-02-01 | End: 2017-02-01

## 2017-02-01 RX ORDER — FUROSEMIDE 10 MG/ML
40 INJECTION INTRAMUSCULAR; INTRAVENOUS ONCE
Status: COMPLETED | OUTPATIENT
Start: 2017-02-01 | End: 2017-02-01

## 2017-02-01 RX ORDER — WARFARIN SODIUM 5 MG/1
5 TABLET ORAL
Status: DISCONTINUED | OUTPATIENT
Start: 2017-02-01 | End: 2017-02-06

## 2017-02-01 RX ADMIN — DEXMEDETOMIDINE HYDROCHLORIDE 0.9 MCG/KG/HR: 100 INJECTION, SOLUTION, CONCENTRATE INTRAVENOUS at 02:07

## 2017-02-01 RX ADMIN — ENOXAPARIN SODIUM 40 MG: 100 INJECTION SUBCUTANEOUS at 19:50

## 2017-02-01 RX ADMIN — QUETIAPINE FUMARATE 25 MG: 25 TABLET, FILM COATED ORAL at 09:09

## 2017-02-01 RX ADMIN — MUPIROCIN 1 APPLICATION: 20 OINTMENT TOPICAL at 08:54

## 2017-02-01 RX ADMIN — MORPHINE SULFATE 4 MG: 4 INJECTION INTRAVENOUS at 00:54

## 2017-02-01 RX ADMIN — VANCOMYCIN HYDROCHLORIDE 900 MG: 10 INJECTION, POWDER, LYOPHILIZED, FOR SOLUTION INTRAVENOUS at 01:43

## 2017-02-01 RX ADMIN — FAMOTIDINE 20 MG: 20 TABLET, FILM COATED ORAL at 09:09

## 2017-02-01 RX ADMIN — FUROSEMIDE 40 MG: 10 INJECTION, SOLUTION INTRAVENOUS at 08:42

## 2017-02-01 RX ADMIN — STANDARDIZED SENNA CONCENTRATE AND DOCUSATE SODIUM 1 TABLET: 8.6; 5 TABLET, FILM COATED ORAL at 19:50

## 2017-02-01 RX ADMIN — DOXYCYCLINE 100 MG: 100 INJECTION, POWDER, LYOPHILIZED, FOR SOLUTION INTRAVENOUS at 10:24

## 2017-02-01 RX ADMIN — HYDROCODONE BITARTRATE AND ACETAMINOPHEN 2 TABLET: 5; 325 TABLET ORAL at 13:10

## 2017-02-01 RX ADMIN — MORPHINE SULFATE 4 MG: 4 INJECTION INTRAVENOUS at 05:44

## 2017-02-01 RX ADMIN — PRAVASTATIN SODIUM 20 MG: 20 TABLET ORAL at 20:00

## 2017-02-01 RX ADMIN — VANCOMYCIN HYDROCHLORIDE 900 MG: 10 INJECTION, POWDER, LYOPHILIZED, FOR SOLUTION INTRAVENOUS at 13:45

## 2017-02-01 RX ADMIN — SODIUM CHLORIDE 4 UNITS/HR: 9 INJECTION, SOLUTION INTRAVENOUS at 01:13

## 2017-02-01 RX ADMIN — HYDROCODONE BITARTRATE AND ACETAMINOPHEN 2 TABLET: 5; 325 TABLET ORAL at 09:09

## 2017-02-01 RX ADMIN — DOXYCYCLINE 100 MG: 100 INJECTION, POWDER, LYOPHILIZED, FOR SOLUTION INTRAVENOUS at 20:20

## 2017-02-01 RX ADMIN — HYDROCODONE BITARTRATE AND ACETAMINOPHEN 2 TABLET: 5; 325 TABLET ORAL at 17:12

## 2017-02-01 RX ADMIN — DEXTROSE MONOHYDRATE 25 ML: 500 INJECTION PARENTERAL at 16:27

## 2017-02-01 RX ADMIN — FAMOTIDINE 20 MG: 20 TABLET, FILM COATED ORAL at 19:50

## 2017-02-01 RX ADMIN — MAGNESIUM SULFATE IN DEXTROSE 1 G: 10 INJECTION, SOLUTION INTRAVENOUS at 17:13

## 2017-02-01 RX ADMIN — CHLORHEXIDINE GLUCONATE 15 ML: 1.2 RINSE ORAL at 09:05

## 2017-02-01 RX ADMIN — THERA TABS 1 TABLET: TAB at 09:09

## 2017-02-01 RX ADMIN — WARFARIN SODIUM 5 MG: 5 TABLET ORAL at 17:12

## 2017-02-01 RX ADMIN — MUPIROCIN 1 APPLICATION: 20 OINTMENT TOPICAL at 21:00

## 2017-02-01 RX ADMIN — CALCIUM CARBONATE-CHOLECALCIFEROL TAB 250 MG-125 UNIT 1 TABLET: 250-125 TAB at 09:09

## 2017-02-01 RX ADMIN — CEFTRIAXONE SODIUM 1 G: 1 INJECTION, POWDER, FOR SOLUTION INTRAMUSCULAR; INTRAVENOUS at 08:46

## 2017-02-01 RX ADMIN — QUETIAPINE FUMARATE 25 MG: 25 TABLET, FILM COATED ORAL at 19:49

## 2017-02-01 RX ADMIN — ASPIRIN 81 MG: 81 TABLET ORAL at 09:09

## 2017-02-01 RX ADMIN — POTASSIUM CHLORIDE 10 MEQ: 7.46 INJECTION, SOLUTION INTRAVENOUS at 12:45

## 2017-02-01 RX ADMIN — MORPHINE SULFATE 4 MG: 4 INJECTION INTRAVENOUS at 02:09

## 2017-02-01 RX ADMIN — HYDROCODONE BITARTRATE AND ACETAMINOPHEN 1 TABLET: 5; 325 TABLET ORAL at 22:10

## 2017-02-01 RX ADMIN — POTASSIUM CHLORIDE 20 MEQ: 14.9 INJECTION, SOLUTION INTRAVENOUS at 00:49

## 2017-02-01 ASSESSMENT — LIFESTYLE VARIABLES: EVER_SMOKED: YES

## 2017-02-01 ASSESSMENT — COPD QUESTIONNAIRES
COPD SCREENING SCORE: 4
HAVE YOU SMOKED AT LEAST 100 CIGARETTES IN YOUR ENTIRE LIFE: YES
DURING THE PAST 4 WEEKS HOW MUCH DID YOU FEEL SHORT OF BREATH: NONE/LITTLE OF THE TIME
DO YOU EVER COUGH UP ANY MUCUS OR PHLEGM?: NO/ONLY WITH OCCASIONAL COLDS OR INFECTIONS

## 2017-02-01 ASSESSMENT — ENCOUNTER SYMPTOMS
CARDIOVASCULAR NEGATIVE: 1
COUGH: 1
HALLUCINATIONS: 0
HEADACHES: 0
BLURRED VISION: 0
CONSTITUTIONAL NEGATIVE: 1
NERVOUS/ANXIOUS: 0
DIARRHEA: 0
FEVER: 0
SHORTNESS OF BREATH: 0
EYES NEGATIVE: 1
MYALGIAS: 0
CONSTIPATION: 0
NAUSEA: 0
FALLS: 0
RESPIRATORY NEGATIVE: 1
GASTROINTESTINAL NEGATIVE: 1
MUSCULOSKELETAL NEGATIVE: 1
SORE THROAT: 1
FOCAL WEAKNESS: 0
NEUROLOGICAL NEGATIVE: 1
PSYCHIATRIC NEGATIVE: 1
CHILLS: 0
VOMITING: 0
DOUBLE VISION: 0
SEIZURES: 0

## 2017-02-01 ASSESSMENT — PAIN SCALES - GENERAL
PAINLEVEL_OUTOF10: 3
PAINLEVEL_OUTOF10: 0
PAINLEVEL_OUTOF10: 0
PAINLEVEL_OUTOF10: 7
PAINLEVEL_OUTOF10: 6
PAINLEVEL_OUTOF10: 3
PAINLEVEL_OUTOF10: 4
PAINLEVEL_OUTOF10: 4
PAINLEVEL_OUTOF10: 0
PAINLEVEL_OUTOF10: 8
PAINLEVEL_OUTOF10: 8
PAINLEVEL_OUTOF10: 0
PAINLEVEL_OUTOF10: 5
PAINLEVEL_OUTOF10: 0
PAINLEVEL_OUTOF10: 5
PAINLEVEL_OUTOF10: 0
PAINLEVEL_OUTOF10: 5

## 2017-02-01 NOTE — PROGRESS NOTES
Cardiology Progress Note:    Amr M Mohsen  Date & Time note created:    1/31/2017   4:28 PM       Patient ID:   Name:             Kristen Salazar   YOB: 1950  Age:                 66 y.o.  female   MRN:               [id number]                                                               Interval History:    The patient was seen and examined. The chart and telemetry were reviewed. Plan for MV repair/replacement today    Review of Systems:      Unable to obtain since patient is intubated       Past Medical History:   Past Medical History   Diagnosis Date   • Allergy    • Arthritis    • Osteoporosis    • Heart murmur    • Fracture      Active Hospital Problems    Diagnosis   • Mitral regurgitation [I34.0]   • Respiratory failure with hypoxia (CMS-HCC) [J96.91]   • Hyperlipidemia [E78.5]   • Hypertension [I10]   • CAP (community acquired pneumonia) [J18.9]   • Bradycardia [R00.1]   • Hypotension [I95.9]   • Normocytic anemia [D64.9]   • Pulmonary edema [J81.1]       Past Surgical History:  Past Surgical History   Procedure Laterality Date   • Knee arthroscopy         Hospital Medications:    Current facility-administered medications:   •  epinephrine 1 mg/mL(1:1000) 4 mg in  mL Infusion, 0-0.2 mcg/kg/min, Intravenous, Continuous, Salazar Monge M.D.  •  dexmedetomidine (PRECEDEX) 200 mcg in NS 50 mL infusion, 0-1.5 mcg/kg/hr, Intravenous, Continuous, Salazar Monge M.D.  •  insulin regular human (HUMULIN/NOVOLIN R) 62.5 Units in  mL infusion per protocol, 1-6 Units/hr, Intravenous, Continuous, Salazar Monge M.D.  •  aminocaproic acid (AMICAR) 5 g in  mL IV Bolus, 5 g, Intravenous, Once **FOLLOWED BY** aminocaproic acid (AMICAR) 5 g in  mL Infusion, 1 g/hr, Intravenous, Once, Salazar Monge M.D.  •  artificial tears 1.4 % ophthalmic solution 1 Drop, 1 Drop, Both Eyes, PRN, Masoud Aguirre M.D.  •  mupirocin (BACTROBAN) 2 % ointment, , Topical, BID, Hannha Sumner, A.P.N., 1  Application at 01/31/17 0907  •  cefUROXime (ZINACEF) 1,500 mg in  mL IVPB, 1.5 g, Intravenous, Once, Hannah Sumner A.PRose MaryNRose Mary  •  vancomycin 900 mg in  mL IVPB, 15 mg/kg, Intravenous, Once, Charan Jo, PHARMD  •  quetiapine (SEROQUEL) tablet 25 mg, 25 mg, Oral, TID, Lesley Atkins M.D., Stopped at 01/31/17 0900  •  dexmedetomidine (PRECEDEX) 200 mcg in NS 50 mL infusion, 0.1-1.5 mcg/kg/hr, Intravenous, Continuous, Lesley Atkins M.D., Last Rate: 12.2 mL/hr at 01/31/17 0521, 0.8 mcg/kg/hr at 01/31/17 0521  •  aspirin EC (ECOTRIN) tablet 81 mg, 81 mg, Oral, DAILY, Navid Ledesma M.D., Stopped at 01/31/17 0900  •  Action is required: Protocol 1073 Hypoglycemia has been implemented, , , Once **AND** Protocol 1073 Inclusion Criteria, , , CONTINUOUS **AND** Protocol 1073 NOTIFY, , , Once **AND** Protocol 1073 Initiate protocol immediately if FSBG is less than or equal to 70 mg/dL, , , CONTINUOUS **AND** glucose 4 g chewable tablet 16 g, 16 g, Oral, Q15 MIN PRN **AND** dextrose 50% (D50W) injection 25 mL, 25 mL, Intravenous, Q15 MIN PRN, James Pérez M.D.  •  multivitamin (THERAGRAN) tablet 1 Tab, 1 Tab, Oral, DAILY, James Pérez M.D., Stopped at 01/31/17 0900  •  Respiratory Care per Protocol, , Nebulization, Continuous RT, Jeremy M Gonda, M.D.  •  lactulose 20 GM/30ML solution 30 mL, 30 mL, Oral, Q24HRS PRN, Jeremy M Gonda, M.D.  •  chlorhexidine (PERIDEX) 0.12 % solution 15 mL, 15 mL, Mouth/Throat, BID, Jeremy M Gonda, M.D., 15 mL at 01/31/17 0907  •  lidocaine (XYLOCAINE) 1%  injection, 1-2 mL, Tracheal Tube, Q30 MIN PRN, Jeremy M Gonda, M.D., 1 mL at 01/28/17 0418  •  ipratropium-albuterol (DUONEB) nebulizer solution 3 mL, 3 mL, Nebulization, Q2HRS PRN (RT), Jeremy M Gonda, M.D., Stopped at 01/30/17 1020  •  famotidine (PEPCID) tablet 20 mg, 20 mg, Oral, Q12HRS, Stopped at 01/31/17 0900 **OR** [DISCONTINUED] famotidine (PEPCID) injection 20 mg, 20 mg, Intravenous, Q12HRS, Christo GARRIDO  "Gonda, M.D., 20 mg at 01/30/17 0835  •  cefTRIAXone (ROCEPHIN) 1 g in  mL IVPB, 1 g, Intravenous, Q24HRS, Lesley Atkins M.D., Stopped at 01/31/17 0921  •  doxycycline (VIBRAMYCIN) 100 mg in  mL IVPB, 100 mg, Intravenous, Q12HRS, Lesley Atkins M.D., Stopped at 01/31/17 1124  •  oyster shell calcium/vitamin D 250-125 MG-UNIT tablet 1 Tab, 1 Tab, Oral, QDAY with Breakfast, James Pérez M.D., Stopped at 01/31/17 0730  •  pravastatin (PRAVACHOL) tablet 20 mg, 20 mg, Oral, Q EVENING, Jeremy M Gonda, M.D., 20 mg at 01/30/17 2039    Outpatient Medications:  Prescriptions prior to admission   Medication Sig Dispense Refill Last Dose   • Calcium Carb-Cholecalciferol (OYSTER SHELL CALCIUM/VITAMIN D) 250-125 MG-UNIT Tab tablet Take 1 Tab by mouth every day.   1/24/2017 at 0800   • pravastatin (PRAVACHOL) 20 MG Tab Take 20 mg by mouth every day.   1/24/2017 at 0800   • losartan (COZAAR) 25 MG Tab Take 25 mg by mouth every day.   1/24/2017 at 0800   • glucosamine Sulfate 500 MG CAPS Take 500 mg by mouth 3 times a day, with meals.   1/24/2017 at 0800       Medication Allergy:  Allergies   Allergen Reactions   • Codeine      vomiting   • Latex        Family History:  History reviewed. No pertinent family history.    Social History:  Social History     Social History   • Marital Status:      Spouse Name: N/A   • Number of Children: N/A   • Years of Education: N/A     Occupational History   • Not on file.     Social History Main Topics   • Smoking status: Never Smoker    • Smokeless tobacco: Never Used   • Alcohol Use: Yes      Comment: sometimes   • Drug Use: No   • Sexual Activity: Not on file     Other Topics Concern   • Not on file     Social History Narrative         Physical Exam:  Vitals/ General Appearance:   Weight/BMI: Body mass index is 21.2 kg/(m^2).  Blood pressure 105/74, pulse 64, temperature 36.5 °C (97.7 °F), resp. rate 16, height 1.702 m (5' 7\"), weight 61.4 kg (135 lb 5.8 oz), SpO2 99 " %.  Filed Vitals:    01/31/17 1100 01/31/17 1115 01/31/17 1130 01/31/17 1200   BP:       Pulse: 58 58 64    Temp:    36.5 °C (97.7 °F)   Resp: 25 24 16    Height:       Weight:       SpO2: 100% 100% 99%      Oxygen Therapy:  Pulse Oximetry: 99 %, FIO2%: 40, O2 Delivery: Ventilator    Constitutional:   Well developed, Well nourished, No acute distress  HENMT:  Normocephalic, Atraumatic, Oropharynx moist mucous membranes, No oral exudates, Nose normal.  No thyromegaly.  Eyes:  EOMI, Conjunctiva normal, No discharge.  Neck:  Normal range of motion, No cervical tenderness,  no JVD.  Cardiovascular:  Normal heart rate, Normal rhythm, III/VI systolic murmur over the apex murmurs, No rubs, No gallops.   Extremitites with intact distal pulses, no cyanosis, or 1+edema.  Lungs:  Decreased breath sounds bilaterally  Abdomen: Bowel sounds normal, Soft, No masses, No hepatosplenomegaly.  Skin: Warm, Dry, No erythema, No rash, no induration.      MDM (Data Review):     Records reviewed and summarized in current documentation    Lab Data Review:  Recent Labs      01/29/17   0040  01/30/17   0115  01/31/17   0502   WBC  8.2  5.3  7.2   RBC  3.75*  3.57*  3.79*   HEMOGLOBIN  11.5*  11.0*  11.6*   HEMATOCRIT  35.1*  33.7*  36.2*   MCV  93.6  94.4  95.5   MCH  30.7  30.8  30.6   MCHC  32.8*  32.6*  32.0*   RDW  48.1  48.5  48.3   PLATELETCT  247  232  291   MPV  12.6  12.4  12.2     Recent Labs      01/29/17   0040  01/30/17   0115  01/31/17   0502   SODIUM  140  139  143   POTASSIUM  3.9  3.6  3.6   CHLORIDE  107  107  111   CO2  22  22  23   GLUCOSE  125*  117*  105*   BUN  49*  52*  62*   CREATININE  0.91  0.74  0.92   CALCIUM  8.8  9.1  9.4     Recent Labs      01/31/17   0503   APTT  29.2   INR  1.06             Recent Labs      01/30/17   0115   TRIGLYCERIDE  113     Recent Labs      01/29/17   0040  01/30/17   0115  01/31/17   0502   SODIUM  140  139  143   POTASSIUM  3.9  3.6  3.6   CHLORIDE  107  107  111   CO2  22  22  23    GLUCOSE  125*  117*  105*   BUN  49*  52*  62*     Recent Labs      01/29/17   0040  01/30/17   0115  01/31/17   0502   SODIUM  140  139  143   POTASSIUM  3.9  3.6  3.6   CHLORIDE  107  107  111   CO2  22  22  23   BUN  49*  52*  62*   CREATININE  0.91  0.74  0.92   MAGNESIUM  2.3  2.3  2.3   CALCIUM  8.8  9.1  9.4     Recent Labs      01/31/17   0503   APTT  29.2   INR  1.06     Results for orders placed or performed during the hospital encounter of 01/24/17   ECHOCARDIOGRAM COMP W/O CONT   Result Value Ref Range    Eject.Frac. MOD BP 80.65     Eject.Frac. MOD 4C 76.68     Eject.Frac. MOD 2C 83.14     Left Ventrical Ejection Fraction 75          Imaging/Procedures Review:    Chest Xray:  Reviewed        MDM (Assessment and Plan):     Active Hospital Problems    Diagnosis   • Mitral regurgitation [I34.0]   • Respiratory failure with hypoxia (CMS-HCC) [J96.91]   • Hyperlipidemia [E78.5]   • Hypertension [I10]   • CAP (community acquired pneumonia) [J18.9]   • Bradycardia [R00.1]   • Hypotension [I95.9]   • Normocytic anemia [D64.9]   • Pulmonary edema [J81.1]     The patient is a 66-year-old woman, a patient of Dr. Landin with moderate severe mitral regurgitation due to prolapse who presented with acute pulmonary edema.  She was intubated on presentation.  She has normal coronary anatomy based on a coronary angiogram.  On attempt for extubation she quickly deteriorated, she had supraventricular tachycardic rhythm and pulmonary edema and was reintubated    Appreciate the surgical team involvement  Plan for mitral valve repair/replacement today with possible maze procedure  The patient continues to be intubated

## 2017-02-01 NOTE — OP REPORT
DATE OF SERVICE:  01/31/2017    REFERRING PHYSICIAN:  Kurtis Ramirez DO    PREOPERATIVE DIAGNOSES:  Acute respiratory failure, severe mitral   regurgitation (3-4+, degenerative), severe mitral valve prolapse (P2),   hypertension, dyslipidemia, history of subdural hematoma.    POSTOPERATIVE DIAGNOSES:  Acute respiratory failure, severe mitral   regurgitation (3-4+, degenerative), severe mitral valve prolapse (P2),   hypertension, dyslipidemia, history of subdural hematoma.    PROCEDURE PERFORMED:  Radical mitral valve repair (P2 triangular resection,   34-mm Ferguson flexible annuloplasty band), left atrial appendage ligation and   intraoperative transesophageal echocardiography.    SURGEON:  Chris Schmitt MD    FIRST ASSISTANT:  NOEMÍ Smith.    ANESTHESIOLOGIST:  Salazar Monge MD.    ANESTHESIA:  General endotracheal.    DRAINS:  Mediastinal chest tubes x2 (32-Romanian straight and angled).    MISCELLANEOUS:  Temporary epicardial ventricular pacemaker wires.    COMPLICATIONS:  None.    INDICATIONS:  The patient is a 66-year-old female who was admitted to the   hospital in respiratory failure requiring endotracheal intubation and   mechanical ventilation.  Transesophageal echocardiography showed severe P2   prolapse of the posterior mitral valve leaflet and severe mitral   regurgitation.  Her left ventricular ejection fraction was normal at   approximately 65%.  Cardiac catheterization did not show any hemodynamically   significant coronary artery disease.    DESCRIPTION OF PROCEDURE:  The patient was brought to the operating room and   placed on the operating room table in the supine position.  After successful   induction of general anesthesia, the patient was prepped and draped in the   usual sterile fashion.  NOEMÍ Smith, assisted with retraction during the   operation and closed the sternal wound.  Intraoperative transesophageal   echocardiography showed severe prolapse of the P2 scallop and several  ruptured   mitral valve cords.  There was severe mitral regurgitation.  Her left   ventricular ejection fraction was normal at approximately 60-65%.  An incision   was made from the sternal notch to the xiphoid.  The sternum was opened   longitudinally with a sternal saw.  Hemostasis was obtained with   electrocautery at the sternal edges.  The patient was systemically   heparinized.  The pericardium was opened longitudinally and tented anteriorly   with Ethibond stay stitches.  The aortic cannula was inserted first followed   by the dual-stage venous cannula.  An antegrade cardioplegia cannula was   placed in the ascending aorta.  Cardiopulmonary bypass was instituted.  The   aorta was cross-clamped and the patient was given 800 mL of cardioplegia in an   antegrade fashion.  There was prompt cardiac arrest.  Ice slush was placed on   the heart for further myocardial protection.  A phrenic nerve protector pad   was used.  From this point on, cardioplegia was given in an antegrade fashion   every 15-20 minutes while the aorta was crossclamped.  The left atrial   appendage was ligated at its base and excised.  The stump was oversewn in 2   layers using #4-0 Prolene sutures.  A left atriotomy was performed just below   the interatrial groove.  The anterior mitral valve leaflets had fairly normal   appearance.  There was severe prolapse of the P2 scallop of the posterior   mitral valve leaflet.  A small triangular resection of P2 was performed and   the edges were reapproximated using #4-0 Ethibond stitches in a   figure-of-eight fashion.  A large cleft between P2 and P3 was closed with 4-0   Ethibond sutures.  A #2-0 Ethibond stitches were then placed around the mitral   valve annulus from trigone to trigone.  A 34-mm Ferguson flexible annuloplasty   band was then placed in the mitral valve annulus and secured in place with   the Ethibond stitches.  Testing of the repair with cold saline showed only   trace mitral  regurgitation.  Rewarming of the patient was initiated.  The left   atriotomy was closed in 2 layers using #4-0 Prolene sutures.  The aortic   cross-clamp was removed.  Aortic cross-clamp time was 74 minutes.  Total   cardiopulmonary bypass time was 86 minutes.  The left ventricle was deaired in   the usual fashion.  The carbon dioxide which had been released over the   operative field during the operation was discontinued.  A straight and an   angled 32-Spanish chest tubes were placed in mediastinum.  Temporary epicardial   ventricular pacemaker wires were inserted.  There was spontaneous conversion   into sinus rhythm.  The antegrade cardioplegia cannula was removed.  When she   was adequately warmed, she was slowly taken off cardiopulmonary bypass, which   she tolerated well.  The dual-stage venous cannula was removed.  Protamine was   given to reverse the effects of heparin.  The aortic cannula was removed.    When adequate hemostasis had been obtained, the sternum was reapproximated   using size 5 sternal wires and the remainder of the incision was closed in 3   layers using Vicryl sutures.  Intraoperative transesophageal echocardiography   showed an excellent mitral valve repair with only trace mitral regurgitation.    Her left ventricular ejection fraction remained normal at approximately 60%.    There were no apparent complications.  The patient tolerated the procedure   well and left the operating room in guarded condition.       ____________________________________     MD JASMINA MAYER / JANET    DD:  01/31/2017 17:00:39  DT:  01/31/2017 19:35:12    D#:  [id number]  Job#:  [id number]    cc: VICKEY DAVIS DO, [facility]  , Kurtis Ramirez DO

## 2017-02-01 NOTE — PROGRESS NOTES
2130 SBP decreasing, increasing Epi gtt large clot out of chest tube, then no drainage SPB continues to drop, with increasing Epi gtt  STAT CXR ordered and ISTAT H&H done  Chest Tube now draining    Hannah Sumner updated on all events    Orders received if CT stops draining notify STAT   Start OBI gtt   Notify if significant changes to CXR

## 2017-02-01 NOTE — PROGRESS NOTES
Late Entry: 2/1/2017 0230  Hannah Sumner notified of pt's progress with vent weaning. Per Hannah posey to extubate pt with Dr. De La Cruz's approval  Will notify Dr. De La Cruz when all parameters are met.

## 2017-02-01 NOTE — CARE PLAN
Problem: Post Op Day 1 CABG/Heart Valve Replacement  Goal: Optimal care of the post op CABG/heart valve replacement Post Op Day 1  Intervention: All valve patients: PT/INR daily  complete  Intervention: Daily Weights  Complete 62.2 kg  Intervention: Up in chair for all meals  Pt remains intubated      Intervention: Graduated elastic stockings  complete

## 2017-02-01 NOTE — PROGRESS NOTES
Cardiovascular Surgery Progress Note    Name: Kristen Salazar  MRN: [id number]  : 1950  Admit Date: 2017  8:18 PM  Procedure:  Procedure(s) and Anesthesia Type:     * MITRAL VALVE REPAIR - General     * KAREN - General  1 Day Post-Op    Vitals:  Patient Vitals for the past 8 hrs:   Temp SpO2 O2 Delivery Pulse Heart Rate (Monitored) Resp NIBP Weight   17 0813 - 95 % - - 62 - - -   17 0656 - 96 % - - 61 - - -   17 0631 - 97 % - - (!) 59 - - -   17 0600 - 97 % - 62 60 14 105/61 mmHg -   17 0544 - 99 % - 64 64 (!) 10 - -   17 0500 - 98 % - 63 63 15 (!) 89/53 mmHg 62.2 kg (137 lb 2 oz)   17 0400 36.1 °C (97 °F) 98 % Ventilator 61 62 (!) 10 100/54 mmHg -   17 0300 - 98 % - 62 62 14 (!) 96/54 mmHg -   17 0200 - 97 % Ventilator 63 63 12 (!) 88/53 mmHg -   17 0145 - 100 % - - 64 - - -   17 0140 - 100 % - - 64 - - -   17 0100 - 100 % - (!) 58 (!) 58 12 (!) 80/46 mmHg -     Temp (24hrs), Av.2 °C (97.1 °F), Min:35.8 °C (96.4 °F), Max:36.5 °C (97.7 °F)      Respiratory:  Quinones Vent Mode: ASV, Rate (breaths/min): 24, PEEP/CPAP: 8, FiO2: 50, P Peak (PIP): 16, P MEAN: 9 Respiration: 14, Pulse Oximetry: 95 %  Chest Tube Group 1 (A) Left;Anterior-Tube Status / Drainage: Draining;Moderate, Chest Tube Group 2 (B) Right;Anterior-Tube Status / Drainage: Patent;Moderate, Chest Tube Group 1 (A) Left;Anterior-Device: Suction 20 cm Water, Chest Tube Group 2 (B) Right;Anterior-Device: Suction 20 cm Water  Chest Tube Drains:     Mediastinal Chest Tube 1: 40 ml    Fluids:    Intake/Output Summary (Last 24 hours) at 17 0830  Last data filed at 17 0600   Gross per 24 hour   Intake 3496.16 ml   Output   2074 ml   Net 1422.16 ml     Admit weight: Weight: 61 kg (134 lb 7.7 oz)  Current weight: Weight: 62.2 kg (137 lb 2 oz) (17 0500)    Labs:  Recent Labs      17   0115  17   0502  17   1725  17   0545   WBC  5.3  7.2    --   17.6*   RBC  3.57*  3.79*   --   3.33*   HEMOGLOBIN  11.0*  11.6*   --   10.2*   HEMATOCRIT  33.7*  36.2*   --   31.8*   MCV  94.4  95.5   --   95.5   MCH  30.8  30.6   --   30.6   MCHC  32.6*  32.0*   --   32.1*   RDW  48.5  48.3   --   54.4*   PLATELETCT  232  291  203  257   MPV  12.4  12.2   --   11.6     Recent Labs      01/30/17   0115 01/31/17   0502 02/01/17   0545   NEUTSPOLYS  61.20  73.20*  94.80*   LYMPHOCYTES  18.50*  13.10*  0.80*   MONOCYTES  17.40*  10.90  3.50   EOSINOPHILS  2.10  1.80  0.00   BASOPHILS  0.20  0.30  0.00   RBCMORPHOLO   --    --   Present     Recent Labs      01/30/17 0115 01/31/17 0502 01/31/17 1725 02/01/17 02/01/17   0545   SODIUM  139  143   --    --   145   POTASSIUM  3.6  3.6  3.7  3.5*  4.4   CHLORIDE  107  111   --    --   116*   CO2  22  23   --    --   23   GLUCOSE  117*  105*   --    --   121*   BUN  52*  62*   --    --   50*   CREATININE  0.74  0.92   --    --   0.70   CALCIUM  9.1  9.4   --    --   8.3*     Recent Labs      01/31/17 0503 01/31/17 1725 02/01/17   0545   APTT  29.2  30.2   --    INR  1.06  1.38*  1.25*       Medications:  • furosemide  40 mg     • mupirocin  1 Application     • docusate sodium  100 mg      And   • senna-docusate  1 Tab     • enoxaparin  40 mg     • K+ Scale: Goal of 4.5  1 Each     • magnesium sulfate  1 g Stopped (01/31/17 1933)   • aspirin EC  81 mg     • vancomycin (VANCOCIN) IVPB (maintenance dose)  15 mg/kg Stopped (02/01/17 0313)   • insulin regular  0-14 Units     • quetiapine  25 mg     • multivitamin  1 Tab     • chlorhexidine  15 mL     • famotidine  20 mg     • cefTRIAXone (ROCEPHIN) IVPB  1 g Stopped (01/31/17 5975)   • doxycycline  100 mg Stopped (01/31/17 7733)   • oyster shell calcium/vitamin D  1 Tab     • pravastatin  20 mg         Exam:   Review of Systems   Constitutional: Negative.    HENT: Negative.    Eyes: Negative.    Respiratory: Negative.    Cardiovascular: Negative.    Gastrointestinal:  Negative.    Genitourinary: Negative.    Musculoskeletal: Negative.    Skin: Negative.    Neurological: Negative.    Endo/Heme/Allergies: Negative.    Psychiatric/Behavioral: Negative.        Physical Exam   Constitutional: She is oriented to person, place, and time. No distress.   Cardiovascular: Normal rate and regular rhythm.  Exam reveals no gallop and no friction rub.    No murmur heard.  Pulmonary/Chest: She has decreased breath sounds in the right lower field and the left lower field.   Abdominal: Soft. She exhibits no distension.   Musculoskeletal: She exhibits edema.   Neurological: She is alert and oriented to person, place, and time.   Skin: Skin is warm and dry.       Quality Measures:   EKG reviewed, Labs reviewed, Medications reviewed and Radiology images reviewed  Sanchez catheter: One or Two Days Post Surgery (Day of Surgery being Day 0)  Central line in place: Need for access    DVT Prophylaxis: Enoxaparin (Lovenox) and Warfarin (Coumadin)  DVT prophylaxis - mechanical: Not indicated at this time, ambulatory  Ulcer prophylaxis: No    Assessed for rehab: Patient returned to prior level of function, rehabilitation not indicated at this time      Assessment/Plan:  POD 1 - HOTN- on low dose epi/daphnie, gently diuresis, Vent - per pulm, keep mediastinal tubes, keep sanchez- strict I&O, CPM    Patient seen, examined and plan reviewed with midlevel provider. I agree with the plan.      Active Hospital Problems    Diagnosis   • Mitral regurgitation [I34.0]   • Respiratory failure with hypoxia (CMS-HCC) [J96.91]   • Hyperlipidemia [E78.5]   • Hypertension [I10]   • CAP (community acquired pneumonia) [J18.9]   • Bradycardia [R00.1]   • Hypotension [I95.9]   • Normocytic anemia [D64.9]   • Pulmonary edema [J81.1]

## 2017-02-01 NOTE — CARE PLAN
Problem: Post Op Day 1 CABG/Heart Valve Replacement  Goal: Optimal care of the post op CABG/heart valve replacement Post Op Day 1  Intervention: Remove original surgical dressing after 24 hrs, leave open to air unless otherwise specified by physician  Dressing still in place.  Intervention: Consider chest tube removal by MD  Chest tube still draining.  Intervention: Graduated elastic stockings  LC hose in place on pt

## 2017-02-01 NOTE — PROGRESS NOTES
"Late entry 1/31/201701900  Pt refused bear hugger and all other blankets, pts temp 36.1c pt feels cool to the touch. Pt states \"hot\"  "

## 2017-02-01 NOTE — PROGRESS NOTES
Pulmonary Critical Care Progress Note        Chief Complaint: Worsening dyspnea and acute hypoxic respiratory failure    History of Present Illness: The patient is a 66-year-old female with a past medical history significant for hypertension, hyperlipidemia, and mitral valve prolapse who was in her usual state of health on 1/24 when she noted a cough and sudden onset of shortness of breath.  She checked her oxygen saturation at home and found to be 85%. She presented to the emergency department at [facility] where unfortunately she decompensated quickly requiring up to 15 L facemask and then eventually requiring intubation. She was initially hypertensive and tachycardic with an oxygen saturation of 86% on room air at the outside hospital and was transported on high peak and expiratory pressures on the ventilator with an FIO2 of 100%, achieving saturations in the low 90s. She became progressively more hypotensive requiring a norepinephrine drip. A stat bedside echo was performed with Dr. Delarosa in the ER, which revealed a ruptured mitral valve leaflet with severe MR as the likely cause of her decompensation.     ROS: Unable to obtain as the patient is sedated on the ventilator     Interval Events:  24 hour interval history reviewed    - (-)1.4L over last 24hr, (-)2.4L since admit   - tmax 97.7   - on epi 0.02   - s/p MV repair yesterday   - remains intubated      PFSH:  No change.    Respiratory:  Quinones Vent Mode: ASV, PEEP/CPAP: 8, FiO2: 60, Static Compliance (ml / cm H2O): 102, Control VTE (exp VT): 432  Pulse Oximetry: 97 %    Exam: clear to auscultation without rales or wheezes  ImagingAvailable data reviewed     CTA chest 1/25:  1.  No evidence pulmonary emboli.  2.  Moderate right and small left pleural effusions.  3.  Compressive atelectasis both lower lobes especially the left and left lung volume loss with mediastinal shift towards left.  4.  Patchy groundglass opacities within the right  upper lobe consistent with focal areas of pneumonitis.  5.  7.5 mm left upper lobe nodule and smaller nodules within the lungs.  Recent Labs      02/01/17   0301  02/01/17   0433  02/01/17   0539   ISTATAPH  7.351*  7.359*  7.337*   ISTATAPCO2  39.0*  40.0*  39.1*   ISTATAPO2  74  70  78   ISTATATCO2  23  24  22   FEABPVM5BCC  94  93  95   ISTATARTHCO3  21.5  22.5  21.0   ISTATARTBE  -4  -3  -4   ISTATTEMP  35.9 C  35.8 C  35.8 C   ISTATFIO2  70  70  70   ISTATSPEC  Arterial  Arterial  Arterial   ISTATAPHTC  7.366*  7.376*  7.355*   GKSDZAHO1YC  68  64  72       HemoDynamics:  Pulse: 62, Heart Rate (Monitored): (!) 59  Arterial BP: 106/51 mmHg, NIBP: 105/61 mmHg  CVP (mm Hg): (!) 8 MM HG  Exam: regular rate and rhythm  Imaging: echo Reviewed           Neuro:  GCS Total Kali Coma Score: 15     Exam:opens eyes to verbal stimuli  Imaging: Available data reviewed    Fluids:  Intake/Output       01/30/17 0700 - 01/31/17 0659 01/31/17 0700 - 02/01/17 0659 02/01/17 0700 - 02/02/17 0659      0132-2298 3541-1454 Total 3531-2144 3941-5616 Total 1970-4099 7764-7241 Total       Intake    I.V.  276.6  308.4 585.1  1054.5  2465 3519.6  --  -- --    Precedex Volume 148.7 146.4 295.1 78.7 158.1 236.8 -- -- --    Amiodarone Volume 94.9 -- 94.9 -- -- -- -- -- --    Phenylephrine Volume -- -- -- -- 28 28 -- -- --    Norepinephrine Volume 33 -- 33 -- -- -- -- -- --    Insulin Volume -- -- -- -- 229.3 229.3 -- -- --    Epinephrine Volume -- -- -- 0 139.6 139.6 -- -- --    Dopamine Volume -- 162 162 55.8 -- 55.8 -- -- --    IV Volume (TKO) -- -- -- 20 110 130 -- -- --    IV Volume (Plasmalyte. ) -- -- -- 600 1500 2100 -- -- --    IV Piggyback Volume (IV Piggyback) -- -- -- 300 300 600 -- -- --    Other  60  90 150  --  -- --  --  -- --    Medications (P.O./ Enteral Liquids) 60 90 150 -- -- -- -- -- --    Enteral  350  360 710  0  120 120  --  -- --    Enteral Volume 320 240 560 -- -- -- -- -- --    Free Water / Tube Flush 30 120 150 0  120 120 -- -- --    Total Intake 686.6 758.4 1445.1 1054.5 2585 3639.6 -- -- --       Output    Urine  --  1450 1450  1527  977 2504  --  -- --    Indwelling Cathether -- 1450 1450 0678 686 9747 -- -- --    Drains  --  0 0  39  281 320  --  -- --    Residual Amount (ml) (Discarded) -- 0 0 -- -- -- -- -- --    Mediastinal Chest Tube 1 -- -- -- 39 281 320 -- -- --    Stool/Urine  1425  -- 1425  --  -- --  --  -- --    Measurable Stool (ml) 1425 -- 1425 -- -- -- -- -- --    Stool  --  -- --  --  -- --  --  -- --    Number of Times Stooled -- -- -- 0 x -- 0 x -- -- --    Total Output 1425 1450 2875 1566 1258 2824 -- -- --       Net I/O     -738.4 -691.6 -1430 -511.5 1327 815.6 -- -- --        Weight: 62.2 kg (137 lb 2 oz)  Recent Labs      01/30/17 0115 01/31/17   0502  01/31/17   1725 02/01/17 02/01/17   0545   SODIUM  139  143   --    --   145   POTASSIUM  3.6  3.6  3.7  3.5*  4.4   CHLORIDE  107  111   --    --   116*   CO2  22  23   --    --   23   BUN  52*  62*   --    --   50*   CREATININE  0.74  0.92   --    --   0.70   MAGNESIUM  2.3  2.3  2.7*   --    --    CALCIUM  9.1  9.4   --    --   8.3*       GI/Nutrition:  Exam: abdomen is soft and non-tender, normal active bowel sounds  Imaging: Available data reviewed  CorTrak: at goal with tube feeds  Liver Function  Recent Labs      01/30/17 0115 01/31/17   0502  02/01/17   0545   ALTSGPT  23  29  24   ASTSGOT  23  24  50*   ALKPHOSPHAT  65  73  57   TBILIRUBIN  0.5  0.4  0.4   PREALBUMIN  14.0*   --    --    GLUCOSE  117*  105*  121*       Heme:  Recent Labs      01/30/17   0115  01/31/17   0502  01/31/17   0503  01/31/17   1725  02/01/17   0545   RBC  3.57*  3.79*   --    --   3.33*   HEMOGLOBIN  11.0*  11.6*   --    --   10.2*   HEMATOCRIT  33.7*  36.2*   --    --   31.8*   PLATELETCT  232  291   --   203  257   PROTHROMBTM   --    --   14.1  17.4*  16.1*   APTT   --    --   29.2  30.2   --    INR   --    --   1.06  1.38*  1.25*       Infectious  Disease:  Temp  Av.2 °C (97.1 °F)  Min: 35.8 °C (96.4 °F)  Max: 36.5 °C (97.7 °F)  Monitored Temp  Av.9 °C (96.7 °F)  Min: 35.8 °C (96.4 °F)  Max: 36.1 °C (97 °F)  Micro: cultures reviewed  Recent Labs      17   0115  17   0502  17   0545   WBC  5.3  7.2  17.6*   NEUTSPOLYS  61.20  73.20*   --    LYMPHOCYTES  18.50*  13.10*   --    MONOCYTES  17.40*  10.90   --    EOSINOPHILS  2.10  1.80   --    BASOPHILS  0.20  0.30   --    ASTSGOT  23  24  50*   ALTSGPT  23  29  24   ALKPHOSPHAT  65  73  57   TBILIRUBIN  0.5  0.4  0.4     Current Facility-Administered Medications   Medication Dose Frequency Provider Last Rate Last Dose   • Respiratory Care per Protocol   Continuous RT Hannah Sumner, A.P.N.       • mupirocin (BACTROBAN) 2 % ointment 1 Application  1 Application BID Hannah Sumner A.P.N.   1 Application at 17 2100   • docusate sodium (COLACE) capsule 100 mg  100 mg QAM Hannah Sumner, A.P.N.   Stopped at 17 1800    And   • senna-docusate (PERICOLACE or SENOKOT S) 8.6-50 MG per tablet 1 Tab  1 Tab Nightly Hannah Sumner A.P.N.   1 Tab at 17    And   • senna-docusate (PERICOLACE or SENOKOT S) 8.6-50 MG per tablet 1 Tab  1 Tab Q24HRS PRN Hannah Sumner, A.P.N.        And   • lactulose 20 GM/30ML solution 30 mL  30 mL Q24HRS PRN Hannah Sumner, A.P.N.        And   • bisacodyl (DULCOLAX) suppository 10 mg  10 mg Q24HRS PRN Hannah Sumner, A.P.N.        And   • fleet enema 133 mL  1 Each Once PRN Hannah Sumner, A.P.N.       • NS infusion   Continuous PONCHO ValderramaP.N. 10 mL/hr at 17 1950     • enoxaparin (LOVENOX) inj 40 mg  40 mg DAILY RACHEL Valderrama.P.N.       • K+ Scale: Goal of 4.5  1 Each Q6HRS RACHEL Valderrama.P.N.   1 Each at 17 0000   • magnesium sulfate ivpb premix 1 g  1 g QDAY RACHEL Valderrama.P.NRose Mary   Stopped at 17   • aspirin EC (ECOTRIN) tablet 81 mg  81 mg DAILY RACHEL Valderrama.P.N.       • MD ALERT... warfarin  (COUMADIN) per pharmacy protocol   PRN Hannah Sumner, A.P.N.       • electrolyte-A (PLASMALYTE-A) infusion   PRN Hannah Sumner, A.P.N.   1,000 mL at 01/31/17 1930   • clevidipine (CLEVIPREX) IV emulsion  0-10 mg/hr Continuous Hannah Sumner, A.P.N.   Stopped at 01/31/17 1800   • nitroglycerin 50 mg in D5W 250 ml infusion  0-100 mcg/min Continuous Hannah Sumner, A.P.N.   Stopped at 01/31/17 1800   • esmolol (BREVIBLOC) 2.5 g/250 mL NS infusion (PREMIX)  0-300 mcg/kg/min Continuous Hannah Sumner, A.P.N.   Stopped at 01/31/17 1800   • oxycodone immediate-release (ROXICODONE) tablet 5 mg  5 mg Q3HRS PRN Hannah Sumner, A.P.N.       • oxycodone immediate release (ROXICODONE) tablet 10 mg  10 mg Q3HRS PRN Hannah Sumner, A.P.N.       • tramadol (ULTRAM) 50 MG tablet 50 mg  50 mg Q4HRS PRN Hannah Sumner, A.P.N.       • dexmedetomidine (PRECEDEX) 200 mcg in NS 50 mL infusion  0-1.5 mcg/kg/hr Continuous Hannah Sumner, A.P.N.   Stopped at 02/01/17 0500   • sodium bicarbonate 8.4 % injection 50 mEq  50 mEq Q HOUR PRN Hannah Sumner, A.P.N.   50 mEq at 01/31/17 1831   • morphine (pf) 4 mg/ml injection 2-5 mg  2-5 mg Q HOUR PRN Hannah Sumner, A.P.N.   4 mg at 02/01/17 0544   • ondansetron (ZOFRAN) syringe/vial injection 4 mg  4 mg Q6HRS PRN Hannah Sumner, A.P.N.        Or   • prochlorperazine (COMPAZINE) injection 10 mg  10 mg Q6HRS PRN Hannah Sumner, A.P.N.       • acetaminophen (TYLENOL) tablet 650 mg  650 mg Q4HRS PRN Hannah Sumner, A.P.N.        Or   • acetaminophen (TYLENOL) suppository 650 mg  650 mg Q4HRS PRN Hannah Sumner, A.P.N.       • mag hydrox-al hydrox-simeth (MAALOX PLUS ES or MYLANTA DS) suspension 30 mL  30 mL Q4HRS PRN Hannah Sumner, A.P.N.       • diphenhydrAMINE (BENADRYL) tablet/capsule 25 mg  25 mg HS PRN - MR X 1 Hannah Sumner, A.P.N.       • hydrocodone-acetaminophen (NORCO) 5-325 MG per tablet 1-2 Tab  1-2 Tab Q4HRS PRN Hannah Sumner, A.P.N.   2 Tab at 01/31/17 2007   • artificial tears 1.4  % ophthalmic solution 1 Drop  1 Drop PRN Masoud Aguirre M.D.       • vancomycin 900 mg in  mL IVPB  15 mg/kg Q12HR Charan Jo, PHARMD   Stopped at 02/01/17 0313   • epinephrine 1 mg/mL(1:1000) 4 mg in  mL Infusion  0-0.2 mcg/kg/min Continuous Hannah Sumner, A.P.N. 4.6 mL/hr at 02/01/17 0619 0.02 mcg/kg/min at 02/01/17 0619   • insulin regular human (HUMULIN/NOVOLIN R) 62.5 Units in  mL infusion per protocol  1-6 Units/hr Continuous Hannha Sumner, A.P.N. 4 mL/hr at 02/01/17 0301 1 Units/hr at 02/01/17 0301    And   • insulin regular (HUMULIN R) injection 0-14 Units  0-14 Units Once Hannah Sumner, A.P.N.        And   • insulin regular (HUMULIN R) injection 0-10 Units  0-10 Units PRN Hannah Sumner, A.P.N.        And   • dextrose 50% (D50W) injection 25 mL  25 mL PRN Hannah Sumner, A.P.N.       • insulin lispro (HUMALOG) injection 0-20 Units  0-20 Units PRN Hannah Sumner, A.P.N.       • phenylephrine (OBI-SYNEPHRINE) 40,000 mcg in  mL Infusion  1-50 mcg/min Continuous Hannah Sumner, A.P.N. 3.8 mL/hr at 01/31/17 2248 10 mcg/min at 01/31/17 2248   • quetiapine (SEROQUEL) tablet 25 mg  25 mg TID Lesley Atkins M.D.   25 mg at 01/31/17 2054   • glucose 4 g chewable tablet 16 g  16 g Q15 MIN PRN James Pérez M.D.        And   • dextrose 50% (D50W) injection 25 mL  25 mL Q15 MIN PRN James Pérez M.D.       • multivitamin (THERAGRAN) tablet 1 Tab  1 Tab DAILY James Pérez M.D.   Stopped at 01/31/17 0900   • lactulose 20 GM/30ML solution 30 mL  30 mL Q24HRS PRN Jeremy M Gonda, M.D.       • chlorhexidine (PERIDEX) 0.12 % solution 15 mL  15 mL BID Jeremy M Gonda, M.D.   15 mL at 01/31/17 2055   • lidocaine (XYLOCAINE) 1%  injection  1-2 mL Q30 MIN PRN Jeremy M Gonda, M.D.   1 mL at 01/28/17 0418   • ipratropium-albuterol (DUONEB) nebulizer solution 3 mL  3 mL Q2HRS PRN (RT) Jeremy M Gonda, M.D.   Stopped at 01/30/17 1020   • famotidine (PEPCID) tablet 20 mg  20 mg Q12HRS  Jeremy M Gonda, M.D.   20 mg at 01/31/17 2055   • cefTRIAXone (ROCEPHIN) 1 g in  mL IVPB  1 g Q24HRS Lesley Atkins M.D.   Stopped at 01/31/17 0921   • doxycycline (VIBRAMYCIN) 100 mg in  mL IVPB  100 mg Q12HRS Lesley Atkins M.D.   Stopped at 01/31/17 2312   • oyster shell calcium/vitamin D 250-125 MG-UNIT tablet 1 Tab  1 Tab QDAY with Breakfast James Pérez M.D.   Stopped at 01/31/17 0730   • pravastatin (PRAVACHOL) tablet 20 mg  20 mg Q EVENING Jeremy M Gonda, M.D.   20 mg at 01/31/17 2055     Last reviewed on 1/30/2017  8:02 PM by Purvi Montanez R.N.    Quality  Measures:  Medications reviewed, EKG reviewed, Labs reviewed and Radiology images reviewed  Frank catheter: Critically Ill - Requiring Accurate Measurement of Urinary Output and Unconscious / Sedated Patient on a Ventilator  Central line in place: Need for access and Vasopressors    DVT Prophylaxis: Heparin  DVT prophylaxis - mechanical: SCDs  Ulcer prophylaxis: Yes  Antibiotics: Treating active infection/contamination beyond 24 hours perioperative coverage      Lines  RIJ TLC 1/24    Gtt  precedex off  Epi 0.02    Abx  Rocephin  Doxycycline    Bcx 1/24 ngtd  Ucx 1/24 ngtd  Flu (-) 1/24    Echo 1/26 E 65%      Problems/Plan:    Acute Hypoxic Respiratory Failure from pulmonary edema and cardiogenic shock   - intubated 1/24, failed extubation 1/28 and re-intubated   - Re-intubated within 20-30 minutes due to tachy/unresponsive/hypoxic likely due to flash pulm edema   - RT/O2 therapies   - SBTs w plan to extubate once meeting parameters (post mvr 1/31)  Acute Pulmonary Edema   - RT/O2 protocols   - cont vent support   - improved  Acute Delirium   - Seroquel 25mg TID   - Cont Precedex  Tachycardia that ? A flutter   - presently sinus, off amio  Severe Mitral Valve Regurgitation    - cardiac cath on 1/25   - sp mv repair 1/31  Cardiogenic Shock likely related to severe mitral regurgitation   - s/p vasopressor/ionotropic therapy   -  Cards following   - Cardiac cath on 1/25 was negative and heparin stopped   - CTA was negative  Acute Leukocytosis and concern for aspiration   - blood/sputum cultures sent and follow   - Empiric Ceftriaxone/Doxycycline day 8 of 10  Elevated Troponin   - cards following   - ASA  Hyperglycemia   - ISS  Hx of mitral valve prolapse  Hx of systemic arterial hypertension  Hx of Dyslipidemia   - statin therapy  Prophylaxis   - heparin, bowel regimen, pepcid, enteral feeds    Discussed patient condition and risk of morbidity and/or mortality with Family, RN, RT, Pharmacy, , [employer] [facility] resident and Charge nurse / hot rounds and well as cardiology  The patient remains critically ill.  Critical care time = 35 minutes in directly providing and coordinating critical care and extensive data review.  No time overlap and excludes procedures.

## 2017-02-01 NOTE — CARE PLAN
Problem: Nutritional:  Goal: Achieve adequate nutritional intake  Patient will tolerate diet >Clear liqiuids and consume ~50% of meals.  Outcome: PROGRESSING AS EXPECTED

## 2017-02-01 NOTE — OR SURGEON
Immediate Post-Operative Note      PreOp Diagnosis: MR    PostOp Diagnosis: MR    Procedure(s):  RADICAL MVRepair (triangular resection of P2, 34mm Ferguson flexible annuloplasty band)  ABIODUN LIGATION  KAREN    Surgeon(s):  Chris Schmitt M.D.    Assistant:  NOEMÍ Smith    Anesthesiologist/Type of Anesthesia:  Anesthesiologist: Salazar Monge M.D./General    Surgical Staff:  Assistant: BRODERICK Valderrama  Circulator: Jaswinder Morales R.N.  Perfusionist: Carl Akers  Scrub Person: SUSAN Gonzalez IV; Belle Osorio    Specimen: P2 piece, ABIODUN    Estimated Blood Loss: Min    Findings: Severe MR    Complications: None        1/31/2017 4:50 PM Chris Schmitt

## 2017-02-01 NOTE — PROGRESS NOTES
Patient extubated from 80 % ASV, 40 % FiO2, and 8 PEEP. FVC 1.2 L, NIF -20 cm H2O, RR 12, pInsp 5 cm H2O.   Per discussion with Dr. Aguirre concerning patient condition okay to extubate without ABG and with above parameters     Total time intubated from reaching room 16 hours and 55 minutes.     Patient currently tolerating 6 L NC.

## 2017-02-01 NOTE — RESPIRATORY CARE
Ventilator Update   Currently on ASV 80% with a target MV of 6.4 peep +8  FIO@ 65%     ABG results 7.36 / 37 / 68 / 21.5 / -4

## 2017-02-01 NOTE — PROGRESS NOTES
Pt transferred to [id number] with OR team at bedside. All lines and gtts verified. All orders received. ECG called.  updated on POC. RT at bedside.

## 2017-02-01 NOTE — PROGRESS NOTES
[facility] ICU Progress Note    ICU Attending/Resident: Dr Aguirre/Thang    Admit Date: 1/24/2017    ICU day: 8    Patient ID:     Name:             Martin   Date of birth:   1950  Age:                 66 year old female   MRN:               [id number]    HPI:  Very pleasant 66 year old White female with PMH htn, dld, mvp admitted to ICU on 1/24/2017 for acute onset shortness of breath, later found on echocardiogram to have severe mitral regurgitation.      Diagnosis:  Severe mitral regurgitation  Hypotension  VDRF  HTN  DLD    Interval Events:  Status post radical mitral valve repair 1/31/2017. Patient doing much better  Extubated today, saturating well on 3 L by nasal cannula  Still on minimal pressors  Sedation stopped          =====================================================  Objective - Systems Based  =====================================================  Fluids:  Intake/Output Summary (Last 24 hours) at 02/01/17 0655  Last data filed at 02/01/17 0600   Gross per 24 hour   Intake 3639.55 ml   Output   2824 ml   Net 815.55 ml       Weight: 62.2 kg (137 lb 2 oz)  Body mass index is 21.47 kg/(m^2).    Recent Labs      01/30/17   0115  01/31/17   0502  01/31/17   1725 02/01/17 02/01/17   0545   SODIUM  139  143   --    --   145   POTASSIUM  3.6  3.6  3.7  3.5*  4.4   CHLORIDE  107  111   --    --   116*   CO2  22  23   --    --   23   BUN  52*  62*   --    --   50*   CREATININE  0.74  0.92   --    --   0.70   MAGNESIUM  2.3  2.3  2.7*   --    --    CALCIUM  9.1  9.4   --    --   8.3*     =====================================================    Review of Systems   Unable to perform ROS: intubated   Constitutional: Negative for fever and chills.   HENT: Positive for sore throat.    Eyes: Negative for blurred vision and double vision.   Respiratory: Positive for cough. Negative for shortness of breath.    Cardiovascular: Positive for chest pain.   Gastrointestinal: Negative for nausea, vomiting, diarrhea and  constipation.   Genitourinary: Negative for dysuria, urgency and frequency.   Musculoskeletal: Negative for myalgias and falls.   Skin: Negative for itching and rash.   Neurological: Negative for focal weakness, seizures and headaches.   Psychiatric/Behavioral: Negative for hallucinations. The patient is not nervous/anxious.            NEURO:  Patient was responding appropriately, no focal deficits noted    Respiratory/Pulmonary:  Quinones Vent Mode: ASV  Respiration: 14, Pulse Oximetry: 97 %  Attempt extubation begat fairly rapid decompensation, now reintubated    HemoDynamics/Cardiovascular:  Pulse: 62, Heart Rate (Monitored): (!) 59 Arterial BP: 106/51 mmHg, NIBP: 105/61 mmHg CVP (mm Hg): (!) 8 MM HG      GI/Nutrition:  Tube feeds stopped, advance diet as tolerated    Recent Labs      17   05017   0545   ALTSGPT  23  29  24   ASTSGOT  23  24  50*   ALKPHOSPHAT  65  73  57   TBILIRUBIN  0.5  0.4  0.4   PREALBUMIN  14.0*   --    --    GLUCOSE  117*  105*  121*       Heme/Onc:  Recent Labs      17   0502  17   0503  17   1725  17   0545   RBC  3.57*  3.79*   --    --   3.33*   HEMOGLOBIN  11.0*  11.6*   --    --   10.2*   HEMATOCRIT  33.7*  36.2*   --    --   31.8*   PLATELETCT  232  291   --   203  257   PROTHROMBTM   --    --   14.1  17.4*  16.1*   APTT   --    --   29.2  30.2   --    INR   --    --   1.06  1.38*  1.25*       Infectious Disease:  Temp  Av.2 °C (97.1 °F)  Min: 35.8 °C (96.4 °F)  Max: 36.5 °C (97.7 °F)  Monitored Temp  Av.9 °C (96.7 °F)  Min: 35.8 °C (96.4 °F)  Max: 36.1 °C (97 °F)  Recent Labs      17   05017   0545   WBC  5.3  7.2  17.6*   NEUTSPOLYS  61.20  73.20*   --    LYMPHOCYTES  18.50*  13.10*   --    MONOCYTES  17.40*  10.90   --    EOSINOPHILS  2.10  1.80   --    BASOPHILS  0.20  0.30   --    ASTSGOT  23  24  50*   ALTSGPT  23  29  24   ALKPHOSPHAT  65  73  57   TBILIRUBIN   0.5  0.4  0.4     • Respiratory Care per Protocol       • mupirocin  1 Application     • docusate sodium  100 mg      And   • senna-docusate  1 Tab      And   • senna-docusate  1 Tab      And   • lactulose  30 mL      And   • bisacodyl  10 mg      And   • fleet  1 Each     • NS   10 mL/hr at 01/31/17 1950   • enoxaparin  40 mg     • K+ Scale: Goal of 4.5  1 Each     • magnesium sulfate  1 g Stopped (01/31/17 1933)   • aspirin EC  81 mg     • MD ALERT... warfarin       • electrolyte-A       • clevidipine  0-10 mg/hr Stopped (01/31/17 1800)   • nitroglycerin in D5W  0-100 mcg/min Stopped (01/31/17 1800)   • esmolol in NaCl  0-300 mcg/kg/min Stopped (01/31/17 1800)   • oxycodone immediate-release  5 mg     • oxycodone immediate release  10 mg     • tramadol  50 mg     • dexmedetomidine (PRECEDEX) infusion 4 mcg/ml  0-1.5 mcg/kg/hr Stopped (02/01/17 0500)   • sodium bicarbonate  50 mEq     • morphine injection  2-5 mg     • ondansetron  4 mg      Or   • prochlorperazine  10 mg     • acetaminophen  650 mg      Or   • acetaminophen  650 mg     • mag hydrox-al hydrox-simeth  30 mL     • diphenhydrAMINE  25 mg     • hydrocodone-acetaminophen  1-2 Tab     • artificial tears  1 Drop     • vancomycin (VANCOCIN) IVPB (maintenance dose)  15 mg/kg Stopped (02/01/17 0313)   • Epinephrine infusion  0-0.2 mcg/kg/min 0.02 mcg/kg/min (02/01/17 0619)   • insulin infusion for post-cardiac surgery protocol  1-6 Units/hr 1 Units/hr (02/01/17 0301)    And   • insulin regular  0-14 Units      And   • insulin regular  0-10 Units      And   • dextrose 50%  25 mL     • insulin lispro  0-20 Units     • Phenylephrine infusion  1-50 mcg/min 10 mcg/min (01/31/17 2248)   • quetiapine  25 mg     • glucose 4 g  16 g      And   • dextrose 50%  25 mL     • multivitamin  1 Tab     • lactulose  30 mL     • chlorhexidine  15 mL     • lidocaine  1-2 mL     • ipratropium-albuterol  3 mL     • famotidine  20 mg     • cefTRIAXone (ROCEPHIN) IVPB  1 g Stopped  (01/31/17 0698)   • doxycycline  100 mg Stopped (01/31/17 5251)   • oyster shell calcium/vitamin D  1 Tab     • pravastatin  20 mg          =====================================================    Physical Exam   Constitutional: She is oriented to person, place, and time and well-developed, well-nourished, and in no distress. No distress.   HENT:   Head: Normocephalic and atraumatic.   Nose: Nose normal.   Mouth/Throat: Oropharynx is clear and moist.   Eyes: EOM are normal. Pupils are equal, round, and reactive to light.   Neck: Normal range of motion. Neck supple.   Cardiovascular: Normal rate and regular rhythm.    Pulmonary/Chest: Effort normal and breath sounds normal. No respiratory distress.   Extubated today, saturating well on 3 L nasal cannula   Abdominal: Soft. Bowel sounds are normal.   Neurological: She is alert and oriented to person, place, and time. GCS score is 15.   Skin: Skin is warm and dry. She is not diaphoretic.   Psychiatric: Mood, memory, affect and judgment normal.         Procedures and Imaging:  DX-CHEST-PORTABLE (1 VIEW)   Final Result      No significant change      DX-CHEST-PORTABLE (1 VIEW)   Final Result      1.  [place]-Jose catheter has been pulled back and the tip now projects over the pulmonary outflow tract      2.  No other change      DX-CHEST-PORTABLE (1 VIEW)   Final Result         1. Postsurgical change in the chest with mild pulmonary edema and bibasilar atelectasis.      2. The left side [place]-Jose catheter with tip in the right lower lobe's segmental pulmonary artery.         DX-CHEST-PORTABLE (1 VIEW)   Final Result      No significant change      Carotid Duplex STAT   Final Result      DX-CHEST-PORTABLE (1 VIEW)   Final Result      No significant change from prior exam.      DX-CHEST-PORTABLE (1 VIEW)   Final Result      Interval improvement of perihilar infiltrates and worsening of LEFT basilar atelectasis.      DX-CHEST-LIMITED (1 VIEW)   Final Result      1.   Satisfactory re-intubation.   2.  Other tubes and lines are stable.   3.  Increase in central hazy parenchymal opacity suspicious for edema although pneumonitis is not excluded.      DX-CHEST-PORTABLE (1 VIEW)   Final Result      No significant change from prior exam.      US-RENAL ARTERY DUPLEX   Final Result      No evidence of main renal artery stenosis.      DX-CHEST-PORTABLE (1 VIEW)   Final Result         1. No significant interval change.      TRANSESOPHAGEAL ECHO W/O CONT (Order not available for [facility])   Final Result      DX-CHEST-PORTABLE (1 VIEW)   Final Result         1. Decrease groundglass and airspace opacities in both lungs.      CT-CTA CHEST PULMONARY ARTERY W/ RECONS   Final Result      1.  No evidence pulmonary emboli.   2.  Moderate right and small left pleural effusions.   3.  Compressive atelectasis both lower lobes especially the left and left lung volume loss with mediastinal shift towards left.   4.  Patchy groundglass opacities within the right upper lobe consistent with focal areas of pneumonitis.   5.  7.5 mm left upper lobe nodule and smaller nodules within the lungs.   Nodule measuring between 7 and 8 mm:   Low Risk Patient:  Initial follow up CT at 6-12 months, then at 18-24 months if no change.      High Risk Patient:  Initial follow up CT at 3-6 months, then at 9-12 and 24 months if no change.         Low Risk - Minimal or absent history of smoking and of other known risk factors.   High Risk - History of smoking or of other known risk factors.   Fleischner Society Guidelines for Pulmonary Nodules:  Radiology, 237:2005         DX-CHEST-PORTABLE (1 VIEW)   Final Result         1. Interval placement of feeding tube. No other significant interval change.      DX-ABDOMEN FOR TUBE PLACEMENT   Final Result         Feeding tube with tip in the stomach.      ECHOCARDIOGRAM COMP W/O CONT   Final Result      DX-CHEST-PORTABLE (1 VIEW)   Final Result         1. Right central venous  catheter with tip in the mid SVC.      OUTSIDE IMAGES-DX CHEST   Preliminary Result      OUTSIDE IMAGES-DX CHEST   Preliminary Result      DX-CHEST-PORTABLE (1 VIEW)   Final Result         1. Extensive groundglass and airspace opacities throughout both lungs, worse in the bilateral lower lungs. This could be seen with multifocal pneumonia, ARDS or pulmonary edema.   2. Endotracheal tube with tip in the mid thoracic trachea.          =====================================================  Problem and Plan:  1. Hypotension/Bradycardia -  -blood pressure normalizing, on minimal pressor epinephrine 0.2   -Likely stop pressor tomorrow   -Regular rate and rhythm       2. Acute pulmonary edema secondary to severe MR  -Echo:EF 75%, severe MR, RVSP 40  -Cardiac cath (1/25/2017): normal coronary arteries  -CTA: no PE  -KAREN: EF 65%, severe MR  -Status post radical mitral valve repair 1/31/2017  -Patient doing well, is hemodynamically stable     3. Acute hypoxemic respiratory failure  -Extubated today, is saturating well on 3 L by nasal cannula  -Continue RT/O2 per protocol    4. Prolonged Qtc  -Avoid qtc prolonging drugs, keep Mg >2.  -Tapering quetiapine    5. Normocytic anemia  - Hb stable at 10.6 today  - CTM    6. Nutrition    Quality Measures:  Frank Catheter: Critical illness  DVT Prophylaxis: Heparin  Ulcer Prophylaxis: Famotidine  Antibiotics: None  Lines: Right IJ, [place]-Jose removed today  ======================================================

## 2017-02-01 NOTE — CARE PLAN
Problem: Day of surgery post CABG/Heart valve replacement  Goal: Stabilization in immediate post op period  Intervention: VS q 15 min x 4 hours, then q 1 hour. Include temperature immediately upon arrival. Check CO/CI q 2-4 hours and PRN  Completed per EPIC, pt back to room at 1710.      Intervention: If radial artery used, elevate arm, no BP checks or needle sticks from affected arm, monitor ulnar pulse and capillary refill  Completed.   Intervention: First post op hour labs and diagnostics per MD order  Completed.   Intervention: For FSBS greater than 130, start Post Cardiac Surgery Insulin Drip Protocol  Completed per MAR.   Intervention: FSBS frequency as per Cardiac Surgery Insulin Drip Protocol  Completed.   Intervention: For patients on Beta Blockers: verify dose given prior to surgery or within 6 hours after arrival to the unit  NA.      Intervention: Chest tube to 20 cm suction, record CT drainage with VS  Completed.   Intervention: Titrate and wean off vasoactive drips per patient’s condition and per MD order while maintaining SBP  mmHg per MD order  NA, drainage WDL.   Intervention: VAP protocol in place  Completed.   Intervention: Wean from vent per protocol (see protocol), extubation goal with 2-6 hours post op.  NA at this time.   Intervention: IS q 1 hour while awake post extubation  NA at this time, pt remains intubated.   Intervention: Bedrest until extubated and groin lines out  Completed.   Intervention: Out of bed, dangle 4 hours post extubation  NA at this time, pt remains intubated.   Intervention: Up in chair 4 hours, day of extubation  NA at this time, pt remains intubated.   Intervention: Maintain all original surgical dressings for 24 hours  Completed.   Intervention: Clear liquids post extubation, advance as tolerated  NA at this time, pt remains intubated.   Intervention: Discontinue Dickson maroila and arterial line 12-18 hours post op if hemodynamically stable and off vasoactive  wilfrid  NA at this time.   Intervention: A-Fib and DVT prophylaxis per MD order or contraindications documented (refer to DVT/VTE problem on Care Plan)  Completed.   Intervention: Amiodarone protocol per MD order  NA. Not needed at this time.

## 2017-02-01 NOTE — PROGRESS NOTES
Inpatient Anticoagulation Service Note    Date: 2/1/2017  Reason for Anticoagulation: Mitral Valve Repair        Hemoglobin Value: 10.2  Hematocrit Value: 31.8  Lab Platelet Value: 257  Target INR: 2.0 to 3.0    INR from last 7 days     Date/Time INR Value    02/01/17 0545 (!)1.25    01/31/17 1725 (!)1.38    01/31/17 0503 1.06        Dose from last 7 days     Date/Time Dose (mg)    02/01/17 1300 5        Average Dose (mg):  (new start)  Significant Interactions: Antibiotics, Aspirin, Statin (ceftriaxone -- to end on 2/3)  Bridge Therapy: No (Lovenox 40 mg qday prophylaxis)     Comments: Pt is POD 1 for MV repair and KAREN. Mediastinal tubes to remain, but per JOSE Smith APN, ok to start warfarin dosing today. Pt is on ceftriaxone until 2/3. Ceftriaxone can interact with warfarin which can cause the INR to increase. Will start warfarin 5 mg daily for now and trend the INR.    Education Material Provided?: No    Pharmacist suggested discharge dosing: starting with warfarin 5 mg daily     Denilson Cortez, PharmD

## 2017-02-01 NOTE — CARE PLAN
Problem: Day of surgery post CABG/Heart valve replacement  Goal: Stabilization in immediate post op period  Intervention: If radial artery used, elevate arm, no BP checks or needle sticks from affected arm, monitor ulnar pulse and capillary refill  Complete  No non-invasive BP or sticks on left arm  Left arm elevated      Intervention: Serum K q 6 hours x 24 hours. ABG and CBC prn.  Complete following protocol      Intervention: FSBS frequency as per Cardiac Surgery Insulin Drip Protocol  Complete  Following protocol      Intervention: Chest tube to 20 cm suction, record CT drainage with VS  complete  Intervention: Titrate and wean off vasoactive drips per patient’s condition and per MD order while maintaining SBP  mmHg per MD order  Patient remains on vasoactive gtts  Intervention: Wean from vent per protocol (see protocol), extubation goal with 2-6 hours post op.  Patient remains intubated      Intervention: Maintain all original surgical dressings for 24 hours  complete

## 2017-02-01 NOTE — DIETARY
Nutrition Services: TF D/c'd; consult cardiac rehab diet education.    S/p Radical mitral valve repair (P2 triangular resection, 34-mm Ferguson flexible annuloplasty band), left atrial appendage ligation and intraoperative transesophageal echocardiography 1/31.    Pt was extubated this morning, and TF was D/c'd. Pt now on post-op clear liquid diet.     RD will F/u to provide cardiac diet education. Will monitor diet advancement and pt's PO intake.    RD following.

## 2017-02-01 NOTE — CARE PLAN
Problem: Post Op Day 1 CABG/Heart Valve Replacement  Goal: Optimal care of the post op CABG/heart valve replacement Post Op Day 1  Intervention: All valve patients: PT/INR daily  PT of 16.1 and INR of 1.21  Intervention: Antibiotics are discontinued within 24 hours of anesthesia end time unless indication documented for continuation beyond 24 hours  ABX given and verified end time.

## 2017-02-01 NOTE — FLOWSHEET NOTE
Extubation    Cuff leak noted yes  Stridor present no        O2 (LPM): 6 (01/28/17 1000)     Patient toleration well  RCP Complete? yes  Events/Summary/Plan: RN/RT/Dr Aguirre agree patient extubated (02/01/17 1005)

## 2017-02-02 ENCOUNTER — APPOINTMENT (OUTPATIENT)
Dept: RADIOLOGY | Facility: MEDICAL CENTER | Age: 67
DRG: 216 | End: 2017-02-02
Attending: INTERNAL MEDICINE
Payer: MEDICARE

## 2017-02-02 LAB
ANION GAP SERPL CALC-SCNC: 7 MMOL/L (ref 0–11.9)
BUN SERPL-MCNC: 56 MG/DL (ref 8–22)
CALCIUM SERPL-MCNC: 8.4 MG/DL (ref 8.5–10.5)
CHLORIDE SERPL-SCNC: 103 MMOL/L (ref 96–112)
CO2 SERPL-SCNC: 22 MMOL/L (ref 20–33)
CREAT SERPL-MCNC: 1.02 MG/DL (ref 0.5–1.4)
ERYTHROCYTE [DISTWIDTH] IN BLOOD BY AUTOMATED COUNT: 55.8 FL (ref 35.9–50)
GFR SERPL CREATININE-BSD FRML MDRD: 54 ML/MIN/1.73 M 2
GLUCOSE BLD-MCNC: 102 MG/DL (ref 65–99)
GLUCOSE BLD-MCNC: 115 MG/DL (ref 65–99)
GLUCOSE SERPL-MCNC: 101 MG/DL (ref 65–99)
HCT VFR BLD AUTO: 28.7 % (ref 37–47)
HGB BLD-MCNC: 9 G/DL (ref 12–16)
INR PPP: 1.26 (ref 0.87–1.13)
MCH RBC QN AUTO: 30.7 PG (ref 27–33)
MCHC RBC AUTO-ENTMCNC: 31.4 G/DL (ref 33.6–35)
MCV RBC AUTO: 98 FL (ref 81.4–97.8)
PLATELET # BLD AUTO: 199 K/UL (ref 164–446)
PMV BLD AUTO: 12.3 FL (ref 9–12.9)
POTASSIUM SERPL-SCNC: 4.2 MMOL/L (ref 3.6–5.5)
PROTHROMBIN TIME: 16.2 SEC (ref 12–14.6)
RBC # BLD AUTO: 2.93 M/UL (ref 4.2–5.4)
SODIUM SERPL-SCNC: 132 MMOL/L (ref 135–145)
WBC # BLD AUTO: 11.9 K/UL (ref 4.8–10.8)

## 2017-02-02 PROCEDURE — 80048 BASIC METABOLIC PNL TOTAL CA: CPT

## 2017-02-02 PROCEDURE — 700105 HCHG RX REV CODE 258: Performed by: INTERNAL MEDICINE

## 2017-02-02 PROCEDURE — A9270 NON-COVERED ITEM OR SERVICE: HCPCS | Performed by: NURSE PRACTITIONER

## 2017-02-02 PROCEDURE — 94668 MNPJ CHEST WALL SBSQ: CPT

## 2017-02-02 PROCEDURE — 82962 GLUCOSE BLOOD TEST: CPT

## 2017-02-02 PROCEDURE — 770020 HCHG ROOM/CARE - TELE (206)

## 2017-02-02 PROCEDURE — 700102 HCHG RX REV CODE 250 W/ 637 OVERRIDE(OP): Performed by: INTERNAL MEDICINE

## 2017-02-02 PROCEDURE — 85027 COMPLETE CBC AUTOMATED: CPT

## 2017-02-02 PROCEDURE — A9270 NON-COVERED ITEM OR SERVICE: HCPCS | Performed by: INTERNAL MEDICINE

## 2017-02-02 PROCEDURE — 700111 HCHG RX REV CODE 636 W/ 250 OVERRIDE (IP): Performed by: INTERNAL MEDICINE

## 2017-02-02 PROCEDURE — A9270 NON-COVERED ITEM OR SERVICE: HCPCS

## 2017-02-02 PROCEDURE — 700102 HCHG RX REV CODE 250 W/ 637 OVERRIDE(OP)

## 2017-02-02 PROCEDURE — 700102 HCHG RX REV CODE 250 W/ 637 OVERRIDE(OP): Performed by: STUDENT IN AN ORGANIZED HEALTH CARE EDUCATION/TRAINING PROGRAM

## 2017-02-02 PROCEDURE — A9270 NON-COVERED ITEM OR SERVICE: HCPCS | Performed by: STUDENT IN AN ORGANIZED HEALTH CARE EDUCATION/TRAINING PROGRAM

## 2017-02-02 PROCEDURE — 700111 HCHG RX REV CODE 636 W/ 250 OVERRIDE (IP): Performed by: NURSE PRACTITIONER

## 2017-02-02 PROCEDURE — 99291 CRITICAL CARE FIRST HOUR: CPT | Mod: GC | Performed by: INTERNAL MEDICINE

## 2017-02-02 PROCEDURE — 700101 HCHG RX REV CODE 250: Performed by: NURSE PRACTITIONER

## 2017-02-02 PROCEDURE — 71010 DX-CHEST-PORTABLE (1 VIEW): CPT

## 2017-02-02 PROCEDURE — 94760 N-INVAS EAR/PLS OXIMETRY 1: CPT

## 2017-02-02 PROCEDURE — 85610 PROTHROMBIN TIME: CPT

## 2017-02-02 PROCEDURE — 700112 HCHG RX REV CODE 229: Performed by: NURSE PRACTITIONER

## 2017-02-02 PROCEDURE — 700102 HCHG RX REV CODE 250 W/ 637 OVERRIDE(OP): Performed by: NURSE PRACTITIONER

## 2017-02-02 RX ORDER — DOXYCYCLINE 100 MG/1
100 TABLET ORAL EVERY 12 HOURS
Status: COMPLETED | OUTPATIENT
Start: 2017-02-02 | End: 2017-02-03

## 2017-02-02 RX ADMIN — MAGNESIUM SULFATE IN DEXTROSE 1 G: 10 INJECTION, SOLUTION INTRAVENOUS at 17:06

## 2017-02-02 RX ADMIN — ONDANSETRON 4 MG: 2 INJECTION, SOLUTION INTRAMUSCULAR; INTRAVENOUS at 06:39

## 2017-02-02 RX ADMIN — ONDANSETRON 4 MG: 2 INJECTION, SOLUTION INTRAMUSCULAR; INTRAVENOUS at 19:41

## 2017-02-02 RX ADMIN — DOXYCYCLINE 100 MG: 100 TABLET ORAL at 20:58

## 2017-02-02 RX ADMIN — THERA TABS 1 TABLET: TAB at 08:36

## 2017-02-02 RX ADMIN — FAMOTIDINE 20 MG: 20 TABLET, FILM COATED ORAL at 08:36

## 2017-02-02 RX ADMIN — PRAVASTATIN SODIUM 20 MG: 20 TABLET ORAL at 20:59

## 2017-02-02 RX ADMIN — MUPIROCIN 1 APPLICATION: 20 OINTMENT TOPICAL at 21:00

## 2017-02-02 RX ADMIN — DOXYCYCLINE 100 MG: 100 TABLET ORAL at 08:36

## 2017-02-02 RX ADMIN — HYDROCODONE BITARTRATE AND ACETAMINOPHEN 1 TABLET: 5; 325 TABLET ORAL at 13:16

## 2017-02-02 RX ADMIN — DOCUSATE SODIUM 100 MG: 100 CAPSULE ORAL at 08:36

## 2017-02-02 RX ADMIN — QUETIAPINE FUMARATE 25 MG: 25 TABLET, FILM COATED ORAL at 20:58

## 2017-02-02 RX ADMIN — ASPIRIN 81 MG: 81 TABLET ORAL at 08:36

## 2017-02-02 RX ADMIN — HYDROCODONE BITARTRATE AND ACETAMINOPHEN 1 TABLET: 5; 325 TABLET ORAL at 01:59

## 2017-02-02 RX ADMIN — FAMOTIDINE 20 MG: 20 TABLET, FILM COATED ORAL at 20:59

## 2017-02-02 RX ADMIN — CEFTRIAXONE SODIUM 1 G: 1 INJECTION, POWDER, FOR SOLUTION INTRAMUSCULAR; INTRAVENOUS at 08:36

## 2017-02-02 RX ADMIN — WARFARIN SODIUM 5 MG: 5 TABLET ORAL at 17:05

## 2017-02-02 RX ADMIN — ENOXAPARIN SODIUM 40 MG: 100 INJECTION SUBCUTANEOUS at 08:36

## 2017-02-02 RX ADMIN — CALCIUM CARBONATE-CHOLECALCIFEROL TAB 250 MG-125 UNIT 1 TABLET: 250-125 TAB at 08:36

## 2017-02-02 RX ADMIN — STANDARDIZED SENNA CONCENTRATE AND DOCUSATE SODIUM 1 TABLET: 8.6; 5 TABLET, FILM COATED ORAL at 20:59

## 2017-02-02 ASSESSMENT — ENCOUNTER SYMPTOMS
NEUROLOGICAL NEGATIVE: 1
GASTROINTESTINAL NEGATIVE: 1
RESPIRATORY NEGATIVE: 1
CONSTITUTIONAL NEGATIVE: 1
PSYCHIATRIC NEGATIVE: 1
MUSCULOSKELETAL NEGATIVE: 1
CARDIOVASCULAR NEGATIVE: 1
EYES NEGATIVE: 1

## 2017-02-02 ASSESSMENT — PAIN SCALES - GENERAL
PAINLEVEL_OUTOF10: 4
PAINLEVEL_OUTOF10: 3
PAINLEVEL_OUTOF10: 6
PAINLEVEL_OUTOF10: 3
PAINLEVEL_OUTOF10: 6
PAINLEVEL_OUTOF10: 3
PAINLEVEL_OUTOF10: 4
PAINLEVEL_OUTOF10: 4
PAINLEVEL_OUTOF10: 3

## 2017-02-02 NOTE — PROGRESS NOTES
Bedside report received, assumed care of pt.  Boards updated.  Pt up in chair, no signs of distress noted.  Denies pain at this time, all needs met.  Call light with in reach, hourly rounding in place.

## 2017-02-02 NOTE — CARE PLAN
Problem: Hyperinflation:  Goal: Prevent or improve atelectasis  Outcome: PROGRESSING AS EXPECTED  Respiratory Therapy Update       Interdisciplinary Plan of Care-Outcomes   Hyperinflation Protocol Goals/Outcome: Increased Vital Capacity or Return to Pre-operative Values;Stable Vital Capacity x24 hrs and Patient Understands / uses I.S. (02/01/17 1611)    Cough:  (No suction required at this time) (02/01/17 0656)  Sputum Amount: Unable to Evaluate (02/01/17 1600)  Sputum Color: Unable to Evaluate (02/01/17 1600)  Sputum Consistency: Unable to Evaluate (02/01/17 1600)     O2 (LPM): 3 (02/01/17 1611)  O2 Daily Delivery Respiratory : Silicone Nasal Cannula (02/01/17 1611)    Breath Sounds  Pre/Post Intervention: Pre Intervention Assessment (02/01/17 0140)  RUL Breath Sounds: Clear (02/01/17 1611)  RML Breath Sounds: Clear (02/01/17 1611)  RLL Breath Sounds: Diminished (02/01/17 1611)  PRAMOD Breath Sounds: Clear (02/01/17 1611)  LLL Breath Sounds: Diminished (02/01/17 1611)

## 2017-02-02 NOTE — PROGRESS NOTES
Inpatient Anticoagulation Service Note    Date: 2/2/2017  Reason for Anticoagulation: Mitral Valve Repair        Hemoglobin Value: 9  Hematocrit Value: 28.7  Lab Platelet Value: 199  Target INR: 2.0 to 3.0    INR from last 7 days     Date/Time INR Value    02/02/17 0210 (!)1.26    02/01/17 0545 (!)1.25    01/31/17 1725 (!)1.38    01/31/17 0503 1.06        Dose from last 7 days     Date/Time Dose (mg)    02/02/17 1300 5    02/01/17 1300 5        Average Dose (mg):  (new start)  Significant Interactions: Antibiotics, Aspirin, Statin (ceftriaxone to end on 2/3)  Bridge Therapy: No (Lovenox 40 mg qday prophylaxis)     Comments: Warfarin 5 mg daily started yesterday for MV repair. Will trend the INR over the next several days. Ceftriaxone will stop on 2/3 (DDI with warfarin). Will stop the prophylactic Lovenox when INR is near or at goal range. Chest tubes were dc'd today.    Education Material Provided?: No    Pharmacist suggested discharge dosing: Warfarin 5 mg daily for now.     Denilson Cortez

## 2017-02-02 NOTE — PROGRESS NOTES
[facility] ICU Progress Note      Admit Date: 1/24/2017  ICU Day: 9    Resident(s): Josr Al  Attending: ALESIA ZARAGOZA/ Dr. Aguirre    Date & Time:   2/2/2017   8:00 AM       Patient ID:    Name:             Kristen Salazar   YOB: 1950  Age:                 66 y.o.  female   MRN:               [id number]    HPI:  Very pleasant 66 year old White female with PMH htn, dld, mvp admitted to ICU on 1/24/2017 for acute onset shortness of breath, later found on echocardiogram to have severe mitral regurgitation.       Diagnosis:  Severe mitral regurgitation  Hypotension  VDRF  HTN  DLD    Interval Events:  Status post radical mitral valve repair 1/31/2017.  Patient doing well. Ambulating in sanders. Minimal O2 via NC  DC sanchez, rehab consult    Consultants:  Cardiology: Dr. Ramirez    PMA: Dr. Atkins    Physical Exam:  GEN: nad; aaox3  HEENT: NC/AT. Eomi; perrla  CV: S1, S2, NSR, Systolic murmur +, mediastinal tubes  RESP: decreased breath sounds  ABD: Soft, NT, ND; +BS  EXT: some LE edema  NEURO: no focal deficits    Respiratory:  Quinones Vent Mode: Spont  Respiration: 16, Pulse Oximetry: 97 %, O2 Daily Delivery Respiratory : Silicone Nasal Cannula    Chest Tube Drains:    Recent Labs      02/01/17   0433  02/01/17   0539  02/01/17   0652   ISTATAPH  7.359*  7.337*  7.354*   ISTATAPCO2  40.0*  39.1*  38.8*   ISTATAPO2  70  78  70   ISTATATCO2  24  22  23   [id number]  93  95  93   ISTATARTHCO3  22.5  21.0  21.6   ISTATARTBE  -3  -4  -4   ISTATTEMP  35.8 C  35.8 C  36.2 C   ISTATFIO2  70  70  60   ISTATSPEC  Arterial  Arterial  Arterial   ISTATAPHTC  7.376*  7.355*  7.365*   [id number]  64  72  66       HemoDynamics:  Pulse: 66, Heart Rate (Monitored): 66 Arterial BP: (!) 99/41 mmHg, NIBP: 104/52 mmHg CVP (mm Hg): (!) 9 MM HG    Neuro:    Fluids:  Intake/Output Summary (Last 24 hours) at 01/26/17 0624  Last data filed at 01/26/17 0200   Gross per 24 hour   Intake 1438.35 ml   Output   1225 ml   Net 213.35 ml        Weight: 64.7 kg (142 lb 10.2 oz)  Body mass index is 22.33 kg/(m^2).    Recent Labs      17   05017   SODIUM  143   --    --   145   --   132*   POTASSIUM  3.6  3.7   < >  4.4  4.0  4.2   CHLORIDE  111   --    --   116*   --   103   CO2  23   --    --   23   --   22   BUN  62*   --    --   50*   --   56*   CREATININE  0.92   --    --   0.70   --   1.02   MAGNESIUM  2.3  2.7*   --    --    --    --    CALCIUM  9.4   --    --   8.3*   --   8.4*    < > = values in this interval not displayed.     GI/Nutrition:  Recent Labs      17   05017   ALTSGPT  29  24   --    ASTSGOT  24  50*   --    ALKPHOSPHAT  73  57   --    TBILIRUBIN  0.4  0.4   --    GLUCOSE  105*  121*  101*     Heme:  Recent Labs      17   05017   0503  01/31/17   1725  02/01/17   0545  02/02/17   0210   RBC  3.79*   --    --    --   3.33*  2.93*   HEMOGLOBIN  11.6*   --    --    --   10.2*  9.0*   HEMATOCRIT  36.2*   --    --    --   31.8*  28.7*   PLATELETCT  291   --    --   203  257  199   PROTHROMBTM   --    < >  14.1  17.4*  16.1*  16.2*   APTT   --    --   29.2  30.2   --    --    INR   --    < >  1.06  1.38*  1.25*  1.26*    < > = values in this interval not displayed.     Infectious Disease:  Temp  Av.7 °C (98 °F)  Min: 36.1 °C (96.9 °F)  Max: 37.4 °C (99.3 °F)  Recent Labs      17   05017   WBC  7.2  17.6*  11.9*   NEUTSPOLYS  73.20*  94.80*   --    LYMPHOCYTES  13.10*  0.80*   --    MONOCYTES  10.90  3.50   --    EOSINOPHILS  1.80  0.00   --    BASOPHILS  0.30  0.00   --    ASTSGOT  24  50*   --    ALTSGPT  29  24   --    ALKPHOSPHAT  73  57   --    TBILIRUBIN  0.4  0.4   --      Meds:  • doxycycline monohydrate  100 mg     • quetiapine  25 mg     • warfarin  5 mg     • insulin lispro  0-10 Units     • Respiratory Care per Protocol       • mupirocin  1 Application     •  docusate sodium  100 mg      And   • senna-docusate  1 Tab      And   • senna-docusate  1 Tab      And   • lactulose  30 mL      And   • bisacodyl  10 mg      And   • fleet  1 Each     • NS   10 mL/hr at 01/31/17 1950   • enoxaparin  40 mg     • magnesium sulfate  1 g Stopped (02/01/17 1813)   • aspirin EC  81 mg     • MD ALERT... warfarin       • oxycodone immediate-release  5 mg     • oxycodone immediate release  10 mg     • tramadol  50 mg     • ondansetron  4 mg      Or   • prochlorperazine  10 mg     • acetaminophen  650 mg      Or   • acetaminophen  650 mg     • mag hydrox-al hydrox-simeth  30 mL     • diphenhydrAMINE  25 mg     • hydrocodone-acetaminophen  1-2 Tab     • artificial tears  1 Drop     • dextrose 50%  25 mL     • glucose 4 g  16 g      And   • dextrose 50%  25 mL     • multivitamin  1 Tab     • lactulose  30 mL     • ipratropium-albuterol  3 mL     • famotidine  20 mg     • cefTRIAXone (ROCEPHIN) IVPB  1 g Stopped (02/01/17 0916)   • oyster shell calcium/vitamin D  1 Tab     • pravastatin  20 mg        Problem and Plan:    Acute Hypoxemic respiratory failure   Acute pulmonary edema d/t severe MR  Hypertensive Emergency  Demand ischemia  Prolonged Qtc   CAP?  - full vent support, extubation tried on 1/28 but patient had to be re-intubated due to distress  - liberated finally on 1/31/2017  - due to severe MR?   - echo:EF 75%, severe MR, RVSP 40  - cardiac cath: normal coronary arteries  - CTA: no PE  - KAREN: EF 65%, mod MR, no rupture of cord or tendinae  - renal duplex neg for stenosis  - avoid qtc prolonging drugs, keep Mg >2  - empirically on C3 and doxy  - followed by CT team    Chronic medical issues:   DLD: cont pravastatin  Chronic back/knee/neck pain: pain meds held  HTN: losartan held    DISPO: ICU    CODE STATUS: Full    Quality Measures:  Frank Catheter: Yes  DVT Prophylaxis: lovenox  Ulcer Prophylaxis: pepcid   Antibiotics:Rocephin, doxy  Lines: Central    Procedures:  1/24  Intubation  1/24 Central line  1/25 cardiac cath  1/28 extubated  1/28 re-intubated    Imaging:  DX-CHEST-PORTABLE (1 VIEW)   Final Result      1.  Interval removal of endotracheal tube, feeding tube, Mine[place]-Jose catheter, and mediastinal/pleural drains..   2.  No pneumothorax.   3.  Worsening hypoinflation and bibasilar atelectasis.      TRANSESOPHAGEAL ECHO W/O CONT   Final Result      DX-CHEST-PORTABLE (1 VIEW)   Final Result      No significant change      DX-CHEST-PORTABLE (1 VIEW)   Final Result      1.  Mineola-Jose catheter has been pulled back and the tip now projects over the pulmonary outflow tract      2.  No other change      DX-CHEST-PORTABLE (1 VIEW)   Final Result         1. Postsurgical change in the chest with mild pulmonary edema and bibasilar atelectasis.      2. The left side Mine[place]-Jose catheter with tip in the right lower lobe's segmental pulmonary artery.         DX-CHEST-PORTABLE (1 VIEW)   Final Result      No significant change      Carotid Duplex STAT   Final Result      DX-CHEST-PORTABLE (1 VIEW)   Final Result      No significant change from prior exam.      DX-CHEST-PORTABLE (1 VIEW)   Final Result      Interval improvement of perihilar infiltrates and worsening of LEFT basilar atelectasis.      DX-CHEST-LIMITED (1 VIEW)   Final Result      1.  Satisfactory re-intubation.   2.  Other tubes and lines are stable.   3.  Increase in central hazy parenchymal opacity suspicious for edema although pneumonitis is not excluded.      DX-CHEST-PORTABLE (1 VIEW)   Final Result      No significant change from prior exam.      US-RENAL ARTERY DUPLEX   Final Result      No evidence of main renal artery stenosis.      DX-CHEST-PORTABLE (1 VIEW)   Final Result         1. No significant interval change.      TRANSESOPHAGEAL ECHO W/O CONT (Order not available for Rehab Hospital)   Final Result      DX-CHEST-PORTABLE (1 VIEW)   Final Result         1. Decrease groundglass and airspace opacities in both lungs.       CT-CTA CHEST PULMONARY ARTERY W/ RECONS   Final Result      1.  No evidence pulmonary emboli.   2.  Moderate right and small left pleural effusions.   3.  Compressive atelectasis both lower lobes especially the left and left lung volume loss with mediastinal shift towards left.   4.  Patchy groundglass opacities within the right upper lobe consistent with focal areas of pneumonitis.   5.  7.5 mm left upper lobe nodule and smaller nodules within the lungs.   Nodule measuring between 7 and 8 mm:   Low Risk Patient:  Initial follow up CT at 6-12 months, then at 18-24 months if no change.      High Risk Patient:  Initial follow up CT at 3-6 months, then at 9-12 and 24 months if no change.         Low Risk - Minimal or absent history of smoking and of other known risk factors.   High Risk - History of smoking or of other known risk factors.   Fleischner Society Guidelines for Pulmonary Nodules:  Radiology, 237:2005         DX-CHEST-PORTABLE (1 VIEW)   Final Result         1. Interval placement of feeding tube. No other significant interval change.      DX-ABDOMEN FOR TUBE PLACEMENT   Final Result         Feeding tube with tip in the stomach.      ECHOCARDIOGRAM COMP W/O CONT   Final Result      DX-CHEST-PORTABLE (1 VIEW)   Final Result         1. Right central venous catheter with tip in the mid SVC.      OUTSIDE IMAGES-DX CHEST   Preliminary Result      OUTSIDE IMAGES-DX CHEST   Preliminary Result      DX-CHEST-PORTABLE (1 VIEW)   Final Result         1. Extensive groundglass and airspace opacities throughout both lungs, worse in the bilateral lower lungs. This could be seen with multifocal pneumonia, ARDS or pulmonary edema.   2. Endotracheal tube with tip in the mid thoracic trachea.

## 2017-02-02 NOTE — PROGRESS NOTES
Cardiovascular Surgery Progress Note    Name: Kristen Salazar  MRN: [id number]  : 1950  Admit Date: 2017  8:18 PM  Procedure:  Procedure(s) and Anesthesia Type:     * MITRAL VALVE REPAIR - General     * KAREN - General  2 Day Post-Op    Vitals:  Patient Vitals for the past 8 hrs:   Temp SpO2 O2 Delivery O2 (LPM) Pulse Heart Rate (Monitored) Resp NIBP Weight   17 0734 - 97 % - 3 66 66 16 - -   17 0600 - - - - - - - - 64.7 kg (142 lb 10.2 oz)   17 0400 37.4 °C (99.3 °F) 89 % Silicone Nasal Cannula 5 65 65 14 104/52 mmHg -     Temp (24hrs), Av.7 °C (98 °F), Min:36.1 °C (96.9 °F), Max:37.4 °C (99.3 °F)      Respiratory:    Respiration: 16, Pulse Oximetry: 97 %, O2 Daily Delivery Respiratory : Silicone Nasal Cannula     Chest Tube Drains:     Mediastinal Chest Tube 1: 70 ml    Fluids:    Intake/Output Summary (Last 24 hours) at 17 1046  Last data filed at 17 0400   Gross per 24 hour   Intake  929.4 ml   Output   1450 ml   Net -520.6 ml     Admit weight: Weight: 61 kg (134 lb 7.7 oz)  Current weight: Weight: 64.7 kg (142 lb 10.2 oz) (17 0600)    Labs:  Recent Labs      17   0502  17   1725  17   0545  17   0210   WBC  7.2   --   17.6*  11.9*   RBC  3.79*   --   3.33*  2.93*   HEMOGLOBIN  11.6*   --   10.2*  9.0*   HEMATOCRIT  36.2*   --   31.8*  28.7*   MCV  95.5   --   95.5  98.0*   MCH  30.6   --   30.6  30.7   MCHC  32.0*   --   32.1*  31.4*   RDW  48.3   --   54.4*  55.8*   PLATELETCT  291  203  257  199   MPV  12.2   --   11.6  12.3     Recent Labs      17   0502  17   0545   NEUTSPOLYS  73.20*  94.80*   LYMPHOCYTES  13.10*  0.80*   MONOCYTES  10.90  3.50   EOSINOPHILS  1.80  0.00   BASOPHILS  0.30  0.00   RBCMORPHOLO   --   Present     Recent Labs      17   0502   17   0545  17   1120  17   0210   SODIUM  143   --   145   --   132*   POTASSIUM  3.6   < >  4.4  4.0  4.2   CHLORIDE  111   --   116*   --   103    CO2  23   --   23   --   22   GLUCOSE  105*   --   121*   --   101*   BUN  62*   --   50*   --   56*   CREATININE  0.92   --   0.70   --   1.02   CALCIUM  9.4   --   8.3*   --   8.4*    < > = values in this interval not displayed.     Recent Labs      01/31/17   0503  01/31/17   1725  02/01/17   0545  02/02/17   0210   APTT  29.2  30.2   --    --    INR  1.06  1.38*  1.25*  1.26*       Medications:  • doxycycline monohydrate  100 mg     • quetiapine  25 mg     • warfarin  5 mg     • mupirocin  1 Application     • docusate sodium  100 mg      And   • senna-docusate  1 Tab     • enoxaparin  40 mg     • magnesium sulfate  1 g Stopped (02/01/17 1813)   • aspirin EC  81 mg     • multivitamin  1 Tab     • famotidine  20 mg     • cefTRIAXone (ROCEPHIN) IVPB  1 g 1 g (02/02/17 0836)   • oyster shell calcium/vitamin D  1 Tab     • pravastatin  20 mg         Exam:   Review of Systems   Constitutional: Negative.    HENT: Negative.    Eyes: Negative.    Respiratory: Negative.    Cardiovascular: Negative.    Gastrointestinal: Negative.    Genitourinary: Negative.    Musculoskeletal: Negative.    Skin: Negative.    Neurological: Negative.    Endo/Heme/Allergies: Negative.    Psychiatric/Behavioral: Negative.        Physical Exam   Constitutional: She is oriented to person, place, and time. She appears well-developed. No distress.   HENT:   Head: Normocephalic and atraumatic.   Eyes: Conjunctivae are normal. Pupils are equal, round, and reactive to light.   Neck: Normal range of motion. No JVD present.   Cardiovascular: Normal rate and regular rhythm.  Exam reveals no gallop and no friction rub.    No murmur heard.  Pulmonary/Chest: She has decreased breath sounds in the right lower field and the left lower field.   Abdominal: Soft. She exhibits no distension.   Musculoskeletal: She exhibits edema.   Neurological: She is alert and oriented to person, place, and time.   Skin: Skin is warm and dry.   Surgical incisions CDI    Psychiatric: She has a normal mood and affect. Her behavior is normal.       Quality Measures:   EKG reviewed, Labs reviewed, Medications reviewed and Radiology images reviewed  Sanchez catheter: One or Two Days Post Surgery (Day of Surgery being Day 0)  Central line in place: Need for access    DVT Prophylaxis: Enoxaparin (Lovenox) and Warfarin (Coumadin)  DVT prophylaxis - mechanical: Not indicated at this time, ambulatory  Ulcer prophylaxis: No    Assessed for rehab: Patient returned to prior level of function, rehabilitation not indicated at this time      Assessment/Plan:  POD 1 - HOTN- on low dose epi/daphnie, gently diuresis, Vent - per pulm, keep mediastinal tubes, keep sanchez- strict I&O, CPM  POD 2 HDS, NSR, labs noted, doing well.  OK DC sanchez.  Rehab consult.    Patient seen, examined and plan reviewed with midlevel provider. I agree with the plan.      Active Hospital Problems    Diagnosis   • Mitral regurgitation [I34.0]   • Respiratory failure with hypoxia (CMS-HCC) [J96.91]   • Hyperlipidemia [E78.5]   • Hypertension [I10]   • CAP (community acquired pneumonia) [J18.9]   • Bradycardia [R00.1]   • Hypotension [I95.9]   • Normocytic anemia [D64.9]   • Pulmonary edema [J81.1]

## 2017-02-02 NOTE — DISCHARGE PLANNING
PMR referral from Ledy DOWD     PROCEDURE PERFORMED:  Radical mitral valve repair (P2 triangular resection,    34-mm Ferguson flexible annuloplasty band), left atrial appendage ligation and    intraoperative transesophageal echocardiography.     Ronald Florence Respiratory Therapist Signed  Care Plan 2/1/2017  5:04 PM      Expand All Collapse All    Problem: Hyperinflation:  Goal: Prevent or improve atelectasis  Outcome: PROGRESSING AS EXPECTED  Respiratory Therapy Update        Interdisciplinary Plan of Care-Outcomes    Hyperinflation Protocol Goals/Outcome: Increased Vital Capacity or Return to Pre-operative Values;Stable Vital Capacity x24 hrs and Patient Understands / uses I.S. (02/01/17 1611)    Cough:  (No suction required at this time) (02/01/17 0656)  Sputum Amount: Unable to Evaluate (02/01/17 1600)  Sputum Color: Unable to Evaluate (02/01/17 1600)  Sputum Consistency: Unable to Evaluate (02/01/17 1600)      O2 (LPM): 3 (02/01/17 1611)  O2 Daily Delivery Respiratory : Silicone Nasal Cannula (02/01/17 1611)    Breath Sounds  Pre/Post Intervention: Pre Intervention Assessment (02/01/17 0140)  RUL Breath Sounds: Clear (02/01/17 1611)  RML Breath Sounds: Clear (02/01/17 1611)  RLL Breath Sounds: Diminished (02/01/17 1611)  PRAMOD Breath Sounds: Clear (02/01/17 1611)  LLL Breath Sounds: Diminished (02/01/17 1611)            Will follow for therapy evaluations to determine post acute intervention. SW Don aware please consider PMR referral once therapy evaluations are  completed. No consult ordered at this time.

## 2017-02-02 NOTE — CARE PLAN
Problem: Day of surgery post CABG/Heart valve replacement  Goal: Stabilization in immediate post op period  Intervention: VS q 15 min x 4 hours, then q 1 hour. Include temperature immediately upon arrival. Check CO/CI q 2-4 hours and PRN  See flow sheets  Intervention: Serum K q 6 hours x 24 hours. ABG and CBC prn.  Reviewed, addressed, plan of care executed.   Intervention: For FSBS greater than 130, start Post Cardiac Surgery Insulin Drip Protocol  In place at beginning of shift   Intervention: FSBS frequency as per Cardiac Surgery Insulin Drip Protocol  See results review  Intervention: For patients on Beta Blockers: verify dose given prior to surgery or within 6 hours after arrival to the unit  Not applicable, valve  Intervention: Chest tube to 20 cm suction, record CT drainage with VS  See flowsheets  Intervention: For CT drainage > 300 cc in first post op hour and/or 150 cc in subsequent hours: platelets, coag screen, fibrinogen, H&H per order  Not applicable   Intervention: Titrate and wean off vasoactive drips per patient’s condition and per MD order while maintaining SBP  mmHg per MD order  See MAR  Intervention: VAP protocol in place  In place  Intervention: Wean from vent per protocol (see protocol), extubation goal with 2-6 hours post op.  See progress notes  Intervention: IS q 1 hour while awake post extubation  IS 1750 mL  Intervention: Out of bed, dangle 4 hours post extubation  Up in chair post extubation   Intervention: Up in chair 4 hours, day of extubation  done  Intervention: Maintain all original surgical dressings for 24 hours  Removed at 24 hours  Intervention: Clear liquids post extubation, advance as tolerated  Advanced   Intervention: Discontinue [place] mariola and arterial line 12-18 hours post op if hemodynamically stable and off vasoactive drips  Removed.       Problem: Post Op Day 1 CABG/Heart Valve Replacement  Goal: Optimal care of the post op CABG/heart valve replacement Post Op Day  1  Intervention: EKG and CXR completed  Complete, reviewed.   Intervention: Antibiotics are discontinued within 24 hours of anesthesia end time unless indication documented for continuation beyond 24 hours  Antibiotics reviewed with interdisciplinary team. Stop date in place on relevant medications   Intervention: Daily Weights  Reviewed   Intervention: Up in chair for all meals  Complete as applicable post extubation.   Intervention: Ambulate in am if stable. First ambulation is 25 feet. Repeat x 3 as tolerated. Ambulate again before bed.  Ambulated 50 feet   Intervention: Discontinue sanchez catheter unless documented reason for continuation  Removed.   Intervention: Remove original surgical dressing after 24 hrs, leave open to air unless otherwise specified by physician  Open to air   Intervention: Consider chest tube removal by MD  Discontinued   Intervention: IS q 1 hour while awake and record best IS volume  Best IS 1750mL  Intervention: Graduated elastic stockings  In place  Intervention: Saline lock IV  Saline locked  Intervention: Transfer to [facility], begin VS q 4 hours  Transitioned   Intervention: After 24th hour post-anesthesia end time, transition patient to Cardiac Surgery SQ Insulin Protocol  Transitioned

## 2017-02-02 NOTE — PROGRESS NOTES
12 hour chart check     Monitor Summary:     Sinus Rhythm / Sinus Bradycardia with rare Premature Ventricular Contraction HR 58-70    MO 0.18 QRS 0.1 QT 0.42

## 2017-02-02 NOTE — CARE PLAN
Problem: Post op day 2 CABG/Heart Valve Replacement  Goal: Optimal care of the post op CABG/heart valve replacement post op day 2  Intervention: IS q 1 hour while awake and record best IS volume  Done           Problem: Post Op Day 3 CABG/Heart Valve replacement  Goal: Optimal care of the post op CABG/Heart Valve replacement post op day 3  Intervention: Daily Weights  Done      Intervention: Up in chair for all meals  done

## 2017-02-02 NOTE — PROGRESS NOTES
Pulmonary Critical Care Progress Note        Chief Complaint: Worsening dyspnea and acute hypoxic respiratory failure    History of Present Illness: The patient is a 66-year-old female with a past medical history significant for hypertension, hyperlipidemia, and mitral valve prolapse who was in her usual state of health on 1/24 when she noted a cough and sudden onset of shortness of breath.  She checked her oxygen saturation at home and found to be 85%. She presented to the emergency department at [facility] where unfortunately she decompensated quickly requiring up to 15 L facemask and then eventually requiring intubation. She was initially hypertensive and tachycardic with an oxygen saturation of 86% on room air at the outside hospital and was transported on high peak and expiratory pressures on the ventilator with an FIO2 of 100%, achieving saturations in the low 90s. She became progressively more hypotensive requiring a norepinephrine drip. A stat bedside echo was performed with Dr. Delarosa in the ER, which revealed a ruptured mitral valve leaflet with severe MR as the likely cause of her decompensation.     ROS: Unable to obtain as the patient is sedated on the ventilator     Interval Events:  24 hour interval history reviewed    - (-)850cc over last 24hr, (-)2.4L since admit   - tmax 99.3   - off pressors   - s/p MV repair 1/31   - extubated 2/1   - 5L nc   - IS/PEP/Mobilize      PFSH:  No change.    Respiratory:     Pulse Oximetry: 89 %    Exam: diminished breath sounds moderate scattered rhonchi  ImagingAvailable data reviewed     CTA chest 1/25:  1.  No evidence pulmonary emboli.  2.  Moderate right and small left pleural effusions.  3.  Compressive atelectasis both lower lobes especially the left and left lung volume loss with mediastinal shift towards left.  4.  Patchy groundglass opacities within the right upper lobe consistent with focal areas of pneumonitis.  5.  7.5 mm left upper lobe  nodule and smaller nodules within the lungs.  Recent Labs      02/01/17   0433  02/01/17   0539  02/01/17   0652   ISTATAPH  7.359*  7.337*  7.354*   ISTATAPCO2  40.0*  39.1*  38.8*   ISTATAPO2  70  78  70   ISTATATCO2  24  22  23   [id number]  93  95  93   ISTATARTHCO3  22.5  21.0  21.6   ISTATARTBE  -3  -4  -4   ISTATTEMP  35.8 C  35.8 C  36.2 C   ISTATFIO2  70  70  60   ISTATSPEC  Arterial  Arterial  Arterial   ISTATAPHTC  7.376*  7.355*  7.365*   [id number]  64  72  66       HemoDynamics:  Pulse: 65, Heart Rate (Monitored): 65  Arterial BP: (!) 99/41 mmHg, NIBP: 104/52 mmHg  CVP (mm Hg): (!) 9 MM HG  Exam: regular rate and rhythm  Imaging: echo Reviewed           Neuro:  GCS Total Kali Coma Score: 15     Exam:non focal  Imaging: Available data reviewed    Fluids:  Intake/Output       01/31/17 0700 - 02/01/17 0659 02/01/17 0700 - 02/02/17 0659 02/02/17 0700 - 02/03/17 0659      3618-9615 3334-3407 Total 2627-9158 2341-2119 Total 6883-8139 5471-7536 Total       Intake    P.O.  --  -- --  400  -- 400  --  -- --    P.O. -- -- -- 400 -- 400 -- -- --    I.V.  1054.5  2465 3519.6  580.4  -- 580.4  --  -- --    Precedex Volume 78.7 158.1 236.8 -- -- -- -- -- --    Phenylephrine Volume -- 28 28 11.7 -- 11.7 -- -- --    Insulin Volume -- 229.3 229.3 31.6 -- 31.6 -- -- --    Epinephrine Volume 0 139.6 139.6 37.1 -- 37.1 -- -- --    Dopamine Volume 55.8 -- 55.8 -- -- -- -- -- --    IV Volume (TKO) 20 110 130 100 -- 100 -- -- --    IV Volume (Plasmalyte. ) 600 1500 2100 -- -- -- -- -- --    IV Piggyback Volume (IV Piggyback) 300 300 600 400 -- 400 -- -- --    Enteral  0  120 120  --  -- --  --  -- --    Free Water / Tube Flush 0 120 120 -- -- -- -- -- --    Total Intake 1054.5 2585 3639.6 980.4 -- 980.4 -- -- --       Output    Urine  1527  977 2504  900  650 1550  --  -- --    Number of Times Voided -- -- -- -- 2 x 2 x -- -- --    Indwelling Cathether 8306 034 4971 900 -- 900 -- -- --    Void (ml) -- -- -- -- 650 650  -- -- --    Drains  39  281 320  280  -- 280  --  -- --    Mediastinal Chest Tube 1 39 281 320 280 -- 280 -- -- --    Stool  --  -- --  --  -- --  --  -- --    Number of Times Stooled 0 x -- 0 x -- -- -- -- -- --    Total Output 1566 1258 2824 7716 707 5650 -- -- --       Net I/O     -511.5 1327 815.6 -199.6 -650 -849.6 -- -- --        Weight: 64.7 kg (142 lb 10.2 oz)  Recent Labs      17   [id number]  17   1120  17   SODIUM  143   --    --   145   --   132*   POTASSIUM  3.6  3.7   < >  4.4  4.0  4.2   CHLORIDE  111   --    --   116*   --   103   CO2  23   --    --   23   --   22   BUN  62*   --    --   50*   --   56*   CREATININE  0.92   --    --   0.70   --   1.02   MAGNESIUM  2.3  2.7*   --    --    --    --    CALCIUM  9.4   --    --   8.3*   --   8.4*    < > = values in this interval not displayed.       GI/Nutrition:  Exam: abdomen is soft and non-tender, normal active bowel sounds  Imaging: Available data reviewed  CorTrak: at goal with tube feeds  Liver Function  Recent Labs      17   05017   0545  17   ALTSGPT  29  24   --    ASTSGOT  24  50*   --    ALKPHOSPHAT  73  57   --    TBILIRUBIN  0.4  0.4   --    GLUCOSE  105*  121*  101*       Heme:  Recent Labs      17   05017   0503  01/31/17   1725  02/01/17   0545  17   021   RBC  3.79*   --    --    --   3.33*  2.93*   HEMOGLOBIN  11.6*   --    --    --   10.2*  9.0*   HEMATOCRIT  36.2*   --    --    --   31.8*  28.7*   PLATELETCT  291   --    --   203  257  199   PROTHROMBTM   --    < >  14.1  17.4*  16.1*  16.2*   APTT   --    --   29.2  30.2   --    --    INR   --    < >  1.06  1.38*  1.25*  1.26*    < > = values in this interval not displayed.       Infectious Disease:  Temp  Av.6 °C (97.9 °F)  Min: 36.1 °C (96.9 °F)  Max: 37.4 °C (99.3 °F)  Micro: cultures reviewed  Recent Labs      17   0502  17   0545  17   0210   WBC  7.2   17.6*  11.9*   NEUTSPOLYS  73.20*  94.80*   --    LYMPHOCYTES  13.10*  0.80*   --    MONOCYTES  10.90  3.50   --    EOSINOPHILS  1.80  0.00   --    BASOPHILS  0.30  0.00   --    ASTSGOT  24  50*   --    ALTSGPT  29  24   --    ALKPHOSPHAT  73  57   --    TBILIRUBIN  0.4  0.4   --      Current Facility-Administered Medications   Medication Dose Frequency Provider Last Rate Last Dose   • doxycycline monohydrate (ADOXA) tablet 100 mg  100 mg Q12HRS Masoud Aguirre M.D.       • quetiapine (SEROQUEL) tablet 25 mg  25 mg Q EVENING Masoud Aguirre M.D.   25 mg at 02/01/17 1949   • warfarin (COUMADIN) tablet 5 mg  5 mg COUMADIN-DAILY Denilson Cortez, PHARMD   5 mg at 02/01/17 1712   • insulin lispro (HUMALOG) injection 0-10 Units  0-10 Units ACHS & 0200 Hannah Sumner, A.P.N.   Stopped at 02/01/17 2200   • Respiratory Care per Protocol   Continuous RT Hannah Sumner, A.P.N.       • mupirocin (BACTROBAN) 2 % ointment 1 Application  1 Application BID Hannah Sumner A.P.N.   1 Application at 02/01/17 2100   • docusate sodium (COLACE) capsule 100 mg  100 mg QAM Hannah Sumner, A.P.N.   Stopped at 01/31/17 1800    And   • senna-docusate (PERICOLACE or SENOKOT S) 8.6-50 MG per tablet 1 Tab  1 Tab Nightly Hannah Sumner, A.P.N.   1 Tab at 02/01/17 1950    And   • senna-docusate (PERICOLACE or SENOKOT S) 8.6-50 MG per tablet 1 Tab  1 Tab Q24HRS PRN Hannah Sumner, A.P.N.        And   • lactulose 20 GM/30ML solution 30 mL  30 mL Q24HRS PRN Hannah Sumner, A.P.N.        And   • bisacodyl (DULCOLAX) suppository 10 mg  10 mg Q24HRS PRN Hannah Sumner, A.P.N.        And   • fleet enema 133 mL  1 Each Once PRN Hannah Sumner A.P.N.       • NS infusion   Continuous Hannah Sumner A.P.N. 10 mL/hr at 01/31/17 1950     • enoxaparin (LOVENOX) inj 40 mg  40 mg DAILY Hannah Sumner A.P.N.   40 mg at 02/01/17 1950   • magnesium sulfate ivpb premix 1 g  1 g QDAY Hannah Sumner A.P.N.   Stopped at 02/01/17 1813   • aspirin EC (ECOTRIN)  tablet 81 mg  81 mg DAILY Hannah Sumner, A.P.N.   81 mg at 02/01/17 0909   • MD ALERT... warfarin (COUMADIN) per pharmacy protocol   PRN Hannah Sumner, A.P.N.       • oxycodone immediate-release (ROXICODONE) tablet 5 mg  5 mg Q3HRS PRN Hannah Sumner, A.P.N.       • oxycodone immediate release (ROXICODONE) tablet 10 mg  10 mg Q3HRS PRN Hannah Sumner, A.P.N.       • tramadol (ULTRAM) 50 MG tablet 50 mg  50 mg Q4HRS PRN Hannah Sumner, A.P.N.       • ondansetron (ZOFRAN) syringe/vial injection 4 mg  4 mg Q6HRS PRN Hannah Sumner, A.P.N.   4 mg at 02/02/17 0639    Or   • prochlorperazine (COMPAZINE) injection 10 mg  10 mg Q6HRS PRN Hannah Sumner, A.P.N.       • acetaminophen (TYLENOL) tablet 650 mg  650 mg Q4HRS PRN Hannah Sumner, A.P.N.        Or   • acetaminophen (TYLENOL) suppository 650 mg  650 mg Q4HRS PRN Hannah Sumner, A.P.N.       • mag hydrox-al hydrox-simeth (MAALOX PLUS ES or MYLANTA DS) suspension 30 mL  30 mL Q4HRS PRN Hannah Sumner, A.P.N.       • diphenhydrAMINE (BENADRYL) tablet/capsule 25 mg  25 mg HS PRN - MR X 1 Hannah Sumner, A.P.N.       • hydrocodone-acetaminophen (NORCO) 5-325 MG per tablet 1-2 Tab  1-2 Tab Q4HRS PRN Hannah Sumner, A.P.N.   1 Tab at 02/02/17 0159   • artificial tears 1.4 % ophthalmic solution 1 Drop  1 Drop PRN Masoud Aguirre M.D.       • dextrose 50% (D50W) injection 25 mL  25 mL PRN Hannah Sumner, A.P.N.   25 mL at 02/01/17 1627   • glucose 4 g chewable tablet 16 g  16 g Q15 MIN PRN RADHIKA Hayes.D.        And   • dextrose 50% (D50W) injection 25 mL  25 mL Q15 MIN PRN James Pérez M.D.       • multivitamin (THERAGRAN) tablet 1 Tab  1 Tab DAILY James Pérez M.D.   1 Tab at 02/01/17 0909   • lactulose 20 GM/30ML solution 30 mL  30 mL Q24HRS PRN Jeremy M Gonda, M.D.       • ipratropium-albuterol (DUONEB) nebulizer solution 3 mL  3 mL Q2HRS PRN (RT) Jeremy M Gonda, M.D.   Stopped at 01/30/17 1020   • famotidine (PEPCID) tablet 20 mg  20 mg Q12HRS Christo GARRIDO  Gonda, M.D.   20 mg at 02/01/17 1950   • cefTRIAXone (ROCEPHIN) 1 g in  mL IVPB  1 g Q24HRS Lesley Atkins M.D.   Stopped at 02/01/17 0916   • oyster shell calcium/vitamin D 250-125 MG-UNIT tablet 1 Tab  1 Tab QDAY with Breakfast James Pérez M.D.   1 Tab at 02/01/17 0909   • pravastatin (PRAVACHOL) tablet 20 mg  20 mg Q EVENING Jeremy M Gonda, M.D.   20 mg at 02/01/17 2000     Last reviewed on 1/30/2017  8:02 PM by Purvi Montanez R.N.    Quality  Measures:  Medications reviewed, Labs reviewed and Radiology images reviewed  Frank catheter: Critically Ill - Requiring Accurate Measurement of Urinary Output  Central line in place: Need for access    DVT Prophylaxis: Enoxaparin (Lovenox)  DVT prophylaxis - mechanical: SCDs  Ulcer prophylaxis: Yes  Antibiotics: Treating active infection/contamination beyond 24 hours perioperative coverage      Lines  RIJ TLC 1/24    Gtt  none    Abx  Rocephin  Doxycycline    Bcx 1/24 ngtd  Ucx 1/24 ngtd  Flu (-) 1/24    Echo 1/26 E 65%      Problems/Plan:    Acute Hypoxic Respiratory Failure from pulmonary edema and cardiogenic shock   - intubated 1/24, failed extubation 1/28 and re-intubated   - extubated 2/1   - RT/O2 therapies   - IS/PEP/Mobilize/IPV   - monitor volume status  Acute Pulmonary Edema   - RT/O2 protocols   - cont vent support   - improved  Acute Delirium   - Seroquel 25mg qhs   - improved  Tachycardia that ? A flutter   - presently sinus, off amio  Severe Mitral Valve Regurgitation    - cardiac cath on 1/25   - sp mv repair 1/31  Cardiogenic Shock likely related to severe mitral regurgitation   - s/p vasopressor/ionotropic therapy   - Cards following   - Cardiac cath on 1/25 was negative and heparin stopped   - CTA was negative  Acute Leukocytosis and concern for aspiration   - blood/sputum cultures sent and follow   - Empiric Ceftriaxone/Doxycycline day 9 of 10  Elevated Troponin   - cards following   - ASA  Hyperglycemia   - ISS  Hx of mitral valve  prolapse  Hx of systemic arterial hypertension  Hx of Dyslipidemia   - statin therapy  Prophylaxis   - heparin, bowel regimen, pepcid, enteral feeds    Discussed patient condition and risk of morbidity and/or mortality with Family, RN, RT, Pharmacy, , [employer] [facility] resident and Charge nurse / hot rounds and well as cardiology  The patient remains critically ill.  Critical care time = 37 minutes in directly providing and coordinating critical care and extensive data review.  No time overlap and excludes procedures.

## 2017-02-03 ENCOUNTER — APPOINTMENT (OUTPATIENT)
Dept: RADIOLOGY | Facility: MEDICAL CENTER | Age: 67
DRG: 216 | End: 2017-02-03
Attending: THORACIC SURGERY (CARDIOTHORACIC VASCULAR SURGERY)
Payer: MEDICARE

## 2017-02-03 ENCOUNTER — APPOINTMENT (OUTPATIENT)
Dept: RADIOLOGY | Facility: MEDICAL CENTER | Age: 67
DRG: 216 | End: 2017-02-03
Attending: NURSE PRACTITIONER
Payer: MEDICARE

## 2017-02-03 LAB
ANION GAP SERPL CALC-SCNC: 4 MMOL/L (ref 0–11.9)
BASOPHILS # BLD AUTO: 0.1 % (ref 0–1.8)
BASOPHILS # BLD: 0.01 K/UL (ref 0–0.12)
BUN SERPL-MCNC: 56 MG/DL (ref 8–22)
CALCIUM SERPL-MCNC: 8.1 MG/DL (ref 8.5–10.5)
CHLORIDE SERPL-SCNC: 101 MMOL/L (ref 96–112)
CO2 SERPL-SCNC: 23 MMOL/L (ref 20–33)
CREAT SERPL-MCNC: 0.83 MG/DL (ref 0.5–1.4)
EOSINOPHIL # BLD AUTO: 0.03 K/UL (ref 0–0.51)
EOSINOPHIL NFR BLD: 0.3 % (ref 0–6.9)
ERYTHROCYTE [DISTWIDTH] IN BLOOD BY AUTOMATED COUNT: 50.9 FL (ref 35.9–50)
GFR SERPL CREATININE-BSD FRML MDRD: >60 ML/MIN/1.73 M 2
GLUCOSE SERPL-MCNC: 108 MG/DL (ref 65–99)
HCT VFR BLD AUTO: 25.9 % (ref 37–47)
HGB BLD-MCNC: 8.3 G/DL (ref 12–16)
IMM GRANULOCYTES # BLD AUTO: 0.05 K/UL (ref 0–0.11)
IMM GRANULOCYTES NFR BLD AUTO: 0.5 % (ref 0–0.9)
INR PPP: 1.4 (ref 0.87–1.13)
LYMPHOCYTES # BLD AUTO: 0.93 K/UL (ref 1–4.8)
LYMPHOCYTES NFR BLD: 8.7 % (ref 22–41)
MCH RBC QN AUTO: 30.6 PG (ref 27–33)
MCHC RBC AUTO-ENTMCNC: 32 G/DL (ref 33.6–35)
MCV RBC AUTO: 95.6 FL (ref 81.4–97.8)
MONOCYTES # BLD AUTO: 0.72 K/UL (ref 0–0.85)
MONOCYTES NFR BLD AUTO: 6.7 % (ref 0–13.4)
NEUTROPHILS # BLD AUTO: 8.94 K/UL (ref 2–7.15)
NEUTROPHILS NFR BLD: 83.7 % (ref 44–72)
NRBC # BLD AUTO: 0 K/UL
NRBC BLD AUTO-RTO: 0 /100 WBC
PLATELET # BLD AUTO: 188 K/UL (ref 164–446)
PMV BLD AUTO: 11.4 FL (ref 9–12.9)
POTASSIUM SERPL-SCNC: 4.4 MMOL/L (ref 3.6–5.5)
PROTHROMBIN TIME: 17.6 SEC (ref 12–14.6)
RBC # BLD AUTO: 2.71 M/UL (ref 4.2–5.4)
SODIUM SERPL-SCNC: 128 MMOL/L (ref 135–145)
WBC # BLD AUTO: 10.7 K/UL (ref 4.8–10.8)

## 2017-02-03 PROCEDURE — A9270 NON-COVERED ITEM OR SERVICE: HCPCS | Performed by: NURSE PRACTITIONER

## 2017-02-03 PROCEDURE — 770020 HCHG ROOM/CARE - TELE (206)

## 2017-02-03 PROCEDURE — A9270 NON-COVERED ITEM OR SERVICE: HCPCS | Performed by: STUDENT IN AN ORGANIZED HEALTH CARE EDUCATION/TRAINING PROGRAM

## 2017-02-03 PROCEDURE — 97166 OT EVAL MOD COMPLEX 45 MIN: CPT

## 2017-02-03 PROCEDURE — 700102 HCHG RX REV CODE 250 W/ 637 OVERRIDE(OP): Performed by: HOSPITALIST

## 2017-02-03 PROCEDURE — A9270 NON-COVERED ITEM OR SERVICE: HCPCS | Performed by: INTERNAL MEDICINE

## 2017-02-03 PROCEDURE — 97162 PT EVAL MOD COMPLEX 30 MIN: CPT

## 2017-02-03 PROCEDURE — A9270 NON-COVERED ITEM OR SERVICE: HCPCS

## 2017-02-03 PROCEDURE — 700102 HCHG RX REV CODE 250 W/ 637 OVERRIDE(OP): Performed by: INTERNAL MEDICINE

## 2017-02-03 PROCEDURE — 700105 HCHG RX REV CODE 258: Performed by: INTERNAL MEDICINE

## 2017-02-03 PROCEDURE — 700102 HCHG RX REV CODE 250 W/ 637 OVERRIDE(OP): Performed by: NURSE PRACTITIONER

## 2017-02-03 PROCEDURE — 80048 BASIC METABOLIC PNL TOTAL CA: CPT

## 2017-02-03 PROCEDURE — G8987 SELF CARE CURRENT STATUS: HCPCS | Mod: CK

## 2017-02-03 PROCEDURE — 700111 HCHG RX REV CODE 636 W/ 250 OVERRIDE (IP): Performed by: NURSE PRACTITIONER

## 2017-02-03 PROCEDURE — 99291 CRITICAL CARE FIRST HOUR: CPT | Mod: GC | Performed by: INTERNAL MEDICINE

## 2017-02-03 PROCEDURE — G8979 MOBILITY GOAL STATUS: HCPCS | Mod: CI

## 2017-02-03 PROCEDURE — 700112 HCHG RX REV CODE 229: Performed by: NURSE PRACTITIONER

## 2017-02-03 PROCEDURE — A9270 NON-COVERED ITEM OR SERVICE: HCPCS | Performed by: HOSPITALIST

## 2017-02-03 PROCEDURE — 700102 HCHG RX REV CODE 250 W/ 637 OVERRIDE(OP)

## 2017-02-03 PROCEDURE — 94669 MECHANICAL CHEST WALL OSCILL: CPT

## 2017-02-03 PROCEDURE — 71020 DX-CHEST-2 VIEWS: CPT

## 2017-02-03 PROCEDURE — 700102 HCHG RX REV CODE 250 W/ 637 OVERRIDE(OP): Performed by: STUDENT IN AN ORGANIZED HEALTH CARE EDUCATION/TRAINING PROGRAM

## 2017-02-03 PROCEDURE — G8978 MOBILITY CURRENT STATUS: HCPCS | Mod: CK

## 2017-02-03 PROCEDURE — 700111 HCHG RX REV CODE 636 W/ 250 OVERRIDE (IP): Performed by: INTERNAL MEDICINE

## 2017-02-03 PROCEDURE — 85025 COMPLETE CBC W/AUTO DIFF WBC: CPT

## 2017-02-03 PROCEDURE — 94668 MNPJ CHEST WALL SBSQ: CPT

## 2017-02-03 PROCEDURE — 97535 SELF CARE MNGMENT TRAINING: CPT

## 2017-02-03 PROCEDURE — 85610 PROTHROMBIN TIME: CPT

## 2017-02-03 PROCEDURE — G8988 SELF CARE GOAL STATUS: HCPCS | Mod: CI

## 2017-02-03 RX ORDER — FUROSEMIDE 20 MG/1
40 TABLET ORAL
Status: DISCONTINUED | OUTPATIENT
Start: 2017-02-03 | End: 2017-02-04

## 2017-02-03 RX ADMIN — MUPIROCIN 1 APPLICATION: 20 OINTMENT TOPICAL at 10:04

## 2017-02-03 RX ADMIN — DOXYCYCLINE 100 MG: 100 TABLET ORAL at 10:01

## 2017-02-03 RX ADMIN — CALCIUM CARBONATE-CHOLECALCIFEROL TAB 250 MG-125 UNIT 1 TABLET: 250-125 TAB at 10:01

## 2017-02-03 RX ADMIN — DOXYCYCLINE 100 MG: 100 TABLET ORAL at 20:52

## 2017-02-03 RX ADMIN — FUROSEMIDE 40 MG: 20 TABLET ORAL at 14:18

## 2017-02-03 RX ADMIN — MUPIROCIN 1 APPLICATION: 20 OINTMENT TOPICAL at 21:47

## 2017-02-03 RX ADMIN — ASPIRIN 81 MG: 81 TABLET ORAL at 10:00

## 2017-02-03 RX ADMIN — FAMOTIDINE 20 MG: 20 TABLET, FILM COATED ORAL at 20:52

## 2017-02-03 RX ADMIN — PRAVASTATIN SODIUM 20 MG: 20 TABLET ORAL at 21:47

## 2017-02-03 RX ADMIN — CEFTRIAXONE SODIUM 1 G: 1 INJECTION, POWDER, FOR SOLUTION INTRAMUSCULAR; INTRAVENOUS at 10:04

## 2017-02-03 RX ADMIN — STANDARDIZED SENNA CONCENTRATE AND DOCUSATE SODIUM 1 TABLET: 8.6; 5 TABLET, FILM COATED ORAL at 10:01

## 2017-02-03 RX ADMIN — THERA TABS 1 TABLET: TAB at 10:00

## 2017-02-03 RX ADMIN — WARFARIN SODIUM 5 MG: 5 TABLET ORAL at 18:15

## 2017-02-03 RX ADMIN — ENOXAPARIN SODIUM 40 MG: 100 INJECTION SUBCUTANEOUS at 10:01

## 2017-02-03 RX ADMIN — DOCUSATE SODIUM 100 MG: 100 CAPSULE ORAL at 10:01

## 2017-02-03 RX ADMIN — FAMOTIDINE 20 MG: 20 TABLET, FILM COATED ORAL at 10:02

## 2017-02-03 ASSESSMENT — ENCOUNTER SYMPTOMS
NEUROLOGICAL NEGATIVE: 1
MUSCULOSKELETAL NEGATIVE: 1
CARDIOVASCULAR NEGATIVE: 1
GASTROINTESTINAL NEGATIVE: 1
RESPIRATORY NEGATIVE: 1
PSYCHIATRIC NEGATIVE: 1
EYES NEGATIVE: 1
CONSTITUTIONAL NEGATIVE: 1

## 2017-02-03 ASSESSMENT — PAIN SCALES - GENERAL
PAINLEVEL_OUTOF10: 0
PAINLEVEL_OUTOF10: 2
PAINLEVEL_OUTOF10: 2

## 2017-02-03 ASSESSMENT — ACTIVITIES OF DAILY LIVING (ADL): TOILETING: INDEPENDENT

## 2017-02-03 ASSESSMENT — GAIT ASSESSMENTS
DEVIATION: BRADYKINETIC;SHUFFLED GAIT;DECREASED BASE OF SUPPORT
DISTANCE (FEET): 80
GAIT LEVEL OF ASSIST: MINIMAL ASSIST
ASSISTIVE DEVICE: FRONT WHEEL WALKER

## 2017-02-03 NOTE — CARE PLAN
Problem: Post Op Day 4 CABG/Heart Valve Replacement  Goal: Optimal care of the Post Op CABG/Heart Valve replacement Post Op Day 4  Outcome: PROGRESSING AS EXPECTED  Intervention: Daily Weights  Done, 66kg via standup scale.  Intervention: Shower daily and clean incisions twice daily with soap and water  In progress.  Intervention: Up in chair for all meals  Up in chair for breakfast.  Intervention: Ambulate, increasing the distance each time x 3 and before bed  Done, ambulated around RN station x1.  Intervention: IS q 1 hour while awake and record best IS volume  Done, 700.  Intervention: Consider removal of sanchez, chest tube and pacer wire if not already done  Sanchez and chest tube removed, pacer wire capped.  Intervention: Discharge Education  In progress.  Intervention: Add special instructions for cardiac surgery discharge instructions - [facility] to after visit summary and review with patient  In progress.

## 2017-02-03 NOTE — CARE PLAN
Problem: Post op day 2 CABG/Heart Valve Replacement  Goal: Optimal care of the post op CABG/heart valve replacement post op day 2  Intervention: FSBS: when 2 consecutive BS < 130 after post op day 2, discontinue FSBS unless patient is insulin dependent diabetic  Complete  Intervention: Up in chair for all meals  complete  Intervention: Ambulate, increasing the distance each time x 3 and before bed  Pt ambulated in hallway with wheelchair push and 2 person assist three times this shift.  Intervention: IS q 1 hour while awake and record best IS volume  , Encouraged while awake  Intervention: Consider pacer wire removal by MD  Pacers capped

## 2017-02-03 NOTE — PROGRESS NOTES
Cardiology Progress Note:    Amr M Mohsen  Date & Time note created:    2/2/2017   6:08 PM       Patient ID:   Name:             Kristen Salazar   YOB: 1950  Age:                 66 y.o.  female   MRN:               [id number]                                                               Interval History:    The patient was seen and examined. The chart and telemetry were reviewed. The patient feels better with no complaints     Review of Systems:      Constitutional: Denies fevers, Denies weight changes  Eyes: Denies changes in vision, no eye pain  Ears/Nose/Throat/Mouth: Denies nasal congestion or sore throat   Cardiovascular: palpitations   Respiratory: Denies cough  Gastrointestinal/Hepatic: Denies abdominal pain, nausea, vomiting, diarrhea, constipation or GI bleeding   Genitourinary: Denies dysuria or frequency  Musculoskeletal/Rheum: Denies  joint pain and swelling  Skin: Denies rash  Neurological: Denies headache, confusion, memory loss or focal weakness/parasthesias  Psychiatric: denies mood disorder   Endocrine: Dimple thyroid problems  Heme/Oncology/Lymph Nodes: Denies enlarged lymph nodes, denies brusing or known bleeding disorder  All other systems were reviewed and are negative (AMA/CMS criteria)                Past Medical History:   Past Medical History   Diagnosis Date   • Allergy    • Arthritis    • Osteoporosis    • Heart murmur    • Fracture      Active Hospital Problems    Diagnosis   • Mitral regurgitation [I34.0]   • Respiratory failure with hypoxia (CMS-HCC) [J96.91]   • Hyperlipidemia [E78.5]   • Hypertension [I10]   • CAP (community acquired pneumonia) [J18.9]   • Bradycardia [R00.1]   • Hypotension [I95.9]   • Normocytic anemia [D64.9]   • Pulmonary edema [J81.1]       Past Surgical History:  Past Surgical History   Procedure Laterality Date   • Knee arthroscopy     • Mitral valve repair  1/31/2017     Procedure: MITRAL VALVE REPAIR;  Surgeon: Chris Schmitt M.D.;   Location: SURGERY [facility];  Service:    • Bryant  1/31/2017     Procedure: BRYANT;  Surgeon: Chris Schmitt M.D.;  Location: SURGERY [facility];  Service:        Hospital Medications:    Current facility-administered medications:   •  doxycycline monohydrate (ADOXA) tablet 100 mg, 100 mg, Oral, Q12HRS, Masoud Aguirre M.D., 100 mg at 02/02/17 0836  •  quetiapine (SEROQUEL) tablet 25 mg, 25 mg, Oral, Q EVENING, Masoud Aguirre M.D., 25 mg at 02/01/17 1949  •  warfarin (COUMADIN) tablet 5 mg, 5 mg, Oral, COUMADIN-DAILY, KIRSTEN SmithD, 5 mg at 02/02/17 1705  •  Respiratory Care per Protocol, , Nebulization, Continuous RT, Hannah Sumner, A.P.N.  •  mupirocin (BACTROBAN) 2 % ointment 1 Application, 1 Application, Topical, BID, Hannah Sumner, A.P.N., 1 Application at 02/01/17 2100  •  docusate sodium (COLACE) capsule 100 mg, 100 mg, Oral, QAM, 100 mg at 02/02/17 0836 **AND** senna-docusate (PERICOLACE or SENOKOT S) 8.6-50 MG per tablet 1 Tab, 1 Tab, Oral, Nightly, 1 Tab at 02/01/17 1950 **AND** senna-docusate (PERICOLACE or SENOKOT S) 8.6-50 MG per tablet 1 Tab, 1 Tab, Oral, Q24HRS PRN **AND** lactulose 20 GM/30ML solution 30 mL, 30 mL, Oral, Q24HRS PRN **AND** bisacodyl (DULCOLAX) suppository 10 mg, 10 mg, Rectal, Q24HRS PRN **AND** fleet enema 133 mL, 1 Each, Rectal, Once PRN, Hannah Sumner, A.P.N.  •  NS infusion, , Intravenous, Continuous, Hannah Sumner, A.P.N., Last Rate: 10 mL/hr at 01/31/17 1950  •  enoxaparin (LOVENOX) inj 40 mg, 40 mg, Subcutaneous, DAILY, Hannah Sumner, A.P.N., 40 mg at 02/02/17 0836  •  aspirin EC (ECOTRIN) tablet 81 mg, 81 mg, Oral, DAILY, RACHEL Valderrama.P.N., 81 mg at 02/02/17 0836  •  MD ALERT... warfarin (COUMADIN) per pharmacy protocol, , Other, PRN, Hannah Sumner, RACHEL.P.N.  •  oxycodone immediate-release (ROXICODONE) tablet 5 mg, 5 mg, Oral, Q3HRS PRN, RACHEL Valderrama.P.N.  •  oxycodone immediate release (ROXICODONE) tablet 10 mg, 10 mg, Oral, Q3HRS PRN, Hannah  RADHIKA Sumner, A.P.N.  •  tramadol (ULTRAM) 50 MG tablet 50 mg, 50 mg, Oral, Q4HRS PRN, Hannah Sumner A.P.N.  •  ondansetron (ZOFRAN) syringe/vial injection 4 mg, 4 mg, Intravenous, Q6HRS PRN, 4 mg at 02/02/17 0639 **OR** prochlorperazine (COMPAZINE) injection 10 mg, 10 mg, Intravenous, Q6HRS PRN, Hannah Sumner, A.P.N.  •  acetaminophen (TYLENOL) tablet 650 mg, 650 mg, Oral, Q4HRS PRN **OR** acetaminophen (TYLENOL) suppository 650 mg, 650 mg, Rectal, Q4HRS PRN, Hannah Sumner, A.P.N.  •  mag hydrox-al hydrox-simeth (MAALOX PLUS ES or MYLANTA DS) suspension 30 mL, 30 mL, Oral, Q4HRS PRN, Hannah Sumner, A.P.N.  •  diphenhydrAMINE (BENADRYL) tablet/capsule 25 mg, 25 mg, Oral, HS PRN - MR X 1, Hannah Sumner, A.P.N.  •  hydrocodone-acetaminophen (NORCO) 5-325 MG per tablet 1-2 Tab, 1-2 Tab, Oral, Q4HRS PRN, Hannah Sumner, A.P.N., 1 Tab at 02/02/17 1316  •  artificial tears 1.4 % ophthalmic solution 1 Drop, 1 Drop, Both Eyes, PRN, Masoud Aguirre M.D.  •  [DISCONTINUED] insulin regular human (HUMULIN/NOVOLIN R) 62.5 Units in  mL infusion per protocol, 1-6 Units/hr, Intravenous, Continuous, Stopped at 02/01/17 1610 **AND** [DISCONTINUED] insulin regular (HUMULIN R) injection 0-14 Units, 0-14 Units, Intravenous, Once **AND** [DISCONTINUED] insulin regular (HUMULIN R) injection 0-10 Units, 0-10 Units, Intravenous, PRN **AND** dextrose 50% (D50W) injection 25 mL, 25 mL, Intravenous, PRN, Hannah Sumner, A.P.N., 25 mL at 02/01/17 1627  •  Action is required: Protocol 1073 Hypoglycemia has been implemented, , , Once **AND** Protocol 1073 Inclusion Criteria, , , CONTINUOUS **AND** Protocol 1073 NOTIFY, , , Once **AND** Protocol 1073 Initiate protocol immediately if FSBG is less than or equal to 70 mg/dL, , , CONTINUOUS **AND** glucose 4 g chewable tablet 16 g, 16 g, Oral, Q15 MIN PRN **AND** dextrose 50% (D50W) injection 25 mL, 25 mL, Intravenous, Q15 MIN PRN, James Pérez M.D.  •  multivitamin (THERAGRAN) tablet 1  Tab, 1 Tab, Oral, DAILY, James Pérez M.D., 1 Tab at 02/02/17 0836  •  lactulose 20 GM/30ML solution 30 mL, 30 mL, Oral, Q24HRS PRN, Jeremy M Gonda, M.D.  •  ipratropium-albuterol (DUONEB) nebulizer solution 3 mL, 3 mL, Nebulization, Q2HRS PRN (RT), Jeremy M Gonda, M.D., Stopped at 01/30/17 1020  •  famotidine (PEPCID) tablet 20 mg, 20 mg, Oral, Q12HRS, 20 mg at 02/02/17 0836 **OR** [DISCONTINUED] famotidine (PEPCID) injection 20 mg, 20 mg, Intravenous, Q12HRS, Jeremy M Gonda, M.D., 20 mg at 01/30/17 0835  •  cefTRIAXone (ROCEPHIN) 1 g in  mL IVPB, 1 g, Intravenous, Q24HRS, Lesley Atkins M.D., Stopped at 02/02/17 0906  •  oyster shell calcium/vitamin D 250-125 MG-UNIT tablet 1 Tab, 1 Tab, Oral, QDAY with Breakfast, James Pérez M.D., 1 Tab at 02/02/17 0836  •  pravastatin (PRAVACHOL) tablet 20 mg, 20 mg, Oral, Q EVENING, Jeremy M Gonda, M.D., 20 mg at 02/01/17 2000    Outpatient Medications:  Prescriptions prior to admission   Medication Sig Dispense Refill Last Dose   • Calcium Carb-Cholecalciferol (OYSTER SHELL CALCIUM/VITAMIN D) 250-125 MG-UNIT Tab tablet Take 1 Tab by mouth every day.   1/24/2017 at 0800   • pravastatin (PRAVACHOL) 20 MG Tab Take 20 mg by mouth every day.   1/24/2017 at 0800   • losartan (COZAAR) 25 MG Tab Take 25 mg by mouth every day.   1/24/2017 at 0800   • glucosamine Sulfate 500 MG CAPS Take 500 mg by mouth 3 times a day, with meals.   1/24/2017 at 0800       Medication Allergy:  Allergies   Allergen Reactions   • Codeine      vomiting   • Latex        Family History:  History reviewed. No pertinent family history.    Social History:  Social History     Social History   • Marital Status:      Spouse Name: N/A   • Number of Children: N/A   • Years of Education: N/A     Occupational History   • Not on file.     Social History Main Topics   • Smoking status: Never Smoker    • Smokeless tobacco: Never Used   • Alcohol Use: Yes      Comment: sometimes   • Drug Use: No  "  • Sexual Activity: Not on file     Other Topics Concern   • Not on file     Social History Narrative         Physical Exam:  Vitals/ General Appearance:   Weight/BMI: Body mass index is 22.33 kg/(m^2).  Blood pressure 105/74, pulse 73, temperature 37.4 °C (99.3 °F), resp. rate 16, height 1.702 m (5' 7.01\"), weight 64.7 kg (142 lb 10.2 oz), SpO2 95 %.  Filed Vitals:    02/02/17 1131 02/02/17 1147 02/02/17 1300 02/02/17 1642   BP:       Pulse: 62 62 71 73   Temp:       Resp:    16   Height:       Weight:       SpO2: 90% 91% 92% 95%     Oxygen Therapy:  Pulse Oximetry: 95 %, O2 (LPM): 5, O2 Delivery: Silicone Nasal Cannula    Constitutional:   Well developed, Well nourished, No acute distress  HENMT:  Normocephalic, Atraumatic, Oropharynx moist mucous membranes, No oral exudates, Nose normal.  No thyromegaly.  Eyes:  EOMI, Conjunctiva normal, No discharge.  Neck:  Normal range of motion, No cervical tenderness,  no JVD.  Cardiovascular:  Normal heart rate, Normal rhythm, III/VI systolic murmur over the base murmurs, No rubs, No gallops.   Extremitites with  intact distal pulses, no cyanosis, or edema.  Lungs:  Normal breath sounds, breath sounds clear to auscultation bilaterally,  no crackles, no wheezing.   Abdomen: Bowel sounds normal, Soft, No tenderness, No guarding, No rebound, No masses, No hepatosplenomegaly.  Skin: Warm, Dry, No erythema, No rash, no induration.  Neurologic: Alert & oriented x 3, No focal deficits noted, cranial nerves II through X are grossly intact.  Psychiatric: Affect normal, Judgment normal, Mood normal.      MDM (Data Review):     Records reviewed and summarized in current documentation    Lab Data Review:  Recent Labs      01/31/17   0502  01/31/17   1725  02/01/17   0545  02/02/17   0210   WBC  7.2   --   17.6*  11.9*   RBC  3.79*   --   3.33*  2.93*   HEMOGLOBIN  11.6*   --   10.2*  9.0*   HEMATOCRIT  36.2*   --   31.8*  28.7*   MCV  95.5   --   95.5  98.0*   MCH  30.6   --   30.6  " 30.7   MCHC  32.0*   --   32.1*  31.4*   RDW  48.3   --   54.4*  55.8*   PLATELETCT  291  203  257  199   MPV  12.2   --   11.6  12.3     Recent Labs      01/31/17 0502 02/01/17 0545 02/01/17 1120 02/02/17 0210   SODIUM  143   --   145   --   132*   POTASSIUM  3.6   < >  4.4  4.0  4.2   CHLORIDE  111   --   116*   --   103   CO2  23   --   23   --   22   GLUCOSE  105*   --   121*   --   101*   BUN  62*   --   50*   --   56*   CREATININE  0.92   --   0.70   --   1.02   CALCIUM  9.4   --   8.3*   --   8.4*    < > = values in this interval not displayed.     Recent Labs      01/31/17 0503 01/31/17 1725 02/01/17 0545 02/02/17 0210   APTT  29.2  30.2   --    --    INR  1.06  1.38*  1.25*  1.26*                 Recent Labs      01/31/17 0502 02/01/17 0545 02/01/17 1120 02/02/17 0210   SODIUM  143   --   145   --   132*   POTASSIUM  3.6   < >  4.4  4.0  4.2   CHLORIDE  111   --   116*   --   103   CO2  23   --   23   --   22   GLUCOSE  105*   --   121*   --   101*   BUN  62*   --   50*   --   56*    < > = values in this interval not displayed.     Recent Labs      01/31/17 0502 01/31/17 1725 02/01/17 0545 02/01/17 1120 02/02/17 0210   SODIUM  143   --    --   145   --   132*   POTASSIUM  3.6  3.7   < >  4.4  4.0  4.2   CHLORIDE  111   --    --   116*   --   103   CO2  23   --    --   23   --   22   BUN  62*   --    --   50*   --   56*   CREATININE  0.92   --    --   0.70   --   1.02   MAGNESIUM  2.3  2.7*   --    --    --    --    CALCIUM  9.4   --    --   8.3*   --   8.4*    < > = values in this interval not displayed.     Recent Labs      01/31/17   0503  01/31/17   1725  02/01/17   0545  02/02/17   0210   APTT  29.2  30.2   --    --    INR  1.06  1.38*  1.25*  1.26*     Results for orders placed or performed during the hospital encounter of 01/24/17   ECHOCARDIOGRAM COMP W/O CONT   Result Value Ref Range    Eject.Frac. MOD BP 80.65     Eject.Frac. MOD 4C 76.68      Eject.Frac. MOD 2C 83.14     Left Ventrical Ejection Fraction 75          Imaging/Procedures Review:    Chest Xray:  Reviewed        MDM (Assessment and Plan):     Active Hospital Problems    Diagnosis   • Mitral regurgitation [I34.0]   • Respiratory failure with hypoxia (CMS-HCC) [J96.91]   • Hyperlipidemia [E78.5]   • Hypertension [I10]   • CAP (community acquired pneumonia) [J18.9]   • Bradycardia [R00.1]   • Hypotension [I95.9]   • Normocytic anemia [D64.9]   • Pulmonary edema [J81.1]       The patient is a 66-year-old woman, a patient of Dr. Landin with moderate severe mitral regurgitation due to prolapse who presented with acute pulmonary edema.  She was intubated on presentation.  She has normal coronary anatomy based on a coronary angiogram.  On attempt for extubation she quickly deteriorated, she had supraventricular tachycardic rhythm and pulmonary edema and was reintubated. Mitral repair on January 31st.  Extubated and started rehab  Feels much better  Will start medicine gradually as tolerated by hemodynamics

## 2017-02-03 NOTE — THERAPY
"Physical Therapy Evaluation completed.   Bed Mobility:  Supine to Sit:  (NT, in chair pre/post; does have an adjustable bed )  Transfers: Sit to Stand: Minimal Assist (of 2 from chair )  Gait: Level Of Assist: Minimal Assist with Front-Wheel Walker       Plan of Care: Will benefit from Physical Therapy 3 times per week  Discharge Recommendations: Equipment: Will Continue to Assess for Equipment Needs.   Pt presents with impaired activity tolerance, dynamic balance and gait mechanics s/p OHS. Pt is most limited by activity tolerance with general fatigue. BP/HR response appropriate to position change and low level activity; pt highly motivated to return to high level of PLOF;  will aid her recovery and can provide assist at d/c. Given fall risk and history of 'drop attacks' would benefit from close monitoring of acute to subacute recovery and would benefit from placement prior to returning home. Acute PT will continue to follow.   See \"Rehab Therapy-Acute\" Patient Summary Report for complete documentation.     "

## 2017-02-03 NOTE — CARE PLAN
Problem: Nutritional:  Goal: Achieve adequate nutritional intake  Patient will tolerate diet >Clear liqiuids and consume ~50% of meals.   Outcome: PROGRESSING AS EXPECTED  Pt on cardiac/consistent carbohydrate diet, but PO intake is <50%.   Boost supplements added to meals.  Dietary staff seeing pt daily for menu options.    RD following.

## 2017-02-03 NOTE — PROGRESS NOTES
Cardiovascular Surgery Progress Note    Name: Kristen Salazar  MRN: [id number]  : 1950  Admit Date: 2017  8:18 PM  Procedure:  Procedure(s) and Anesthesia Type:     * MITRAL VALVE REPAIR - General     * KAREN - General  3 Day Post-Op    Vitals:  Patient Vitals for the past 8 hrs:   Temp NIBP Weight   17 0400 36.7 °C (98 °F) (!) 95/45 mmHg 66 kg (145 lb 8.1 oz)     Temp (24hrs), Av.5 °C (97.7 °F), Min:36.4 °C (97.5 °F), Max:36.7 °C (98 °F)      Respiratory:    Respiration: 16, Pulse Oximetry: 95 %, O2 Daily Delivery Respiratory : Silicone Nasal Cannula     Chest Tube Drains:          Fluids:    Intake/Output Summary (Last 24 hours) at 17 0953  Last data filed at 17 0600   Gross per 24 hour   Intake    600 ml   Output    100 ml   Net    500 ml     Admit weight: Weight: 61 kg (134 lb 7.7 oz)  Current weight: Weight: 66 kg (145 lb 8.1 oz) (17 0400)    Labs:  Recent Labs      17   0545  17   02117   0600   WBC  17.6*  11.9*  10.7   RBC  3.33*  2.93*  2.71*   HEMOGLOBIN  10.2*  9.0*  8.3*   HEMATOCRIT  31.8*  28.7*  25.9*   MCV  95.5  98.0*  95.6   MCH  30.6  30.7  30.6   MCHC  32.1*  31.4*  32.0*   RDW  54.4*  55.8*  50.9*   PLATELETCT  257  199  188   MPV  11.6  12.3  11.4     Recent Labs      17   0545  17   0600   NEUTSPOLYS  94.80*  83.70*   LYMPHOCYTES  0.80*  8.70*   MONOCYTES  3.50  6.70   EOSINOPHILS  0.00  0.30   BASOPHILS  0.00  0.10   RBCMORPHOLO  Present   --      Recent Labs      17   0545  17   1120  17   0210  17   0600   SODIUM  145   --   132*  128*   POTASSIUM  4.4  4.0  4.2  4.4   CHLORIDE  116*   --   103  101   CO2  23   --   22  23   GLUCOSE  121*   --   101*  108*   BUN  50*   --   56*  56*   CREATININE  0.70   --   1.02  0.83   CALCIUM  8.3*   --   8.4*  8.1*     Recent Labs      17   1725  17   0545  17   0210  17   0600   APTT  30.2   --    --    --    INR  1.38*  1.25*  1.26*   1.40*       Medications:  • doxycycline monohydrate  100 mg     • warfarin  5 mg     • mupirocin  1 Application     • docusate sodium  100 mg      And   • senna-docusate  1 Tab     • enoxaparin  40 mg     • aspirin EC  81 mg     • multivitamin  1 Tab     • famotidine  20 mg     • cefTRIAXone (ROCEPHIN) IVPB  1 g Stopped (02/02/17 0906)   • oyster shell calcium/vitamin D  1 Tab     • pravastatin  20 mg         Exam:   Review of Systems   Constitutional: Negative.    HENT: Negative.    Eyes: Negative.    Respiratory: Negative.    Cardiovascular: Negative.    Gastrointestinal: Negative.    Genitourinary: Negative.    Musculoskeletal: Negative.    Skin: Negative.    Neurological: Negative.    Endo/Heme/Allergies: Negative.    Psychiatric/Behavioral: Negative.        Physical Exam   Constitutional: She is oriented to person, place, and time. She appears well-developed. No distress.   HENT:   Head: Normocephalic and atraumatic.   Eyes: Conjunctivae are normal. Pupils are equal, round, and reactive to light.   Neck: Normal range of motion. No JVD present.   Cardiovascular: Normal rate and regular rhythm.  Exam reveals no gallop and no friction rub.    No murmur heard.  Pulmonary/Chest: She has decreased breath sounds in the right lower field and the left lower field.   Abdominal: Soft. She exhibits no distension.   Musculoskeletal: She exhibits edema.   Neurological: She is alert and oriented to person, place, and time.   Skin: Skin is warm and dry.   Surgical incisions CDI   Psychiatric: She has a normal mood and affect. Her behavior is normal.       Quality Measures:   EKG reviewed, Labs reviewed, Medications reviewed and Radiology images reviewed  Frank catheter: One or Two Days Post Surgery (Day of Surgery being Day 0)  Central line in place: Need for access    DVT Prophylaxis: Enoxaparin (Lovenox) and Warfarin (Coumadin)  DVT prophylaxis - mechanical: Not indicated at this time, ambulatory  Ulcer prophylaxis:  No    Assessed for rehab: Patient returned to prior level of function, rehabilitation not indicated at this time      Assessment/Plan:  POD 1 - HOTN- on low dose epi/daphnie, gently diuresis, Vent - per pulm, keep mediastinal tubes, keep sanchez- strict I&O, CPM  POD 2 HDS, NSR, labs noted, doing well.  OK DC sanchez.  Rehab consult.  POD 3 HDS, NSR, labs noted doing well, had BM. DC temp wires and central lines. H/H low/stable--w/w. 2 view CXR today, wean O2 as able.  Plan for Rehab 1-2 days      Active Hospital Problems    Diagnosis   • Mitral regurgitation [I34.0]   • Respiratory failure with hypoxia (CMS-HCC) [J96.91]   • Hyperlipidemia [E78.5]   • Hypertension [I10]   • CAP (community acquired pneumonia) [J18.9]   • Bradycardia [R00.1]   • Hypotension [I95.9]   • Normocytic anemia [D64.9]   • Pulmonary edema [J81.1]

## 2017-02-03 NOTE — PROGRESS NOTES
Pulmonary Critical Care Progress Note        Chief Complaint: Worsening dyspnea and acute hypoxic respiratory failure    History of Present Illness: The patient is a 66-year-old female with a past medical history significant for hypertension, hyperlipidemia, and mitral valve prolapse who was in her usual state of health on 1/24 when she noted a cough and sudden onset of shortness of breath.  She checked her oxygen saturation at home and found to be 85%. She presented to the emergency department at [facility] where unfortunately she decompensated quickly requiring up to 15 L facemask and then eventually requiring intubation. She was initially hypertensive and tachycardic with an oxygen saturation of 86% on room air at the outside hospital and was transported on high peak and expiratory pressures on the ventilator with an FIO2 of 100%, achieving saturations in the low 90s. She became progressively more hypotensive requiring a norepinephrine drip. A stat bedside echo was performed with Dr. Delarosa in the ER, which revealed a ruptured mitral valve leaflet with severe MR as the likely cause of her decompensation.     ROS: Unable to obtain as the patient is sedated on the ventilator     Interval Events:  24 hour interval history reviewed    - +900cc over last 24hr, (-)1.5L since admit   - tmax 98   - remains off pressors   - s/p MV repair 1/31   - extubated 2/1   - remains 5L nc   - IS/PEP/Mobilize - may need IPV      PFSH:  No change.    Respiratory:     Pulse Oximetry: 95 %    Exam: diminished breath sounds moderate scattered rhonchi  ImagingAvailable data reviewed     CTA chest 1/25:  1.  No evidence pulmonary emboli.  2.  Moderate right and small left pleural effusions.  3.  Compressive atelectasis both lower lobes especially the left and left lung volume loss with mediastinal shift towards left.  4.  Patchy groundglass opacities within the right upper lobe consistent with focal areas of  pneumonitis.  5.  7.5 mm left upper lobe nodule and smaller nodules within the lungs.  Recent Labs      02/01/17   0433  02/01/17   0539  02/01/17   0652   ISTATAPH  7.359*  7.337*  7.354*   ISTATAPCO2  40.0*  39.1*  38.8*   ISTATAPO2  70  78  70   ISTATATCO2  24  22  23   [id number]  93  95  93   ISTATARTHCO3  22.5  21.0  21.6   ISTATARTBE  -3  -4  -4   ISTATTEMP  35.8 C  35.8 C  36.2 C   ISTATFIO2  70  70  60   ISTATSPEC  Arterial  Arterial  Arterial   ISTATAPHTC  7.376*  7.355*  7.365*   [id number]  64  72  66       HemoDynamics:  Pulse: 65, Heart Rate (Monitored): 66  NIBP: (!) 95/45 mmHg    Exam: regular rate and rhythm  Imaging: echo Reviewed           Neuro:  GCS Total [place] Coma Score: 15     Exam:non focal  Imaging: Available data reviewed    Fluids:  Intake/Output       02/01/17 0700 - 02/02/17 0659 02/02/17 0700 - 02/03/17 0659 02/03/17 0700 - 02/04/17 0659      0039-3200 0562-4703 Total 2355-2095 5403-9980 Total 5543-4820 5884-0324 Total       Intake    P.O.  400  -- 400  800  -- 800  --  -- --    P.O. 400 -- 400 800 -- 800 -- -- --    I.V.  580.4  -- 580.4  200  -- 200  --  -- --    Phenylephrine Volume 11.7 -- 11.7 -- -- -- -- -- --    Insulin Volume 31.6 -- 31.6 -- -- -- -- -- --    Epinephrine Volume 37.1 -- 37.1 -- -- -- -- -- --    IV Volume (TKO) 100 -- 100 -- -- -- -- -- --    IV Piggyback Volume (IV Piggyback) 400 -- 400 200 -- 200 -- -- --    Total Intake 980.4 -- 980.4 1000 -- 1000 -- -- --       Output    Urine  900  650 1550  --  100 100  --  -- --    Number of Times Voided -- 2 x 2 x 3 x 2 x 5 x -- -- --    Indwelling Cathether 900 -- 900 -- -- -- -- -- --    Void (ml) -- 650 650 -- 100 100 -- -- --    Drains  280  -- 280  --  -- --  --  -- --    Mediastinal Chest Tube 1 280 -- 280 -- -- -- -- -- --    Stool  --  -- --  --  -- --  --  -- --    Number of Times Stooled -- -- -- -- 1 x 1 x -- -- --    Total Output 5076 774 0729 -- 100 100 -- -- --       Net I/O     -199.6 -650 -849.6 1000  -100 900 -- -- --        Weight: 66 kg (145 lb 8.1 oz)  Recent Labs      17   1120  17   0210   SODIUM   --    --   145   --   132*   POTASSIUM  3.7   < >  4.4  4.0  4.2   CHLORIDE   --    --   116*   --   103   CO2   --    --   23   --   22   BUN   --    --   50*   --   56*   CREATININE   --    --   0.70   --   1.02   MAGNESIUM  2.7*   --    --    --    --    CALCIUM   --    --   8.3*   --   8.4*    < > = values in this interval not displayed.       GI/Nutrition:  Exam: abdomen is soft and non-tender, normal active bowel sounds  Imaging: Available data reviewed  Liver Function  Recent Labs      17   ALTSGPT  24   --    ASTSGOT  50*   --    ALKPHOSPHAT  57   --    TBILIRUBIN  0.4   --    GLUCOSE  121*  101*       Heme:  Recent Labs      17   06   RBC   --   3.33*  2.93*  2.71*   HEMOGLOBIN   --   10.2*  9.0*  8.3*   HEMATOCRIT   --   31.8*  28.7*  25.9*   PLATELETCT  203  257  199  188   PROTHROMBTM  17.4*  16.1*  16.2*  17.6*   APTT  30.2   --    --    --    INR  1.38*  1.25*  1.26*  1.40*       Infectious Disease:  Temp  Av.5 °C (97.7 °F)  Min: 36.4 °C (97.5 °F)  Max: 36.7 °C (98 °F)  Micro: cultures reviewed  Recent Labs      17   0600   WBC  17.6*  11.9*  10.7   NEUTSPOLYS  94.80*   --   83.70*   LYMPHOCYTES  0.80*   --   8.70*   MONOCYTES  3.50   --   6.70   EOSINOPHILS  0.00   --   0.30   BASOPHILS  0.00   --   0.10   ASTSGOT  50*   --    --    ALTSGPT  24   --    --    ALKPHOSPHAT  57   --    --    TBILIRUBIN  0.4   --    --      Current Facility-Administered Medications   Medication Dose Frequency Provider Last Rate Last Dose   • doxycycline monohydrate (ADOXA) tablet 100 mg  100 mg Q12HRS Masoud Aguirre M.D.   100 mg at 17   • quetiapine (SEROQUEL) tablet 25 mg  25 mg Q EVENING Masoud Aguirre M.D.   25 mg at  02/02/17 2058   • warfarin (COUMADIN) tablet 5 mg  5 mg COUMADIN-DAILY Denilson Cortez, PHARMBRENT   5 mg at 02/02/17 1705   • Respiratory Care per Protocol   Continuous RT Hannah Sumner, A.P.N.       • mupirocin (BACTROBAN) 2 % ointment 1 Application  1 Application BID Hannah Sumner A.P.N.   1 Application at 02/02/17 2100   • docusate sodium (COLACE) capsule 100 mg  100 mg QAM Hannah Sumner A.P.N.   100 mg at 02/02/17 0836    And   • senna-docusate (PERICOLACE or SENOKOT S) 8.6-50 MG per tablet 1 Tab  1 Tab Nightly Hannah Sumner A.P.N.   1 Tab at 02/02/17 2059    And   • senna-docusate (PERICOLACE or SENOKOT S) 8.6-50 MG per tablet 1 Tab  1 Tab Q24HRS PRN Hannah Sumner A.P.N.        And   • lactulose 20 GM/30ML solution 30 mL  30 mL Q24HRS PRN Hannah Sumner A.P.N.        And   • bisacodyl (DULCOLAX) suppository 10 mg  10 mg Q24HRS PRN Hannah Sumner, A.P.N.        And   • fleet enema 133 mL  1 Each Once PRN Hannah Sumner A.P.N.       • NS infusion   Continuous Hannah Sumner A.P.N. 10 mL/hr at 01/31/17 1950     • enoxaparin (LOVENOX) inj 40 mg  40 mg DAILY Hannah Sumner, A.P.N.   40 mg at 02/02/17 0836   • aspirin EC (ECOTRIN) tablet 81 mg  81 mg DAILY Hannah Sumner A.P.N.   81 mg at 02/02/17 0836   • MD ALERT... warfarin (COUMADIN) per pharmacy protocol   PRN Hannah Sumner, A.P.N.       • oxycodone immediate-release (ROXICODONE) tablet 5 mg  5 mg Q3HRS PRN Hannah Sumner A.P.N.       • oxycodone immediate release (ROXICODONE) tablet 10 mg  10 mg Q3HRS PRN Hannah Sumner, A.P.N.       • tramadol (ULTRAM) 50 MG tablet 50 mg  50 mg Q4HRS PRN Hannah Sumner, A.P.N.       • ondansetron (ZOFRAN) syringe/vial injection 4 mg  4 mg Q6HRS PRN Hannah Sumner, A.P.N.   4 mg at 02/02/17 1941    Or   • prochlorperazine (COMPAZINE) injection 10 mg  10 mg Q6HRS PRN Hannah Sumner, A.P.N.       • acetaminophen (TYLENOL) tablet 650 mg  650 mg Q4HRS PRN Hannah Sumner, A.P.N.        Or   • acetaminophen (TYLENOL)  suppository 650 mg  650 mg Q4HRS PRN Hannah Sumner, A.P.N.       • mag hydrox-al hydrox-simeth (MAALOX PLUS ES or MYLANTA DS) suspension 30 mL  30 mL Q4HRS PRN Hannah Sumner, A.P.N.       • diphenhydrAMINE (BENADRYL) tablet/capsule 25 mg  25 mg HS PRN - MR X 1 Hannah Sumner, A.P.N.       • hydrocodone-acetaminophen (NORCO) 5-325 MG per tablet 1-2 Tab  1-2 Tab Q4HRS PRN Hannah Sumner, A.P.N.   1 Tab at 02/02/17 1316   • artificial tears 1.4 % ophthalmic solution 1 Drop  1 Drop PRN Masoud Aguirre M.D.       • dextrose 50% (D50W) injection 25 mL  25 mL PRN Hannah Sumner, A.P.N.   25 mL at 02/01/17 1627   • glucose 4 g chewable tablet 16 g  16 g Q15 MIN PRN James Pérez M.D.        And   • dextrose 50% (D50W) injection 25 mL  25 mL Q15 MIN PRN James Pérez M.D.       • multivitamin (THERAGRAN) tablet 1 Tab  1 Tab DAILY James Pérez M.D.   1 Tab at 02/02/17 0836   • lactulose 20 GM/30ML solution 30 mL  30 mL Q24HRS PRN Jeremy M Gonda, M.D.       • ipratropium-albuterol (DUONEB) nebulizer solution 3 mL  3 mL Q2HRS PRN (RT) Jeremy M Gonda, M.D.   Stopped at 01/30/17 1020   • famotidine (PEPCID) tablet 20 mg  20 mg Q12HRS Jeremy M Gonda, M.D.   20 mg at 02/02/17 2059   • cefTRIAXone (ROCEPHIN) 1 g in  mL IVPB  1 g Q24HRS Lesley tAkins M.D.   Stopped at 02/02/17 0906   • oyster shell calcium/vitamin D 250-125 MG-UNIT tablet 1 Tab  1 Tab QDAY with Breakfast James Pérez M.D.   1 Tab at 02/02/17 0836   • pravastatin (PRAVACHOL) tablet 20 mg  20 mg Q EVENING Jeremy M Gonda, M.D.   20 mg at 02/02/17 2059     Last reviewed on 1/30/2017  8:02 PM by Purvi Montanez R.N.    Quality  Measures:  Medications reviewed, Labs reviewed and Radiology images reviewed  Frank catheter: Critically Ill - Requiring Accurate Measurement of Urinary Output  Central line in place: Need for access    DVT Prophylaxis: Enoxaparin (Lovenox)  DVT prophylaxis - mechanical: SCDs  Ulcer prophylaxis: Yes  Antibiotics:  Treating active infection/contamination beyond 24 hours perioperative coverage      Lines  RIJ TLC 1/24    Gtt  none    Abx  Rocephin  Doxycycline    Bcx 1/24 ngtd  Ucx 1/24 ngtd  Flu (-) 1/24    Echo 1/26 E 65%      Problems/Plan:    Acute Hypoxic Respiratory Failure from pulmonary edema and cardiogenic shock   - intubated 1/24, failed extubation 1/28 and re-intubated   - extubated 2/1   - RT/O2 therapies   - IS/PEP/Mobilize/IPV   - monitor volume status  Acute Pulmonary Edema   - RT/O2 protocols   - cont vent support   - improved  Acute Delirium   - Seroquel 25mg qhs   - improved  Tachycardia that ? A flutter   - presently sinus, off amio  Severe Mitral Valve Regurgitation    - cardiac cath on 1/25   - sp mv repair 1/31  Cardiogenic Shock likely related to severe mitral regurgitation   - s/p vasopressor/ionotropic therapy   - Cards following   - Cardiac cath on 1/25 was negative and heparin stopped   - CTA was negative  Acute Leukocytosis and concern for aspiration   - blood/sputum cultures sent and follow   - Empiric Ceftriaxone/Doxycycline day 10 of 10  Elevated Troponin   - cards following   - ASA  Hyperglycemia   - ISS  Hx of mitral valve prolapse  Hx of systemic arterial hypertension  Hx of Dyslipidemia   - statin therapy  Prophylaxis   - heparin, bowel regimen, pepcid, enteral feeds    Discussed patient condition and risk of morbidity and/or mortality with Family, RN, RT, Pharmacy, , [facility] resident and Charge nurse / hot rounds and well as cardiology  The patient remains critically ill.  Critical care time = 37 minutes in directly providing and coordinating critical care and extensive data review.  No time overlap and excludes procedures.

## 2017-02-03 NOTE — PROGRESS NOTES
[facility] ICU Progress Note      Admit Date: 1/24/2017  ICU Day: 10    Resident(s): Josr Al  Attending: ALESIA ZARAGOZA/ Dr. Aguirre    Date & Time:   2/3/2017   8:50 AM       Patient ID:    Name:             Kristen Salazar   YOB: 1950  Age:                 66 y.o.  female   MRN:               [id number]    HPI:  Very pleasant 66 year old White female with PMH htn, dld, mvp admitted to ICU on 1/24/2017 for acute onset shortness of breath, later found on echocardiogram to have severe mitral regurgitation.       Diagnosis:  Severe mitral regurgitation  Hypotension  VDRF  HTN  DLD    Interval Events:  Status post radical mitral valve repair 1/31/2017.  Patient doing well. Ambulating in sanders. Minimal O2 via NC  DC temp wires and central lines.   2 view CXR today shows pleural effusions  Wean O2  Plan for Rehab 1-2 days    Consultants:  Cardiology: Dr. Ramirez    CT Surgery: Dr. Schmitt  PMA: Dr. Atkins    Physical Exam:  GEN: nad; aaox3  HEENT: NC/AT. Eomi; perrla  CV: S1, S2, NSR, Systolic murmur +  RESP: decreased breath sounds, bilateral crackles at bases  ABD: Soft, NT, ND; +BS  EXT: some LE edema  NEURO: no focal deficits    Respiratory:     Respiration: 16, Pulse Oximetry: 95 %, O2 Daily Delivery Respiratory : Silicone Nasal Cannula    Chest Tube Drains:    Recent Labs      02/01/17   0433  02/01/17   0539  02/01/17   0652   ISTATAPH  7.359*  7.337*  7.354*   ISTATAPCO2  40.0*  39.1*  38.8*   ISTATAPO2  70  78  70   ISTATATCO2  24  22  23   [id number]  93  95  93   ISTATARTHCO3  22.5  21.0  21.6   ISTATARTBE  -3  -4  -4   ISTATTEMP  35.8 C  35.8 C  36.2 C   ISTATFIO2  70  70  60   ISTATSPEC  Arterial  Arterial  Arterial   ISTATAPHTC  7.376*  7.355*  7.365*   [id number]  64  72  66       HemoDynamics:  Pulse: 65 NIBP: (!) 95/45 mmHg     Neuro:    Fluids:  Intake/Output Summary (Last 24 hours) at 01/26/17 0624  Last data filed at 01/26/17 0200   Gross per 24 hour   Intake 1438.35 ml   Output   1225  ml   Net 213.35 ml       Weight: 66 kg (145 lb 8.1 oz)  Body mass index is 22.78 kg/(m^2).    Recent Labs      17   SODIUM   --    --   145   --   132*  128*   POTASSIUM  3.7   < >  4.4  4.0  4.2  4.4   CHLORIDE   --    --   116*   --   103  101   CO2   --    --   23   --   22  23   BUN   --    --   50*   --   56*  56*   CREATININE   --    --   0.70   --   1.02  0.83   MAGNESIUM  2.7*   --    --    --    --    --    CALCIUM   --    --   8.3*   --   8.4*  8.1*    < > = values in this interval not displayed.     GI/Nutrition:  Recent Labs      17   ALTSGPT  24   --    --    ASTSGOT  50*   --    --    ALKPHOSPHAT  57   --    --    TBILIRUBIN  0.4   --    --    GLUCOSE  121*  101*  108*     Heme:  Recent Labs      17   RBC   --   3.33*  2.93*  2.71*   HEMOGLOBIN   --   10.2*  9.0*  8.3*   HEMATOCRIT   --   31.8*  28.7*  25.9*   PLATELETCT  203  257  199  188   PROTHROMBTM  17.4*  16.1*  16.2*  17.6*   APTT  30.2   --    --    --    INR  1.38*  1.25*  1.26*  1.40*     Infectious Disease:  Temp  Av.5 °C (97.7 °F)  Min: 36.4 °C (97.5 °F)  Max: 36.7 °C (98 °F)  Recent Labs      17   WBC  17.6*  11.9*  10.7   NEUTSPOLYS  94.80*   --   83.70*   LYMPHOCYTES  0.80*   --   8.70*   MONOCYTES  3.50   --   6.70   EOSINOPHILS  0.00   --   0.30   BASOPHILS  0.00   --   0.10   ASTSGOT  50*   --    --    ALTSGPT  24   --    --    ALKPHOSPHAT  57   --    --    TBILIRUBIN  0.4   --    --      Meds:  • doxycycline monohydrate  100 mg     • quetiapine  25 mg     • warfarin  5 mg     • Respiratory Care per Protocol       • mupirocin  1 Application     • docusate sodium  100 mg      And   • senna-docusate  1 Tab      And   • senna-docusate  1 Tab      And   • lactulose  30 mL      And   • bisacodyl   10 mg      And   • fleet  1 Each     • NS   10 mL/hr at 01/31/17 1950   • enoxaparin  40 mg     • aspirin EC  81 mg     • MD ALERT... warfarin       • oxycodone immediate-release  5 mg     • oxycodone immediate release  10 mg     • tramadol  50 mg     • ondansetron  4 mg      Or   • prochlorperazine  10 mg     • acetaminophen  650 mg      Or   • acetaminophen  650 mg     • mag hydrox-al hydrox-simeth  30 mL     • diphenhydrAMINE  25 mg     • hydrocodone-acetaminophen  1-2 Tab     • artificial tears  1 Drop     • dextrose 50%  25 mL     • glucose 4 g  16 g      And   • dextrose 50%  25 mL     • multivitamin  1 Tab     • lactulose  30 mL     • ipratropium-albuterol  3 mL     • famotidine  20 mg     • cefTRIAXone (ROCEPHIN) IVPB  1 g Stopped (02/02/17 0906)   • oyster shell calcium/vitamin D  1 Tab     • pravastatin  20 mg        Problem and Plan:    Acute Hypoxemic respiratory failure   Acute pulmonary edema d/t severe MR  Hypertensive Emergency  Demand ischemia  Prolonged Qtc   CAP  full vent support, extubation tried on 1/28 but patient had to be re-intubated due to distress  liberated finally on 1/31/2017  due to severe MR?   echo:EF 75%, severe MR, RVSP 40  cardiac cath: normal coronary arteries  CTA: no PE  KAREN: EF 65%, mod MR, no rupture of cord or tendinae  renal duplex neg for stenosis  Finished course of ceftriaxone, cont doxy     followed by CT team  DC temp wires and central lines.   Plan for Rehab 1-2 days    Volume overload  2 view CXR shows pleural effusions  Initiate PO lasix    Chronic medical issues:   DLD: cont pravastatin  Chronic back/knee/neck pain: pain meds held  HTN: losartan held    DISPO: ICU    CODE STATUS: Full    Quality Measures:  Frank Catheter: no  DVT Prophylaxis: lovenox  Ulcer Prophylaxis: pepcid   Antibiotics: doxy  Lines: Central    Procedures:  1/24 Intubation  1/24 Central line  1/25 cardiac cath  1/28 extubated  1/28 re-intubated    Imaging:  DX-CHEST-PORTABLE (1 VIEW)   Final  Result      1.  Interval removal of endotracheal tube, feeding tube, [place]-Jose catheter, and mediastinal/pleural drains..   2.  No pneumothorax.   3.  Worsening hypoinflation and bibasilar atelectasis.      TRANSESOPHAGEAL ECHO W/O CONT   Final Result      DX-CHEST-PORTABLE (1 VIEW)   Final Result      No significant change      DX-CHEST-PORTABLE (1 VIEW)   Final Result      1.  [place]-Jose catheter has been pulled back and the tip now projects over the pulmonary outflow tract      2.  No other change      DX-CHEST-PORTABLE (1 VIEW)   Final Result         1. Postsurgical change in the chest with mild pulmonary edema and bibasilar atelectasis.      2. The left side Yu[place]-Jose catheter with tip in the right lower lobe's segmental pulmonary artery.         DX-CHEST-PORTABLE (1 VIEW)   Final Result      No significant change      Carotid Duplex STAT   Final Result      DX-CHEST-PORTABLE (1 VIEW)   Final Result      No significant change from prior exam.      DX-CHEST-PORTABLE (1 VIEW)   Final Result      Interval improvement of perihilar infiltrates and worsening of LEFT basilar atelectasis.      DX-CHEST-LIMITED (1 VIEW)   Final Result      1.  Satisfactory re-intubation.   2.  Other tubes and lines are stable.   3.  Increase in central hazy parenchymal opacity suspicious for edema although pneumonitis is not excluded.      DX-CHEST-PORTABLE (1 VIEW)   Final Result      No significant change from prior exam.      US-RENAL ARTERY DUPLEX   Final Result      No evidence of main renal artery stenosis.      DX-CHEST-PORTABLE (1 VIEW)   Final Result         1. No significant interval change.      TRANSESOPHAGEAL ECHO W/O CONT (Order not available for Rehab Hospital)   Final Result      DX-CHEST-PORTABLE (1 VIEW)   Final Result         1. Decrease groundglass and airspace opacities in both lungs.      CT-CTA CHEST PULMONARY ARTERY W/ RECONS   Final Result      1.  No evidence pulmonary emboli.   2.  Moderate right and small left  pleural effusions.   3.  Compressive atelectasis both lower lobes especially the left and left lung volume loss with mediastinal shift towards left.   4.  Patchy groundglass opacities within the right upper lobe consistent with focal areas of pneumonitis.   5.  7.5 mm left upper lobe nodule and smaller nodules within the lungs.   Nodule measuring between 7 and 8 mm:   Low Risk Patient:  Initial follow up CT at 6-12 months, then at 18-24 months if no change.      High Risk Patient:  Initial follow up CT at 3-6 months, then at 9-12 and 24 months if no change.         Low Risk - Minimal or absent history of smoking and of other known risk factors.   High Risk - History of smoking or of other known risk factors.   Fleischner Society Guidelines for Pulmonary Nodules:  Radiology, 237:2005         DX-CHEST-PORTABLE (1 VIEW)   Final Result         1. Interval placement of feeding tube. No other significant interval change.      DX-ABDOMEN FOR TUBE PLACEMENT   Final Result         Feeding tube with tip in the stomach.      ECHOCARDIOGRAM COMP W/O CONT   Final Result      DX-CHEST-PORTABLE (1 VIEW)   Final Result         1. Right central venous catheter with tip in the mid SVC.      OUTSIDE IMAGES-DX CHEST   Preliminary Result      OUTSIDE IMAGES-DX CHEST   Preliminary Result      DX-CHEST-PORTABLE (1 VIEW)   Final Result         1. Extensive groundglass and airspace opacities throughout both lungs, worse in the bilateral lower lungs. This could be seen with multifocal pneumonia, ARDS or pulmonary edema.   2. Endotracheal tube with tip in the mid thoracic trachea.      DX-CHEST-2 VIEWS    (Results Pending)

## 2017-02-03 NOTE — THERAPY
"Occupational Therapy Evaluation completed.   Functional Status:  Up in chair, min A w/grooming limited BUE ROM and guarding d/t pain at incision, mod A LB dressing, min A x2 for safety sit>stand provided w/extensive education appearing overwhelmed w/functional application pleasant and motivated ambulated w/CGA w/fww in hallway easily fatigued returned to chair spouse present for session   Plan of Care: Will benefit from Occupational Therapy 3 times per week  Discharge Recommendations:  Equipment: Will Continue to Assess for Equipment Needs. Post-acute therapy recommended before discharged home.    Please consider a physiatry consult for this patient. This patient can tolerate and would benefit from intensive daily therapy at a post-acute setting.    See \"Rehab Therapy-Acute\" Patient Summary Report for complete documentation.        "

## 2017-02-03 NOTE — PROGRESS NOTES
Inpatient Anticoagulation Service Note    Date: 2/3/2017  Reason for Anticoagulation: Mitral Valve Repair        Hemoglobin Value: 8.3  Hematocrit Value: 25.9  Lab Platelet Value: 188  Target INR: 2.0 to 3.0    INR from last 7 days     Date/Time INR Value    02/03/17 0600 (!)1.4    02/02/17 0210 (!)1.26    02/01/17 0545 (!)1.25    01/31/17 1725 (!)1.38    01/31/17 0503 1.06        Dose from last 7 days     Date/Time Dose (mg)    02/03/17 1300 5    02/02/17 1300 5    02/01/17 1300 5        Average Dose (mg):  (new start)  Significant Interactions: Aspirin, Statin (ceftriaxone and doxycycline to end on 2/3)  Bridge Therapy: No (Lovenox 40 mg qday prophylaxis)     Comments: Pt has ahd 2 doses of warfarin 5 mg and the INR is starting to trend up. Will continue current dosing for now and trend the INR. Ceftriaxone and doxycycline will stop after today's doses are completed (DDI with warfarin). Will stop the prophylactic Lovenox when INR is near or at goal range.       Education Material Provided?: No ([place])    Pharmacist suggested discharge dosing: warfarin 5 mg daily for now     Denilson Cortez

## 2017-02-04 ENCOUNTER — APPOINTMENT (OUTPATIENT)
Dept: RADIOLOGY | Facility: MEDICAL CENTER | Age: 67
DRG: 216 | End: 2017-02-04
Attending: NURSE PRACTITIONER
Payer: MEDICARE

## 2017-02-04 ENCOUNTER — APPOINTMENT (OUTPATIENT)
Dept: RADIOLOGY | Facility: MEDICAL CENTER | Age: 67
DRG: 216 | End: 2017-02-04
Attending: HOSPITALIST
Payer: MEDICARE

## 2017-02-04 LAB
ANION GAP SERPL CALC-SCNC: 8 MMOL/L (ref 0–11.9)
BASOPHILS # BLD AUTO: 0.2 % (ref 0–1.8)
BASOPHILS # BLD: 0.02 K/UL (ref 0–0.12)
BUN SERPL-MCNC: 36 MG/DL (ref 8–22)
C DIFF DNA SPEC QL NAA+PROBE: NEGATIVE
C DIFF TOX GENS STL QL NAA+PROBE: NEGATIVE
CALCIUM SERPL-MCNC: 8.5 MG/DL (ref 8.5–10.5)
CHLORIDE SERPL-SCNC: 102 MMOL/L (ref 96–112)
CO2 SERPL-SCNC: 24 MMOL/L (ref 20–33)
CREAT SERPL-MCNC: 0.81 MG/DL (ref 0.5–1.4)
EOSINOPHIL # BLD AUTO: 0.04 K/UL (ref 0–0.51)
EOSINOPHIL NFR BLD: 0.4 % (ref 0–6.9)
ERYTHROCYTE [DISTWIDTH] IN BLOOD BY AUTOMATED COUNT: 49.6 FL (ref 35.9–50)
GFR SERPL CREATININE-BSD FRML MDRD: >60 ML/MIN/1.73 M 2
GLUCOSE SERPL-MCNC: 104 MG/DL (ref 65–99)
HCT VFR BLD AUTO: 27.6 % (ref 37–47)
HGB BLD-MCNC: 9.1 G/DL (ref 12–16)
IMM GRANULOCYTES # BLD AUTO: 0.12 K/UL (ref 0–0.11)
IMM GRANULOCYTES NFR BLD AUTO: 1.1 % (ref 0–0.9)
INR PPP: 1.46 (ref 0.87–1.13)
LYMPHOCYTES # BLD AUTO: 1.18 K/UL (ref 1–4.8)
LYMPHOCYTES NFR BLD: 11.1 % (ref 22–41)
MCH RBC QN AUTO: 31.4 PG (ref 27–33)
MCHC RBC AUTO-ENTMCNC: 33 G/DL (ref 33.6–35)
MCV RBC AUTO: 95.2 FL (ref 81.4–97.8)
MONOCYTES # BLD AUTO: 0.7 K/UL (ref 0–0.85)
MONOCYTES NFR BLD AUTO: 6.6 % (ref 0–13.4)
NEUTROPHILS # BLD AUTO: 8.58 K/UL (ref 2–7.15)
NEUTROPHILS NFR BLD: 80.6 % (ref 44–72)
NRBC # BLD AUTO: 0.02 K/UL
NRBC BLD AUTO-RTO: 0.2 /100 WBC
PLATELET # BLD AUTO: 249 K/UL (ref 164–446)
PMV BLD AUTO: 11.4 FL (ref 9–12.9)
POTASSIUM SERPL-SCNC: 3.7 MMOL/L (ref 3.6–5.5)
PROTHROMBIN TIME: 18.2 SEC (ref 12–14.6)
RBC # BLD AUTO: 2.9 M/UL (ref 4.2–5.4)
SODIUM SERPL-SCNC: 134 MMOL/L (ref 135–145)
WBC # BLD AUTO: 10.6 K/UL (ref 4.8–10.8)

## 2017-02-04 PROCEDURE — 700102 HCHG RX REV CODE 250 W/ 637 OVERRIDE(OP): Performed by: STUDENT IN AN ORGANIZED HEALTH CARE EDUCATION/TRAINING PROGRAM

## 2017-02-04 PROCEDURE — A9270 NON-COVERED ITEM OR SERVICE: HCPCS | Performed by: INTERNAL MEDICINE

## 2017-02-04 PROCEDURE — 700111 HCHG RX REV CODE 636 W/ 250 OVERRIDE (IP): Performed by: INTERNAL MEDICINE

## 2017-02-04 PROCEDURE — 700102 HCHG RX REV CODE 250 W/ 637 OVERRIDE(OP): Performed by: HOSPITALIST

## 2017-02-04 PROCEDURE — 700102 HCHG RX REV CODE 250 W/ 637 OVERRIDE(OP): Performed by: NURSE PRACTITIONER

## 2017-02-04 PROCEDURE — 99291 CRITICAL CARE FIRST HOUR: CPT | Mod: GC | Performed by: INTERNAL MEDICINE

## 2017-02-04 PROCEDURE — 700102 HCHG RX REV CODE 250 W/ 637 OVERRIDE(OP): Performed by: INTERNAL MEDICINE

## 2017-02-04 PROCEDURE — 94668 MNPJ CHEST WALL SBSQ: CPT

## 2017-02-04 PROCEDURE — 85025 COMPLETE CBC W/AUTO DIFF WBC: CPT

## 2017-02-04 PROCEDURE — 94669 MECHANICAL CHEST WALL OSCILL: CPT

## 2017-02-04 PROCEDURE — 71010 DX-CHEST-PORTABLE (1 VIEW): CPT

## 2017-02-04 PROCEDURE — 32555 ASPIRATE PLEURA W/ IMAGING: CPT | Mod: LT

## 2017-02-04 PROCEDURE — 85610 PROTHROMBIN TIME: CPT

## 2017-02-04 PROCEDURE — A9270 NON-COVERED ITEM OR SERVICE: HCPCS

## 2017-02-04 PROCEDURE — 80048 BASIC METABOLIC PNL TOTAL CA: CPT

## 2017-02-04 PROCEDURE — A9270 NON-COVERED ITEM OR SERVICE: HCPCS | Performed by: NURSE PRACTITIONER

## 2017-02-04 PROCEDURE — 770020 HCHG ROOM/CARE - TELE (206)

## 2017-02-04 PROCEDURE — 700102 HCHG RX REV CODE 250 W/ 637 OVERRIDE(OP)

## 2017-02-04 PROCEDURE — A9270 NON-COVERED ITEM OR SERVICE: HCPCS | Performed by: STUDENT IN AN ORGANIZED HEALTH CARE EDUCATION/TRAINING PROGRAM

## 2017-02-04 PROCEDURE — A9270 NON-COVERED ITEM OR SERVICE: HCPCS | Performed by: HOSPITALIST

## 2017-02-04 PROCEDURE — 0W9B3ZZ DRAINAGE OF LEFT PLEURAL CAVITY, PERCUTANEOUS APPROACH: ICD-10-PCS | Performed by: RADIOLOGY

## 2017-02-04 PROCEDURE — 700111 HCHG RX REV CODE 636 W/ 250 OVERRIDE (IP): Performed by: HOSPITALIST

## 2017-02-04 PROCEDURE — 87493 C DIFF AMPLIFIED PROBE: CPT

## 2017-02-04 RX ORDER — POTASSIUM CHLORIDE 20 MEQ/1
40 TABLET, EXTENDED RELEASE ORAL ONCE
Status: COMPLETED | OUTPATIENT
Start: 2017-02-04 | End: 2017-02-04

## 2017-02-04 RX ORDER — ACETAMINOPHEN 325 MG/1
650 TABLET ORAL EVERY 4 HOURS PRN
Status: DISCONTINUED | OUTPATIENT
Start: 2017-02-04 | End: 2017-02-09 | Stop reason: HOSPADM

## 2017-02-04 RX ORDER — FUROSEMIDE 10 MG/ML
40 INJECTION INTRAMUSCULAR; INTRAVENOUS ONCE
Status: COMPLETED | OUTPATIENT
Start: 2017-02-04 | End: 2017-02-04

## 2017-02-04 RX ORDER — ECHINACEA PURPUREA EXTRACT 125 MG
2 TABLET ORAL PRN
Status: DISCONTINUED | OUTPATIENT
Start: 2017-02-04 | End: 2017-02-09 | Stop reason: HOSPADM

## 2017-02-04 RX ORDER — FUROSEMIDE 10 MG/ML
40 INJECTION INTRAMUSCULAR; INTRAVENOUS
Status: DISCONTINUED | OUTPATIENT
Start: 2017-02-04 | End: 2017-02-06

## 2017-02-04 RX ORDER — ACETAMINOPHEN 650 MG/1
650 SUPPOSITORY RECTAL EVERY 4 HOURS PRN
Status: DISCONTINUED | OUTPATIENT
Start: 2017-02-04 | End: 2017-02-09 | Stop reason: HOSPADM

## 2017-02-04 RX ADMIN — THERA TABS 1 TABLET: TAB at 08:25

## 2017-02-04 RX ADMIN — FUROSEMIDE 40 MG: 20 TABLET ORAL at 08:25

## 2017-02-04 RX ADMIN — ACETAMINOPHEN 650 MG: 325 TABLET, FILM COATED ORAL at 11:35

## 2017-02-04 RX ADMIN — HYDROCODONE BITARTRATE AND ACETAMINOPHEN 1 TABLET: 5; 325 TABLET ORAL at 15:53

## 2017-02-04 RX ADMIN — PRAVASTATIN SODIUM 20 MG: 20 TABLET ORAL at 20:14

## 2017-02-04 RX ADMIN — FUROSEMIDE 40 MG: 10 INJECTION, SOLUTION INTRAVENOUS at 17:00

## 2017-02-04 RX ADMIN — MUPIROCIN 1 APPLICATION: 20 OINTMENT TOPICAL at 20:14

## 2017-02-04 RX ADMIN — WARFARIN SODIUM 5 MG: 5 TABLET ORAL at 17:06

## 2017-02-04 RX ADMIN — MUPIROCIN 1 APPLICATION: 20 OINTMENT TOPICAL at 08:26

## 2017-02-04 RX ADMIN — ASPIRIN 81 MG: 81 TABLET ORAL at 08:26

## 2017-02-04 RX ADMIN — CALCIUM CARBONATE-CHOLECALCIFEROL TAB 250 MG-125 UNIT 1 TABLET: 250-125 TAB at 07:30

## 2017-02-04 RX ADMIN — POTASSIUM CHLORIDE 40 MEQ: 1500 TABLET, EXTENDED RELEASE ORAL at 11:35

## 2017-02-04 RX ADMIN — FUROSEMIDE 40 MG: 10 INJECTION, SOLUTION INTRAMUSCULAR; INTRAVENOUS at 11:34

## 2017-02-04 RX ADMIN — FAMOTIDINE 20 MG: 20 TABLET, FILM COATED ORAL at 08:25

## 2017-02-04 RX ADMIN — FAMOTIDINE 20 MG: 20 TABLET, FILM COATED ORAL at 20:14

## 2017-02-04 RX ADMIN — HYDROCODONE BITARTRATE AND ACETAMINOPHEN 1 TABLET: 5; 325 TABLET ORAL at 20:11

## 2017-02-04 ASSESSMENT — PAIN SCALES - GENERAL
PAINLEVEL_OUTOF10: 5
PAINLEVEL_OUTOF10: 2
PAINLEVEL_OUTOF10: 5
PAINLEVEL_OUTOF10: 0
PAINLEVEL_OUTOF10: 6
PAINLEVEL_OUTOF10: 2
PAINLEVEL_OUTOF10: 2
PAINLEVEL_OUTOF10: 3

## 2017-02-04 ASSESSMENT — ENCOUNTER SYMPTOMS
EYES NEGATIVE: 1
CONSTITUTIONAL NEGATIVE: 1
MUSCULOSKELETAL NEGATIVE: 1
RESPIRATORY NEGATIVE: 1
CARDIOVASCULAR NEGATIVE: 1
PSYCHIATRIC NEGATIVE: 1
NEUROLOGICAL NEGATIVE: 1
GASTROINTESTINAL NEGATIVE: 1

## 2017-02-04 NOTE — CARE PLAN
Problem: Oxygenation:  Goal: Maintain adequate oxygenation dependent on patient condition  Intervention: Manage oxygen therapy by monitoring pulse oximetry and/or ABG values  FiO2 titrated as tolerated.      Problem: Hyperinflation:  Goal: Prevent or improve atelectasis  Intervention: Instruct incentive spirometry usage  PT receives PEP/IS q4h

## 2017-02-04 NOTE — PROGRESS NOTES
Cardiology Progress Note:    Amr M Mohsen  Date & Time note created:    2/3/2017   7:46 PM       Patient ID:   Name:             Kristen Salazar   YOB: 1950  Age:                 66 y.o.  female   MRN:               [id number]                                                               Interval History:    The patient was seen and examined. The chart and telemetry were reviewed. The patient feels better with no complaints. She is ambulating well.     Review of Systems:      Constitutional: Denies fevers, Denies weight changes  Eyes: Denies changes in vision, no eye pain  Ears/Nose/Throat/Mouth: Denies nasal congestion or sore throat    Cardiovascular: denies palpitations    Respiratory: Denies cough  Gastrointestinal/Hepatic: Denies abdominal pain, nausea, vomiting, diarrhea, constipation or GI bleeding    Genitourinary: Denies dysuria or frequency  Musculoskeletal/Rheum: Denies  joint pain and swelling  Skin: Denies rash  Neurological: Denies headache, confusion, memory loss or focal weakness/parasthesias  Psychiatric: denies mood disorder    Endocrine: Dimple thyroid problems  Heme/Oncology/Lymph Nodes: Denies enlarged lymph nodes, denies brusing or known bleeding disorder  All other systems were reviewed and are negative (AMA/CMS criteria)               Past Medical History:   Past Medical History   Diagnosis Date   • Allergy    • Arthritis    • Osteoporosis    • Heart murmur    • Fracture      Active Hospital Problems    Diagnosis   • Mitral regurgitation [I34.0]   • Respiratory failure with hypoxia (CMS-HCC) [J96.91]   • Hyperlipidemia [E78.5]   • Hypertension [I10]   • CAP (community acquired pneumonia) [J18.9]   • Bradycardia [R00.1]   • Hypotension [I95.9]   • Normocytic anemia [D64.9]   • Pulmonary edema [J81.1]       Past Surgical History:  Past Surgical History   Procedure Laterality Date   • Knee arthroscopy     • Mitral valve repair  1/31/2017     Procedure: MITRAL VALVE REPAIR;   Surgeon: Chris Schmitt M.D.;  Location: SURGERY [facility];  Service:    • Bryant  1/31/2017     Procedure: BRYANT;  Surgeon: Chris Schmitt M.D.;  Location: SURGERY [facility];  Service:        Hospital Medications:    Current facility-administered medications:   •  furosemide (LASIX) tablet 40 mg, 40 mg, Oral, Q DAY, Josr Al M.D., 40 mg at 02/03/17 1418  •  doxycycline monohydrate (ADOXA) tablet 100 mg, 100 mg, Oral, Q12HRS, Masoud Aguirre M.D., 100 mg at 02/03/17 1001  •  warfarin (COUMADIN) tablet 5 mg, 5 mg, Oral, COUMADIN-DAILY, Denilson Cortez PHARMD, 5 mg at 02/03/17 1815  •  Respiratory Care per Protocol, , Nebulization, Continuous RT, Hannah Sumner, A.P.N.  •  mupirocin (BACTROBAN) 2 % ointment 1 Application, 1 Application, Topical, BID, Hannah Sumner, A.P.N., 1 Application at 02/03/17 1004  •  docusate sodium (COLACE) capsule 100 mg, 100 mg, Oral, QAM, 100 mg at 02/03/17 1001 **AND** senna-docusate (PERICOLACE or SENOKOT S) 8.6-50 MG per tablet 1 Tab, 1 Tab, Oral, Nightly, 1 Tab at 02/02/17 2059 **AND** senna-docusate (PERICOLACE or SENOKOT S) 8.6-50 MG per tablet 1 Tab, 1 Tab, Oral, Q24HRS PRN, 1 Tab at 02/03/17 1001 **AND** lactulose 20 GM/30ML solution 30 mL, 30 mL, Oral, Q24HRS PRN **AND** bisacodyl (DULCOLAX) suppository 10 mg, 10 mg, Rectal, Q24HRS PRN **AND** fleet enema 133 mL, 1 Each, Rectal, Once PRN, Hannah Sumner, A.P.N.  •  NS infusion, , Intravenous, Continuous, Hannah Sumner, A.P.N., Last Rate: 10 mL/hr at 01/31/17 1950  •  enoxaparin (LOVENOX) inj 40 mg, 40 mg, Subcutaneous, DAILY, Hannah Sumner A.P.N., 40 mg at 02/03/17 1001  •  aspirin EC (ECOTRIN) tablet 81 mg, 81 mg, Oral, DAILY, RACHEL Valderrama.P.N., 81 mg at 02/03/17 1000  •  MD ALERT... warfarin (COUMADIN) per pharmacy protocol, , Other, PRN, Hannah Sumner, A.P.N.  •  oxycodone immediate-release (ROXICODONE) tablet 5 mg, 5 mg, Oral, Q3HRS PRN, Hannah Sumner A.P.N.  •  oxycodone immediate release  (ROXICODONE) tablet 10 mg, 10 mg, Oral, Q3HRS PRN, Hannah Sumner, A.P.N.  •  tramadol (ULTRAM) 50 MG tablet 50 mg, 50 mg, Oral, Q4HRS PRN, Hannah Sumner, A.P.N.  •  ondansetron (ZOFRAN) syringe/vial injection 4 mg, 4 mg, Intravenous, Q6HRS PRN, 4 mg at 02/02/17 1941 **OR** prochlorperazine (COMPAZINE) injection 10 mg, 10 mg, Intravenous, Q6HRS PRN, Hananh Sumner, A.P.N.  •  acetaminophen (TYLENOL) tablet 650 mg, 650 mg, Oral, Q4HRS PRN **OR** acetaminophen (TYLENOL) suppository 650 mg, 650 mg, Rectal, Q4HRS PRN, Hannah Sumner, A.P.N.  •  mag hydrox-al hydrox-simeth (MAALOX PLUS ES or MYLANTA DS) suspension 30 mL, 30 mL, Oral, Q4HRS PRN, Hannah Sumner, A.P.N.  •  diphenhydrAMINE (BENADRYL) tablet/capsule 25 mg, 25 mg, Oral, HS PRN - MR X 1, Hannah Sumner, A.P.N.  •  hydrocodone-acetaminophen (NORCO) 5-325 MG per tablet 1-2 Tab, 1-2 Tab, Oral, Q4HRS PRN, Hannah Sumner, A.P.N., 1 Tab at 02/02/17 1316  •  artificial tears 1.4 % ophthalmic solution 1 Drop, 1 Drop, Both Eyes, PRN, Masoud Aguirre M.D.  •  [DISCONTINUED] insulin regular human (HUMULIN/NOVOLIN R) 62.5 Units in  mL infusion per protocol, 1-6 Units/hr, Intravenous, Continuous, Stopped at 02/01/17 1610 **AND** [DISCONTINUED] insulin regular (HUMULIN R) injection 0-14 Units, 0-14 Units, Intravenous, Once **AND** [DISCONTINUED] insulin regular (HUMULIN R) injection 0-10 Units, 0-10 Units, Intravenous, PRN **AND** dextrose 50% (D50W) injection 25 mL, 25 mL, Intravenous, PRN, PONCHO ValderramaP.NRose Mary, 25 mL at 02/01/17 1627  •  Action is required: Protocol 1073 Hypoglycemia has been implemented, , , Once **AND** Protocol 1073 Inclusion Criteria, , , CONTINUOUS **AND** Protocol 1073 NOTIFY, , , Once **AND** Protocol 1073 Initiate protocol immediately if FSBG is less than or equal to 70 mg/dL, , , CONTINUOUS **AND** glucose 4 g chewable tablet 16 g, 16 g, Oral, Q15 MIN PRN **AND** dextrose 50% (D50W) injection 25 mL, 25 mL, Intravenous, Q15 MIN PRN,  James Pérez M.D.  •  multivitamin (THERAGRAN) tablet 1 Tab, 1 Tab, Oral, DAILY, James Pérez M.D., 1 Tab at 02/03/17 1000  •  lactulose 20 GM/30ML solution 30 mL, 30 mL, Oral, Q24HRS PRN, Jeremy M Gonda, M.D.  •  ipratropium-albuterol (DUONEB) nebulizer solution 3 mL, 3 mL, Nebulization, Q2HRS PRN (RT), Jeremy M Gonda, M.D., Stopped at 01/30/17 1020  •  famotidine (PEPCID) tablet 20 mg, 20 mg, Oral, Q12HRS, 20 mg at 02/03/17 1002 **OR** [DISCONTINUED] famotidine (PEPCID) injection 20 mg, 20 mg, Intravenous, Q12HRS, Jeremy M Gonda, M.D., 20 mg at 01/30/17 0835  •  oyster shell calcium/vitamin D 250-125 MG-UNIT tablet 1 Tab, 1 Tab, Oral, QDAY with Breakfast, James Pérez M.D., 1 Tab at 02/03/17 1001  •  pravastatin (PRAVACHOL) tablet 20 mg, 20 mg, Oral, Q EVENING, Jeremy M Gonda, M.D., 20 mg at 02/02/17 2059    Outpatient Medications:  Prescriptions prior to admission   Medication Sig Dispense Refill Last Dose   • Calcium Carb-Cholecalciferol (OYSTER SHELL CALCIUM/VITAMIN D) 250-125 MG-UNIT Tab tablet Take 1 Tab by mouth every day.   1/24/2017 at 0800   • pravastatin (PRAVACHOL) 20 MG Tab Take 20 mg by mouth every day.   1/24/2017 at 0800   • losartan (COZAAR) 25 MG Tab Take 25 mg by mouth every day.   1/24/2017 at 0800   • glucosamine Sulfate 500 MG CAPS Take 500 mg by mouth 3 times a day, with meals.   1/24/2017 at 0800       Medication Allergy:  Allergies   Allergen Reactions   • Codeine      vomiting   • Latex        Family History:  History reviewed. No pertinent family history.    Social History:  Social History     Social History   • Marital Status:      Spouse Name: N/A   • Number of Children: N/A   • Years of Education: N/A     Occupational History   • Not on file.     Social History Main Topics   • Smoking status: Never Smoker    • Smokeless tobacco: Never Used   • Alcohol Use: Yes      Comment: sometimes   • Drug Use: No   • Sexual Activity: Not on file     Other Topics Concern   • Not  "on file     Social History Narrative         Physical Exam:  Vitals/ General Appearance:   Weight/BMI: Body mass index is 22.78 kg/(m^2).  Blood pressure 105/74, pulse 70, temperature 36.7 °C (98 °F), resp. rate 21, height 1.702 m (5' 7.01\"), weight 66 kg (145 lb 8.1 oz), SpO2 89 %.  Filed Vitals:    02/03/17 1102 02/03/17 1200 02/03/17 1600 02/03/17 1709   BP:       Pulse: 60 65 65 70   Temp:  37.2 °C (98.9 °F) 36.7 °C (98 °F)    Resp: 18 20 21    Height:       Weight:       SpO2: 95% 92% 91% 89%     Oxygen Therapy:  Pulse Oximetry: 89 %, O2 (LPM): 4, O2 Delivery: Silicone Nasal Cannula    Constitutional:   Well developed, Well nourished, No acute distress  HENMT:  Normocephalic, Atraumatic, Oropharynx moist mucous membranes, No oral exudates, Nose normal.  No thyromegaly.  Eyes:  EOMI, Conjunctiva normal, No discharge.  Neck:  Normal range of motion, No cervical tenderness,  no JVD.  Cardiovascular:  Normal heart rate, Normal rhythm, III/VI systolic murmur over the base murmurs, No rubs, No gallops.   Extremitites with  intact distal pulses, no cyanosis, or edema.  Lungs:  Normal breath sounds, breath sounds clear to auscultation bilaterally,  no crackles, no wheezing.   Abdomen: Bowel sounds normal, Soft, No tenderness, No guarding, No rebound, No masses, No hepatosplenomegaly.  Skin: Warm, Dry, No erythema, No rash, no induration.  Neurologic: Alert & oriented x 3, No focal deficits noted, cranial nerves II through X are grossly intact.  Psychiatric: Affect normal, Judgment normal, Mood normal.      [facility] (Data Review):     Records reviewed and summarized in current documentation    Lab Data Review:  Recent Labs      02/01/17   0545  02/02/17   0210  02/03/17   0600   WBC  17.6*  11.9*  10.7   RBC  3.33*  2.93*  2.71*   HEMOGLOBIN  10.2*  9.0*  8.3*   HEMATOCRIT  31.8*  28.7*  25.9*   MCV  95.5  98.0*  95.6   MCH  30.6  30.7  30.6   MCHC  32.1*  31.4*  32.0*   RDW  54.4*  55.8*  50.9*   PLATELETCT  257  199  188 "   MPV  11.6  12.3  11.4     Recent Labs      02/01/17   0545  02/01/17   1120  02/02/17   0210  02/03/17   0600   SODIUM  145   --   132*  128*   POTASSIUM  4.4  4.0  4.2  4.4   CHLORIDE  116*   --   103  101   CO2  23   --   22  23   GLUCOSE  121*   --   101*  108*   BUN  50*   --   56*  56*   CREATININE  0.70   --   1.02  0.83   CALCIUM  8.3*   --   8.4*  8.1*     Recent Labs      02/01/17   0545  02/02/17   0210  02/03/17   0600   INR  1.25*  1.26*  1.40*                 Recent Labs      02/01/17   0545  02/01/17   1120  02/02/17   0210  02/03/17   0600   SODIUM  145   --   132*  128*   POTASSIUM  4.4  4.0  4.2  4.4   CHLORIDE  116*   --   103  101   CO2  23   --   22  23   GLUCOSE  121*   --   101*  108*   BUN  50*   --   56*  56*     Recent Labs      02/01/17   0545  02/01/17   1120  02/02/17   0210  02/03/17   0600   SODIUM  145   --   132*  128*   POTASSIUM  4.4  4.0  4.2  4.4   CHLORIDE  116*   --   103  101   CO2  23   --   22  23   BUN  50*   --   56*  56*   CREATININE  0.70   --   1.02  0.83   CALCIUM  8.3*   --   8.4*  8.1*     Recent Labs      02/01/17   0545  02/02/17   0210  02/03/17   0600   INR  1.25*  1.26*  1.40*     Results for orders placed or performed during the hospital encounter of 01/24/17   ECHOCARDIOGRAM COMP W/O CONT   Result Value Ref Range    Eject.Frac. MOD BP 80.65     Eject.Frac. MOD 4C 76.68     Eject.Frac. MOD 2C 83.14     Left Ventrical Ejection Fraction 75          Imaging/Procedures Review:    Chest Xray:  Reviewed        MDM (Assessment and Plan):     Active Hospital Problems    Diagnosis   • Mitral regurgitation [I34.0]   • Respiratory failure with hypoxia (CMS-HCC) [J96.91]   • Hyperlipidemia [E78.5]   • Hypertension [I10]   • CAP (community acquired pneumonia) [J18.9]   • Bradycardia [R00.1]   • Hypotension [I95.9]   • Normocytic anemia [D64.9]   • Pulmonary edema [J81.1]           The patient is a 66-year-old woman, a patient of Dr. Landin with moderate to severe mitral  regurgitation due to prolapse who presented with acute pulmonary edema.  She was intubated on presentation.  She has normal coronary anatomy based on a coronary angiogram.  On attempt for extubation she quickly deteriorated, she had supraventricular tachycardic rhythm and pulmonary edema and was reintubated. Mitral repair on January 31st.  Good recovery  Ambulating well  Start medicine gradually as tolerated by hemodynamics  Will sign off. Please call for questions  Follow up on out patient basis with Dr. Landin.

## 2017-02-04 NOTE — PROGRESS NOTES
Inpatient Anticoagulation Service Note    Date: 2/4/2017  Reason for Anticoagulation: Mitral Valve Repair        Hemoglobin Value: 9.1  Hematocrit Value: 27.6  Lab Platelet Value: 249  Target INR: 2.0 to 3.0    INR from last 7 days     Date/Time INR Value    02/04/17 0445 (!)1.46    02/03/17 0600 (!)1.4    02/02/17 0210 (!)1.26    02/01/17 0545 (!)1.25    01/31/17 1725 (!)1.38    01/31/17 0503 1.06        Dose from last 7 days     Date/Time Dose (mg)    02/04/17 1400 5    02/03/17 1300 5    02/02/17 1300 5    02/01/17 1300 5        Average Dose (mg):  (new start)  Significant Interactions: Aspirin, Statin  Bridge Therapy: No    Comments: INR trending up. Will continue warfarin 5 mg daily for now and trend the INR. Thoracentesis this AM(?).       Education Material Provided?: No ([place])    Pharmacist suggested discharge dosing: warfarin 5 mg daily     Denilson Cortez, PharmD

## 2017-02-04 NOTE — PROGRESS NOTES
[facility] ICU Progress Note      Admit Date: 1/24/2017  ICU Day: 11    Resident(s): Josr Al  Attending: ALESIA ZARAGOZA/ Dr. Aguirre    Date & Time:   2/4/2017   3:21 PM       Patient ID:    Name:             Kristen Salazar   YOB: 1950  Age:                 66 y.o.  female   MRN:               [id number]    HPI:  Very pleasant 66 year old White female with PMH htn, dld, mvp admitted to ICU on 1/24/2017 for acute onset shortness of breath, later found on echocardiogram to have severe mitral regurgitation.       Diagnosis:  Severe mitral regurgitation  Hypotension  VDRF  HTN  DLD    Interval Events:  Status post radical mitral valve repair 1/31/2017.  Increased O2 requirements overnight  LLL thoracentesis today  Cont diuresis  Having diarrhea, c.diff sent off    Consultants:  Cardiology: Dr. Ramirez    CT Surgery: Dr. Schmitt  PMA: Dr. Atkins    Physical Exam:  GEN: nad; aaox3  HEENT: NC/AT. Eomi; perrla  CV: S1, S2, NSR, Systolic murmur +  RESP: decreased breath sounds, bilateral crackles at bases  ABD: Soft, NT, ND; +BS  EXT: some LE edema  NEURO: no focal deficits    Respiratory:     Respiration: (!) 22, Pulse Oximetry: 94 %, O2 Daily Delivery Respiratory : Silicone Nasal Cannula    Chest Tube Drains:  HemoDynamics:  Pulse: 68 NIBP: 121/57 mmHg     Fluids:  Intake/Output Summary (Last 24 hours) at 01/26/17 0624  Last data filed at 01/26/17 0200   Gross per 24 hour   Intake 1438.35 ml   Output   1225 ml   Net 213.35 ml     Weight: 64.7 kg (142 lb 10.2 oz)  Body mass index is 22.33 kg/(m^2).    Recent Labs      02/02/17 0210 02/03/17   0600 02/04/17   0445   SODIUM  132*  128*  134*   POTASSIUM  4.2  4.4  3.7   CHLORIDE  103  101  102   CO2  22  23  24   BUN  56*  56*  36*   CREATININE  1.02  0.83  0.81   CALCIUM  8.4*  8.1*  8.5     GI/Nutrition:  Recent Labs      02/02/17 0210 02/03/17   0600 02/04/17   0445   GLUCOSE  101*  108*  104*     Heme:  Recent Labs      02/02/17   0210  02/03/17    0617   0445   RBC  2.93*  2.71*  2.90*   HEMOGLOBIN  9.0*  8.3*  9.1*   HEMATOCRIT  28.7*  25.9*  27.6*   PLATELETCT  199  188  249   PROTHROMBTM  16.2*  17.6*  18.2*   INR  1.26*  1.40*  1.46*     Infectious Disease:  Temp  Av.2 °C (98.9 °F)  Min: 36.7 °C (98 °F)  Max: 37.8 °C (100 °F)  Recent Labs      17   0210  17   0617   0445   WBC  11.9*  10.7  10.6   NEUTSPOLYS   --   83.70*  80.60*   LYMPHOCYTES   --   8.70*  11.10*   MONOCYTES   --   6.70  6.60   EOSINOPHILS   --   0.30  0.40   BASOPHILS   --   0.10  0.20     Meds:  • sodium chloride  2 Spray     • acetaminophen  650 mg      Or   • acetaminophen  650 mg     • furosemide  40 mg     • warfarin  5 mg     • Respiratory Care per Protocol       • mupirocin  1 Application     • docusate sodium  100 mg      And   • senna-docusate  1 Tab      And   • senna-docusate  1 Tab      And   • lactulose  30 mL      And   • bisacodyl  10 mg      And   • fleet  1 Each     • NS   10 mL/hr at 17 1950   • aspirin EC  81 mg     • MD ALERT... warfarin       • oxycodone immediate-release  5 mg     • oxycodone immediate release  10 mg     • tramadol  50 mg     • ondansetron  4 mg      Or   • prochlorperazine  10 mg     • mag hydrox-al hydrox-simeth  30 mL     • diphenhydrAMINE  25 mg     • hydrocodone-acetaminophen  1-2 Tab     • artificial tears  1 Drop     • dextrose 50%  25 mL     • glucose 4 g  16 g      And   • dextrose 50%  25 mL     • multivitamin  1 Tab     • lactulose  30 mL     • ipratropium-albuterol  3 mL     • famotidine  20 mg     • oyster shell calcium/vitamin D  1 Tab     • pravastatin  20 mg        Problem and Plan:    Acute Hypoxemic respiratory failure   Acute pulmonary edema d/t severe MR  Hypertensive Emergency  Demand ischemia  Prolonged Qtc   CAP  full vent support, extubation tried on  but patient had to be re-intubated due to distress  liberated finally on 2017  due to severe MR?   echo:EF 75%, severe MR,  RVSP 40  cardiac cath: normal coronary arteries  CTA: no PE  KAREN: EF 65%, mod MR, no rupture of cord or tendinae  renal duplex neg for stenosis  Finished course of ceftriaxone, cont doxy     followed by CT team  DC temp wires and central lines.   Thoracentesis ordered   Plan for rehab tomorrow    Volume overload  2 view CXR shows pleural effusions  Cont IV lasix    Chronic medical issues:   DLD: cont pravastatin  Chronic back/knee/neck pain: pain management  HTN: losartan held    DISPO: ICU    CODE STATUS: Full    Quality Measures:  Frank Catheter: no  DVT Prophylaxis: lovenox  Ulcer Prophylaxis: pepcid   Antibiotics: doxy  Lines: Central    Procedures:  1/24 Intubation  1/24 Central line  1/25 cardiac cath  1/28 extubated  1/28 re-intubated    Imaging:  DX-CHEST-2 VIEWS   Final Result      1.  There are bilateral small-to-moderate pleural effusions.   2.  There is bibasilar airspace disease which could be atelectasis or pneumonitis.      DX-CHEST-PORTABLE (1 VIEW)   Final Result      1.  Interval removal of endotracheal tube, feeding tube, Vienna-Jose catheter, and mediastinal/pleural drains..   2.  No pneumothorax.   3.  Worsening hypoinflation and bibasilar atelectasis.      TRANSESOPHAGEAL ECHO W/O CONT   Final Result      DX-CHEST-PORTABLE (1 VIEW)   Final Result      No significant change      DX-CHEST-PORTABLE (1 VIEW)   Final Result      1.  Vienna-Jose catheter has been pulled back and the tip now projects over the pulmonary outflow tract      2.  No other change      DX-CHEST-PORTABLE (1 VIEW)   Final Result         1. Postsurgical change in the chest with mild pulmonary edema and bibasilar atelectasis.      2. The left side Vienna-Jose catheter with tip in the right lower lobe's segmental pulmonary artery.         DX-CHEST-PORTABLE (1 VIEW)   Final Result      No significant change      Carotid Duplex STAT   Final Result      DX-CHEST-PORTABLE (1 VIEW)   Final Result      No significant change from prior exam.       DX-CHEST-PORTABLE (1 VIEW)   Final Result      Interval improvement of perihilar infiltrates and worsening of LEFT basilar atelectasis.      DX-CHEST-LIMITED (1 VIEW)   Final Result      1.  Satisfactory re-intubation.   2.  Other tubes and lines are stable.   3.  Increase in central hazy parenchymal opacity suspicious for edema although pneumonitis is not excluded.      DX-CHEST-PORTABLE (1 VIEW)   Final Result      No significant change from prior exam.      US-RENAL ARTERY DUPLEX   Final Result      No evidence of main renal artery stenosis.      DX-CHEST-PORTABLE (1 VIEW)   Final Result         1. No significant interval change.      TRANSESOPHAGEAL ECHO W/O CONT (Order not available for Rehab Hospital)   Final Result      DX-CHEST-PORTABLE (1 VIEW)   Final Result         1. Decrease groundglass and airspace opacities in both lungs.      CT-CTA CHEST PULMONARY ARTERY W/ RECONS   Final Result      1.  No evidence pulmonary emboli.   2.  Moderate right and small left pleural effusions.   3.  Compressive atelectasis both lower lobes especially the left and left lung volume loss with mediastinal shift towards left.   4.  Patchy groundglass opacities within the right upper lobe consistent with focal areas of pneumonitis.   5.  7.5 mm left upper lobe nodule and smaller nodules within the lungs.   Nodule measuring between 7 and 8 mm:   Low Risk Patient:  Initial follow up CT at 6-12 months, then at 18-24 months if no change.      High Risk Patient:  Initial follow up CT at 3-6 months, then at 9-12 and 24 months if no change.         Low Risk - Minimal or absent history of smoking and of other known risk factors.   High Risk - History of smoking or of other known risk factors.   Fleischner Society Guidelines for Pulmonary Nodules:  Radiology, 237:2005         DX-CHEST-PORTABLE (1 VIEW)   Final Result         1. Interval placement of feeding tube. No other significant interval change.      DX-ABDOMEN FOR TUBE  PLACEMENT   Final Result         Feeding tube with tip in the stomach.      ECHOCARDIOGRAM COMP W/O CONT   Final Result      DX-CHEST-PORTABLE (1 VIEW)   Final Result         1. Right central venous catheter with tip in the mid SVC.      OUTSIDE IMAGES-DX CHEST   Preliminary Result      OUTSIDE IMAGES-DX CHEST   Preliminary Result      DX-CHEST-PORTABLE (1 VIEW)   Final Result         1. Extensive groundglass and airspace opacities throughout both lungs, worse in the bilateral lower lungs. This could be seen with multifocal pneumonia, ARDS or pulmonary edema.   2. Endotracheal tube with tip in the mid thoracic trachea.      US-THORACENTESIS PUNCTURE LEFT    (Results Pending)   DX-CHEST-PORTABLE (1 VIEW)    (Results Pending)

## 2017-02-04 NOTE — CARE PLAN
Problem: Post Op Day 4 CABG/Heart Valve Replacement  Goal: Optimal care of the Post Op CABG/Heart Valve replacement Post Op Day 4  Intervention: Daily Weights  Will be done with morning walk.  Intervention: Shower daily and clean incisions twice daily with soap and water  In progress daily  Intervention: Up in chair for all meals  In progress  Intervention: Ambulate, increasing the distance each time x 3 and before bed  In progress  Intervention: IS q 1 hour while awake and record best IS volume  In progress  Intervention: Consider removal of sanchez, chest tube and pacer wire if not already done  done  Intervention: Discharge Education  In process  Intervention: Add special instructions for cardiac surgery discharge instructions - [facility] to after visit summary and review with patient  In process

## 2017-02-04 NOTE — PROGRESS NOTES
Pulmonary Critical Care Progress Note        Chief Complaint: Worsening dyspnea and acute hypoxic respiratory failure    History of Present Illness: The patient is a 66-year-old female with a past medical history significant for hypertension, hyperlipidemia, and mitral valve prolapse who was in her usual state of health on 1/24 when she noted a cough and sudden onset of shortness of breath.  She checked her oxygen saturation at home and found to be 85%. She presented to the emergency department at [facility] where unfortunately she decompensated quickly requiring up to 15 L facemask and then eventually requiring intubation. She was initially hypertensive and tachycardic with an oxygen saturation of 86% on room air at the outside hospital and was transported on high peak and expiratory pressures on the ventilator with an FIO2 of 100%, achieving saturations in the low 90s. She became progressively more hypotensive requiring a norepinephrine drip. A stat bedside echo was performed with Dr. Delarosa in the ER, which revealed a ruptured mitral valve leaflet with severe MR as the likely cause of her decompensation.     ROS: Unable to obtain as the patient is sedated on the ventilator     Interval Events:  24 hour interval history reviewed    - +100cc over last 24hr, (-)1.4L since admit   - tmax 100   - remains off pressors   - s/p MV repair 1/31   - extubated 2/1   - 4L nc   - IS/PEP/Mobilize - add IPV     cxr with bilateral pleural effusions    PFSH:  No change.    Respiratory:     Pulse Oximetry: 92 %    Exam: diminished breath sounds moderate scattered rhonchi  ImagingAvailable data reviewed     CTA chest 1/25:  1.  No evidence pulmonary emboli.  2.  Moderate right and small left pleural effusions.  3.  Compressive atelectasis both lower lobes especially the left and left lung volume loss with mediastinal shift towards left.  4.  Patchy groundglass opacities within the right upper lobe consistent with  focal areas of pneumonitis.  5.  7.5 mm left upper lobe nodule and smaller nodules within the lungs.        Invalid input(s): [id number]    HemoDynamics:  Pulse: 67  NIBP: 116/52 mmHg    Exam: regular rate and rhythm  Imaging: echo Reviewed           Neuro:  GCS Total Cohutta Coma Score: 15     Exam:non focal  Imaging: Available data reviewed    Fluids:  Intake/Output       02/02/17 0700 - 02/03/17 0659 02/03/17 0700 - 02/04/17 0659 02/04/17 0700 - 02/05/17 0659      [id number] Total 8847-9669 9575-0269 Total 2954-5543 7795-1280 Total       Intake    P.O.  800  -- 800  --  -- --  --  -- --    P.O. 800 -- 800 -- -- -- -- -- --    I.V.  200  -- 200  100  -- 100  --  -- --    IV Piggyback Volume (IV Piggyback) 200 -- 200 100 -- 100 -- -- --    Total Intake 1000 -- 1000 100 -- 100 -- -- --       Output    Urine  --  100 100  --  -- --  --  -- --    Number of Times Voided 3 x 2 x 5 x 5 x 3 x 8 x -- -- --    Void (ml) -- 100 100 -- -- -- -- -- --    Stool  --  -- --  --  -- --  --  -- --    Number of Times Stooled -- 1 x 1 x 2 x 2 x 4 x -- -- --    Total Output -- 100 100 -- -- -- -- -- --       Net I/O     1000 -100 900 100 -- 100 -- -- --        Weight: 64.7 kg (142 lb 10.2 oz)  Recent Labs      02/02/17   0210  02/03/17   0600 02/04/17   0445   SODIUM  132*  128*  134*   POTASSIUM  4.2  4.4  3.7   CHLORIDE  103  101  102   CO2  22  23  24   BUN  56*  56*  36*   CREATININE  1.02  0.83  0.81   CALCIUM  8.4*  8.1*  8.5       GI/Nutrition:  Exam: abdomen is soft and non-tender, normal active bowel sounds  Imaging: Available data reviewed  Liver Function  Recent Labs      02/02/17   0210  02/03/17   0600  02/04/17   0445   GLUCOSE  101*  108*  104*       Heme:  Recent Labs      02/02/17   0210 02/03/17   0600 02/04/17   0445   RBC  2.93*  2.71*  2.90*   HEMOGLOBIN  9.0*  8.3*  9.1*   HEMATOCRIT  28.7*  25.9*  27.6*   PLATELETCT  199  188  249   PROTHROMBTM  16.2*  17.6*  18.2*   INR  1.26*  1.40*  1.46*        Infectious Disease:  Temp  Av.2 °C (98.9 °F)  Min: 36.6 °C (97.9 °F)  Max: 37.8 °C (100 °F)  Micro: cultures reviewed  Recent Labs      17   0210  17   0600  17   0445   WBC  11.9*  10.7  10.6   NEUTSPOLYS   --   83.70*  80.60*   LYMPHOCYTES   --   8.70*  11.10*   MONOCYTES   --   6.70  6.60   EOSINOPHILS   --   0.30  0.40   BASOPHILS   --   0.10  0.20     Current Facility-Administered Medications   Medication Dose Frequency Provider Last Rate Last Dose   • furosemide (LASIX) tablet 40 mg  40 mg Q DAY Josr Al M.D.   40 mg at 17 1418   • warfarin (COUMADIN) tablet 5 mg  5 mg COUMADIN-DAILY Denilson Cortez, PHARMD   5 mg at 17 1815   • Respiratory Care per Protocol   Continuous RT Hannah Sumner, A.P.N.       • mupirocin (BACTROBAN) 2 % ointment 1 Application  1 Application BID Hannah Sumner, A.P.N.   1 Application at 17 2147   • docusate sodium (COLACE) capsule 100 mg  100 mg QAM Hannah Sumner, A.P.N.   100 mg at 17 1001    And   • senna-docusate (PERICOLACE or SENOKOT S) 8.6-50 MG per tablet 1 Tab  1 Tab Nightly Hannah Sumner, A.P.N.   Stopped at 17 2100    And   • senna-docusate (PERICOLACE or SENOKOT S) 8.6-50 MG per tablet 1 Tab  1 Tab Q24HRS PRN Hannah Sumner, A.P.N.   1 Tab at 17 1001    And   • lactulose 20 GM/30ML solution 30 mL  30 mL Q24HRS PRN Hannah Sumner, A.P.N.        And   • bisacodyl (DULCOLAX) suppository 10 mg  10 mg Q24HRS PRN Hannah Sumner, A.P.N.        And   • fleet enema 133 mL  1 Each Once PRN Hannah Sumner, A.P.N.       • NS infusion   Continuous Hannah Sumner A.P.N. 10 mL/hr at 17 1950     • enoxaparin (LOVENOX) inj 40 mg  40 mg DAILY Hannah Sumner A.P.N.   40 mg at 17 1001   • aspirin EC (ECOTRIN) tablet 81 mg  81 mg DAILY Hannah Sumner A.P.N.   81 mg at 17 1000   • MD ALERT... warfarin (COUMADIN) per pharmacy protocol   PRN Hannah Sumner, A.P.N.       • oxycodone immediate-release  (ROXICODONE) tablet 5 mg  5 mg Q3HRS PRN Hannah Sumner, A.P.N.       • oxycodone immediate release (ROXICODONE) tablet 10 mg  10 mg Q3HRS PRN Hannah Sumner, A.P.N.       • tramadol (ULTRAM) 50 MG tablet 50 mg  50 mg Q4HRS PRN Hannah Sumner, A.P.N.       • ondansetron (ZOFRAN) syringe/vial injection 4 mg  4 mg Q6HRS PRN Hannah Sumner, A.P.N.   4 mg at 02/02/17 1941    Or   • prochlorperazine (COMPAZINE) injection 10 mg  10 mg Q6HRS PRN Hannah Sumner, A.P.N.       • acetaminophen (TYLENOL) tablet 650 mg  650 mg Q4HRS PRN Hannah Sumner, A.P.N.        Or   • acetaminophen (TYLENOL) suppository 650 mg  650 mg Q4HRS PRN Hannah Sumner, A.P.N.       • mag hydrox-al hydrox-simeth (MAALOX PLUS ES or MYLANTA DS) suspension 30 mL  30 mL Q4HRS PRN Hannah Sumner, A.P.N.       • diphenhydrAMINE (BENADRYL) tablet/capsule 25 mg  25 mg HS PRN - MR X 1 Hannah Sumner, A.P.N.       • hydrocodone-acetaminophen (NORCO) 5-325 MG per tablet 1-2 Tab  1-2 Tab Q4HRS PRN Hannah Sumner, A.P.N.   1 Tab at 02/02/17 1316   • artificial tears 1.4 % ophthalmic solution 1 Drop  1 Drop PRN Masoud Aguirre M.D.       • dextrose 50% (D50W) injection 25 mL  25 mL PRN Hananh Sumner, A.P.N.   25 mL at 02/01/17 1627   • glucose 4 g chewable tablet 16 g  16 g Q15 MIN PRN Jaems Pérez M.D.        And   • dextrose 50% (D50W) injection 25 mL  25 mL Q15 MIN PRN James Pérez M.D.       • multivitamin (THERAGRAN) tablet 1 Tab  1 Tab DAILY James Pérez M.D.   1 Tab at 02/03/17 1000   • lactulose 20 GM/30ML solution 30 mL  30 mL Q24HRS PRN Jeremy M Gonda, M.D.       • ipratropium-albuterol (DUONEB) nebulizer solution 3 mL  3 mL Q2HRS PRN (RT) Jeremy M Gonda, M.D.   Stopped at 01/30/17 1020   • famotidine (PEPCID) tablet 20 mg  20 mg Q12HRS Jeremy M Gonda, M.D.   20 mg at 02/03/17 2052   • oyster shell calcium/vitamin D 250-125 MG-UNIT tablet 1 Tab  1 Tab QDAY with Breakfast James Pérez M.D.   1 Tab at 02/03/17 1001   • pravastatin  (PRAVACHOL) tablet 20 mg  20 mg Q EVENING Jeremy M Gonda, M.D.   20 mg at 02/03/17 2147     Last reviewed on 1/30/2017  8:02 PM by Purvi Montanez R.N.    Quality  Measures:  Medications reviewed, Labs reviewed and Radiology images reviewed  Frank catheter: Critically Ill - Requiring Accurate Measurement of Urinary Output  Central line in place: Need for access    DVT Prophylaxis: Enoxaparin (Lovenox)  DVT prophylaxis - mechanical: SCDs  Ulcer prophylaxis: Yes  Antibiotics: Treating active infection/contamination beyond 24 hours perioperative coverage      Lines  RIJ TLC 1/24    Gtt  none    Abx  Rocephin completed 2/3  Doxycycline completed 2/3    Bcx 1/24 ngtd  Ucx 1/24 ngtd  Flu (-) 1/24    Echo 1/26 E 65%      Problems/Plan:    Acute Hypoxic Respiratory Failure from pulmonary edema and cardiogenic shock   - intubated 1/24, failed extubation 1/28 and re-intubated   - extubated 2/1   - RT/O2 therapies   - IS/PEP/Mobilize/IPV   - diurese as tolerated (scheduled iv lasix + metolazone)  Acute Pulmonary Edema   - RT/O2 protocols   - diurese as tolerated  Acute Delirium   - improved  Tachycardia that ? A flutter   - presently sinus, off amio  Severe Mitral Valve Regurgitation    - cardiac cath on 1/25   - sp mv repair 1/31  Cardiogenic Shock likely related to severe mitral regurgitation   - s/p vasopressor/ionotropic therapy   - Cards following   - Cardiac cath on 1/25 was negative and heparin stopped   - CTA was negative  Acute Leukocytosis and concern for aspiration   - blood/sputum cultures sent and follow   - Ceftriaxone/Doxycycline 10 day course completed 2/3  Elevated Troponin   - cards following   - ASA  Hyperglycemia   - ISS  Hx of mitral valve prolapse  Hx of systemic arterial hypertension  Hx of Dyslipidemia   - statin therapy  Prophylaxis   - heparin, bowel regimen, pepcid, enteral feeds      Discussed patient condition and risk of morbidity and/or mortality with Family, RN, RT, Pharmacy, ,  [employer] [facility] resident and Charge nurse / hot rounds and well as cardiology  The patient remains critically ill.  Critical care time = 33 minutes in directly providing and coordinating critical care and extensive data review.  No time overlap and excludes procedures.

## 2017-02-04 NOTE — PROGRESS NOTES
Cardiovascular Surgery Progress Note    Name: Kristen Salazar  MRN: [id number]  : 1950  Admit Date: 2017  8:18 PM  Procedure:  Procedure(s) and Anesthesia Type:     * MITRAL VALVE REPAIR - General     * KAREN - General  4 Day Post-Op    Vitals:  Patient Vitals for the past 8 hrs:   Temp SpO2 O2 Delivery O2 (LPM) Pulse Resp NIBP Weight   17 0800 - - Silicone Nasal Cannula 9 - - 121/57 mmHg -   17 0748 - - - 10 - (!) 22 - -   17 0700 - 94 % - - - - - -   17 0600 - - - - 67 - - -   17 0500 - - - - - - 116/52 mmHg -   17 0400 37.8 °C (100 °F) - - - - - - 64.7 kg (142 lb 10.2 oz)     Temp (24hrs), Av.2 °C (99 °F), Min:36.7 °C (98 °F), Max:37.8 °C (100 °F)      Respiratory:    Respiration: (!) 22, Pulse Oximetry: 94 %, O2 Daily Delivery Respiratory : Silicone Nasal Cannula     Chest Tube Drains:          Fluids:    Intake/Output Summary (Last 24 hours) at 17 0853  Last data filed at 17 1200   Gross per 24 hour   Intake    100 ml   Output      0 ml   Net    100 ml     Admit weight: Weight: 61 kg (134 lb 7.7 oz)  Current weight: Weight: 64.7 kg (142 lb 10.2 oz) (17 0400)    Labs:  Recent Labs      17   0210  17   0600  17   0445   WBC  11.9*  10.7  10.6   RBC  2.93*  2.71*  2.90*   HEMOGLOBIN  9.0*  8.3*  9.1*   HEMATOCRIT  28.7*  25.9*  27.6*   MCV  98.0*  95.6  95.2   MCH  30.7  30.6  31.4   MCHC  31.4*  32.0*  33.0*   RDW  55.8*  50.9*  49.6   PLATELETCT  199  188  249   MPV  12.3  11.4  11.4     Recent Labs      17   0600  17   0445   NEUTSPOLYS  83.70*  80.60*   LYMPHOCYTES  8.70*  11.10*   MONOCYTES  6.70  6.60   EOSINOPHILS  0.30  0.40   BASOPHILS  0.10  0.20     Recent Labs      17   0210  17   0600  17   0445   SODIUM  132*  128*  134*   POTASSIUM  4.2  4.4  3.7   CHLORIDE  103  101  102   CO2  22  23  24   GLUCOSE  101*  108*  104*   BUN  56*  56*  36*   CREATININE  1.02  0.83  0.81   CALCIUM  8.4*   8.1*  8.5     Recent Labs      02/02/17   0210  02/03/17   0600  02/04/17   0445   INR  1.26*  1.40*  1.46*       Medications:  • furosemide  40 mg     • warfarin  5 mg     • mupirocin  1 Application     • docusate sodium  100 mg      And   • senna-docusate  1 Tab     • enoxaparin  40 mg     • aspirin EC  81 mg     • multivitamin  1 Tab     • famotidine  20 mg     • oyster shell calcium/vitamin D  1 Tab     • pravastatin  20 mg         Exam:   Review of Systems   Constitutional: Negative.    HENT: Positive for congestion.    Eyes: Negative.    Respiratory: Negative.    Cardiovascular: Negative.    Gastrointestinal: Negative.    Genitourinary: Negative.    Musculoskeletal: Negative.    Skin: Negative.    Neurological: Negative.    Endo/Heme/Allergies: Negative.    Psychiatric/Behavioral: Negative.        Physical Exam   Constitutional: She is oriented to person, place, and time. She appears well-developed. No distress.   HENT:   Head: Normocephalic and atraumatic.   Eyes: Conjunctivae are normal. Pupils are equal, round, and reactive to light.   Neck: Normal range of motion. No JVD present.   Cardiovascular: Normal rate and regular rhythm.  Exam reveals no gallop and no friction rub.    No murmur heard.  Pulmonary/Chest: She has decreased breath sounds in the right lower field and the left lower field.   Abdominal: Soft. She exhibits no distension.   Musculoskeletal: She exhibits edema.   Neurological: She is alert and oriented to person, place, and time.   Skin: Skin is warm and dry.   Surgical incisions CDI   Psychiatric: She has a normal mood and affect. Her behavior is normal.       Quality Measures:   EKG reviewed, Labs reviewed, Medications reviewed and Radiology images reviewed  Frank catheter: No Frank  Central line in place: Need for access    DVT Prophylaxis: Warfarin (Coumadin)  DVT prophylaxis - mechanical: Not indicated at this time, ambulatory  Ulcer prophylaxis: No    Assessed for rehab: Patient  returned to prior level of function, rehabilitation not indicated at this time      Assessment/Plan:  POD 1 - HOTN- on low dose epi/daphnie, gently diuresis, Vent - per pulm, keep mediastinal tubes, keep sanchez- strict I&O, CPM  POD 2 HDS, NSR, labs noted, doing well.  OK DC sanchez.  Rehab consult.  POD 3 HDS, NSR, labs noted doing well, had BM. DC temp wires and central lines. H/H low/stable--w/w. 2 view CXR today, wean O2 as able.  Plan for Rehab 1-2 days  POD 4 HDS, NSR, increased O2 overnight, better this AM. LLL thoracentesis this AM pending. Continue diuresis, planning for Rehab likely in AM.    Active Hospital Problems    Diagnosis   • Mitral regurgitation [I34.0]   • Respiratory failure with hypoxia (CMS-HCC) [J96.91]   • Hyperlipidemia [E78.5]   • Hypertension [I10]   • CAP (community acquired pneumonia) [J18.9]   • Bradycardia [R00.1]   • Hypotension [I95.9]   • Normocytic anemia [D64.9]   • Pulmonary edema [J81.1]

## 2017-02-04 NOTE — PROGRESS NOTES
Thoracentesis complete, pt tolerated well.  Total 825 ml removed.  Pt denies pain at this time, 92% 5L no distress noted. Hourly rounding in place.

## 2017-02-04 NOTE — PROGRESS NOTES
Tele: SB to SR 67. .20 / .08 / .42    Assumed care of pt from Alisha HAYNES. Pt sitting up in chair, eating dinner. No c/o. Will pass care to NOC RN @ [facility].

## 2017-02-05 LAB
ANION GAP SERPL CALC-SCNC: 9 MMOL/L (ref 0–11.9)
BASOPHILS # BLD AUTO: 0.2 % (ref 0–1.8)
BASOPHILS # BLD: 0.02 K/UL (ref 0–0.12)
BUN SERPL-MCNC: 25 MG/DL (ref 8–22)
CALCIUM SERPL-MCNC: 8.3 MG/DL (ref 8.5–10.5)
CHLORIDE SERPL-SCNC: 98 MMOL/L (ref 96–112)
CO2 SERPL-SCNC: 28 MMOL/L (ref 20–33)
CREAT SERPL-MCNC: 0.66 MG/DL (ref 0.5–1.4)
EOSINOPHIL # BLD AUTO: 0.08 K/UL (ref 0–0.51)
EOSINOPHIL NFR BLD: 0.7 % (ref 0–6.9)
ERYTHROCYTE [DISTWIDTH] IN BLOOD BY AUTOMATED COUNT: 48.1 FL (ref 35.9–50)
GFR SERPL CREATININE-BSD FRML MDRD: >60 ML/MIN/1.73 M 2
GLUCOSE SERPL-MCNC: 95 MG/DL (ref 65–99)
HCT VFR BLD AUTO: 26.4 % (ref 37–47)
HGB BLD-MCNC: 8.8 G/DL (ref 12–16)
IMM GRANULOCYTES # BLD AUTO: 0.06 K/UL (ref 0–0.11)
IMM GRANULOCYTES NFR BLD AUTO: 0.5 % (ref 0–0.9)
INR PPP: 1.71 (ref 0.87–1.13)
LYMPHOCYTES # BLD AUTO: 1.27 K/UL (ref 1–4.8)
LYMPHOCYTES NFR BLD: 11.4 % (ref 22–41)
MCH RBC QN AUTO: 31.1 PG (ref 27–33)
MCHC RBC AUTO-ENTMCNC: 33.3 G/DL (ref 33.6–35)
MCV RBC AUTO: 93.3 FL (ref 81.4–97.8)
MONOCYTES # BLD AUTO: 0.9 K/UL (ref 0–0.85)
MONOCYTES NFR BLD AUTO: 8.1 % (ref 0–13.4)
NEUTROPHILS # BLD AUTO: 8.81 K/UL (ref 2–7.15)
NEUTROPHILS NFR BLD: 79.1 % (ref 44–72)
NRBC # BLD AUTO: 0 K/UL
NRBC BLD AUTO-RTO: 0 /100 WBC
PLATELET # BLD AUTO: 287 K/UL (ref 164–446)
PMV BLD AUTO: 10.9 FL (ref 9–12.9)
POTASSIUM SERPL-SCNC: 3.5 MMOL/L (ref 3.6–5.5)
PROTHROMBIN TIME: 20.6 SEC (ref 12–14.6)
RBC # BLD AUTO: 2.83 M/UL (ref 4.2–5.4)
SODIUM SERPL-SCNC: 135 MMOL/L (ref 135–145)
WBC # BLD AUTO: 11.1 K/UL (ref 4.8–10.8)

## 2017-02-05 PROCEDURE — 700102 HCHG RX REV CODE 250 W/ 637 OVERRIDE(OP): Performed by: NURSE PRACTITIONER

## 2017-02-05 PROCEDURE — 94002 VENT MGMT INPAT INIT DAY: CPT

## 2017-02-05 PROCEDURE — 770020 HCHG ROOM/CARE - TELE (206)

## 2017-02-05 PROCEDURE — 80048 BASIC METABOLIC PNL TOTAL CA: CPT

## 2017-02-05 PROCEDURE — 700102 HCHG RX REV CODE 250 W/ 637 OVERRIDE(OP): Performed by: INTERNAL MEDICINE

## 2017-02-05 PROCEDURE — A9270 NON-COVERED ITEM OR SERVICE: HCPCS | Performed by: STUDENT IN AN ORGANIZED HEALTH CARE EDUCATION/TRAINING PROGRAM

## 2017-02-05 PROCEDURE — A9270 NON-COVERED ITEM OR SERVICE: HCPCS | Performed by: NURSE PRACTITIONER

## 2017-02-05 PROCEDURE — 700102 HCHG RX REV CODE 250 W/ 637 OVERRIDE(OP)

## 2017-02-05 PROCEDURE — 700102 HCHG RX REV CODE 250 W/ 637 OVERRIDE(OP): Performed by: STUDENT IN AN ORGANIZED HEALTH CARE EDUCATION/TRAINING PROGRAM

## 2017-02-05 PROCEDURE — A9270 NON-COVERED ITEM OR SERVICE: HCPCS

## 2017-02-05 PROCEDURE — 85610 PROTHROMBIN TIME: CPT

## 2017-02-05 PROCEDURE — A9270 NON-COVERED ITEM OR SERVICE: HCPCS | Performed by: INTERNAL MEDICINE

## 2017-02-05 PROCEDURE — 99233 SBSQ HOSP IP/OBS HIGH 50: CPT | Mod: GC | Performed by: INTERNAL MEDICINE

## 2017-02-05 PROCEDURE — 700111 HCHG RX REV CODE 636 W/ 250 OVERRIDE (IP): Performed by: HOSPITALIST

## 2017-02-05 PROCEDURE — 94669 MECHANICAL CHEST WALL OSCILL: CPT

## 2017-02-05 PROCEDURE — 85025 COMPLETE CBC W/AUTO DIFF WBC: CPT

## 2017-02-05 RX ORDER — ASPIRIN 81 MG/1
81 TABLET ORAL DAILY
Qty: 30 TAB | Status: SHIPPED | DISCHARGE
Start: 2017-02-05

## 2017-02-05 RX ORDER — METOLAZONE 5 MG/1
5 TABLET ORAL ONCE
Status: DISPENSED | OUTPATIENT
Start: 2017-02-05 | End: 2017-02-06

## 2017-02-05 RX ORDER — POTASSIUM CHLORIDE 750 MG/1
20 TABLET, EXTENDED RELEASE ORAL DAILY
Status: ON HOLD | DISCHARGE
Start: 2017-02-05 | End: 2017-02-18

## 2017-02-05 RX ORDER — ECHINACEA PURPUREA EXTRACT 125 MG
2 TABLET ORAL PRN
Qty: 1 BOTTLE | Refills: 3 | Status: SHIPPED | DISCHARGE
Start: 2017-02-05 | End: 2017-11-27

## 2017-02-05 RX ORDER — POTASSIUM CHLORIDE 20 MEQ/1
40 TABLET, EXTENDED RELEASE ORAL ONCE
Status: COMPLETED | OUTPATIENT
Start: 2017-02-05 | End: 2017-02-05

## 2017-02-05 RX ORDER — WARFARIN SODIUM 5 MG/1
5 TABLET ORAL
Qty: 30 TAB | Refills: 3 | Status: ON HOLD | DISCHARGE
Start: 2017-02-05 | End: 2017-02-18

## 2017-02-05 RX ORDER — FAMOTIDINE 20 MG/1
20 TABLET, FILM COATED ORAL EVERY 12 HOURS
Qty: 60 TAB | Status: ON HOLD | DISCHARGE
Start: 2017-02-05 | End: 2017-02-18

## 2017-02-05 RX ORDER — TRAMADOL HYDROCHLORIDE 50 MG/1
50 TABLET ORAL EVERY 4 HOURS PRN
Qty: 30 TAB | Refills: 0 | Status: ON HOLD | DISCHARGE
Start: 2017-02-05 | End: 2017-02-18

## 2017-02-05 RX ORDER — POLYVINYL ALCOHOL 14 MG/ML
1 SOLUTION/ DROPS OPHTHALMIC PRN
Qty: 1 BOTTLE | Refills: 3 | Status: SHIPPED | DISCHARGE
Start: 2017-02-05

## 2017-02-05 RX ORDER — IPRATROPIUM BROMIDE AND ALBUTEROL SULFATE 2.5; .5 MG/3ML; MG/3ML
3 SOLUTION RESPIRATORY (INHALATION) EVERY 4 HOURS PRN
Status: ON HOLD | DISCHARGE
Start: 2017-02-05 | End: 2017-02-18

## 2017-02-05 RX ORDER — FUROSEMIDE 10 MG/ML
40 INJECTION INTRAMUSCULAR; INTRAVENOUS DAILY
Refills: 0 | Status: ON HOLD | DISCHARGE
Start: 2017-02-05 | End: 2017-02-18

## 2017-02-05 RX ADMIN — FUROSEMIDE 40 MG: 10 INJECTION, SOLUTION INTRAVENOUS at 08:05

## 2017-02-05 RX ADMIN — FAMOTIDINE 20 MG: 20 TABLET, FILM COATED ORAL at 08:05

## 2017-02-05 RX ADMIN — POTASSIUM CHLORIDE 40 MEQ: 1500 TABLET, EXTENDED RELEASE ORAL at 08:05

## 2017-02-05 RX ADMIN — TRAMADOL HYDROCHLORIDE 50 MG: 50 TABLET, COATED ORAL at 19:28

## 2017-02-05 RX ADMIN — MUPIROCIN 1 APPLICATION: 20 OINTMENT TOPICAL at 08:05

## 2017-02-05 RX ADMIN — THERA TABS 1 TABLET: TAB at 08:04

## 2017-02-05 RX ADMIN — WARFARIN SODIUM 5 MG: 5 TABLET ORAL at 18:18

## 2017-02-05 RX ADMIN — PRAVASTATIN SODIUM 20 MG: 20 TABLET ORAL at 19:28

## 2017-02-05 RX ADMIN — HYDROCODONE BITARTRATE AND ACETAMINOPHEN 1 TABLET: 5; 325 TABLET ORAL at 00:18

## 2017-02-05 RX ADMIN — ASPIRIN 81 MG: 81 TABLET ORAL at 08:04

## 2017-02-05 RX ADMIN — FAMOTIDINE 20 MG: 20 TABLET, FILM COATED ORAL at 19:28

## 2017-02-05 RX ADMIN — CALCIUM CARBONATE-CHOLECALCIFEROL TAB 250 MG-125 UNIT 1 TABLET: 250-125 TAB at 08:04

## 2017-02-05 ASSESSMENT — ENCOUNTER SYMPTOMS
NEUROLOGICAL NEGATIVE: 1
EYES NEGATIVE: 1
CARDIOVASCULAR NEGATIVE: 1
RESPIRATORY NEGATIVE: 1
PALPITATIONS: 0
GASTROINTESTINAL NEGATIVE: 1
PSYCHIATRIC NEGATIVE: 1
DIARRHEA: 0
MUSCULOSKELETAL NEGATIVE: 1
ABDOMINAL PAIN: 0
NAUSEA: 0
FEVER: 0
VOMITING: 0
CHILLS: 0
FOCAL WEAKNESS: 0
SENSORY CHANGE: 0
CONSTIPATION: 0
CONSTITUTIONAL NEGATIVE: 1

## 2017-02-05 ASSESSMENT — PAIN SCALES - GENERAL
PAINLEVEL_OUTOF10: 0
PAINLEVEL_OUTOF10: 2
PAINLEVEL_OUTOF10: 2
PAINLEVEL_OUTOF10: 0
PAINLEVEL_OUTOF10: 1
PAINLEVEL_OUTOF10: 2
PAINLEVEL_OUTOF10: 0
PAINLEVEL_OUTOF10: 2
PAINLEVEL_OUTOF10: 0
PAINLEVEL_OUTOF10: 0
PAINLEVEL_OUTOF10: 4

## 2017-02-05 NOTE — PROGRESS NOTES
Cardiovascular Surgery Progress Note    Name: Kristen Salazar  MRN: [id number]  : 1950  Admit Date: 2017  8:18 PM  Procedure:  Procedure(s) and Anesthesia Type:     * MITRAL VALVE REPAIR - General     * KAREN - General  5 Day Post-Op    Vitals:  Patient Vitals for the past 8 hrs:   Temp SpO2 O2 Delivery O2 (LPM) Heart Rate (Monitored) Resp NIBP Weight   17 0500 - - - - - - - 61.4 kg (135 lb 5.8 oz)   17 0400 37.2 °C (98.9 °F) 92 % - - 70 18 114/69 mmHg -   17 0000 37.4 °C (99.3 °F) 95 % Nasal Cannula 4 78 14 113/64 mmHg -     Temp (24hrs), Av °C (98.6 °F), Min:36.9 °C (98.4 °F), Max:37.4 °C (99.3 °F)      Respiratory:    Respiration: 18, Pulse Oximetry: 92 %, O2 Daily Delivery Respiratory : Silicone Nasal Cannula     Chest Tube Drains:          Fluids:    Intake/Output Summary (Last 24 hours) at 17 0727  Last data filed at 17 0000   Gross per 24 hour   Intake   1320 ml   Output    300 ml   Net   1020 ml     Admit weight: Weight: 61 kg (134 lb 7.7 oz)  Current weight: Weight: 61.4 kg (135 lb 5.8 oz) (17 0500)    Labs:  Recent Labs      17   0600  17   0445  17   0455   WBC  10.7  10.6  11.1*   RBC  2.71*  2.90*  2.83*   HEMOGLOBIN  8.3*  9.1*  8.8*   HEMATOCRIT  25.9*  27.6*  26.4*   MCV  95.6  95.2  93.3   MCH  30.6  31.4  31.1   MCHC  32.0*  33.0*  33.3*   RDW  50.9*  49.6  48.1   PLATELETCT  188  249  287   MPV  11.4  11.4  10.9     Recent Labs      17   0600  17   0445  17   0455   NEUTSPOLYS  83.70*  80.60*  79.10*   LYMPHOCYTES  8.70*  11.10*  11.40*   MONOCYTES  6.70  6.60  8.10   EOSINOPHILS  0.30  0.40  0.70   BASOPHILS  0.10  0.20  0.20     Recent Labs      17   0600  17   0445  17   0455   SODIUM  128*  134*  135   POTASSIUM  4.4  3.7  3.5*   CHLORIDE  101  102  98   CO2  23  24  28   GLUCOSE  108*  104*  95   BUN  56*  36*  25*   CREATININE  0.83  0.81  0.66   CALCIUM  8.1*  8.5  8.3*     Recent Labs       02/03/17   0600  02/04/17   0445  02/05/17   0455   INR  1.40*  1.46*  1.71*       Medications:  • metolazone  5 mg     • potassium chloride (KCl)  40 mEq     • furosemide  40 mg     • warfarin  5 mg     • mupirocin  1 Application     • docusate sodium  100 mg      And   • senna-docusate  1 Tab     • aspirin EC  81 mg     • multivitamin  1 Tab     • famotidine  20 mg     • oyster shell calcium/vitamin D  1 Tab     • pravastatin  20 mg         Exam:   Review of Systems   Constitutional: Negative.    Eyes: Negative.    Respiratory: Negative.    Cardiovascular: Negative.    Gastrointestinal: Negative.    Genitourinary: Negative.    Musculoskeletal: Negative.    Skin: Negative.    Neurological: Negative.    Endo/Heme/Allergies: Negative.    Psychiatric/Behavioral: Negative.        Physical Exam   Constitutional: She is oriented to person, place, and time. She appears well-developed. No distress.   HENT:   Head: Normocephalic and atraumatic.   Eyes: Conjunctivae are normal. Pupils are equal, round, and reactive to light.   Neck: Normal range of motion. No JVD present.   Cardiovascular: Normal rate and regular rhythm.  Exam reveals no gallop and no friction rub.    No murmur heard.  Pulmonary/Chest: She has decreased breath sounds in the right lower field and the left lower field.   Abdominal: Soft. She exhibits no distension.   Musculoskeletal: She exhibits edema.   Neurological: She is alert and oriented to person, place, and time.   Skin: Skin is warm and dry.   Surgical incisions CDI   Psychiatric: She has a normal mood and affect. Her behavior is normal.       Quality Measures:   EKG reviewed, Labs reviewed, Medications reviewed and Radiology images reviewed  Frank catheter: No Frank  Central line in place: Need for access    DVT Prophylaxis: Warfarin (Coumadin)  DVT prophylaxis - mechanical: Not indicated at this time, ambulatory  Ulcer prophylaxis: No    Assessed for rehab: Patient returned to prior level of  function, rehabilitation not indicated at this time      Assessment/Plan:  POD 1 - HOTN- on low dose epi/daphnie, gently diuresis, Vent - per pulm, keep mediastinal tubes, keep sanchez- strict I&O, CPM  POD 2 HDS, NSR, labs noted, doing well.  OK DC sanchez.  Rehab consult.  POD 3 HDS, NSR, labs noted doing well, had BM. DC temp wires and central lines. H/H low/stable--w/w. 2 view CXR today, wean O2 as able.  Plan for Rehab 1-2 days  POD 4 HDS, NSR, increased O2 overnight, better this AM. LLL thoracentesis this AM pending. Continue diuresis, planning for Rehab likely in AM.  POD 5 HDS, NSR, k low, replace, diuresing well, wts down, 800cc off left lung, feeling better today, min right effusion. No pneumothorax.  Cdiff neg.  OK for rehab today.  See dictated DC summary for details.      Active Hospital Problems    Diagnosis   • Mitral regurgitation [I34.0]   • Respiratory failure with hypoxia (CMS-HCC) [J96.91]   • Hyperlipidemia [E78.5]   • Hypertension [I10]   • CAP (community acquired pneumonia) [J18.9]   • Bradycardia [R00.1]   • Hypotension [I95.9]   • Normocytic anemia [D64.9]   • Pulmonary edema [J81.1]

## 2017-02-05 NOTE — CARE PLAN
Problem: Post Op Day 4 CABG/Heart Valve Replacement  Goal: Optimal care of the Post Op CABG/Heart Valve replacement Post Op Day 4  Outcome: PROGRESSING AS EXPECTED  Pt receiving optimal care during [facility] postoperative period.  Intervention: Daily Weights  Pt weighed and entered into EPIC.  Intervention: Shower daily and clean incisions twice daily with soap and water  Pt able to shower with minimal assist today, tolerated it well.  Intervention: Up in chair for all meals  Pt has been up in the chair for all meals, tolerates well.  Intervention: Ambulate, increasing the distance each time x 3 and before bed  Pt has ambulated 1.5 to 2 x around the unit already today, tolerates it well, but still requiring supplemental O2.  Intervention: IS q 1 hour while awake and record best IS volume  Pt able to pull 8372-8575 ml on IS, needs motivated. Pt educated about importance of using the IS frequently and pt given proper technique re-education.  Intervention: Consider removal of sanchez, chest tube and pacer wire if not already done  Sanchez and mediastinal tube previously removed.  Intervention: Discharge Education  Pt given more discharge education, has orders to go to Rehab, but no open beds at this point. Pt re-educated about sternal precautions.  Intervention: Add special instructions for cardiac surgery discharge instructions - [facility] to after visit summary and review with patient  Special instructions in place for when pt is ready for discharge.

## 2017-02-05 NOTE — PROGRESS NOTES
[facility] Progress Note               Author: Lj Philip Date & Time created: 2/5/2017  10:33 AM     Interval History:  Pt is doing well, and is ready to be transferred to rehab today. Denies any symptoms at this time.    Review of Systems:  Review of Systems   Constitutional: Negative for fever and chills.   Cardiovascular: Negative for chest pain and palpitations.   Gastrointestinal: Negative for nausea, vomiting, abdominal pain, diarrhea and constipation.   Neurological: Negative for sensory change and focal weakness.       Physical Exam:  Physical Exam   Constitutional: She is oriented to person, place, and time. She appears well-developed and well-nourished.   Eyes: Conjunctivae and EOM are normal.   Cardiovascular: Normal rate and regular rhythm.    Pulmonary/Chest: Effort normal and breath sounds normal.   Abdominal: Soft. Bowel sounds are normal.   Neurological: She is alert and oriented to person, place, and time.   Skin:   Chest scar healing well, no discharge.       Labs:  Recent Results (from the past 24 hour(s))   CDIFF BY PCR    Collection Time: 02/04/17 10:50 AM   Result Value Ref Range    C Diff by PCR Negative Negative    [id number] Presumptive Negative Negative   PROTHROMBIN TIME    Collection Time: 02/05/17  4:55 AM   Result Value Ref Range    PT 20.6 (H) 12.0 - 14.6 sec    INR 1.71 (H) 0.87 - 1.13   CBC with Differential    Collection Time: 02/05/17  4:55 AM   Result Value Ref Range    WBC 11.1 (H) 4.8 - 10.8 K/uL    RBC 2.83 (L) 4.20 - 5.40 M/uL    Hemoglobin 8.8 (L) 12.0 - 16.0 g/dL    Hematocrit 26.4 (L) 37.0 - 47.0 %    MCV 93.3 81.4 - 97.8 fL    MCH 31.1 27.0 - 33.0 pg    MCHC 33.3 (L) 33.6 - 35.0 g/dL    RDW 48.1 35.9 - 50.0 fL    Platelet Count 287 164 - 446 K/uL    MPV 10.9 9.0 - 12.9 fL    Neutrophils-Polys 79.10 (H) 44.00 - 72.00 %    Lymphocytes 11.40 (L) 22.00 - 41.00 %    Monocytes 8.10 0.00 - 13.40 %    Eosinophils 0.70 0.00 - 6.90 %    Basophils 0.20 0.00 - 1.80 %     Immature Granulocytes 0.50 0.00 - 0.90 %    Nucleated RBC 0.00 /100 WBC    Neutrophils (Absolute) 8.81 (H) 2.00 - 7.15 K/uL    Lymphs (Absolute) 1.27 1.00 - 4.80 K/uL    Monos (Absolute) 0.90 (H) 0.00 - 0.85 K/uL    Eos (Absolute) 0.08 0.00 - 0.51 K/uL    Baso (Absolute) 0.02 0.00 - 0.12 K/uL    Immature Granulocytes (abs) 0.06 0.00 - 0.11 K/uL    NRBC (Absolute) 0.00 K/uL   BASIC METABOLIC PANEL    Collection Time: 17  4:55 AM   Result Value Ref Range    Sodium 135 135 - 145 mmol/L    Potassium 3.5 (L) 3.6 - 5.5 mmol/L    Chloride 98 96 - 112 mmol/L    Co2 28 20 - 33 mmol/L    Glucose 95 65 - 99 mg/dL    Bun 25 (H) 8 - 22 mg/dL    Creatinine 0.66 0.50 - 1.40 mg/dL    Calcium 8.3 (L) 8.5 - 10.5 mg/dL    Anion Gap 9.0 0.0 - 11.9   ESTIMATED GFR    Collection Time: 17  4:55 AM   Result Value Ref Range    GFR If African American >60 >60 mL/min/1.73 m 2    GFR If Non African American >60 >60 mL/min/1.73 m 2       Hemodynamics:  Temp (24hrs), Av.1 °C (98.7 °F), Min:36.9 °C (98.4 °F), Max:37.4 °C (99.3 °F)  Temperature: 37.2 °C (98.9 °F)  Pulse  Av.9  Min: 51  Max: 129Heart Rate (Monitored): 76  NIBP: 118/57 mmHg    Respiratory:    Respiration: 18, Pulse Oximetry: 92 %, O2 Daily Delivery Respiratory : Silicone Nasal Cannula     Given By:: Mouthpiece, Work Of Breathing / Effort: Mild  RUL Breath Sounds: Clear, RML Breath Sounds: Clear, RLL Breath Sounds: Diminished, PRAMOD Breath Sounds: Clear, LLL Breath Sounds: Diminished  Fluids:    Intake/Output Summary (Last 24 hours) at 17 1033  Last data filed at 17 0000   Gross per 24 hour   Intake   1080 ml   Output    300 ml   Net    780 ml     Weight: 61.4 kg (135 lb 5.8 oz)  GI/Nutrition:  Orders Placed This Encounter   Procedures   • DIET ORDER     Standing Status: Standing      Number of Occurrences: 1      Standing Expiration Date:      Order Specific Question:  Diet:     Answer:  Consistent Carbohydrate [4]     Order Specific Question:  Diet:      Answer:  Cardiac [6]     Medications:  Current Facility-Administered Medications   Medication Last Dose   • metolazone (ZAROXOLYN) tablet 5 mg Stopped at 02/05/17 0715   • sodium chloride (OCEAN) 0.65 % nasal spray 2 Spray     • acetaminophen (TYLENOL) tablet 650 mg 650 mg at 02/04/17 1135    Or   • acetaminophen (TYLENOL) suppository 650 mg     • furosemide (LASIX) injection 40 mg 40 mg at 02/05/17 0805   • warfarin (COUMADIN) tablet 5 mg 5 mg at 02/04/17 1706   • Respiratory Care per Protocol     • mupirocin (BACTROBAN) 2 % ointment 1 Application 1 Application at 02/05/17 0805   • docusate sodium (COLACE) capsule 100 mg Stopped at 02/04/17 0900    And   • senna-docusate (PERICOLACE or SENOKOT S) 8.6-50 MG per tablet 1 Tab Stopped at 02/03/17 2100    And   • senna-docusate (PERICOLACE or SENOKOT S) 8.6-50 MG per tablet 1 Tab 1 Tab at 02/03/17 1001    And   • lactulose 20 GM/30ML solution 30 mL      And   • bisacodyl (DULCOLAX) suppository 10 mg      And   • fleet enema 133 mL     • NS infusion     • aspirin EC (ECOTRIN) tablet 81 mg 81 mg at 02/05/17 0804   • MD ALERT... warfarin (COUMADIN) per pharmacy protocol     • oxycodone immediate-release (ROXICODONE) tablet 5 mg     • oxycodone immediate release (ROXICODONE) tablet 10 mg     • tramadol (ULTRAM) 50 MG tablet 50 mg     • ondansetron (ZOFRAN) syringe/vial injection 4 mg 4 mg at 02/02/17 1941    Or   • prochlorperazine (COMPAZINE) injection 10 mg     • mag hydrox-al hydrox-simeth (MAALOX PLUS ES or MYLANTA DS) suspension 30 mL     • diphenhydrAMINE (BENADRYL) tablet/capsule 25 mg     • hydrocodone-acetaminophen (NORCO) 5-325 MG per tablet 1-2 Tab 1 Tab at 02/05/17 0018   • artificial tears 1.4 % ophthalmic solution 1 Drop     • glucose 4 g chewable tablet 16 g      And   • dextrose 50% (D50W) injection 25 mL     • multivitamin (THERAGRAN) tablet 1 Tab 1 Tab at 02/05/17 0804   • lactulose 20 GM/30ML solution 30 mL     • ipratropium-albuterol (DUONEB)  nebulizer solution 3 mL Stopped at 01/30/17 1020   • famotidine (PEPCID) tablet 20 mg 20 mg at 02/05/17 0805   • oyster shell calcium/vitamin D 250-125 MG-UNIT tablet 1 Tab 1 Tab at 02/05/17 0804   • pravastatin (PRAVACHOL) tablet 20 mg 20 mg at 02/04/17 2014     Medical Decision Making, by Problem:  Active Hospital Problems    Diagnosis   • Mitral regurgitation [I34.0]   • Respiratory failure with hypoxia (CMS-HCC) [J96.91]   • Hyperlipidemia [E78.5]   • Hypertension [I10]   • CAP (community acquired pneumonia) [J18.9]   • Bradycardia [R00.1]   • Hypotension [I95.9]   • Normocytic anemia [D64.9]   • Pulmonary edema [J81.1]     1. Acute hypoxemic respiratory failure    Acute pulmonary edema d/t severe MR  Hypertensive Emergency  Demand ischemia  Prolonged Qtc    CAP  due to severe MR    Extubated on 1/31/2017 (Intubated 1/24, failed extubation on 1/28).  Echo:EF 75%, severe MR, RVSP 40  Cardiac cath: normal coronary arteries  CTA: no PE  KAREN: EF 65%, mod MR, no rupture of cord or tendinae  Renal duplex neg for stenosis  S/p ceftriaxone & doxy.  S/P radical mitral valve repair (P2 triangular resection,    34-mm Ferguson flexible annuloplasty band), left atrial appendage ligation and  intraoperative transesophageal echocardiography on 1/31/2017. On coumadin.  CT team on board, see d/c summary for details.     Volume overload  2 view CXR shows pleural effusions, thora performed yesterday with 825cc removed.  Cont IV lasix and metolazone.    Chronic medical issues:   DLD: cont pravastatin  Chronic back/knee/neck pain: pain management  HTN: losartan held, diuretics as above.    Quality  Measures:    Frank catheter: No Frank      DVT Prophylaxis: Warfarin (Coumadin)    Ulcer prophylaxis: Yes

## 2017-02-05 NOTE — PROGRESS NOTES
Inpatient Anticoagulation Service Note    Date: 2017  Reason for Anticoagulation: Mitral Valve Repair        Hemoglobin Value: 8.8  Hematocrit Value: 26.4  Lab Platelet Value: 287  Target INR: 2.0 to 3.0    INR from last 7 days     Date/Time INR Value    17 0455 (!)1.71    17 0445 (!)1.46    17 0600 (!)1.4    17 0210 (!)1.26    17 0545 (!)1.25    17 1725 (!)1.38    17 0503 1.06        Dose from last 7 days     Date/Time Dose (mg)    17 0455 5    17 1400 5    17 1300 5    17 1300 5    17 1300 5        Average Dose (mg):  (new start)  Significant Interactions: Aspirin, Statin  Bridge Therapy: No    Comments: H/H is stable and INR continues to trend upward toward goal.  This patient is cleared to transfer to a rehabilitation facility today.  Continue warfarin 5 mg daily at this time.    Plan:  INR with morning labs  Education Material Provided?: No ([place])  Pharmacist suggested discharge dosin mg daily with follow up in 1 to 2 days     Fabi Schreiber

## 2017-02-05 NOTE — PROGRESS NOTES
Rcvd bedside report. Pt resting comfortably in bedside chair, just was up to restroom. No C/O pain. Call light on the bedside table and she calls appropriately.  at bedside. POC discussed, updates given, and questions answered.

## 2017-02-05 NOTE — CARE PLAN
Problem: Post Op Day 4 CABG/Heart Valve Replacement  Goal: Optimal care of the Post Op CABG/Heart Valve replacement Post Op Day 4  Outcome: PROGRESSING SLOWER THAN EXPECTED  Intervention: Daily Weights  Done by night shift  Intervention: Shower daily and clean incisions twice daily with soap and water  Pt decline due to pain   Intervention: Up in chair for all meals  Completed  Intervention: Ambulate, increasing the distance each time x 3 and before bed  Furthest length walked was 200 ft.  Intervention: IS q 1 hour while awake and record best IS volume  Pt motivated and best IS was 1000  Intervention: Consider removal of sanchez, chest tube and pacer wire if not already done  NA  Intervention: Discharge Education  Done

## 2017-02-05 NOTE — PROGRESS NOTES
Pulmonary Critical Care Progress Note        Chief Complaint: Worsening dyspnea and acute hypoxic respiratory failure    History of Present Illness: The patient is a 66-year-old female with a past medical history significant for hypertension, hyperlipidemia, and mitral valve prolapse who was in her usual state of health on 1/24 when she noted a cough and sudden onset of shortness of breath.  She checked her oxygen saturation at home and found to be 85%. She presented to the emergency department at [facility] where unfortunately she decompensated quickly requiring up to 15 L facemask and then eventually requiring intubation. She was initially hypertensive and tachycardic with an oxygen saturation of 86% on room air at the outside hospital and was transported on high peak and expiratory pressures on the ventilator with an FIO2 of 100%, achieving saturations in the low 90s. She became progressively more hypotensive requiring a norepinephrine drip. A stat bedside echo was performed with Dr. Delarosa in the ER, which revealed a ruptured mitral valve leaflet with severe MR as the likely cause of her decompensation.     ROS: Unable to obtain as the patient is sedated on the ventilator     Interval Events:  24 hour interval history reviewed    - I/Os does not appear to be accurately reflected in EPIC   - tmax 99.3   - s/p MV repair 1/31   - extubated 2/1   - 4L nc   - IS/PEP/Mobilize - added IPV   - ambulating in halls   - s/p thora 2/4   - awaiting transfer to rehab     cxr w improved bilateral pleural effusions    PFSH:  No change.    Respiratory:     Pulse Oximetry: 92 %    Exam: diminished breath sounds moderate scattered rhonchi  ImagingAvailable data reviewed     CTA chest 1/25:  1.  No evidence pulmonary emboli.  2.  Moderate right and small left pleural effusions.  3.  Compressive atelectasis both lower lobes especially the left and left lung volume loss with mediastinal shift towards left.  4.   Patchy groundglass opacities within the right upper lobe consistent with focal areas of pneumonitis.  5.  7.5 mm left upper lobe nodule and smaller nodules within the lungs.        Invalid input(s): [id number]    HemoDynamics:  Pulse: 76, Heart Rate (Monitored): 70  NIBP: 114/69 mmHg    Exam: regular rate and rhythm  Imaging: echo Reviewed           Neuro:  GCS Total [place] Coma Score: 15     Exam:non focal  Imaging: Available data reviewed    Fluids:  Intake/Output       02/03/17 0700 - 02/04/17 0659 02/04/17 0700 - 02/05/17 0659 02/05/17 0700 - 02/06/17 0659      0185-3218 4909-0588 Total 3222-5581 5625-2219 Total 7958-5222 9002-7002 Total       Intake    P.O.  --  -- --  980  340 1320  --  -- --    P.O. -- -- --  -- -- --    I.V.  100  -- 100  --  -- --  --  -- --    IV Piggyback Volume (IV Piggyback) 100 -- 100 -- -- -- -- -- --    Total Intake 100 -- 100  -- -- --       Output    Urine  --  -- --  --  300 300  --  -- --    Number of Times Voided 5 x 3 x 8 x 10 x 3 x 13 x -- -- --    Void (ml) -- -- -- -- 300 300 -- -- --    Stool  --  -- --  --  -- --  --  -- --    Number of Times Stooled 2 x 2 x 4 x 5 x -- 5 x -- -- --    Total Output -- -- -- -- 300 300 -- -- --       Net I/O     100 -- 100 770 70 7592 -- -- --        Weight: 61.4 kg (135 lb 5.8 oz)  Recent Labs      02/03/17   0600 02/04/17   0445  02/05/17   0458   SODIUM  128*  134*  135   POTASSIUM  4.4  3.7  3.5*   CHLORIDE  101  102  98   CO2  23  24  28   BUN  56*  36*  25*   CREATININE  0.83  0.81  0.66   CALCIUM  8.1*  8.5  8.3*       GI/Nutrition:  Exam: abdomen is soft and non-tender, normal active bowel sounds  Imaging: Available data reviewed  Liver Function  Recent Labs      02/03/17   0600  02/04/17   0445  02/05/17   0455   GLUCOSE  108*  104*  95       Heme:  Recent Labs      02/03/17   0600  02/04/17   0445  02/05/17   0455   RBC  2.71*  2.90*  2.83*   HEMOGLOBIN  8.3*  9.1*  8.8*   HEMATOCRIT  25.9*  27.6*   26.4*   PLATELETCT  188  249  287   PROTHROMBTM  17.6*  18.2*  20.6*   INR  1.40*  1.46*  1.71*       Infectious Disease:  Temp  Av °C (98.6 °F)  Min: 36.9 °C (98.4 °F)  Max: 37.4 °C (99.3 °F)  Micro: cultures reviewed  Recent Labs      17   0600  17   0445  17   0455   WBC  10.7  10.6  11.1*   NEUTSPOLYS  83.70*  80.60*  79.10*   LYMPHOCYTES  8.70*  11.10*  11.40*   MONOCYTES  6.70  6.60  8.10   EOSINOPHILS  0.30  0.40  0.70   BASOPHILS  0.10  0.20  0.20     Current Facility-Administered Medications   Medication Dose Frequency Provider Last Rate Last Dose   • sodium chloride (OCEAN) 0.65 % nasal spray 2 Spray  2 Spray PRN Josr Al M.D.       • acetaminophen (TYLENOL) tablet 650 mg  650 mg Q4HRS PRN Josr Al M.D.   650 mg at 17 1135    Or   • acetaminophen (TYLENOL) suppository 650 mg  650 mg Q4HRS PRN Josr Al M.D.       • furosemide (LASIX) injection 40 mg  40 mg Q DAY Josr Al M.D.   40 mg at 17 1700   • warfarin (COUMADIN) tablet 5 mg  5 mg COUMADIN-DAILY Denilson Cortez, PHARMD   5 mg at 17 1706   • Respiratory Care per Protocol   Continuous RT Hannah Sumner A.P.N.       • mupirocin (BACTROBAN) 2 % ointment 1 Application  1 Application BID Hannah Sumner A.P.N.   1 Application at 17   • docusate sodium (COLACE) capsule 100 mg  100 mg QAM Hannah Sumner, A.P.N.   Stopped at 17 0900    And   • senna-docusate (PERICOLACE or SENOKOT S) 8.6-50 MG per tablet 1 Tab  1 Tab Nightly Hannah Sumner A.P.N.   Stopped at 17 2100    And   • senna-docusate (PERICOLACE or SENOKOT S) 8.6-50 MG per tablet 1 Tab  1 Tab Q24HRS PRN Hannah Sumner, A.P.N.   1 Tab at 17 1001    And   • lactulose 20 GM/30ML solution 30 mL  30 mL Q24HRS PRN Hannah Sumner, A.P.N.        And   • bisacodyl (DULCOLAX) suppository 10 mg  10 mg Q24HRS PRN Hannah Sumner, A.P.N.        And   • fleet enema 133 mL  1 Each Once PRN Hannah Sumner, A.P.N.       • NS  infusion   Continuous Hannah Sumner A.P.N. 10 mL/hr at 01/31/17 1950     • aspirin EC (ECOTRIN) tablet 81 mg  81 mg DAILY Hannah Sumner A.P.N.   81 mg at 02/04/17 0826   • MD ALERT... warfarin (COUMADIN) per pharmacy protocol   PRN Hannah Sumner A.P.N.       • oxycodone immediate-release (ROXICODONE) tablet 5 mg  5 mg Q3HRS PRN Hannah Sumner, A.P.N.       • oxycodone immediate release (ROXICODONE) tablet 10 mg  10 mg Q3HRS PRN Hannah Sumner, A.P.N.       • tramadol (ULTRAM) 50 MG tablet 50 mg  50 mg Q4HRS PRN Hannah Sumner, A.P.N.       • ondansetron (ZOFRAN) syringe/vial injection 4 mg  4 mg Q6HRS PRN Hannah Sumner, A.P.N.   4 mg at 02/02/17 1941    Or   • prochlorperazine (COMPAZINE) injection 10 mg  10 mg Q6HRS PRN Hannah Sumner, A.P.N.       • mag hydrox-al hydrox-simeth (MAALOX PLUS ES or MYLANTA DS) suspension 30 mL  30 mL Q4HRS PRN Hannah Sumner, A.P.N.       • diphenhydrAMINE (BENADRYL) tablet/capsule 25 mg  25 mg HS PRN - MR X 1 Hannah Sumner, A.P.N.       • hydrocodone-acetaminophen (NORCO) 5-325 MG per tablet 1-2 Tab  1-2 Tab Q4HRS PRN Hannah Sumner, A.P.N.   1 Tab at 02/05/17 0018   • artificial tears 1.4 % ophthalmic solution 1 Drop  1 Drop PRN Masoud Aguirre M.D.       • dextrose 50% (D50W) injection 25 mL  25 mL PRN Hannah Sumner, A.P.N.   25 mL at 02/01/17 1627   • glucose 4 g chewable tablet 16 g  16 g Q15 MIN PRN James Pérez M.D.        And   • dextrose 50% (D50W) injection 25 mL  25 mL Q15 MIN PRN James Pérez M.D.       • multivitamin (THERAGRAN) tablet 1 Tab  1 Tab DAILY James Pérez M.D.   1 Tab at 02/04/17 0825   • lactulose 20 GM/30ML solution 30 mL  30 mL Q24HRS PRN Jeremy M Gonda, M.D.       • ipratropium-albuterol (DUONEB) nebulizer solution 3 mL  3 mL Q2HRS PRN (RT) Jeremy M Gonda, M.D.   Stopped at 01/30/17 1020   • famotidine (PEPCID) tablet 20 mg  20 mg Q12HRS Jeremy M Gonda, M.D.   20 mg at 02/04/17 2014   • oyster shell calcium/vitamin D 250-125 MG-UNIT  tablet 1 Tab  1 Tab QDAY with Breakfast James Pérez M.D.   1 Tab at 02/04/17 0730   • pravastatin (PRAVACHOL) tablet 20 mg  20 mg Q EVENING Jeremy M Gonda, M.D.   20 mg at 02/04/17 2014     Last reviewed on 1/30/2017  8:02 PM by Purvi Montanez R.N.    Quality  Measures:  Medications reviewed, Labs reviewed and Radiology images reviewed        DVT Prophylaxis: Warfarin (Coumadin)  DVT prophylaxis - mechanical: SCDs  Ulcer prophylaxis: Yes        Lines  piv    Gtt  none    Abx  Rocephin completed 2/3  Doxycycline completed 2/3    Bcx 1/24 ngtd  Ucx 1/24 ngtd  Flu (-) 1/24    Echo 1/26 E 65%      Problems/Plan:    Acute Hypoxic Respiratory Failure from pulmonary edema and cardiogenic shock   - intubated 1/24, failed extubation 1/28 and re-intubated   - extubated 2/1   - RT/O2 therapies   - IS/PEP/Mobilize/IPV   - diurese as tolerated (scheduled iv lasix + metolazone)  Acute Pulmonary Edema   - RT/O2 protocols   - diurese as tolerated  Acute Delirium   - improved  Tachycardia that ? A flutter   - presently sinus, off amio  Severe Mitral Valve Regurgitation    - cardiac cath on 1/25   - sp mv repair 1/31  Cardiogenic Shock likely related to severe mitral regurgitation   - s/p vasopressor/ionotropic therapy   - Cards following   - Cardiac cath on 1/25 was negative and heparin stopped   - CTA was negative  Acute Leukocytosis and concern for aspiration   - blood/sputum cultures sent and follow   - Ceftriaxone/Doxycycline 10 day course completed 2/3  Elevated Troponin   - cards following   - ASA  Hyperglycemia   - ISS  Hx of mitral valve prolapse  Hx of systemic arterial hypertension  Hx of Dyslipidemia   - statin therapy  Prophylaxis   - heparin, bowel regimen, pepcid, enteral feeds      Discussed patient condition and risk of morbidity and/or mortality with Family, RN, RT, Pharmacy, , [employer] resident and Charge nurse / hot rounds and well as cardiology  The patient remains critically ill.

## 2017-02-05 NOTE — CARE PLAN
Problem: Safety  Goal: Will remain free from falls  Outcome: PROGRESSING AS EXPECTED  Pt has been educated regarding fall prevention measures and agrees to remain compliant.     Problem: Pain Management  Goal: Pain level will decrease to patient’s comfort goal  Outcome: PROGRESSING AS EXPECTED  Pt reports adequate analgesia from prn opiates and is able to participate in ADLs.

## 2017-02-06 ENCOUNTER — APPOINTMENT (OUTPATIENT)
Dept: RADIOLOGY | Facility: MEDICAL CENTER | Age: 67
DRG: 216 | End: 2017-02-06
Attending: NURSE PRACTITIONER
Payer: MEDICARE

## 2017-02-06 ENCOUNTER — APPOINTMENT (OUTPATIENT)
Dept: RADIOLOGY | Facility: MEDICAL CENTER | Age: 67
DRG: 216 | End: 2017-02-06
Attending: CLINICAL NURSE SPECIALIST
Payer: MEDICARE

## 2017-02-06 LAB
ANION GAP SERPL CALC-SCNC: 7 MMOL/L (ref 0–11.9)
BASOPHILS # BLD AUTO: 0.2 % (ref 0–1.8)
BASOPHILS # BLD: 0.02 K/UL (ref 0–0.12)
BUN SERPL-MCNC: 18 MG/DL (ref 8–22)
CALCIUM SERPL-MCNC: 8.3 MG/DL (ref 8.5–10.5)
CHLORIDE SERPL-SCNC: 94 MMOL/L (ref 96–112)
CO2 SERPL-SCNC: 28 MMOL/L (ref 20–33)
CREAT SERPL-MCNC: 0.62 MG/DL (ref 0.5–1.4)
EKG IMPRESSION: NORMAL
EOSINOPHIL # BLD AUTO: 0.07 K/UL (ref 0–0.51)
EOSINOPHIL NFR BLD: 0.6 % (ref 0–6.9)
ERYTHROCYTE [DISTWIDTH] IN BLOOD BY AUTOMATED COUNT: 46.8 FL (ref 35.9–50)
ERYTHROCYTE [DISTWIDTH] IN BLOOD BY AUTOMATED COUNT: 46.8 FL (ref 35.9–50)
GFR SERPL CREATININE-BSD FRML MDRD: >60 ML/MIN/1.73 M 2
GLUCOSE SERPL-MCNC: 98 MG/DL (ref 65–99)
HCT VFR BLD AUTO: 25.9 % (ref 37–47)
HCT VFR BLD AUTO: 25.9 % (ref 37–47)
HGB BLD-MCNC: 8.8 G/DL (ref 12–16)
HGB BLD-MCNC: 8.8 G/DL (ref 12–16)
IMM GRANULOCYTES # BLD AUTO: 0.09 K/UL (ref 0–0.11)
IMM GRANULOCYTES NFR BLD AUTO: 0.8 % (ref 0–0.9)
INR PPP: 1.69 (ref 0.87–1.13)
LYMPHOCYTES # BLD AUTO: 1.45 K/UL (ref 1–4.8)
LYMPHOCYTES NFR BLD: 12.4 % (ref 22–41)
MCH RBC QN AUTO: 31.3 PG (ref 27–33)
MCH RBC QN AUTO: 31.3 PG (ref 27–33)
MCHC RBC AUTO-ENTMCNC: 34 G/DL (ref 33.6–35)
MCHC RBC AUTO-ENTMCNC: 34 G/DL (ref 33.6–35)
MCV RBC AUTO: 92.2 FL (ref 81.4–97.8)
MCV RBC AUTO: 92.2 FL (ref 81.4–97.8)
MONOCYTES # BLD AUTO: 1.08 K/UL (ref 0–0.85)
MONOCYTES NFR BLD AUTO: 9.2 % (ref 0–13.4)
NEUTROPHILS # BLD AUTO: 9.01 K/UL (ref 2–7.15)
NEUTROPHILS NFR BLD: 76.8 % (ref 44–72)
NRBC # BLD AUTO: 0 K/UL
NRBC BLD AUTO-RTO: 0 /100 WBC
PLATELET # BLD AUTO: 338 K/UL (ref 164–446)
PLATELET # BLD AUTO: 338 K/UL (ref 164–446)
PMV BLD AUTO: 10.3 FL (ref 9–12.9)
PMV BLD AUTO: 10.3 FL (ref 9–12.9)
POTASSIUM SERPL-SCNC: 3.8 MMOL/L (ref 3.6–5.5)
PROTHROMBIN TIME: 20.4 SEC (ref 12–14.6)
RBC # BLD AUTO: 2.81 M/UL (ref 4.2–5.4)
RBC # BLD AUTO: 2.81 M/UL (ref 4.2–5.4)
SODIUM SERPL-SCNC: 129 MMOL/L (ref 135–145)
WBC # BLD AUTO: 12 K/UL (ref 4.8–10.8)
WBC # BLD AUTO: 12 K/UL (ref 4.8–10.8)

## 2017-02-06 PROCEDURE — A9270 NON-COVERED ITEM OR SERVICE: HCPCS | Performed by: STUDENT IN AN ORGANIZED HEALTH CARE EDUCATION/TRAINING PROGRAM

## 2017-02-06 PROCEDURE — 94668 MNPJ CHEST WALL SBSQ: CPT

## 2017-02-06 PROCEDURE — A9270 NON-COVERED ITEM OR SERVICE: HCPCS | Performed by: NURSE PRACTITIONER

## 2017-02-06 PROCEDURE — 85610 PROTHROMBIN TIME: CPT

## 2017-02-06 PROCEDURE — 99291 CRITICAL CARE FIRST HOUR: CPT | Mod: GC | Performed by: INTERNAL MEDICINE

## 2017-02-06 PROCEDURE — A9270 NON-COVERED ITEM OR SERVICE: HCPCS | Performed by: INTERNAL MEDICINE

## 2017-02-06 PROCEDURE — 94669 MECHANICAL CHEST WALL OSCILL: CPT

## 2017-02-06 PROCEDURE — 700102 HCHG RX REV CODE 250 W/ 637 OVERRIDE(OP): Performed by: STUDENT IN AN ORGANIZED HEALTH CARE EDUCATION/TRAINING PROGRAM

## 2017-02-06 PROCEDURE — 80048 BASIC METABOLIC PNL TOTAL CA: CPT

## 2017-02-06 PROCEDURE — 700112 HCHG RX REV CODE 229: Performed by: NURSE PRACTITIONER

## 2017-02-06 PROCEDURE — 94640 AIRWAY INHALATION TREATMENT: CPT

## 2017-02-06 PROCEDURE — 97116 GAIT TRAINING THERAPY: CPT

## 2017-02-06 PROCEDURE — 97110 THERAPEUTIC EXERCISES: CPT

## 2017-02-06 PROCEDURE — 770020 HCHG ROOM/CARE - TELE (206)

## 2017-02-06 PROCEDURE — 700102 HCHG RX REV CODE 250 W/ 637 OVERRIDE(OP): Performed by: INTERNAL MEDICINE

## 2017-02-06 PROCEDURE — 700102 HCHG RX REV CODE 250 W/ 637 OVERRIDE(OP): Performed by: NURSE PRACTITIONER

## 2017-02-06 PROCEDURE — 85025 COMPLETE CBC W/AUTO DIFF WBC: CPT

## 2017-02-06 PROCEDURE — 700101 HCHG RX REV CODE 250: Performed by: INTERNAL MEDICINE

## 2017-02-06 PROCEDURE — 700111 HCHG RX REV CODE 636 W/ 250 OVERRIDE (IP): Performed by: HOSPITALIST

## 2017-02-06 PROCEDURE — 71010 DX-CHEST-LIMITED (1 VIEW): CPT

## 2017-02-06 PROCEDURE — 93010 ELECTROCARDIOGRAM REPORT: CPT | Performed by: INTERNAL MEDICINE

## 2017-02-06 PROCEDURE — 93005 ELECTROCARDIOGRAM TRACING: CPT | Performed by: NURSE PRACTITIONER

## 2017-02-06 RX ORDER — FUROSEMIDE 10 MG/ML
60 INJECTION INTRAMUSCULAR; INTRAVENOUS
Status: DISCONTINUED | OUTPATIENT
Start: 2017-02-07 | End: 2017-02-08

## 2017-02-06 RX ORDER — WARFARIN SODIUM 7.5 MG/1
7.5 TABLET ORAL
Status: DISCONTINUED | OUTPATIENT
Start: 2017-02-06 | End: 2017-02-09 | Stop reason: HOSPADM

## 2017-02-06 RX ORDER — AMIODARONE HYDROCHLORIDE 200 MG/1
400 TABLET ORAL TWICE DAILY
Status: DISCONTINUED | OUTPATIENT
Start: 2017-02-06 | End: 2017-02-06 | Stop reason: CLARIF

## 2017-02-06 RX ADMIN — HYDROCODONE BITARTRATE AND ACETAMINOPHEN 1 TABLET: 5; 325 TABLET ORAL at 09:23

## 2017-02-06 RX ADMIN — TRAMADOL HYDROCHLORIDE 50 MG: 50 TABLET, COATED ORAL at 21:56

## 2017-02-06 RX ADMIN — THERA TABS 1 TABLET: TAB at 08:17

## 2017-02-06 RX ADMIN — FAMOTIDINE 20 MG: 20 TABLET, FILM COATED ORAL at 08:17

## 2017-02-06 RX ADMIN — IPRATROPIUM BROMIDE AND ALBUTEROL SULFATE 3 ML: .5; 3 SOLUTION RESPIRATORY (INHALATION) at 05:46

## 2017-02-06 RX ADMIN — PRAVASTATIN SODIUM 20 MG: 20 TABLET ORAL at 21:56

## 2017-02-06 RX ADMIN — FAMOTIDINE 20 MG: 20 TABLET, FILM COATED ORAL at 21:56

## 2017-02-06 RX ADMIN — CALCIUM CARBONATE-CHOLECALCIFEROL TAB 250 MG-125 UNIT 1 TABLET: 250-125 TAB at 08:16

## 2017-02-06 RX ADMIN — ASPIRIN 81 MG: 81 TABLET ORAL at 08:16

## 2017-02-06 RX ADMIN — HYDROCODONE BITARTRATE AND ACETAMINOPHEN 1 TABLET: 5; 325 TABLET ORAL at 15:45

## 2017-02-06 RX ADMIN — FUROSEMIDE 40 MG: 10 INJECTION, SOLUTION INTRAVENOUS at 05:37

## 2017-02-06 RX ADMIN — DOCUSATE SODIUM 100 MG: 100 CAPSULE ORAL at 08:16

## 2017-02-06 ASSESSMENT — PAIN SCALES - GENERAL
PAINLEVEL_OUTOF10: 0
PAINLEVEL_OUTOF10: 0
PAINLEVEL_OUTOF10: 3
PAINLEVEL_OUTOF10: 0
PAINLEVEL_OUTOF10: 3

## 2017-02-06 ASSESSMENT — GAIT ASSESSMENTS
DEVIATION: BRADYKINETIC;SHUFFLED GAIT;DECREASED BASE OF SUPPORT
ASSISTIVE DEVICE: FRONT WHEEL WALKER
GAIT LEVEL OF ASSIST: CONTACT GUARD ASSIST
DISTANCE (FEET): 120

## 2017-02-06 ASSESSMENT — ENCOUNTER SYMPTOMS
CARDIOVASCULAR NEGATIVE: 1
PSYCHIATRIC NEGATIVE: 1
VOMITING: 0
FOCAL WEAKNESS: 0
RESPIRATORY NEGATIVE: 1
FEVER: 0
EYES NEGATIVE: 1
SENSORY CHANGE: 0
MUSCULOSKELETAL NEGATIVE: 1
DIARRHEA: 0
NEUROLOGICAL NEGATIVE: 1
ABDOMINAL PAIN: 0
GASTROINTESTINAL NEGATIVE: 1
PALPITATIONS: 0
CHILLS: 0
SHORTNESS OF BREATH: 0
WHEEZING: 0
CONSTITUTIONAL NEGATIVE: 1

## 2017-02-06 NOTE — PROGRESS NOTES
Patient respiratory saturation went down in 80s,patient is on oxymask from 4 to 10 L, saturating at 91. stat chest xray ordered, rt called, surgeons updated per  Desirae Aiken to give morning dose of lasix 40meq now. Continue to monitor.

## 2017-02-06 NOTE — CARE PLAN
Problem: Nutritional:  Goal: Achieve adequate nutritional intake  Patient will tolerate diet >Clear liqiuids and consume ~50% of meals.   Outcome: MET Date Met:  02/06/17

## 2017-02-06 NOTE — DISCHARGE SUMMARY
DISCHARGE DIAGNOSES:  Acute respiratory failure, severe mitral regurgitation,   3-4+ degenerative severe mitral valve prolapse, P2, hypertension,   dyslipidemia, and history of subdural hematoma.    DISCHARGE DIAGNOSES:  Acute respiratory failure, severe mitral regurgitation,   3-4+ degenerative severe mitral valve prolapse, P2, hypertension,   dyslipidemia, history of subdural hematoma, left pleural effusion, status post   thoracentesis; diarrhea, resolved, negative for Clostridium difficile;   debility following cardiac surgery requiring continued rehabilitation   services, and hypoxemia requiring continued oxygen.    HOSPITAL COURSE:  On postop day #1, the patient remained intubated, being   managed by pulmonology.  She was on low dose epinephrine, started on gentle   diuresis.  Braddock was discontinued.  Chest tubes and Frank were kept.  On postop   day #2, the patient had been extubated successfully the day prior, she is   hemodynamically stable, normal sinus rhythm, very weak.  Rehab was consulted.    On postop day #3, her temporary wires were discontinued, central line was   discontinued.  She had had a bowel movement, planning for a rehab in the   morning.  She had bilateral pleural effusions, a left thoracentesis was   ordered.  On postop day #4, the patient had a left thoracentesis, which   yielded 800 mL of serosanguineous fluid, doing well afterwards with no   pneumothorax, trace right pleural effusion remained.  No other changes.  The   patient is doing well.  She did have significant diarrhea and she was tested   for C. diff, which was negative.  On postop day #5, the patient is doing well.    She is ambulating with still moderate assist as she remains weak; however,   her vital signs are stable and she is ready for transfer to rehab today.    PHYSICAL EXAMINATION:  VITAL SIGNS:  The patient is currently afebrile at 37.2 degrees Fahrenheit.    She has sinus rhythm in the 70s.  Her current blood pressure  is 114/69.  She   is on 3 liters nasal cannula, satting in mid-to-low 90s.  Current weight is 61   kilograms, which is now her admission weight of 61 kilograms; however, she   will continue on diuresis as she still has some signs of fluid excess, Lasix   until further directed by cardiology.  Pain is controlled on oral medications.    Her surgical incisions are clean, dry and intact.    LABORATORY DATA:  Mild leukocytosis with white blood cell of 11.1 without any   signs or symptoms of infection, RBCs 2.83, hemoglobin 8.8, hematocrit 26.4,   this is stable, and platelets are 287.  Sodium 135, potassium 3.5 and this   will be replaced before discharge.  BUN is 25, creatinine 0.66, and GFR   greater than 60.  INR is trending up to 1.7.    IMAGING STUDIES:  Chest x-ray post-thoracentesis shows resolved left lower   lobe effusion and trace minimal right pleural effusion and no pneumothorax.    DISCHARGE MEDICATIONS:  1.  New medications include artificial tears in both eyes as needed for dry   eyes.  2.  Aspirin 81 mg once daily.  3.  Famotidine 20 mg 1 tablet twice daily.  4.  Lasix 40 mg IV once daily.  5.  DuoNeb nebulizer as needed for shortness of breath.  6.  Potassium chloride 20 mEq once daily.  7.  Sodium chloride nasal spray as needed.  8.  Tramadol 50 mg 1 tablet every 4-6 hours as needed for pain.  9.  Coumadin 5 mg once daily or as directed by attending physician, monitoring   INRs in Coumadin clinic.    She will continue her home medications of glucosamine, multivitamin, oyster   shells, and pravastatin 20 mg once daily as previously directed.    FOLLOWUP APPOINTMENTS:  She sees cardiac surgery, Dr. Chris Schmitt on   03/06/20174 at 12:45 and follows up with NOEMÍ Poole on February 13th   at 10:20 a.m.    PLAN:  The patient is cleared for transfer to rehab today.  She is in stable   condition.  Wound care reviewed the patient.  She is to wash the incision with   soap and water, pat dry, do not use  ointments, to report any signs or   symptoms of infection including warmth, redness, or oozing at the incision   sites or any fevers or chills.  Sternal precautions reviewed.  No driving for   1 month, no heavy lifting, pushing or pulling over 10 pounds for 1 month.    Medications were reviewed, discussed to seek emergent medical care.  ___ has   been reviewed.  Discussed signs or symptoms of bleeding to report.  Followup   appointments arranged.  The patient verbalizes understanding and agrees with   this plan of care.       ____________________________________     NOEMÍ GERMAIN / JANET    DD:  02/05/2017 09:12:32  DT:  02/06/2017 07:33:28    D#:  [id number]  Job#:  [id number]    cc: STEPHANE BRONSON MD

## 2017-02-06 NOTE — PROGRESS NOTES
Cardiovascular Surgery Progress Note    Name: Kristen Salazar  MRN: [id number]  : 1950  Admit Date: 2017  8:18 PM  Procedure:  Procedure(s) and Anesthesia Type:     * MITRAL VALVE REPAIR - General     * KAREN - General  6 Day Post-Op    Vitals:  Patient Vitals for the past 8 hrs:   Temp SpO2 O2 Delivery O2 (LPM) Pulse Heart Rate (Monitored) Resp NIBP   17 1300 - 95 % - - 80 80 (!) 21 (!) 98/61 mmHg   17 1200 37 °C (98.6 °F) 94 % Oxymask 10 74 75 18 (!) 98/61 mmHg   17 1100 - 94 % - - 80 80 14 -   17 1000 - 93 % - - 83 84 19 (!) 98/51 mmHg   17 0900 - 91 % - - 89 90 20 (!) 84/40 mmHg   17 0800 37 °C (98.6 °F) 92 % Oxymask 15 94 85 18 101/68 mmHg   17 0700 - 89 % - - 83 85 18 111/63 mmHg     Temp (24hrs), Av.9 °C (98.4 °F), Min:36.8 °C (98.2 °F), Max:37 °C (98.6 °F)      Respiratory:    Respiration: (!) 21, Pulse Oximetry: 95 %, O2 Daily Delivery Respiratory : OxyMask     Chest Tube Drains:          Fluids:    Intake/Output Summary (Last 24 hours) at 17 1444  Last data filed at 17 1200   Gross per 24 hour   Intake   1290 ml   Output   3350 ml   Net  -2060 ml     Admit weight: Weight: 61 kg (134 lb 7.7 oz)  Current weight: Weight: 62.3 kg (137 lb 5.6 oz) (17 0400)    Labs:  Recent Labs      17   0445  17   0455  17   0024   WBC  10.6  11.1*  12.0*  12.0*   RBC  2.90*  2.83*  2.81*  2.81*   HEMOGLOBIN  9.1*  8.8*  8.8*  8.8*   HEMATOCRIT  27.6*  26.4*  25.9*  25.9*   MCV  95.2  93.3  92.2  92.2   MCH  31.4  31.1  31.3  31.3   MCHC  33.0*  33.3*  34.0  34.0   RDW  49.6  48.1  46.8  46.8   PLATELETCT  249  287  338  338   MPV  11.4  10.9  10.3  10.3     Recent Labs      175  175  17   0024   NEUTSPOLYS  80.60*  79.10*  76.80*   LYMPHOCYTES  11.10*  11.40*  12.40*   MONOCYTES  6.60  8.10  9.20   EOSINOPHILS  0.40  0.70  0.60   BASOPHILS  0.20  0.20  0.20     Recent Labs      17   02/05/17   0455  02/06/17   0024   SODIUM  134*  135  129*   POTASSIUM  3.7  3.5*  3.8   CHLORIDE  102  98  94*   CO2  24  28  28   GLUCOSE  104*  95  98   BUN  36*  25*  18   CREATININE  0.81  0.66  0.62   CALCIUM  8.5  8.3*  8.3*     Recent Labs      02/04/17   0445  02/05/17   0455  02/06/17   1145   INR  1.46*  1.71*  1.69*       Medications:  • [START ON 2/7/2017] furosemide  60 mg     • warfarin  7.5 mg     • docusate sodium  100 mg      And   • senna-docusate  1 Tab     • aspirin EC  81 mg     • multivitamin  1 Tab     • famotidine  20 mg     • oyster shell calcium/vitamin D  1 Tab     • pravastatin  20 mg         Exam:   Review of Systems   Constitutional: Negative.    Eyes: Negative.    Respiratory: Negative.    Cardiovascular: Negative.    Gastrointestinal: Negative.    Genitourinary: Negative.    Musculoskeletal: Negative.    Skin: Negative.    Neurological: Negative.    Endo/Heme/Allergies: Negative.    Psychiatric/Behavioral: Negative.        Physical Exam   Constitutional: She is oriented to person, place, and time. She appears well-developed. No distress.   HENT:   Head: Normocephalic and atraumatic.   Eyes: Conjunctivae are normal. Pupils are equal, round, and reactive to light.   Neck: Normal range of motion. No JVD present.   Cardiovascular: Normal rate and regular rhythm.  Exam reveals no gallop and no friction rub.    No murmur heard.  Pulmonary/Chest: She has decreased breath sounds in the right lower field and the left lower field.   Abdominal: Soft. She exhibits no distension.   Musculoskeletal: She exhibits edema.   Neurological: She is alert and oriented to person, place, and time.   Skin: Skin is warm and dry.   Surgical incisions CDI   Psychiatric: She has a normal mood and affect. Her behavior is normal.       Quality Measures:   EKG reviewed, Labs reviewed, Medications reviewed and Radiology images reviewed  Frank catheter: No Frank  Central line in place: Need for access    DVT  Prophylaxis: Warfarin (Coumadin)  DVT prophylaxis - mechanical: Not indicated at this time, ambulatory  Ulcer prophylaxis: No    Assessed for rehab: Patient returned to prior level of function, rehabilitation not indicated at this time      Assessment/Plan:  POD 1 - HOTN- on low dose epi/daphnie, gently diuresis, Vent - per pulm, keep mediastinal tubes, keep sanchez- strict I&O, CPM  POD 2 HDS, NSR, labs noted, doing well.  OK DC sanchez.  Rehab consult.  POD 3 HDS, NSR, labs noted doing well, had BM. DC temp wires and central lines. H/H low/stable--w/w. 2 view CXR today, wean O2 as able.  Plan for Rehab 1-2 days  POD 4 HDS, NSR, increased O2 overnight, better this AM. LLL thoracentesis this AM pending. Continue diuresis, planning for Rehab likely in AM.  POD 5 HDS, NSR, k low, replace, diuresing well, wts down, 800cc off left lung, feeling better today, min right effusion. No pneumothorax.  Cdiff neg.  OK for rehab today.  See dictated DC summary for details.    POD 6 HDS< NSR. Increased O2 requirements early this AM--denies any SOB or pain.  Additional lasix given.  CXR shows mild increase of bilat effusions.  800 cc out on L few days ago. R thoracentesis ordered for today.  Patient feeling well. Keep today, continue lasix/metalazone, to rehab maybe in AM.     Active Hospital Problems    Diagnosis   • Mitral regurgitation [I34.0]   • Respiratory failure with hypoxia (CMS-HCC) [J96.91]   • Hyperlipidemia [E78.5]   • Hypertension [I10]   • CAP (community acquired pneumonia) [J18.9]   • Bradycardia [R00.1]   • Hypotension [I95.9]   • Normocytic anemia [D64.9]   • Pulmonary edema [J81.1]

## 2017-02-06 NOTE — DISCHARGE PLANNING
Thank you for the opportunity to assist with this patient as they transition to post acute services.  We are aware of the Rehab referral from NOEMÍ Villafana.  Dr. Vizcaino to consult this referral.  Our Transitional Case Coordinator will follow. At this time patient is showing to have Medicare as their coverage.   Please do not hesitate to call us if you require additional assistance my phone number is [phone] Edd.

## 2017-02-06 NOTE — PROGRESS NOTES
Pt notes she is feeling tired all the time. Pt educated about what a big operation she had and that it is OK to take little naps during the day. Pt told that cardiac surgery APRN would be notified, but pt is doing well. She has walked the unit 4xs today, and took a shower, so she was told that she should be feeling a bit tired.

## 2017-02-06 NOTE — PREADMISSION SCREENING NOTE
Pre-Admission Screening Form    Patient Information:   Name: Kristen Salazar     MRN: [id number]       : 1950      Age: 66 y.o.   Gender: female      Race: White [7]       Marital Status:  [2]  Family Contact: Eliud De Anda        Relationship: Spouse [17]  Home Phone: [phone]           Cell Phone:   Advanced Directives: None  Code Status:  FULL  Current Attending Provider: Priyanka Atkins, *  Referring Physician: NOEMÍ Lim      Physiatrist Consult: {PHYSIATRIST:[id number]}       Referral Date: 17  Primary Payor Source:  MEDICARE  Secondary Payor Source:      Kristen is seeking inpatient rehabilitation for ongoing medical management and therapeutic interventions to prepare for transition home, to supportive care of spouse and outpatient services.     Medical Information:   Date of Admission to Acute Care [facility]2017  Room Number: T615/00  Rehabilitation Diagnosis: 09 Cardiac  @IMM@  Allergies   Allergen Reactions   • Codeine      vomiting   • Latex      Past Medical History   Diagnosis Date   • Allergy    • Arthritis    • Osteoporosis    • Heart murmur    • Fracture      Past Surgical History   Procedure Laterality Date   • Knee arthroscopy     • Mitral valve repair  2017     Procedure: MITRAL VALVE REPAIR;  Surgeon: Crhis Schmitt M.D.;  Location: SURGERY [facility];  Service:    • Bryant  2017     Procedure: BRYANT;  Surgeon: Chris Schmitt M.D.;  Location: SURGERY [facility];  Service:        History Leading to Admission, Conditions that Caused the Need for Rehab (CMS):     Paulie Delarosa M.D. Physician Signed  Consults 2017  9:32 PM      Expand All Collapse All    DATE OF SERVICE:  2017    REFERRING PHYSICIAN:  Walter Riggins DO    CHIEF COMPLAINT:  Respiratory failure.    HISTORY OF PRESENT ILLNESS:  The patient is a 66-year-old female with past    medical history significant for mitral valve prolapse.  She was well until today when    she  suffered acute shortness of breath leading to respiratory failure.  She was intubated    and transferred from [place] [facility] for further         ASSESSMENT AND PLAN:  1.  Acute respiratory failure, ventilation support, EKG showing sinus    tachycardia:  The patient is a 66-year-old female with history of mitral valve  prolapse.  She suffered acute respiratory failure and is currently intubated.  We will obtain a stat echocardiogram as requested.  We will follow her    clinically.  2.  History of mitral valve prolapse:  Plan as above.  3.  Hypotension on pressor support.  4.  Respiratory failure, on ventilation support.    Thank you for this consult.    Addendum: Stat echocardiogram demonstrated posterior mitral valve prolapse    with probable flail leaflet severe mitral regurgitation. Also her  arrived and  he informed me that she has been followed by Tom Hampton for mitral    valve prolapse with regular echocardiogram evaluations. We will contact [facility]  cardiologist service tomorrow. She will likely need transesophageal echocardiogram  and cardiac catheterization evaluations and consult with Chris Castaneda for  mitral valve repair or replacement.          ____________________________________     MD JOSE ANDRADE / JANET    DD:  01/24/2017 21:32:48  DT:  01/24/2017 23:44:08    D#:  [id number]  Job#:  [id number]          Last signed by: Paulie Delarosa M.D. at 1/24/2017 11:59 PM               Jeremy M Gonda, M.D. Physician Signed  H&P 1/24/2017 11:34 PM      Expand All Collapse All    CHIEF COMPLAINT:  Shortness of breath.     HISTORY OF PRESENT ILLNESS:  The patient is a 66-year-old female with a past    medical history significant for hypertension, hyperlipidemia, and mitral valve   prolapse who was in her usual state of health yesterday and in fact when    skiing all day with her .  This morning, she reported to her     that she was not feeling well with a  nonproductive cough and some mild    shortness of breath.  She checked her oxygen saturation at home and found to    be 85%.  She presented to the emergency department at [facility]    where unfortunately she decompensated quickly requiring up to 15 L facemask    and then eventually requiring intubation.  She was initially hypertensive and    tachycardic with an oxygen saturation of 86% on room air at the outside    hospital and was transported on high peak and expiratory pressures on the    ventilator with an FIO2 of 100%, achieving saturations in the low 90s.  She    became progressively more hypotensive at this point and has been started on a    norepinephrine drip.  A stat bedside echo was performed with Dr. Delarosa, which   revealed a ruptured mitral valve leaflet with severe MR as the likely cause    of her decompensation.  She does have an elevated white count and temperature    up to 99 degrees and was started on empiric antibiotics in the meantime.  Per    her , she has not had any recent sick contacts and has been compliant    with her medications and was not having any chest pain or syncope or    palpitations.  Also no lower extremity edema.     PAST MEDICAL HISTORY:  1.  Systemic arterial hypertension.  2.  Hyperlipidemia.  3.  Mitral valve prolapse followed by Dr. Landin outpatient with last    checkup three months ago that was stable per .  4.  Nocturnal hypoxemia, on supplemental oxygen 2 liters per minute at night.  5.  Urinary tract infection.  6.  Subdural hematoma.        ASSESSMENT:  1.  Acute hypoxemic respiratory failure requiring mechanical ventilatory    support.  2.  Diffuse acute cardiogenic pulmonary edema with severe A-a gradient.  3.  Mitral valve prolapse with ruptured leaflets and resultant severe mitral    regurgitation.  4.  Undifferentiated shock requiring vasopressor support.  5.  Partially compensated metabolic acidosis.  6.  Leukocytosis, likely  reactive, although will cover with empiric    antibiotics.  7.  Hyponatremia.  8.  Hypomagnesemia and hypocalcemia.  9.  Elevated troponin likely secondary to demand ischemia.  10.  Elevated D-dimer as an inflammatory marker.  Doubt pulmonary    embolism/deep venous thrombosis with a Wells score of 0.     PLAN:  Patient is critically ill at this time and will be admitted to the    intensive care unit for close observation and management.  She will be    continued on full mechanical ventilatory support with attempts to wean her    inspired oxygen fraction to keep saturations greater than 92%.  We will    increase her mandatory minute ventilation to help compensate for her metabolic   acidosis and maintain higher peak and expiratory pressures given her    pulmonary edema.  She will be kept on a norepinephrine support to maintain a    mean arterial pressure greater than 65 and may need the addition of low dose    Lasix drip to help diurese her pulmonary edema.  Cardiology is involved in her   case and we appreciate their assistance and will need to involve    cardiothoracic surgery as well for possible operative intervention of her    mitral valve.  She will be continued on empiric antibiotics at this time    including Rocephin and doxycycline given her prolonged QTC interval and her    calcium and magnesium will be replaced and her other electrolytes will be    monitored closely.  A Cortrak will be placed to initiate enteral nutrition.     She will be kept on DVT and GI prophylaxis per ICU protocol.  This assessment    and plan was discussed with the patient's  at bedside, the nurse,    respiratory therapy, and Dr. Riggins, as well as Dr. Delarosa and the [facility]    resident.  Patient is critically ill at this time and we appreciate the    opportunity to assist in her care and will continue to follow along closely    with you.     Critical care time 45 minutes.  No time overlap.  Procedures are not included     in this time.     [id number].        ____________________________________     Jeremy Gonda, MD JG / JANET     DD:  01/24/2017 23:34:08  DT:  01/25/2017 02:02:36     D#:  [id number]  Job#:  [id number]          Last signed by: Jeremy M Gonda, M.D. at 1/25/2017  2:20 AM               Kurtis Ramirez D.O. Physician Signed  Procedures 1/25/2017  3:41 PM      Expand All Collapse All    DATE OF SERVICE:  01/25/2017     INDICATION:  1.  Non-STEMI.  2.  Right-sided heart failure.  3.  Known history of moderate to severe MR.     BRIEF SUMMARY:  The patient is a pleasant 66-year-old female with history of    moderate to severe mitral valve regurgitation that has been followed carefully   by Dr. Landin ([facility]) for the past several years.  Last    echocardiogram was done possibly last in 6 months ago with stable moderate to    severe mitral valve regurgitation.  The patient is transferred to [facility] ED    from [facility] where she presented with acute shortness of breath.    Per record, patient's  indicated that she is not feeling well with    nonproductive cough and mild shortness of breath.  Saturation was in the 80s.     By the time she is in [facility], she is quickly decompensated    with requirement of 15 liters of face mask.  Patient eventually was intubated    and transferred to [facility] for further evaluation.  Echocardiogram was    obtained, and evaluated by Dr. Delarosa.     PROCEDURE:  1.  Left heart catheterization with LV gram.  2.  Selective right and left coronary angiography.     FINDINGS:  1.  LV gram consists of EF of 60-65% with no wall motion abnormality.  2+    mitral valve regurgitation.  2.  Right dominant system.  3.  Left main consists of normal size vessel with normal bifurcation of LAD    and left circumflex.  Left main is angiographically free of disease.  4.  LAD consists of moderate to large size vessel.  It is mildly tortuous at    the mid and distal  segment, but appeared to have good ATIF flow and no    significant disease.  LAD would give off multiple small branches of diagonal,    large one at the mid segment.  Overall, diagonal system appeared to be    angiographically free of disease.  5.  Left circumflex consists of moderate to large size vessel.  It is    angiographically free of disease.  The left circumflex gives 3 OM system with    the first and second OM tiny, rest no more than 1.5 mm in diameter.  They are    angiographically free of disease.  OM3 is the large of the size of 3, but    still approximately 2.0 mm in diameter, but angiographically free of disease.  6.  Ostial consists of a large and dominant system.  It has a high bifurcation   of the PDA and posterior lateral branch.  Overall, the ostial system is    angiographically free of disease.     RECOMMENDATION:  Apparently, normal coronary arteries.  I have discussed the    case with Dr. Schmitt (thoracic surgeon) and updated him on regarding to her    coronary situation.  Dr. Schmitt has reviewed echocardiogram, which appeared    to be more of moderate to severe MR and chronically origin.  He indicated that   patient does not need emergent surgery at this time, which I agree.  Plan for   CT chest to rule out PE given her elevated D-dimer and also the troponin.  In   addition, patient would benefit a KAREN for further assessment above.     DESCRIPTION OF PROCEDURE:  Patient was brought back to the operating table,    and draped in sterile fashion.  Timeout as per protocol was initiated prior to   starting procedure.  Following that, the right common femoral artery was    assessed and located.  2% lidocaine was used to anesthetize.  The right common   femoral artery was accessed by modified Seldinger method.  A 4-Norwegian sheath    was then placed.  We used a pigtail catheter along with J-wire to advance    through the aortic valve without any difficulty.  J-wire was removed.     Hemodynamic  measurement of the left ventricle was taken, which LVEDP was    approximately 11-13 mmHg.  Following that, LV gram was obtained.  Findings as    stated above.  At that point in time, pigtail catheter was then pulled back to   aorta with no gradient differences during the pullback.  Next, J-wire was    reinserted for catheter exchange, which the pigtail catheter was exchanged for   JL4 catheter.  We used JL4 catheter to engage into the left coronary system.     Selective coronary angiogram was obtained in multiple planes and views.     Findings as stated above.  Following that, the JL4 catheter was exchanged for    JR4 catheter over the wire.  JR4 catheter was then carefully engaged into the    right coronary system without difficulty.  Coronary angiogram was obtained in    multiple planes and views.  Findings as stated above.  Following that, the    catheter was removed.  At this point in time, _____ of its entirety.  No    revascularization is recommended.  Please see further discussion as stated    above in the recommendation.  Catheter was removed, sheath was then pulled and   manual compression was held as per protocol.  Overall, patient tolerated the    procedure well and no complication noticed.     Approximately 80 mL contrast used.        ____________________________________     Kurtis Ramirez DO     TY / NTS     DD:  01/25/2017 15:41:03  DT:  01/25/2017 19:00:59     D#:  [id number]  Job#:  [id number]     cc: VICKEY DAVIS DO          Last signed by: Kurtis Ramirez D.O. at 2/5/2017  4:30 PM               Kurtis Ramirez D.O. Physician Signed  Consults 1/25/2017  5:13 PM      Expand All Collapse All    DATE OF SERVICE:  01/25/2017     CONSULTATION REGARDING:  Severe MR     BRIEF SUMMARY:  The patient was seen and examined in the CCU (bed [facility] 605)    at your request for consultation regarding severe MR.  As you know, the    patient is a 66-year-old male with known history of stable (moderate to    severe)  mitral valve regurgitation, O2 (2 liter) dependent at night, who was    transferred to [facility] for acute respiratory failure.  She is currently sedated   and intubated on ventilation support.  Most of the information is taken by    the medical record and also spoken to her .  According to him, patient    has been doing well and actually went skiing with him the day before.  She    became more dyspneic yesterday with saturation 85.  She eventually was brought   to the ER in [place], [place], which ___ respiratory did decompensate    quickly that required mask and eventually intubated for airway protection.     Initial cardiac enzyme, at one point, indicated is was 1.7 and, this morning,    it was elevated at 3.35.     Her cardiologist is Dr. Tom Landin at [facility].  She has been    followed regularly for her valvular disease.  She has had a series of    echocardiograms in which the last one was in April of 2016, with EF of 55-60%.    Mitral valve prolapse with posterior leaflet involvement.  There is    moderate-to-severe MR.  Prior to that, there was an echo March 2015, with EF    of 55% with moderate-to-severe MR.  She had an echo back in April 2014, with    EF of 60-65% with moderate-to-severe MR.  KAREN was done back in May of 2014 and   indicated moderate mitral valve prolapse with moderate MR.     PAST MEDICAL HISTORY:  1.  Mitral valve prolapse with mitral valve regurgitation.  2.  Chronic obstructive pulmonary disease.  3.  O2 at night.     PAST SURGICAL HISTORY:  Unable to obtain.     SOCIAL HISTORY:  Patient is .  No tobacco use.     FAMILY HISTORY:  Noncontributory.     MEDICATIONS:  1.  Pravachol 20 mg daily.  2.  Multivitamin daily.  3.  Losartan 25 mg daily.  4.  Glucosamine 500 mg.     ALLERGIES:  1.  CODEINE.  2.  LATEX.     REVIEW OF SYSTEMS:  All are negative according to CMS/AMA criteria, except for   what is stated as above.     PHYSICAL EXAMINATION:  VITAL SIGNS:   Consistent with borderline hypertension.  She is currently on    pressor medication.  She is currently intubated on the ventilation support.  HEENT:  Consist of ET tube is in place.  SKIN:  Consist of cool and moist.  CARDIOVASCULAR:  Consist of normal rate and rhythm, S1, S2 with positive    murmurs.  PULMONARY:  Decreased breath sound bilaterally.  GASTROINTESTINAL:  Positive bowel sounds, soft and nontender.  EXTREMITIES:  Consist of no edema.     LABORATORY DATA:  Reviewed.     ASSESSMENT AND PLAN:  1.  Respiratory failure/ventilation support.  2.  Elevated troponin.  3.  Severe MR on echocardiogram.  4.  O2 dependent at night.     Recommended start heparin for elevated troponin.  In addition, I had a long    discussion with the patient's , at this point, we would recommend    coronary angiogram for further assessment due to the elevated troponin,    otherwise, would recommend consult ___, please see Dr. Chris Schmitt for    further assessment on the valve.     Thank you very much for allowing me to be part of this patient's healthcare.        ____________________________________     Kurtis Ramirez,      TY / NTS     DD:  01/25/2017 17:13:40  DT:  01/25/2017 22:24:32     D#:  [id number]  Job#:  [id number]          Last signed by: Kurtis Ramirez D.O. at 2/5/2017  4:30 PM               Lesley Atkins M.D. Physician Signed  Procedures 1/28/2017  1:12 PM     Pre-procedure Diagnoses     Acute respiratory failure with hypoxia (CMS-HCC) [J96.01]         Post-procedure Diagnoses     Acute respiratory failure with hypoxia (CMS-HCC) [J96.01]         Procedures     PROCEDURE INTUBATION [PRO89 (Custom)]          Expand All Collapse All    Procedure Note    Date: 1/28/2017  Time: 0930    Procedure: Intubation    Indication: Acute hypoxicrespiratory failure, Unresponsive  Consent: Informed consent obtained from patient or designated decision maker after explaining the benefits/risks of the procedure including but  not limited to airway/dental trauma, hypoxia/hypercarbia, bleeding, aspiration/infection, vascular/nerve or other deep structure injury, arrythmia, or death. Patient or surrogate expressed understanding and agreement and signed consent which can be found in the patient's chart.    Procedure: After obtaining consent, a time-out was performed. Airway assessed and patient found to have Mallampati class 3.  Equipment prepared and RT/RN at bedside. Patient pre-oxygenated with 100% FiO2.  After medication delivery, a Glidescope was used to acheive direct visualization and a grade A view. A 7.5 ETT was placed with 1 attempt(s) into the trachea directly through the vocal cords. Appropriate placement confirmed by direct visualization, bilateral chest and epigastric auscultation, misting in the ETT, and ETCO2 detector. ETT secured in place and patient connected to ventilator. Sedation/analgesia continued as appropriate. Patient tolerated procedure well without any difficulties and remains in care of bedside nurse and respiratory therapy. CXR will be performed to confirm appropriate placement of ETT.    Medications: Etomidate 20mg IV and Rocuronium 50mg IV  Complications: None  CXR: ET tube at 3cm above the anmol with acute bilateral pulmonary edema    Lesley Atkins MD  Pulmonary and Critical Care Medicine                          Chris Schmitt M.D. Physician Signed  Consults 1/30/2017  1:40 PM      Expand All Collapse All    DATE OF SERVICE:  01/30/2017     REFERRING PHYSICIAN:  Dr. Ramirez, ([facility]).     REASON FOR CONSULTATION:  Consideration for mitral valve repair or    replacement.     CHIEF COMPLAINT:  Shortness of breath.     HISTORY OF PRESENT ILLNESS:  Please note that all the information contained in   this dictation was obtained from the chart since the patient is    endotracheally intubated.  The patient is a 66-year-old female with known    mitral valve prolapse.  She was admitted  to the hospital in respiratory    failure and required endotracheal intubation and mechanical ventilation.  A    transesophageal echocardiography showed severe P2 prolapse of the mitral valve   and severe mitral regurgitation.  Her left ventricular ejection fraction was    65%.  Cardiac catheterization did not show any hemodynamically significant    coronary artery disease.  There was severe left atrial enlargement and mitral    regurgitation.        IMPRESSION:  Acute respiratory failure, severe mitral regurgitation (3-4+,    degenerative), severe mitral valve prolapse, hypertension, dyslipidemia,    history of subdural hematoma.     PLAN:  I recommend that she undergo mitral valve repair or replacement,    possible maze procedure, and intraoperative transesophageal echocardiography.     Risks, benefits, potential complications and alternative treatments were    discussed with the patient's  in detail including her risks should she    decide not to undergo my recommended treatment.  All of his questions were    answered to his satisfaction and he is willing to have his wife proceed with    the operation.  Risks include death, stroke, bleeding, infection,    perioperative myocardial infarction, dysrhythmias, organ failure, possible    return to the operating room for bleeding, drug or transfusion reactions.  Her   operative mortality risk is in the 5-10% range considering her condition.     The operation will be performed on Tuesday, 01/31/2017 at noon.  Findings and    recommendations were discussed with the patient's cardiologist, Dr. Ramirez.     Thank you for this very challenging consultation and participation in the    patient's care.        ____________________________________     MD JASMINA MAYER / JAENT     DD:  01/30/2017 13:40:26  DT:  01/30/2017 17:52:52     D#:  [id number]  Job#:  [id number]     cc: Kurtis ADLER DO, DO          Last signed by: Chris Schmitt M.D. at 1/31/2017   7:01 AM               Chris Schmitt M.D. Physician Signed  OP Report 1/31/2017  5:00 PM      Expand All Collapse All    DATE OF SERVICE:  01/31/2017     REFERRING PHYSICIAN:  Kurtis Ramirez DO     PREOPERATIVE DIAGNOSES:  Acute respiratory failure, severe mitral    regurgitation (3-4+, degenerative), severe mitral valve prolapse (P2),    hypertension, dyslipidemia, history of subdural hematoma.     POSTOPERATIVE DIAGNOSES:  Acute respiratory failure, severe mitral    regurgitation (3-4+, degenerative), severe mitral valve prolapse (P2),    hypertension, dyslipidemia, history of subdural hematoma.     PROCEDURE PERFORMED:  Radical mitral valve repair (P2 triangular resection,    34-mm Ferguson flexible annuloplasty band), left atrial appendage ligation and    intraoperative transesophageal echocardiography.     SURGEON:  Chris Schmitt MD     FIRST ASSISTANT:  NOEMÍ Smith.     ANESTHESIOLOGIST:  Salazar Monge MD.     ANESTHESIA:  General endotracheal.     DRAINS:  Mediastinal chest tubes x2 (32-Welsh straight and angled).     MISCELLANEOUS:  Temporary epicardial ventricular pacemaker wires.     COMPLICATIONS:  None.     INDICATIONS:  The patient is a 66-year-old female who was admitted to the    hospital in respiratory failure requiring endotracheal intubation and    mechanical ventilation.  Transesophageal echocardiography showed severe P2    prolapse of the posterior mitral valve leaflet and severe mitral    regurgitation.  Her left ventricular ejection fraction was normal at    approximately 65%.  Cardiac catheterization did not show any hemodynamically    significant coronary artery disease.     DESCRIPTION OF PROCEDURE:  The patient was brought to the operating room and    placed on the operating room table in the supine position.  After successful    induction of general anesthesia, the patient was prepped and draped in the    usual sterile fashion.  NOEMÍ Smith, assisted with retraction  during the   operation and closed the sternal wound.  Intraoperative transesophageal    echocardiography showed severe prolapse of the P2 scallop and several ruptured   mitral valve cords.  There was severe mitral regurgitation.  Her left    ventricular ejection fraction was normal at approximately 60-65%.  An incision   was made from the sternal notch to the xiphoid.  The sternum was opened    longitudinally with a sternal saw.  Hemostasis was obtained with    electrocautery at the sternal edges.  The patient was systemically    heparinized.  The pericardium was opened longitudinally and tented anteriorly    with Ethibond stay stitches.  The aortic cannula was inserted first followed    by the dual-stage venous cannula.  An antegrade cardioplegia cannula was    placed in the ascending aorta.  Cardiopulmonary bypass was instituted.  The    aorta was cross-clamped and the patient was given 800 mL of cardioplegia in an   antegrade fashion.  There was prompt cardiac arrest.  Ice slush was placed on   the heart for further myocardial protection.  A phrenic nerve protector pad    was used.  From this point on, cardioplegia was given in an antegrade fashion    every 15-20 minutes while the aorta was crossclamped.  The left atrial    appendage was ligated at its base and excised.  The stump was oversewn in 2    layers using #4-0 Prolene sutures.  A left atriotomy was performed just below    the interatrial groove.  The anterior mitral valve leaflets had fairly normal    appearance.  There was severe prolapse of the P2 scallop of the posterior    mitral valve leaflet.  A small triangular resection of P2 was performed and    the edges were reapproximated using #4-0 Ethibond stitches in a    figure-of-eight fashion.  A large cleft between P2 and P3 was closed with 4-0    Ethibond sutures.  A #2-0 Ethibond stitches were then placed around the mitral   valve annulus from trigone to trigone.  A 34-mm Ferguson flexible  annuloplasty   band was then placed in the mitral valve annulus and secured in place with    the Ethibond stitches.  Testing of the repair with cold saline showed only    trace mitral regurgitation.  Rewarming of the patient was initiated.  The left   atriotomy was closed in 2 layers using #4-0 Prolene sutures.  The aortic    cross-clamp was removed.  Aortic cross-clamp time was 74 minutes.  Total    cardiopulmonary bypass time was 86 minutes.  The left ventricle was deaired in   the usual fashion.  The carbon dioxide which had been released over the    operative field during the operation was discontinued.  A straight and an    angled 32-Telugu chest tubes were placed in mediastinum.  Temporary epicardial   ventricular pacemaker wires were inserted.  There was spontaneous conversion    into sinus rhythm.  The antegrade cardioplegia cannula was removed.  When she    was adequately warmed, she was slowly taken off cardiopulmonary bypass, which    she tolerated well.  The dual-stage venous cannula was removed.  Protamine was   given to reverse the effects of heparin.  The aortic cannula was removed.     When adequate hemostasis had been obtained, the sternum was reapproximated    using size 5 sternal wires and the remainder of the incision was closed in 3    layers using Vicryl sutures.  Intraoperative transesophageal echocardiography    showed an excellent mitral valve repair with only trace mitral regurgitation.     Her left ventricular ejection fraction remained normal at approximately 60%.     There were no apparent complications.  The patient tolerated the procedure    well and left the operating room in guarded condition.        ____________________________________     MD JASMINA MAYER / JANET     DD:  01/31/2017 17:00:39  DT:  01/31/2017 19:35:12     D#:  [id number]  Job#:  [id number]     cc: VICKEY DAVIS DO, [facility]  , Kurtis Ramirez DO          Last signed by: Chris Schmitt M.D. at 2/1/2017  "11:26 AM               Co-morbidities: See above  Potential Risk - Complications: Cognitive Impairment, Deep Vein Thrombosis, Malnutrition, Pain, Perceptual Impairment, Pneumonia, Pressure Ulcer and Infection  Level of Risk: High    Ongoing Medical Management Needed (Medical/Nursing Needs):   Patient Active Problem List    Diagnosis Date Noted   • Mitral regurgitation 01/30/2017   • Respiratory failure with hypoxia (CMS-[facility]) 01/30/2017   • Hyperlipidemia 01/30/2017   • Hypertension 01/30/2017   • CAP (community acquired pneumonia) 01/30/2017   • Bradycardia 01/30/2017   • Hypotension 01/30/2017   • Normocytic anemia 01/30/2017   • Pulmonary edema 01/24/2017   • Cervical spondylosis 11/15/2016   • Neck pain 10/24/2016   • Falls 07/19/2016   • Left knee pain 07/19/2016   • Loss of balance 06/28/2016   • Dizziness 06/28/2016   • Right wrist pain 06/28/2016   • Plantar fasciitis of right foot 06/06/2016   • Peroneal tendinitis of right lower extremity 06/06/2016   • Lumbar spinal stenosis 05/17/2016   • Hamstring tear 03/28/2016   • Primary osteoarthritis of both knees 01/19/2016   • Lumbar spondylosis 12/10/2015   • Bilateral low back pain without sciatica 12/01/2015   • Right hip pain 12/01/2015     Margoth Mi R.N. Registered Nurse Signed  Progress Notes 2/6/2017  8:06 AM      Expand All Collapse All    Monitor Summary:sinus rhythm .12/.08./.40                    Current Vital Signs:   Temperature: 37 °C (98.6 °F) Pulse: 94 Respiration: 18 Blood Pressure : 105/74 mmHg  Weight: 62.3 kg (137 lb 5.6 oz) Height: 170.2 cm (5' 7.01\")  Pulse Oximetry: 92 % O2 (LPM): 15      Completed Laboratory Reports:  Recent Labs      02/04/17   0445  02/05/17   0455  02/06/17   0024   WBC  10.6  11.1*  12.0*  12.0*   HEMOGLOBIN  9.1*  8.8*  8.8*  8.8*   HEMATOCRIT  27.6*  26.4*  25.9*  25.9*   PLATELETCT  249  287  338  338   SODIUM  134*  135  129*   POTASSIUM  3.7  3.5*  3.8   BUN  36*  25*  18   CREATININE  0.81  0.66  0.62 "   GLUCOSE  104*  95  98   INR  1.46*  1.71*   --      Additional Labs: None    Prior Living Situation:   Housing / Facility: 1 Story House  Steps Into Home:  (detached garage, 6 x 4 steps to enter, snowy/icy outdoors )  Lives with - Patient's Self Care Capacity: Spouse  Equipment Owned: Other (Comments) (adjustable temperpedic )    Prior Level of Function / Living Situation:   Physical Therapy: Prior Services: None  Housing / Facility: 1 Story House  Steps Into Home:  (detached garage, 6 x 4 steps to enter, snowy/icy outdoors )  Bathroom Set up: Walk In Shower  Equipment Owned: Other (Comments) (adjustable temperpedic )  Lives with - Patient's Self Care Capacity: Spouse  Bed Mobility: Independent  Transfer Status: Independent  Ambulation: Independent  Distance Ambulation (Feet):  (to tolerance)  Assistive Devices Used: None  Current Level of Function:   Level Of Assist: Minimal Assist  Assistive Device: Front Wheel Walker (needing assist for walker management )  Distance (Feet): 80  Deviation: Bradykinetic, Shuffled Gait, Decreased Base Of Support  Weight Bearing Status: full  Supine to Sit:  (NT, in chair pre/post; does have an adjustable bed )  Sit to Supine:  (chair )  Scooting: Contact Guard Assist  Sit to Stand: Minimal Assist (of 2 from chair )  Bed, Chair, Wheelchair Transfer: Minimal Assist (with FWW)  Transfer Method:  (via ambulation )  Sitting in Chair: > 10 mins pre/post  Sitting Edge of Bed: NT   Standin mins   Occupational Therapy:   Self Feeding: Independent  Grooming / Hygiene: Independent  Bathing: Independent  Dressing: Independent  Toileting: Independent  Medication Management: Independent  Laundry: Independent  Kitchen Mobility: Independent  Finances: Independent  Home Management: Independent  Shopping: Independent  Prior Level Of Mobility: Independent Without Device in Community  Driving / Transportation: Driving Independent  Prior Services: None  Housing / Facility: [address]  Current  Level of Function:   Lower Body Dressing: Moderate Assist  Toileting: Not Tested  Speech Language Pathology:      Rehabilitation Prognosis/Potential: Good  Estimated Length of Stay: 14 days    Nursing:   Orientation : Oriented x 4  Continent    Scope/Intensity of Services Recommended:  Physical Therapy: 1.5 hr / day  5 days / week. Therapeutic Interventions Required: Maximize Endurance, Mobility, Strength and Safety  Occupational Therapy: 1.5 hr / day 5 days / week. Therapeutic Interventions Required: Maximize Self Care, ADLs, IADLs and Energy Conservation  Speech & Language Pathology: Evaluation 5 days / week. Therapeutic Interventions Required: Maximize Pending recommendatons of SLP sandra  Rehabilitation Nursin/7. Therapeutic Interventions Required: Monitor Pain, Skin, Vital Signs, Intake and Output, Labs, Safety, Aspiration Risk, Family Training and Incision care, Bowel & Bladder regimen, ADL's, Infection control.  Rehabilitation Physician: 3 - 5 days / week. Therapeutic Interventions Required: Medical Management  Respiratory Care: Evaluation. Therapeutic Interventions Required: Pulmonary Toileting, O2 Weaning and Respiratory care per protocol.  Dietician: Consult. Therapeutic Interventions Required: Nutritinal recommendations to promote optimal health/healing.    Rehabilitation Goals and Plan (Expected frequency & duration of treatment in the IRF):   Return to the Community, Modified Independent Level of Care and Outpatient Support  Anticipated Date of Rehabilitation Admission: 17  Patient/Family oriented IRF level of care/facility/plan: Yes  Patient/Family willing to participate in IRF care/facility/plan: Yes  Patient able to tolerate IRF level of care proposed: Yes  Patient has potential to benefit IRF level of care proposed: Yes  Comments: None    Special Needs or Precautions - Medical Necessity:  Safety Concerns/Precautions:  Fall Risk / High Risk for Falls and Balance  Complex Wound Care: Incision  care  Pain Management  Requires Oxygen  Cardiac Precautions  Diet:   DIET ORDERS (Through next 24h)    Start     Ordered    02/03/17 1100  SUPPLEMENTS   ALL MEALS     Question:  Which Supplement  Answer:  BOOST GLUCOSE CONTROL    02/03/17 1059    02/01/17 1704  DIET ORDER   ALL MEALS     Question Answer Comment   Diet: Consistent Carbohydrate    Diet: Cardiac        02/01/17 1704          Anticipated Discharge Destination / Patient/Family Goal:  Destination: Home with Assistance Support System: Spouse  Anticipated home health services: OT, PT and Nursing  Previously used HH service/ provider: Not Applicable  Anticipated DME Needs: To be determined  Outpatient Services: To be determined  Alternative resources to address additional identified needs:   Outpatient Cardiac Rehabilitation  Pre-Screen Completed: 2/6/2017 8:36 AM Jennifer Lockett R.N.

## 2017-02-06 NOTE — PROGRESS NOTES
[facility] Progress Note               Author: Lj Kudafarshadsocorox Date & Time created: 2/6/2017  10:48 AM     Interval History:  Pt O2 sats dropped to 80% this morning, 40 iv lasix was given following which she put out 1.2L and her O2 sats improved to 91%. She denies any new symptoms and states that she feels about the same.      Review of Systems   Constitutional: Negative for fever and chills.   Respiratory: Negative for shortness of breath and wheezing.    Cardiovascular: Negative for chest pain and palpitations.   Gastrointestinal: Negative for vomiting, abdominal pain and diarrhea.   Skin: Negative for itching and rash.   Neurological: Negative for sensory change and focal weakness.         Physical Exam   Constitutional: She is oriented to person, place, and time. No distress.   Cardiovascular: Normal rate and regular rhythm.    Systolic click noted.   Pulmonary/Chest:   Has bibasilar crackles. On 15L of O2. Not in respiratory distress.    Abdominal: Soft. Bowel sounds are normal. She exhibits no distension. There is no tenderness.   Neurological: She is alert and oriented to person, place, and time.   Skin: Skin is warm and dry.       Labs:  Recent Results (from the past 24 hour(s))   CBC without Differential Critical Care 0130    Collection Time: 02/06/17 12:24 AM   Result Value Ref Range    WBC 12.0 (H) 4.8 - 10.8 K/uL    RBC 2.81 (L) 4.20 - 5.40 M/uL    Hemoglobin 8.8 (L) 12.0 - 16.0 g/dL    Hematocrit 25.9 (L) 37.0 - 47.0 %    MCV 92.2 81.4 - 97.8 fL    MCH 31.3 27.0 - 33.0 pg    MCHC 34.0 33.6 - 35.0 g/dL    RDW 46.8 35.9 - 50.0 fL    Platelet Count 338 164 - 446 K/uL    MPV 10.3 9.0 - 12.9 fL   Basic Metabolic Panel (BMP) Critical Care 0130    Collection Time: 02/06/17 12:24 AM   Result Value Ref Range    Sodium 129 (L) 135 - 145 mmol/L    Potassium 3.8 3.6 - 5.5 mmol/L    Chloride 94 (L) 96 - 112 mmol/L    Co2 28 20 - 33 mmol/L    Glucose 98 65 - 99 mg/dL    Bun 18 8 - 22 mg/dL    Creatinine 0.62 0.50 -  1.40 mg/dL    Calcium 8.3 (L) 8.5 - 10.5 mg/dL    Anion Gap 7.0 0.0 - 11.9   ESTIMATED GFR    Collection Time: 17 12:24 AM   Result Value Ref Range    GFR If African American >60 >60 mL/min/1.73 m 2    GFR If Non African American >60 >60 mL/min/1.73 m 2   CBC WITH DIFFERENTIAL    Collection Time: 17 12:24 AM   Result Value Ref Range    WBC 12.0 (H) 4.8 - 10.8 K/uL    RBC 2.81 (L) 4.20 - 5.40 M/uL    Hemoglobin 8.8 (L) 12.0 - 16.0 g/dL    Hematocrit 25.9 (L) 37.0 - 47.0 %    MCV 92.2 81.4 - 97.8 fL    MCH 31.3 27.0 - 33.0 pg    MCHC 34.0 33.6 - 35.0 g/dL    RDW 46.8 35.9 - 50.0 fL    Platelet Count 338 164 - 446 K/uL    MPV 10.3 9.0 - 12.9 fL    Neutrophils-Polys 76.80 (H) 44.00 - 72.00 %    Lymphocytes 12.40 (L) 22.00 - 41.00 %    Monocytes 9.20 0.00 - 13.40 %    Eosinophils 0.60 0.00 - 6.90 %    Basophils 0.20 0.00 - 1.80 %    Immature Granulocytes 0.80 0.00 - 0.90 %    Nucleated RBC 0.00 /100 WBC    Neutrophils (Absolute) 9.01 (H) 2.00 - 7.15 K/uL    Lymphs (Absolute) 1.45 1.00 - 4.80 K/uL    Monos (Absolute) 1.08 (H) 0.00 - 0.85 K/uL    Eos (Absolute) 0.07 0.00 - 0.51 K/uL    Baso (Absolute) 0.02 0.00 - 0.12 K/uL    Immature Granulocytes (abs) 0.09 0.00 - 0.11 K/uL    NRBC (Absolute) 0.00 K/uL   EKG    Collection Time: 17  6:57 AM   Result Value Ref Range    Report       [facility]    Test Date:  2017  Pt Name:    LILY ARGUETA             Department: 161  MRN:        [id number]                      Room:       [facility]  Gender:     F                            Technician: [facility]  :        [id number]                   Requested By:BARBARA WOODRUFF  Order #:    [id number]                    Reading MD: Moises Macias MD    Measurements  Intervals                                Axis  Rate:       85                           P:          83  NY:         168                          QRS:        0  QRSD:       94                           T:          52  QT:         384  QTc:         457    Interpretive Statements  SINUS RHYTHM  VENTRICULAR PREMATURE COMPLEX  LATE PRECORDIAL R/S TRANSITION  BORDERLINE T ABNORMALITIES, ANTERIOR LEADS  Compared to ECG 2017 06:35:22  Ventricular premature complex(es) now present  T-wave abnormality now present  Ectopic atrial rhythm no longer present  ST (T wave) deviation no longer present    Electronically Sig [place] On 2017 7:14:38 PST by Moises Macias MD         Hemodynamics:  Temp (24hrs), Av.9 °C (98.4 °F), Min:36.8 °C (98.2 °F), Max:37.1 °C (98.8 °F)  Temperature: 37 °C (98.6 °F)  Pulse  Av.5  Min: 51  Max: 129Heart Rate (Monitored): 90  NIBP: (!) 84/40 mmHg    Respiratory:    Respiration: 20, Pulse Oximetry: 92 %, O2 Daily Delivery Respiratory : OxyMask     Given By:: Mouthpiece, PEP/CPT Method: Positive Airway Pressure Device, Given By:: Mouthpiece, Work Of Breathing / Effort: Mild  RUL Breath Sounds: Clear, RML Breath Sounds: Diminished, RLL Breath Sounds: Diminished, PRAMOD Breath Sounds: Clear, LLL Breath Sounds: Diminished  Fluids:    Intake/Output Summary (Last 24 hours) at 17 1048  Last data filed at 17 0600   Gross per 24 hour   Intake   1430 ml   Output   2650 ml   Net  -1220 ml     Weight: 62.3 kg (137 lb 5.6 oz)  GI/Nutrition:  Orders Placed This Encounter   Procedures   • DIET ORDER     Standing Status: Standing      Number of Occurrences: 1      Standing Expiration Date:      Order Specific Question:  Diet:     Answer:  Consistent Carbohydrate [4]     Order Specific Question:  Diet:     Answer:  Cardiac [6]     Medications:  Current Facility-Administered Medications   Medication Last Dose   • [START ON 2017] furosemide (LASIX) injection 60 mg     • sodium chloride (OCEAN) 0.65 % nasal spray 2 Spray     • acetaminophen (TYLENOL) tablet 650 mg 650 mg at 17 1135    Or   • acetaminophen (TYLENOL) suppository 650 mg     • warfarin (COUMADIN) tablet 5 mg 5 mg at 17 1818   • Respiratory Care per Protocol      • docusate sodium (COLACE) capsule 100 mg 100 mg at 02/06/17 0816    And   • senna-docusate (PERICOLACE or SENOKOT S) 8.6-50 MG per tablet 1 Tab Stopped at 02/03/17 2100    And   • senna-docusate (PERICOLACE or SENOKOT S) 8.6-50 MG per tablet 1 Tab 1 Tab at 02/03/17 1001    And   • lactulose 20 GM/30ML solution 30 mL      And   • bisacodyl (DULCOLAX) suppository 10 mg      And   • fleet enema 133 mL     • NS infusion     • aspirin EC (ECOTRIN) tablet 81 mg 81 mg at 02/06/17 0816   • MD ALERT... warfarin (COUMADIN) per pharmacy protocol     • oxycodone immediate-release (ROXICODONE) tablet 5 mg     • oxycodone immediate release (ROXICODONE) tablet 10 mg     • tramadol (ULTRAM) 50 MG tablet 50 mg 50 mg at 02/05/17 1928   • ondansetron (ZOFRAN) syringe/vial injection 4 mg 4 mg at 02/02/17 1941    Or   • prochlorperazine (COMPAZINE) injection 10 mg     • mag hydrox-al hydrox-simeth (MAALOX PLUS ES or MYLANTA DS) suspension 30 mL     • diphenhydrAMINE (BENADRYL) tablet/capsule 25 mg     • hydrocodone-acetaminophen (NORCO) 5-325 MG per tablet 1-2 Tab 1 Tab at 02/06/17 0923   • artificial tears 1.4 % ophthalmic solution 1 Drop     • glucose 4 g chewable tablet 16 g      And   • dextrose 50% (D50W) injection 25 mL     • multivitamin (THERAGRAN) tablet 1 Tab 1 Tab at 02/06/17 0817   • lactulose 20 GM/30ML solution 30 mL     • ipratropium-albuterol (DUONEB) nebulizer solution 3 mL 3 mL at 02/06/17 0546   • famotidine (PEPCID) tablet 20 mg 20 mg at 02/06/17 0817   • oyster shell calcium/vitamin D 250-125 MG-UNIT tablet 1 Tab 1 Tab at 02/06/17 0816   • pravastatin (PRAVACHOL) tablet 20 mg 20 mg at 02/05/17 1928     Medical Decision Making, by Problem:  Active Hospital Problems    Diagnosis   • Mitral regurgitation [I34.0]   • Respiratory failure with hypoxia (CMS-HCC) [J96.91]   • Hyperlipidemia [E78.5]   • Hypertension [I10]   • CAP (community acquired pneumonia) [J18.9]   • Bradycardia [R00.1]   • Hypotension [I95.9]   •  Normocytic anemia [D64.9]   • Pulmonary edema [J81.1]     Quality  Measures:    Frank catheter: No Frank      DVT Prophylaxis: Warfarin (Coumadin)    Ulcer prophylaxis: Yes        Plan:    1. Acute hypoxemic respiratory failure    Acute pulmonary edema d/t severe MR  Hypertensive Emergency  Demand ischemia  Prolonged Qtc    CAP  due to severe MR    Extubated on 1/31/2017 (Intubated 1/24, failed extubation on 1/28).  Echo: EF 75%, severe MR, RVSP 40  Cardiac cath: normal coronary arteries  CTA: no PE  KAREN: EF 65%, mod MR, no rupture of cord or tendinae  Renal duplex neg for stenosis  S/p ceftriaxone & doxy.  S/P radical mitral valve repair (P2 triangular resection, 34-mm Ferguson flexible annuloplasty band), left atrial appendage ligation and  intraoperative transesophageal echocardiography on 1/31/2017. On coumadin.  CT team on board, hold off on transfer today given her desaturation.   Will increase lasix to 60mg bid.     Volume overload  2 view CXR shows pleural effusions, thora performed 2/4/17 with 825cc removed. Another round of thora planned today.  Cont IV lasix and metolazone.    Chronic medical issues:   DLD: cont pravastatin  Chronic back/knee/neck pain: pain management  HTN: losartan held, diuretics as above.

## 2017-02-06 NOTE — CARE PLAN
Problem: Post Op Day 4 CABG/Heart Valve Replacement  Goal: Optimal care of the Post Op CABG/Heart Valve replacement Post Op Day 4  Intervention: Daily Weights  Every am  Intervention: Shower daily and clean incisions twice daily with soap and water  done  Intervention: Up in chair for all meals  Up in a chair for all meals  Intervention: Ambulate, increasing the distance each time x 3 and before bed  Ambulated, tolerates well  Intervention: IS q 1 hour while awake and record best IS volume  Done, needs coaching  Intervention: Consider removal of sanchez, chest tube and pacer wire if not already done  done  Intervention: Discharge Education  In progress

## 2017-02-06 NOTE — CARE PLAN
Problem: Nutritional:  Goal: Nutrition support tolerated and meeting greater than 85% of estimated needs  Outcome: MET Date Met:  02/06/17  PO diet started

## 2017-02-06 NOTE — PROGRESS NOTES
Pulmonary Critical Care Progress Note        Chief Complaint: Worsening dyspnea and acute hypoxic respiratory failure    Date of service: 2/6/17    History of Present Illness: The patient is a 66-year-old female with a past medical history significant for hypertension, hyperlipidemia, and mitral valve prolapse who was in her usual state of health on 1/24 when she noted a cough and sudden onset of shortness of breath.  She checked her oxygen saturation at home and found to be 85%. She presented to the emergency department at [facility] where unfortunately she decompensated quickly requiring up to 15 L facemask and then eventually requiring intubation. She was initially hypertensive and tachycardic with an oxygen saturation of 86% on room air at the outside hospital and was transported on high peak and expiratory pressures on the ventilator with an FIO2 of 100%, achieving saturations in the low 90s. She became progressively more hypotensive requiring a norepinephrine drip. A stat bedside echo was performed with Dr. Delarosa in the ER, which revealed a ruptured mitral valve leaflet with severe MR as the likely cause of her decompensation.     ROS: (+) SOB this morning with mild cough, no CP, abd pain, ambulates without difficulty     Interval Events:  24 hour interval history reviewed    - worsening O2 requirement this morning but improved on own later today   - increased WBC without fevers, decreasing Na    PFSH:  No change.    Respiratory:   4 lpm --> 15 lpm FM, IS dropped to 250mL  Pulse Oximetry: 91 %    Exam: unlabored respirations, no intercostal retractions or accessory muscle use and rhonchi bibasilar, left > right  ImagingCXR  I have personally reviewed the chest x-ray my impression is  increase in LLL atelectasis/infiltrates with stable R effusion     CTA chest 1/25:  1.  No evidence pulmonary emboli.  2.  Moderate right and small left pleural effusions.  3.  Compressive atelectasis both lower  lobes especially the left and left lung volume loss with mediastinal shift towards left.  4.  Patchy groundglass opacities within the right upper lobe consistent with focal areas of pneumonitis.  5.  7.5 mm left upper lobe nodule and smaller nodules within the lungs.        Invalid input(s): [id number]    HemoDynamics:  Pulse: 83, Heart Rate (Monitored): 83  NIBP: (!) 90/58 mmHg    Exam: regular rate and rhythm  Imaging: echo Reviewed           Neuro:  GCS Total [place] Coma Score: 15     Exam:non focal, AAO x 4  Imaging: Available data reviewed    Fluids:  Intake/Output       02/04/17 0700 - 02/05/17 0659 02/05/17 0700 - 02/06/17 0659 02/06/17 0700 - 02/07/17 0659      [id number] 9696-1389 Total 5011-4642 9309-0966 Total 6651-1336 3783-4518 Total       Intake    P.O.  980  340 1320  1320  550 1870  --  -- --    P.O.  7440 175 4068 -- -- --    Total Intake  9166 099 3151 -- -- --       Output    Urine  --  300 300  1300  1950 3250  --  -- --    Number of Times Voided 10 x 3 x 13 x 6 x 5 x 11 x -- -- --    Void (ml) --  1950 3250 -- -- --    Stool  --  -- --  --  -- --  --  -- --    Number of Times Stooled 5 x -- 5 x 2 x -- 2 x -- -- --    Total Output --  1950 3250 -- -- --       Net I/O     374 23 6468 20 -1400 -1380 -- -- --        Weight: 62.3 kg (137 lb 5.6 oz)  Recent Labs      02/04/17   0445  02/05/17   0455  02/06/17   0024   SODIUM  134*  135  129*   POTASSIUM  3.7  3.5*  3.8   CHLORIDE  102  98  94*   CO2  24  28  28   BUN  36*  25*  18   CREATININE  0.81  0.66  0.62   CALCIUM  8.5  8.3*  8.3*       GI/Nutrition:  Exam: abdomen is soft and non-tender, normal active bowel sounds  Imaging: Available data reviewed  Liver Function  Recent Labs      02/04/17   0445  02/05/17   0455  02/06/17   0024   GLUCOSE  104*  95  98       Heme:  Recent Labs      02/04/17 0445  02/05/17   0455  02/06/17   0024   RBC  2.90*  2.83*  2.81*  2.81*   HEMOGLOBIN  9.1*  8.8*   8.8*  8.8*   HEMATOCRIT  27.6*  26.4*  25.9*  25.9*   PLATELETCT  249  287  338  338   PROTHROMBTM  18.2*  20.6*   --    INR  1.46*  1.71*   --        Infectious Disease:  Temp  Av.9 °C (98.4 °F)  Min: 36.8 °C (98.2 °F)  Max: 37.1 °C (98.8 °F)  Micro: reviewed  Recent Labs      17   0445  17   0455  17   0024   WBC  10.6  11.1*  12.0*  12.0*   NEUTSPOLYS  80.60*  79.10*  76.80*   LYMPHOCYTES  11.10*  11.40*  12.40*   MONOCYTES  6.60  8.10  9.20   EOSINOPHILS  0.40  0.70  0.60   BASOPHILS  0.20  0.20  0.20     Current Facility-Administered Medications   Medication Dose Frequency Provider Last Rate Last Dose   • sodium chloride (OCEAN) 0.65 % nasal spray 2 Spray  2 Spray PRN Josr Al M.D.       • acetaminophen (TYLENOL) tablet 650 mg  650 mg Q4HRS PRN Josr Al M.D.   650 mg at 17 1135    Or   • acetaminophen (TYLENOL) suppository 650 mg  650 mg Q4HRS PRN Josr Al M.D.       • furosemide (LASIX) injection 40 mg  40 mg Q DAY Josr Al M.D.   40 mg at 17 0537   • warfarin (COUMADIN) tablet 5 mg  5 mg COUMADIN-DAILY Denilson Cortez PHARMD   5 mg at 17 1818   • Respiratory Care per Protocol   Continuous RT Hannah Sumner, A.P.N.       • docusate sodium (COLACE) capsule 100 mg  100 mg QAM Hannah Sumner, A.P.N.   Stopped at 17 0900    And   • senna-docusate (PERICOLACE or SENOKOT S) 8.6-50 MG per tablet 1 Tab  1 Tab Nightly Hannah Sumner, A.P.N.   Stopped at 17 2100    And   • senna-docusate (PERICOLACE or SENOKOT S) 8.6-50 MG per tablet 1 Tab  1 Tab Q24HRS PRN Hannah Sumner, A.P.N.   1 Tab at 17 1001    And   • lactulose 20 GM/30ML solution 30 mL  30 mL Q24HRS PRN Hannah Sumner, A.P.N.        And   • bisacodyl (DULCOLAX) suppository 10 mg  10 mg Q24HRS PRN Hannah Sumner, A.P.N.        And   • fleet enema 133 mL  1 Each Once PRN Hannah Sumner, A.P.N.       • NS infusion   Continuous Hannah Sumner, A.P.N. 10 mL/hr at 17 1950     •  aspirin EC (ECOTRIN) tablet 81 mg  81 mg DAILY Hannah Sumner A.P.N.   81 mg at 02/05/17 0804   • MD ALERT... warfarin (COUMADIN) per pharmacy protocol   PRN Hannah Sumner, A.P.N.       • oxycodone immediate-release (ROXICODONE) tablet 5 mg  5 mg Q3HRS PRN Hannah Sumner, A.P.N.       • oxycodone immediate release (ROXICODONE) tablet 10 mg  10 mg Q3HRS PRN Hannah Sumner, A.P.N.       • tramadol (ULTRAM) 50 MG tablet 50 mg  50 mg Q4HRS PRN Hannah Sumner, A.P.N.   50 mg at 02/05/17 1928   • ondansetron (ZOFRAN) syringe/vial injection 4 mg  4 mg Q6HRS PRN Hannah Sumner A.P.N.   4 mg at 02/02/17 1941    Or   • prochlorperazine (COMPAZINE) injection 10 mg  10 mg Q6HRS PRN Hannah Sumner, A.P.N.       • mag hydrox-al hydrox-simeth (MAALOX PLUS ES or MYLANTA DS) suspension 30 mL  30 mL Q4HRS PRN Hannah Sumner, A.P.N.       • diphenhydrAMINE (BENADRYL) tablet/capsule 25 mg  25 mg HS PRN - MR X 1 Hannah Sumner, A.P.N.       • hydrocodone-acetaminophen (NORCO) 5-325 MG per tablet 1-2 Tab  1-2 Tab Q4HRS PRN Hannah Sumner, A.P.N.   1 Tab at 02/05/17 0018   • artificial tears 1.4 % ophthalmic solution 1 Drop  1 Drop PRN Masoud Aguirre M.D.       • glucose 4 g chewable tablet 16 g  16 g Q15 MIN PRN James Pérez M.D.        And   • dextrose 50% (D50W) injection 25 mL  25 mL Q15 MIN PRN James Pérez M.D.       • multivitamin (THERAGRAN) tablet 1 Tab  1 Tab DAILY James Pérez M.D.   1 Tab at 02/05/17 0804   • lactulose 20 GM/30ML solution 30 mL  30 mL Q24HRS PRN Jeremy M Gonda, M.D.       • ipratropium-albuterol (DUONEB) nebulizer solution 3 mL  3 mL Q2HRS PRN (RT) Jeremy M Gonda, M.D.   3 mL at 02/06/17 0546   • famotidine (PEPCID) tablet 20 mg  20 mg Q12HRS Jeremy M Gonda, M.D.   20 mg at 02/05/17 1928   • oyster shell calcium/vitamin D 250-125 MG-UNIT tablet 1 Tab  1 Tab QDAY with Breakfast James Pérez M.D.   1 Tab at 02/05/17 0804   • pravastatin (PRAVACHOL) tablet 20 mg  20 mg Q EVENING Christo GARRIDO  Gonda, M.D.   20 mg at 02/05/17 1928     Last reviewed on 1/30/2017  8:02 PM by Purvi Montanez R.N.    Quality  Measures:  Medications reviewed, Labs reviewed and Radiology images reviewed  Frank catheter: No Frank      DVT Prophylaxis: Warfarin (Coumadin)  DVT prophylaxis - mechanical: SCDs  Ulcer prophylaxis: Yes    Assessed for rehab: Patient was assess for and/or received rehabilitation services during this hospitalization    Problems/Plan:  Acute Hypoxic Respiratory Failure - extubated 2/1   - RT/O2 therapies   - IS/PEP/Mobilize/IPV   - diurese as tolerated (scheduled iv lasix + metolazone)   - R thoracentesis  Acute Pulmonary Edema   - RT/O2 protocols   - diurese as tolerated  Acute Delirium   - improved  Tachycardia that ? A flutter   - presently sinus, off amio  Severe Mitral Valve Regurgitation    - cardiac cath on 1/25   - sp mv repair 1/31  Cardiogenic Shock likely related to severe mitral regurgitation   - s/p vasopressor/ionotropic therapy   - Cards following   - Cardiac cath on 1/25 was negative and heparin stopped   - CTA was negative  Acute Leukocytosis and concern for aspiration   - blood/sputum cultures sent and follow   - Ceftriaxone/Doxycycline 10 day course completed 2/3  Elevated Troponin   - cards following   - ASA  Hyperglycemia   - ISS  Hx of mitral valve prolapse  Hx of systemic arterial hypertension  Hx of Dyslipidemia   - statin therapy  Prophylaxis, diet, therapies    Discussed patient condition and risk of morbidity and/or mortality with Family, RN, RT, Pharmacy, , [employer] [facility] resident, Charge nurse / hot rounds and Patient   The patient remains critically ill.  Critical care time: 31 min. No time overlap.  [id number]

## 2017-02-07 ENCOUNTER — APPOINTMENT (OUTPATIENT)
Dept: RADIOLOGY | Facility: MEDICAL CENTER | Age: 67
DRG: 216 | End: 2017-02-07
Attending: NURSE PRACTITIONER
Payer: MEDICARE

## 2017-02-07 LAB
ANION GAP SERPL CALC-SCNC: 6 MMOL/L (ref 0–11.9)
BASOPHILS # BLD AUTO: 0.1 % (ref 0–1.8)
BASOPHILS # BLD: 0.01 K/UL (ref 0–0.12)
BUN SERPL-MCNC: 16 MG/DL (ref 8–22)
CALCIUM SERPL-MCNC: 8.3 MG/DL (ref 8.5–10.5)
CHLORIDE SERPL-SCNC: 94 MMOL/L (ref 96–112)
CO2 SERPL-SCNC: 31 MMOL/L (ref 20–33)
CREAT SERPL-MCNC: 0.64 MG/DL (ref 0.5–1.4)
EOSINOPHIL # BLD AUTO: 0.09 K/UL (ref 0–0.51)
EOSINOPHIL NFR BLD: 1.2 % (ref 0–6.9)
ERYTHROCYTE [DISTWIDTH] IN BLOOD BY AUTOMATED COUNT: 46.7 FL (ref 35.9–50)
GFR SERPL CREATININE-BSD FRML MDRD: >60 ML/MIN/1.73 M 2
GLUCOSE SERPL-MCNC: 93 MG/DL (ref 65–99)
HCT VFR BLD AUTO: 26.9 % (ref 37–47)
HGB BLD-MCNC: 8.8 G/DL (ref 12–16)
IMM GRANULOCYTES # BLD AUTO: 0.08 K/UL (ref 0–0.11)
IMM GRANULOCYTES NFR BLD AUTO: 1.1 % (ref 0–0.9)
INR PPP: 1.62 (ref 0.87–1.13)
LYMPHOCYTES # BLD AUTO: 1.25 K/UL (ref 1–4.8)
LYMPHOCYTES NFR BLD: 16.5 % (ref 22–41)
MCH RBC QN AUTO: 30.3 PG (ref 27–33)
MCHC RBC AUTO-ENTMCNC: 32.7 G/DL (ref 33.6–35)
MCV RBC AUTO: 92.8 FL (ref 81.4–97.8)
MONOCYTES # BLD AUTO: 0.77 K/UL (ref 0–0.85)
MONOCYTES NFR BLD AUTO: 10.1 % (ref 0–13.4)
NEUTROPHILS # BLD AUTO: 5.39 K/UL (ref 2–7.15)
NEUTROPHILS NFR BLD: 71 % (ref 44–72)
NRBC # BLD AUTO: 0 K/UL
NRBC BLD AUTO-RTO: 0 /100 WBC
PLATELET # BLD AUTO: 426 K/UL (ref 164–446)
PMV BLD AUTO: 10.4 FL (ref 9–12.9)
POTASSIUM SERPL-SCNC: 3.7 MMOL/L (ref 3.6–5.5)
PROTHROMBIN TIME: 19.7 SEC (ref 12–14.6)
RBC # BLD AUTO: 2.9 M/UL (ref 4.2–5.4)
SODIUM SERPL-SCNC: 131 MMOL/L (ref 135–145)
WBC # BLD AUTO: 7.6 K/UL (ref 4.8–10.8)

## 2017-02-07 PROCEDURE — 700102 HCHG RX REV CODE 250 W/ 637 OVERRIDE(OP): Performed by: NURSE PRACTITIONER

## 2017-02-07 PROCEDURE — 85610 PROTHROMBIN TIME: CPT

## 2017-02-07 PROCEDURE — 700102 HCHG RX REV CODE 250 W/ 637 OVERRIDE(OP): Performed by: INTERNAL MEDICINE

## 2017-02-07 PROCEDURE — 32555 ASPIRATE PLEURA W/ IMAGING: CPT | Mod: RT

## 2017-02-07 PROCEDURE — 700102 HCHG RX REV CODE 250 W/ 637 OVERRIDE(OP): Performed by: STUDENT IN AN ORGANIZED HEALTH CARE EDUCATION/TRAINING PROGRAM

## 2017-02-07 PROCEDURE — 0W993ZZ DRAINAGE OF RIGHT PLEURAL CAVITY, PERCUTANEOUS APPROACH: ICD-10-PCS | Performed by: RADIOLOGY

## 2017-02-07 PROCEDURE — 700112 HCHG RX REV CODE 229: Performed by: NURSE PRACTITIONER

## 2017-02-07 PROCEDURE — A9270 NON-COVERED ITEM OR SERVICE: HCPCS | Performed by: NURSE PRACTITIONER

## 2017-02-07 PROCEDURE — A9270 NON-COVERED ITEM OR SERVICE: HCPCS | Performed by: INTERNAL MEDICINE

## 2017-02-07 PROCEDURE — A9270 NON-COVERED ITEM OR SERVICE: HCPCS | Performed by: STUDENT IN AN ORGANIZED HEALTH CARE EDUCATION/TRAINING PROGRAM

## 2017-02-07 PROCEDURE — 97535 SELF CARE MNGMENT TRAINING: CPT

## 2017-02-07 PROCEDURE — 770020 HCHG ROOM/CARE - TELE (206)

## 2017-02-07 PROCEDURE — 80048 BASIC METABOLIC PNL TOTAL CA: CPT

## 2017-02-07 PROCEDURE — 700102 HCHG RX REV CODE 250 W/ 637 OVERRIDE(OP): Performed by: HOSPITALIST

## 2017-02-07 PROCEDURE — 85025 COMPLETE CBC W/AUTO DIFF WBC: CPT

## 2017-02-07 PROCEDURE — 71010 DX-CHEST-PORTABLE (1 VIEW): CPT

## 2017-02-07 PROCEDURE — 700111 HCHG RX REV CODE 636 W/ 250 OVERRIDE (IP): Performed by: HOSPITALIST

## 2017-02-07 PROCEDURE — 94669 MECHANICAL CHEST WALL OSCILL: CPT

## 2017-02-07 PROCEDURE — A9270 NON-COVERED ITEM OR SERVICE: HCPCS | Performed by: HOSPITALIST

## 2017-02-07 PROCEDURE — 99233 SBSQ HOSP IP/OBS HIGH 50: CPT | Mod: GC | Performed by: INTERNAL MEDICINE

## 2017-02-07 RX ADMIN — CALCIUM CARBONATE-CHOLECALCIFEROL TAB 250 MG-125 UNIT 1 TABLET: 250-125 TAB at 08:40

## 2017-02-07 RX ADMIN — ASPIRIN 81 MG: 81 TABLET ORAL at 08:40

## 2017-02-07 RX ADMIN — OXYCODONE HYDROCHLORIDE 10 MG: 10 TABLET ORAL at 08:42

## 2017-02-07 RX ADMIN — FUROSEMIDE 60 MG: 10 INJECTION, SOLUTION INTRAVENOUS at 08:41

## 2017-02-07 RX ADMIN — PRAVASTATIN SODIUM 20 MG: 20 TABLET ORAL at 21:49

## 2017-02-07 RX ADMIN — STANDARDIZED SENNA CONCENTRATE AND DOCUSATE SODIUM 1 TABLET: 8.6; 5 TABLET, FILM COATED ORAL at 21:00

## 2017-02-07 RX ADMIN — FAMOTIDINE 20 MG: 20 TABLET, FILM COATED ORAL at 08:41

## 2017-02-07 RX ADMIN — Medication 2 SPRAY: at 08:41

## 2017-02-07 RX ADMIN — FAMOTIDINE 20 MG: 20 TABLET, FILM COATED ORAL at 21:49

## 2017-02-07 RX ADMIN — THERA TABS 1 TABLET: TAB at 08:40

## 2017-02-07 RX ADMIN — DOCUSATE SODIUM 100 MG: 100 CAPSULE ORAL at 08:40

## 2017-02-07 ASSESSMENT — ENCOUNTER SYMPTOMS
CONSTITUTIONAL NEGATIVE: 1
CONSTIPATION: 0
VOMITING: 0
NAUSEA: 0
FOCAL WEAKNESS: 0
SHORTNESS OF BREATH: 0
RESPIRATORY NEGATIVE: 1
MUSCULOSKELETAL NEGATIVE: 1
GASTROINTESTINAL NEGATIVE: 1
DIARRHEA: 0
ABDOMINAL PAIN: 0
EYES NEGATIVE: 1
CARDIOVASCULAR NEGATIVE: 1
PSYCHIATRIC NEGATIVE: 1
NEUROLOGICAL NEGATIVE: 1
FEVER: 0
CHILLS: 0
SENSORY CHANGE: 0
PALPITATIONS: 0
WHEEZING: 0

## 2017-02-07 ASSESSMENT — PAIN SCALES - GENERAL
PAINLEVEL_OUTOF10: 0
PAINLEVEL_OUTOF10: 3

## 2017-02-07 ASSESSMENT — PATIENT HEALTH QUESTIONNAIRE - PHQ9
2. FEELING DOWN, DEPRESSED, IRRITABLE, OR HOPELESS: NOT AT ALL
SUM OF ALL RESPONSES TO PHQ9 QUESTIONS 1 AND 2: 0
1. LITTLE INTEREST OR PLEASURE IN DOING THINGS: NOT AT ALL
SUM OF ALL RESPONSES TO PHQ QUESTIONS 1-9: 0

## 2017-02-07 NOTE — PROGRESS NOTES
Inpatient Anticoagulation Service Note    Date: 2017  Reason for Anticoagulation: Mitral Valve Repair        Hemoglobin Value: 8.8  Hematocrit Value: 26.9  Lab Platelet Value: 426  Target INR: 2.0 to 3.0    INR from last 7 days     Date/Time INR Value    17 0500 (!)1.62    17 1145 (!)1.69    17 0455 (!)1.71    17 0445 (!)1.46    17 0600 (!)1.4    17 0210 (!)1.26    17 0545 (!)1.25    17 1725 (!)1.38        Dose from last 7 days     Date/Time Dose (mg)    17 0500 7.5    17 1145 0    17 0455 5    17 1400 5    17 1300 5    17 1300 5    17 1300 5        Average Dose (mg):  (new start)  Significant Interactions: Aspirin, Statin  Bridge Therapy: No    Comments: Warfarin held yesterday in anticipation of thoracentesis today.  The patient had 400 mL fluid removed during the procedure today.  H/H is stable with no notation of bleeding.  Resume warfarin tonight with a 7.5 mg daily dose.    Plan:  INR with morning labs  Education Material Provided?: Yes (AET 17)  Pharmacist suggested discharge dosin.5 mg daily with follow up in 1 to 2 days.     Fabi Schreiber

## 2017-02-07 NOTE — FLOWSHEET NOTE
02/06/17 0643   Events/Summary/Plan   Events/Summary/Plan IPV PEP. Found Pt to be on 15L oxymask Ronchi heard in upper and middle lobes. IPV tx done .    Interdisciplinary Plan of Care-Goals (Indications)   Obstructive Ventilatory Defect or Pulmonary Disease without Obvious Obstruction History / Diagnosis   Hyperinflation Protocol Indications Restrictive Lung Disorder / Consolidation;Pre or Post-op Abdominal, Thoracic or Orthopedic Surgery   Interdisciplinary Plan of Care-Outcomes    Hyperinflation Protocol Goals/Outcome Increased Vital Capacity or Return to Pre-operative Values;Stable Vital Capacity x24 hrs and Patient Understands / uses I.S.   SVN Group   #SVN Performed Yes   Given By: Mouthpiece   PEP/CPT Group   PEP/CPT/Airway Clearance Therapy Yes   #PEP/CPT (Manual) Subsequent Subsequent   #CPT (Mechanical) Yes   PEP/CPT Method Positive Airway Pressure Device   CPT Settings Yes   Pressure 15   IPV Group   #IPV Performed Yes   Operational Pressure 35-40 psig (Adult)   Given By: Mouthpiece   All Arrows Up or 12 O'Clock to Start Yes   Frequency 3 O'Clock Position;9 O'Clock Position;12 O'Clock Position   Inspiratory Flow Adjusted to achieve chest wiggle   Respiratory WDL   Respiratory (WDL) X   Chest Exam   Work Of Breathing / Effort Mild   Breath Sounds   RUL Breath Sounds Clear   RML Breath Sounds Coarse Crackles   RLL Breath Sounds Diminished   PRAMOD Breath Sounds Coarse Crackles   LLL Breath Sounds Diminished   Oxygen   O2 (LPM) 10   O2 Daily Delivery Respiratory  OxyMask

## 2017-02-07 NOTE — PROGRESS NOTES
[facility] Progress Note               Author: Lj Kuluis Date & Time created: 2/7/2017  9:56 AM     Interval History:  Pt reports that she has been having ear fullness since the time of her surgery in both ears. Pt also reports that she has been having clear nasal discharge with occasional blood when she blows her nose. Pt reports sleeping well, has been mobile and denies any cardiac or respiratory symptoms.      Review of Systems   Constitutional: Negative for fever and chills.   HENT:        Ear fullness and nasal congestion with clear drainage.   Respiratory: Negative for shortness of breath and wheezing.    Cardiovascular: Negative for chest pain and palpitations.        Surgical scar healing well.   Gastrointestinal: Negative for nausea, vomiting, abdominal pain, diarrhea and constipation.   Neurological: Negative for sensory change and focal weakness.         Physical Exam   Constitutional: She is oriented to person, place, and time. No distress.   HENT:   Right Ear: External ear normal.   Left Ear: External ear normal.   Minimal ear wax noted in R ear, otherwise normal bilaterally and TM visible.   Cardiovascular: Normal rate and regular rhythm.    Systolic click noted.   Pulmonary/Chest: Breath sounds normal. No respiratory distress. She has no wheezes.   On O2 via NC.   Abdominal: Soft. Bowel sounds are normal.   Neurological: She is alert and oriented to person, place, and time.   Skin: Skin is warm and dry.       Labs:  Recent Results (from the past 24 hour(s))   PROTHROMBIN TIME    Collection Time: 02/06/17 11:45 AM   Result Value Ref Range    PT 20.4 (H) 12.0 - 14.6 sec    INR 1.69 (H) 0.87 - 1.13   Basic Metabolic Panel (BMP) Critical Care 0130    Collection Time: 02/07/17  5:00 AM   Result Value Ref Range    Sodium 131 (L) 135 - 145 mmol/L    Potassium 3.7 3.6 - 5.5 mmol/L    Chloride 94 (L) 96 - 112 mmol/L    Co2 31 20 - 33 mmol/L    Glucose 93 65 - 99 mg/dL    Bun 16 8 - 22 mg/dL    Creatinine  0.64 0.50 - 1.40 mg/dL    Calcium 8.3 (L) 8.5 - 10.5 mg/dL    Anion Gap 6.0 0.0 - 11.9   CBC with Differential    Collection Time: 17  5:00 AM   Result Value Ref Range    WBC 7.6 4.8 - 10.8 K/uL    RBC 2.90 (L) 4.20 - 5.40 M/uL    Hemoglobin 8.8 (L) 12.0 - 16.0 g/dL    Hematocrit 26.9 (L) 37.0 - 47.0 %    MCV 92.8 81.4 - 97.8 fL    MCH 30.3 27.0 - 33.0 pg    MCHC 32.7 (L) 33.6 - 35.0 g/dL    RDW 46.7 35.9 - 50.0 fL    Platelet Count 426 164 - 446 K/uL    MPV 10.4 9.0 - 12.9 fL    Neutrophils-Polys 71.00 44.00 - 72.00 %    Lymphocytes 16.50 (L) 22.00 - 41.00 %    Monocytes 10.10 0.00 - 13.40 %    Eosinophils 1.20 0.00 - 6.90 %    Basophils 0.10 0.00 - 1.80 %    Immature Granulocytes 1.10 (H) 0.00 - 0.90 %    Nucleated RBC 0.00 /100 WBC    Neutrophils (Absolute) 5.39 2.00 - 7.15 K/uL    Lymphs (Absolute) 1.25 1.00 - 4.80 K/uL    Monos (Absolute) 0.77 0.00 - 0.85 K/uL    Eos (Absolute) 0.09 0.00 - 0.51 K/uL    Baso (Absolute) 0.01 0.00 - 0.12 K/uL    Immature Granulocytes (abs) 0.08 0.00 - 0.11 K/uL    NRBC (Absolute) 0.00 K/uL   PROTHROMBIN TIME    Collection Time: 17  5:00 AM   Result Value Ref Range    PT 19.7 (H) 12.0 - 14.6 sec    INR 1.62 (H) 0.87 - 1.13   ESTIMATED GFR    Collection Time: 17  5:00 AM   Result Value Ref Range    GFR If African American >60 >60 mL/min/1.73 m 2    GFR If Non African American >60 >60 mL/min/1.73 m 2       Hemodynamics:  Temp (24hrs), Av.9 °C (98.5 °F), Min:36.8 °C (98.2 °F), Max:37.1 °C (98.7 °F)  Temperature: 36.8 °C (98.2 °F)  Pulse  Av.4  Min: 51  Max: 129Heart Rate (Monitored): 87  NIBP: (!) 93/59 mmHg    Respiratory:    Respiration: 19, Pulse Oximetry: 96 %, O2 Daily Delivery Respiratory : Silicone Nasal Cannula     Given By:: Mouthpiece, PEP/CPT Method: Positive Airway Pressure Device, Given By:: Mouthpiece, Work Of Breathing / Effort: Mild  RUL Breath Sounds: Diminished, RML Breath Sounds: Diminished, RLL Breath Sounds: Diminished, PRAMOD Breath  Sounds: Diminished, LLL Breath Sounds: Diminished  Fluids:    Intake/Output Summary (Last 24 hours) at 02/07/17 0956  Last data filed at 02/07/17 0600   Gross per 24 hour   Intake    930 ml   Output   2250 ml   Net  -1320 ml     Weight: 62.8 kg (138 lb 7.2 oz)  GI/Nutrition:  Orders Placed This Encounter   Procedures   • DIET ORDER     Standing Status: Standing      Number of Occurrences: 1      Standing Expiration Date:      Order Specific Question:  Diet:     Answer:  Consistent Carbohydrate [4]     Order Specific Question:  Diet:     Answer:  Cardiac [6]     Medications:  Current Facility-Administered Medications   Medication Last Dose   • furosemide (LASIX) injection 60 mg 60 mg at 02/07/17 0841   • warfarin (COUMADIN) tablet 7.5 mg Stopped at 02/06/17 1800   • sodium chloride (OCEAN) 0.65 % nasal spray 2 Spray 2 Spray at 02/07/17 0841   • acetaminophen (TYLENOL) tablet 650 mg 650 mg at 02/04/17 1135    Or   • acetaminophen (TYLENOL) suppository 650 mg     • Respiratory Care per Protocol     • docusate sodium (COLACE) capsule 100 mg 100 mg at 02/07/17 0840    And   • senna-docusate (PERICOLACE or SENOKOT S) 8.6-50 MG per tablet 1 Tab Stopped at 02/03/17 2100    And   • senna-docusate (PERICOLACE or SENOKOT S) 8.6-50 MG per tablet 1 Tab 1 Tab at 02/03/17 1001    And   • lactulose 20 GM/30ML solution 30 mL      And   • bisacodyl (DULCOLAX) suppository 10 mg      And   • fleet enema 133 mL     • NS infusion     • aspirin EC (ECOTRIN) tablet 81 mg 81 mg at 02/07/17 0840   • MD ALERT... warfarin (COUMADIN) per pharmacy protocol     • oxycodone immediate-release (ROXICODONE) tablet 5 mg     • oxycodone immediate release (ROXICODONE) tablet 10 mg 10 mg at 02/07/17 0842   • tramadol (ULTRAM) 50 MG tablet 50 mg 50 mg at 02/06/17 2156   • ondansetron (ZOFRAN) syringe/vial injection 4 mg 4 mg at 02/02/17 1941    Or   • prochlorperazine (COMPAZINE) injection 10 mg     • mag hydrox-al hydrox-simeth (MAALOX PLUS ES or MYLANTA  DS) suspension 30 mL     • diphenhydrAMINE (BENADRYL) tablet/capsule 25 mg     • hydrocodone-acetaminophen (NORCO) 5-325 MG per tablet 1-2 Tab 1 Tab at 02/06/17 1545   • artificial tears 1.4 % ophthalmic solution 1 Drop     • glucose 4 g chewable tablet 16 g      And   • dextrose 50% (D50W) injection 25 mL     • multivitamin (THERAGRAN) tablet 1 Tab 1 Tab at 02/07/17 0840   • lactulose 20 GM/30ML solution 30 mL     • ipratropium-albuterol (DUONEB) nebulizer solution 3 mL 3 mL at 02/06/17 0546   • famotidine (PEPCID) tablet 20 mg 20 mg at 02/07/17 0841   • oyster shell calcium/vitamin D 250-125 MG-UNIT tablet 1 Tab 1 Tab at 02/07/17 0840   • pravastatin (PRAVACHOL) tablet 20 mg 20 mg at 02/06/17 2156     Medical Decision Making, by Problem:  Active Hospital Problems    Diagnosis   • Mitral regurgitation [I34.0]   • Respiratory failure with hypoxia (CMS-HCC) [J96.91]   • Hyperlipidemia [E78.5]   • Hypertension [I10]   • CAP (community acquired pneumonia) [J18.9]   • Bradycardia [R00.1]   • Hypotension [I95.9]   • Normocytic anemia [D64.9]   • Pulmonary edema [J81.1]     Quality  Measures:    Frank catheter: No Frank      DVT Prophylaxis: Warfarin (Coumadin)    Ulcer prophylaxis: Yes        Plan:    1. Acute hypoxemic respiratory failure    Acute pulmonary edema d/t severe MR  Hypertensive Emergency  Demand ischemia  Prolonged Qtc    CAP  due to severe MR    Extubated on 1/31/2017 (Intubated 1/24, failed extubation on 1/28).  Echo: EF 75%, severe MR, RVSP 40  Cardiac cath: normal coronary arteries  CTA: no PE  KAREN: EF 65%, mod MR, no rupture of cord or tendinae  Renal duplex neg for stenosis  S/p ceftriaxone & doxy.  S/P radical mitral valve repair (P2 triangular resection, 34-mm Ferguson flexible annuloplasty band), left atrial appendage ligation and  intraoperative transesophageal echocardiography on 1/31/2017. On coumadin.  Lasix to 60mg bid.   CT team on board, likely transfer to rehab this afternoon after her L  thoracentesis.      2. Volume overload  2 view CXR shows pleural effusions, thora performed 2/4/17 with 825cc removed. R sided thoracentesis done today (2/7/17) with 400 cc removed. Pocket of fluid noted on L side and thoracentesis to be done this afternoon and patient to be discharged to rehab after the procedure.  Cont IV lasix and metolazone.    3. Ear fullness and nasal discharge  - Reports that these symptoms started since the time of surgery.   - Normal ear exam.  - Discussed with RN regarding a nasal rinse.     Chronic medical issues:   4. DLD: cont pravastatin  5. Chronic back/knee/neck pain: pain management  6. HTN: losartan held, diuretics as above.

## 2017-02-07 NOTE — DISCHARGE PLANNING
PAS completed sent to Dr. Camacho for review and consideration for IRF. Anticipate medical clearance 02/08/2017 Don SLOANE is aware.

## 2017-02-07 NOTE — PREADMISSION SCREENING NOTE
Pre-Admission Screening Form    Patient Information:   Name: Kristen Salazar     MRN: [id number]       : 1950      Age: 66 y.o.   Gender: female      Race: White [7]       Marital Status:  [2]  Family Contact: Eliud De Anda        Relationship: Spouse [17]  Home Phone: [phone]           Cell Phone:   Advanced Directives: Copy in Chart  Code Status:  FULL  Current Attending Provider: Priyanka Atkins, *  Referring Physician: Dr.Barakat DOWD        Physiatrist Consult: Dr. Maicol Vizcaino       Referral Date: 2017  Primary Payor Source:  MEDICARE  Secondary Payor Source:      Medical Information:   Date of Admission to Acute Care [place]2017  Room Number: T615/00  Rehabilitation Diagnosis: 09 Cardiac  @IMM@  Allergies   Allergen Reactions   • Codeine      vomiting   • Latex      Past Medical History   Diagnosis Date   • Allergy    • Arthritis    • Osteoporosis    • Heart murmur    • Fracture      Past Surgical History   Procedure Laterality Date   • Knee arthroscopy     • Mitral valve repair  2017     Procedure: MITRAL VALVE REPAIR;  Surgeon: Chris Schmitt M.D.;  Location: SURGERY [facility];  Service:    • Bryant  2017     Procedure: BRYANT;  Surgeon: Chris Schmitt M.D.;  Location: SURGERY [facility];  Service:        History Leading to Admission, Conditions that Caused the Need for Rehab (CMS):     Jeremy M Gonda, M.D. Physician Signed  H&P 2017 11:34 PM      Expand All Collapse All    CHIEF COMPLAINT:  Shortness of breath.     HISTORY OF PRESENT ILLNESS:  The patient is a 66-year-old female with a past    medical history significant for hypertension, hyperlipidemia, and mitral valve   prolapse who was in her usual state of health yesterday and in fact when    skiing all day with her .  This morning, she reported to her     that she was not feeling well with a nonproductive cough and some mild    shortness of breath.  She checked her oxygen  saturation at home and found to    be 85%.  She presented to the emergency department at [facility]    where unfortunately she decompensated quickly requiring up to 15 L facemask    and then eventually requiring intubation.  She was initially hypertensive and    tachycardic with an oxygen saturation of 86% on room air at the outside    hospital and was transported on high peak and expiratory pressures on the    ventilator with an FIO2 of 100%, achieving saturations in the low 90s.  She    became progressively more hypotensive at this point and has been started on a    norepinephrine drip.  A stat bedside echo was performed with Dr. Delarosa, which   revealed a ruptured mitral valve leaflet with severe MR as the likely cause    of her decompensation.  She does have an elevated white count and temperature    up to 99 degrees and was started on empiric antibiotics in the meantime.  Per    her , she has not had any recent sick contacts and has been compliant    with her medications and was not having any chest pain or syncope or    palpitations.  Also no lower extremity edema.        ASSESSMENT:  1.  Acute hypoxemic respiratory failure requiring mechanical ventilatory    support.  2.  Diffuse acute cardiogenic pulmonary edema with severe A-a gradient.  3.  Mitral valve prolapse with ruptured leaflets and resultant severe mitral    regurgitation.  4.  Undifferentiated shock requiring vasopressor support.  5.  Partially compensated metabolic acidosis.  6.  Leukocytosis, likely reactive, although will cover with empiric    antibiotics.  7.  Hyponatremia.  8.  Hypomagnesemia and hypocalcemia.  9.  Elevated troponin likely secondary to demand ischemia.  10.  Elevated D-dimer as an inflammatory marker.  Doubt pulmonary    embolism/deep venous thrombosis with a Wells score of 0.     PLAN:  Patient is critically ill at this time and will be admitted to the    intensive care unit for close observation and  management.  She will be    continued on full mechanical ventilatory support with attempts to wean her    inspired oxygen fraction to keep saturations greater than 92%.  We will    increase her mandatory minute ventilation to help compensate for her metabolic   acidosis and maintain higher peak and expiratory pressures given her    pulmonary edema.  She will be kept on a norepinephrine support to maintain a    mean arterial pressure greater than 65 and may need the addition of low dose    Lasix drip to help diurese her pulmonary edema.  Cardiology is involved in her   case and we appreciate their assistance and will need to involve    cardiothoracic surgery as well for possible operative intervention of her    mitral valve.  She will be continued on empiric antibiotics at this time    including Rocephin and doxycycline given her prolonged QTC interval and her    calcium and magnesium will be replaced and her other electrolytes will be    monitored closely.  A Cortrak will be placed to initiate enteral nutrition.     She will be kept on DVT and GI prophylaxis per ICU protocol.  This assessment    and plan was discussed with the patient's  at bedside, the nurse,    respiratory therapy, and Dr. Riggins, as well as Dr. Delarosa and the [facility]    resident.  Patient is critically ill at this time and we appreciate the    opportunity to assist in her care and will continue to follow along closely    with you.     Critical care time 45 minutes.  No time overlap.  Procedures are not included    in this time.     [id number].        ____________________________________     Jeremy Gonda, MD Thomas N Truong, D.O. Physician Signed  Procedures 1/25/2017  3:41 PM      Expand All Collapse All    DATE OF SERVICE:  01/25/2017     INDICATION:  1.  Non-STEMI.  2.  Right-sided heart failure.  3.  Known history of moderate to severe MR.     BRIEF SUMMARY:  The patient is a pleasant 66-year-old female with history of    moderate to  severe mitral valve regurgitation that has been followed carefully   by Dr. Landin ([facility]) for the past several years.  Last    echocardiogram was done possibly last in 6 months ago with stable moderate to    severe mitral valve regurgitation.  The patient is transferred [place] [facility] [place] [facility] [place] with acute shortness of breath.    Per record, patient's  indicated that she is not feeling well with    nonproductive cough and mild shortness of breath.  Saturation was in the 80s.     By the time she is in [facility] she is quickly decompensated    with requirement of 15 liters of face mask.  Patient eventually was intubated    and transferred to [facility] for further evaluation.  Echocardiogram was    obtained, and evaluated by Dr. Delarosa.           PROCEDURE:  1.  Left heart catheterization with LV gram.  2.  Selective right and left coronary angiography.        RECOMMENDATION:  Apparently, normal coronary arteries.  I have discussed the    case with Dr. Schmitt (thoracic surgeon) and updated him on regarding to her    coronary situation.  Dr. Schmitt has reviewed echocardiogram, which appeared    to be more of moderate to severe MR and chronically origin.  He indicated that   patient does not need emergent surgery at this time, which I agree.  Plan for   CT chest to rule out PE given her elevated D-dimer and also the troponin.  In   addition, patient would benefit a KAREN for further assessment above.      Lesley Atkins M.D. Physician Signed  Procedures 1/28/2017  1:12 PM     Pre-procedure Diagnoses     Acute respiratory failure with hypoxia (CMS-HCC) [J96.01]         Post-procedure Diagnoses     Acute respiratory failure with hypoxia (CMS-HCC) [J96.01]         Procedures     PROCEDURE INTUBATION [PRO89 (Custom)]          Expand All Collapse All    Procedure Note    Date: 1/28/2017  Time: 0930    Procedure: Intubation    Indication: Acute  hypoxicrespiratory failure, Unresponsive  Consent: Informed consent obtained from patient or designated decision maker after explaining the benefits/risks of the procedure including but not limited to airway/dental trauma, hypoxia/hypercarbia, bleeding, aspiration/infection, vascular/nerve or other deep structure injury, arrythmia, or death. Patient or surrogate expressed understanding and agreement and signed consent which can be found in the patient's chart.    Procedure: After obtaining consent, a time-out was performed. Airway assessed and patient found to have Mallampati class 3.  Equipment prepared and RT/RN at bedside. Patient pre-oxygenated with 100% FiO2.  After medication delivery, a Glidescope was used to acheive direct visualization and a grade A view. A 7.5 ETT was placed with 1 attempt(s) into the trachea directly through the vocal cords. Appropriate placement confirmed by direct visualization, bilateral chest and epigastric auscultation, misting in the ETT, and ETCO2 detector. ETT secured in place and patient connected to ventilator. Sedation/analgesia continued as appropriate. Patient tolerated procedure well without any difficulties and remains in care of bedside nurse and respiratory therapy. CXR will be performed to confirm appropriate placement of ETT.    Medications: Etomidate 20mg IV and Rocuronium 50mg IV  Complications: None  CXR: ET tube at 3cm above the anmol with acute bilateral pulmonary edema    Lesley Atkins MD  Pulmonary and Critical Care Medicine          Chris Schmitt M.D. Physician Signed  OP Report 1/31/2017  5:00 PM      Expand All Collapse All    DATE OF SERVICE:  01/31/2017     REFERRING PHYSICIAN:  Kurtis Ramirez DO     PREOPERATIVE DIAGNOSES:  Acute respiratory failure, severe mitral    regurgitation (3-4+, degenerative), severe mitral valve prolapse (P2),    hypertension, dyslipidemia, history of subdural hematoma.     POSTOPERATIVE DIAGNOSES:  Acute respiratory  failure, severe mitral    regurgitation (3-4+, degenerative), severe mitral valve prolapse (P2),    hypertension, dyslipidemia, history of subdural hematoma.     PROCEDURE PERFORMED:  Radical mitral valve repair (P2 triangular resection,    34-mm Ferguson flexible annuloplasty band), left atrial appendage ligation and    intraoperative transesophageal echocardiography.     SURGEON:  MD Blade Cao M.D. Physician Unsigned Transcription  Consults 2/6/2017  2:46 PM      Expand All Collapse All    DATE OF SERVICE:  02/06/2017     REASON FOR CONSULTATION:  To address debility secondary to cardiopulmonary    debility.     HISTORY OF PRESENT ILLNESS:  This is a previously healthy independent    66-year-old female that was admitted to [facility] on    01/24/2017 for progressive respiratory insufficiency.  Patient went on to    require intubation and mechanical ventilation was treated in critical care.     She was evaluated by cardiology and was found to have a non-ST myocardial    infarction with right-sided heart failure.  Once she was medically stabilized,   she underwent the mitral valve repair on 01/31/2017 per Dr. Chris Schmitt    without any intraoperative or postoperative surgical complications.     Postoperatively, she was continued to be monitored in the telemetry unit.  She   currently has some ongoing pleural effusion and was pending a possible    thoracentesis at this time.  Continue monitoring for pulmonary status with    titrating the FiO2 as indicated.     She was successfully extubated off of the ventilator on 01/31/2017.  A    followup cardiac workup included an ejection fraction of 75.  She did found to   have severe mitral regurg, resulting in above surgery.  She underwent a    radical mitral valve repair as above and was placed on Coumadin    postoperatively.  Continue to be monitored for the pleural effusion with    thoracentesis pending at this time.   She had one initially on January 4th that   pulled off 825 mL, and removed.  Continue to monitor for further    thoracentesis today.  She was continued on IV Lasix and metolazone in the    interim.     Therapies have been initiated, at the time of this consult, she was requiring    contact-guard assist with her occupational therapy, physical therapy _____    requiring min assist with her transfers, gait had been initiated with min    assist with a front-wheel walker.  Speech and language was working with her    now that she had been extubated to check her swallowing, continue to follow up   on her cognitive recovery.     Rehabilitation consult to address her rehab needs, rehab options, coordination   with further discharge planning.     Patient is FULL CODE.     ALLERGIES:  INCLUDE CODEINE AND LATEX.      PHYSICAL EXAMINATION:  GENERAL:  The patient is sitting upright in a chair.  She is alert and    responsive.  She is working on her paperwork.  Her cognition is intact.  She    is wearing a nasal cannula.  VITAL SIGNS:  Temperature is 37.9, blood pressure is 98/61, pulse of 80,    respiratory rate around 18.  She is on a monitor, which shows a blood pressure   reading of 112/62 at this time.  She is approximately 5 feet 7 inches, 157    pounds.  HEENT:  She is normocephalic and atraumatic.  Pupils are equal and reactive to   light.  Extraocular muscles are intact.  Visual fields are full, visual    acuity is fair.  She has got a nasal cannula.  Her tongue is midline.  Dry    mucous membranes.  Fair dentition.  NECK:  Supple.  No JVD, thyromegaly or adenopathy appreciated.  HEART:  Regular rate and rhythm without any murmur or gallop, slight systolic    click noted.  LUNGS:  Decreased.  She does have some bilateral crackles.  ABDOMEN:  Soft, nontender, and nondistended.  EXTREMITIES:  Without any contractures.  NEUROLOGIC:  Her cognition is intact.  Her cranial nerves are intact.  She has   got good symmetrical  motor tone, power, and bulk.  No focal deficits.     Sternal incision is clean, dry, and intact.  Skin is otherwise intact.     IMPRESSION:  1.  Cardiac debility secondary to status post mitral regurgitation, status    post mitral valve repair on January 31st.  Continue to follow for wound care    recovery and cardiac surgery.  Follow with Dr. Schmitt as an outpatient.  2.  Status post acute respiratory failure with hypoxia, continue to be    monitored for the resolving community-acquired pneumonia and respiratory    failure, possible thoracentesis pending at this time.  Continue to follow up    on the resolution of pleural effusion.  3.  Hypertension, continue to be monitored.  4.  Normocytic anemia, continue to be monitored.     REHAB DIAGNOSES:  1.  Cardiopulmonary debility, anticipate need for acute rehab based on her    medical complexity with cardiopulmonary status.  The case was reviewed with    rehab admissions.  We will follow up with thoracentesis that has been    completed, medical and surgical workup was completed while on acute.  2.  Deep venous thrombosis prophylaxis, continue the Coumadin.  3.  Bowel and bladder management, continue to be addressed with some rehab.  4.  Disposition:  We will continue advancing therapies on acute, once cleared    from the medical and surgical standpoint, we will follow up for acute rehab    admission to maximize her medical stability and functional recovery prior to    returning home with her  up in the [place] area.  I anticipate at least   14-21 days on acute rehab to maximize the patient's medical stability and    functional recovery prior to returning to [place].     Thank you for allowing me to participate in this patient's care.     This is Dr. Blade Vizcaino in coverage for [facility].        ____________________________________     MD ABAD PAK / JANET     DD:  02/06/2017 14:46:09        Lj Philip M.D. Resident [facility]  Needed  Progress Notes 2/7/2017 10:05 AM      Expand All Collapse All    [facility] Progress Note        Quality  Measures:    Frank catheter: No Frank  DVT Prophylaxis: Warfarin (Coumadin)  Ulcer prophylaxis: Yes    Plan:    1. Acute hypoxemic respiratory failure    Acute pulmonary edema d/t severe MR  Hypertensive Emergency  Demand ischemia  Prolonged Qtc    CAP  due to severe MR    Extubated on 1/31/2017 (Intubated 1/24, failed extubation on 1/28).  Echo: EF 75%, severe MR, RVSP 40  Cardiac cath: normal coronary arteries  CTA: no PE  KAREN: EF 65%, mod MR, no rupture of cord or tendinae  Renal duplex neg for stenosis  S/p ceftriaxone & doxy.  S/P radical mitral valve repair (P2 triangular resection, 34-mm Ferguson flexible annuloplasty band), left atrial appendage ligation and  intraoperative transesophageal echocardiography on 1/31/2017. On coumadin.  Lasix to 60mg bid.   CT team on board, likely transfer to rehab this afternoon after her L thoracentesis.      2. Volume overload  2 view CXR shows pleural effusions, thora performed 2/4/17 with 825cc removed. R sided thoracentesis done today (2/7/17) with 400 cc removed. Pocket of fluid noted on L side and thoracentesis to be done this afternoon and patient to be discharged to rehab after the procedure.  Cont IV lasix and metolazone.    3. Ear fullness and nasal discharge  - Reports that these symptoms started since the time of surgery.    - Normal ear exam.  - Discussed with RN regarding a nasal rinse.      Chronic medical issues:   4. DLD: cont pravastatin  5. Chronic back/knee/neck pain: pain management  6. HTN: losartan held, diuretics as above.                          Fabi Schreiber, PHARMD Pharmacist Signed  Progress Notes 2/7/2017  1:35 PM      Expand All Collapse All    Inpatient Anticoagulation Service Note    Date: 2/7/2017  Reason for Anticoagulation: Mitral Valve Repair         Hemoglobin Value: 8.8  Hematocrit Value: 26.9  Lab Platelet Value: 426  Target  INR: 2.0 to 3.0      INR from last 7 days       Date/Time  INR Value      17 0500  (!)1.62      17 1145  (!)1.69      17 0455  (!)1.71      17 0445  (!)1.46      17 0600  (!)1.4      17 0210  (!)1.26      17 0545  (!)1.25      17 1725  (!)1.38            Dose from last 7 days       Date/Time  Dose (mg)      17 0500  7.5      17 1145  0      17 0455  5      17 1400  5      17 1300  5      17 1300  5      17 1300  5          Average Dose (mg):  (new start)  Significant Interactions: Aspirin, Statin  Bridge Therapy: No    Comments: Warfarin held yesterday in anticipation of thoracentesis today.  The patient had 400 mL fluid removed during the procedure today.  H/H is stable with no notation of bleeding.  Resume warfarin tonight with a 7.5 mg daily dose.    Plan:  INR with morning labs  Education Material Provided?: Yes (AET 17)  Pharmacist suggested discharge dosin.5 mg daily with follow up in 1 to 2 days.     Fabi Schreiber                                                Co-morbidities: as listed above and below  Potential Risk - Complications: Deep Vein Thrombosis, Pain, Pneumonia and cardiac precautions respiratory risk infection  anticoagulation wound risk   Level of Risk: High    Ongoing Medical Management Needed (Medical/Nursing Needs):   Patient Active Problem List    Diagnosis Date Noted   • Mitral regurgitation 2017   • Respiratory failure with hypoxia (CMS-HCC) 2017   • Hyperlipidemia 2017   • Hypertension 2017   • CAP (community acquired pneumonia) 2017   • Bradycardia 2017   • Hypotension 2017   • Normocytic anemia 2017   • Pulmonary edema 2017   • Cervical spondylosis 11/15/2016   • Neck pain 10/24/2016   • Falls 2016   • Left knee pain 2016   • Loss of balance 2016   • Dizziness 2016   • Right wrist pain 2016   • Plantar  "fasciitis of right foot 06/06/2016   • Peroneal tendinitis of right lower extremity 06/06/2016   • Lumbar spinal stenosis 05/17/2016   • Hamstring tear 03/28/2016   • Primary osteoarthritis of both knees 01/19/2016   • Lumbar spondylosis 12/10/2015   • Bilateral low back pain without sciatica 12/01/2015   • Right hip pain 12/01/2015       Current Vital Signs:   Temperature: 36.8 °C (98.2 °F) Pulse: 87 Respiration: 19 Blood Pressure : 105/74 mmHg  Weight: 62.8 kg (138 lb 7.2 oz) Height: 170.2 cm (5' 7.01\")  Pulse Oximetry: 96 % O2 (LPM): 5      Completed Laboratory Reports:  Recent Labs      02/05/17   0455  02/06/17   0024  02/06/17   1145  02/07/17   0500   WBC  11.1*  12.0*  12.0*   --   7.6   HEMOGLOBIN  8.8*  8.8*  8.8*   --   8.8*   HEMATOCRIT  26.4*  25.9*  25.9*   --   26.9*   PLATELETCT  287  338  338   --   426   SODIUM  135  129*   --   131*   POTASSIUM  3.5*  3.8   --   3.7   BUN  25*  18   --   16   CREATININE  0.66  0.62   --   0.64   GLUCOSE  95  98   --   93   INR  1.71*   --   1.69*  1.62*     Additional Labs: Not Applicable    Prior Living Situation:   Housing / Facility: 1 Story House  Steps Into Home:  (detached garage, 6 x 4 steps to enter, snowy/icy outdoors )  Lives with - Patient's Self Care Capacity: Spouse  Equipment Owned: Other (Comments) (adjustable temperpedic )    Prior Level of Function / Living Situation:   Physical Therapy: Prior Services: None  Housing / Facility: 1 Story House  Steps Into Home:  (detached garage, 6 x 4 steps to enter, snowy/icy outdoors )  Bathroom Set up: Walk In Shower  Equipment Owned: Other (Comments) (adjustable temperpedic )  Lives with - Patient's Self Care Capacity: Spouse  Bed Mobility: Independent  Transfer Status: Independent  Ambulation: Independent  Distance Ambulation (Feet):  (to tolerance)  Assistive Devices Used: None  Current Level of Function:   Level Of Assist: Contact Guard Assist  Assistive Device: Front Wheel Walker  Distance (Feet): " 120  Deviation: Bradykinetic, Shuffled Gait, Decreased Base Of Support  Weight Bearing Status: full  Skilled Intervention: Tactile Cuing, Verbal Cuing  Comments: distance limited by therapist; cueing for widened gait/stance   Supine to Sit:  (NT, in chair pre/post )  Sit to Supine:  (chair )  Scooting: Minimal Assist  Sit to Stand: Minimal Assist (of 2 )  Bed, Chair, Wheelchair Transfer: Minimal Assist  Transfer Method:  (via ambulation )  Sitting in Chair: > 10 mins   Sitting Edge of Bed: NT   Standing: 10 mins   Occupational Therapy:   Self Feeding: Independent  Grooming / Hygiene: Independent  Bathing: Independent  Dressing: Independent  Toileting: Independent  Medication Management: Independent  Laundry: Independent  Kitchen Mobility: Independent  Finances: Independent  Home Management: Independent  Shopping: Independent  Prior Level Of Mobility: Independent Without Device in Community  Driving / Transportation: Driving Independent  Prior Services: None  Housing / Facility: [address] [facility]  Current Level of Function:   Lower Body Dressing: Moderate Assist  Toileting: Not Tested  Speech Language Pathology:      Rehabilitation Prognosis/Potential: Good  Estimated Length of Stay: 14-21 days    Nursing:   Orientation : Oriented x 4  Continent    Scope/Intensity of Services Recommended:  Physical Therapy: 1.5 hr / day  5 days / week. Therapeutic Interventions Required: Maximize Endurance, Mobility, Strength and Safety  Occupational Therapy: 1.5 hr / day 5 days / week. Therapeutic Interventions Required: Maximize Self Care, ADLs, IADLs and Energy Conservation  Rehabilitation Nursin/7. Therapeutic Interventions Required: Monitor Pain, Skin, Wound(s), Vital Signs, Intake and Output, Labs, Safety and Family Training  Rehabilitation Physician: 3 - 5 days / week. Therapeutic Interventions Required: Medical Management  Respiratory Care: consult . Therapeutic Interventions Required: Pulmonary Toileting and O2  Weaning  Dietician: consult . Therapeutic Interventions Required: nutritioanl needs     Rehabilitation Goals and Plan (Expected frequency & duration of treatment in the IRF):   Return to the Community, Modified Independent Level of Care and Minimal Assist Level of Care  Anticipated Date of Rehabilitation Admission: 02/08/2016  Patient/Family oriented IRF level of care/facility/plan: Yes  Patient/Family willing to participate in IRF care/facility/plan: Yes  Patient able to tolerate IRF level of care proposed: Yes  Patient has potential to benefit IRF level of care proposed: Yes  Comments: Not Applicable    Special Needs or Precautions - Medical Necessity:  Safety Concerns/Precautions:  Fall Risk / High Risk for Falls and Balance  Complex Wound Care: pos surgical   Pain Management  IV Site: Peripheral  Requires Oxygen  Cardiac Precautions  Current Medications:    Current Facility-Administered Medications Ordered in Epic   Medication Dose Route Frequency Provider Last Rate Last Dose   • furosemide (LASIX) injection 60 mg  60 mg Intravenous Q DAY Lj Philip M.D.   60 mg at 02/07/17 0841   • warfarin (COUMADIN) tablet 7.5 mg  7.5 mg Oral COUMADIN-DAILY KIRSTEN SosaD   Stopped at 02/06/17 1800   • sodium chloride (OCEAN) 0.65 % nasal spray 2 Spray  2 Spray Nasal PRN Josr Al M.D.   2 Spray at 02/07/17 0841   • acetaminophen (TYLENOL) tablet 650 mg  650 mg Oral Q4HRS PRN Josr Al M.D.   650 mg at 02/04/17 1135    Or   • acetaminophen (TYLENOL) suppository 650 mg  650 mg Rectal Q4HRS PRN Josr Al M.D.       • Respiratory Care per Protocol   Nebulization Continuous RT Hannah Sumner, A.P.N.       • docusate sodium (COLACE) capsule 100 mg  100 mg Oral QAM Hannah Sumner, A.P.N.   100 mg at 02/07/17 0840    And   • senna-docusate (PERICOLACE or SENOKOT S) 8.6-50 MG per tablet 1 Tab  1 Tab Oral Nightly Hannah Sumner, A.P.N.   Stopped at 02/03/17 2100    And   • senna-docusate (PERICOLACE or  SENOKOT S) 8.6-50 MG per tablet 1 Tab  1 Tab Oral Q24HRS PRN Hannah Sumner, A.P.N.   1 Tab at 02/03/17 1001    And   • lactulose 20 GM/30ML solution 30 mL  30 mL Oral Q24HRS PRN Hannah Sumner, A.P.N.        And   • bisacodyl (DULCOLAX) suppository 10 mg  10 mg Rectal Q24HRS PRN Hannah Sumner, A.P.N.        And   • fleet enema 133 mL  1 Each Rectal Once PRN Hannah Sumner, A.P.N.       • NS infusion   Intravenous Continuous Hannah Sumner A.P.N. 10 mL/hr at 01/31/17 1950     • aspirin EC (ECOTRIN) tablet 81 mg  81 mg Oral DAILY Hannah Sumner, A.P.N.   81 mg at 02/07/17 0840   • MD ALERT... warfarin (COUMADIN) per pharmacy protocol   Other PRN Hannah Sumner, A.P.N.       • oxycodone immediate-release (ROXICODONE) tablet 5 mg  5 mg Oral Q3HRS PRN Hannah Sumner, A.P.N.       • oxycodone immediate release (ROXICODONE) tablet 10 mg  10 mg Oral Q3HRS PRN Hannah Sumner A.P.N.   10 mg at 02/07/17 0842   • tramadol (ULTRAM) 50 MG tablet 50 mg  50 mg Oral Q4HRS PRN Hannah Sumner, A.P.N.   50 mg at 02/06/17 2156   • ondansetron (ZOFRAN) syringe/vial injection 4 mg  4 mg Intravenous Q6HRS PRN Hannah Sumner, A.P.N.   4 mg at 02/02/17 1941    Or   • prochlorperazine (COMPAZINE) injection 10 mg  10 mg Intravenous Q6HRS PRN Hannah Sumner, A.P.N.       • mag hydrox-al hydrox-simeth (MAALOX PLUS ES or MYLANTA DS) suspension 30 mL  30 mL Oral Q4HRS PRN Hannah Sumner, A.P.N.       • diphenhydrAMINE (BENADRYL) tablet/capsule 25 mg  25 mg Oral HS PRN - MR X 1 PONCHO ValderramaP.N.       • hydrocodone-acetaminophen (NORCO) 5-325 MG per tablet 1-2 Tab  1-2 Tab Oral Q4HRS PRN PONCHO ValderramaPRose MaryN.   1 Tab at 02/06/17 1545   • artificial tears 1.4 % ophthalmic solution 1 Drop  1 Drop Both Eyes PRN Masoud Aguirre M.D.       • glucose 4 g chewable tablet 16 g  16 g Oral Q15 MIN PRN James Pérez M.D.        And   • dextrose 50% (D50W) injection 25 mL  25 mL Intravenous Q15 MIN PRN James Pérez M.D.       • multivitamin  (THERAGRAN) tablet 1 Tab  1 Tab Oral DAILY James Pérez M.D.   1 Tab at 02/07/17 0840   • lactulose 20 GM/30ML solution 30 mL  30 mL Oral Q24HRS PRN Jeremy M Gonda, M.D.       • ipratropium-albuterol (DUONEB) nebulizer solution 3 mL  3 mL Nebulization Q2HRS PRN (RT) Jeremy M Gonda, M.D.   3 mL at 02/06/17 0546   • famotidine (PEPCID) tablet 20 mg  20 mg Oral Q12HRS Jeremy M Gonda, M.D.   20 mg at 02/07/17 0841   • oyster shell calcium/vitamin D 250-125 MG-UNIT tablet 1 Tab  1 Tab Oral QDAY with Breakfast James Pérez M.D.   1 Tab at 02/07/17 0840   • pravastatin (PRAVACHOL) tablet 20 mg  20 mg Oral Q EVENING Jeremy M Gonda, M.D.   20 mg at 02/06/17 2156     Current Outpatient Prescriptions Ordered in [facility]   Medication Sig Dispense Refill   • multivitamin (THERAGRAN) Tab Take 1 Tab by mouth every day. 30 Tab    • artificial tears 1.4 % Solution Place 1 Drop in both eyes as needed (discomfort/dry eyes). 1 Bottle 3   • aspirin EC 81 MG EC tablet Take 1 Tab by mouth every day. 30 Tab    • famotidine (PEPCID) 20 MG Tab Take 1 Tab by mouth every 12 hours. 60 Tab    • furosemide (LASIX) 10 MG/ML Solution 4 mL by Intravenous route every day.  0   • ipratropium-albuterol (DUONEB) 0.5-2.5 (3) MG/3ML nebulizer solution 3 mL by Nebulization route every four hours as needed for Shortness of Breath.     • sodium chloride (OCEAN) 0.65 % Solution Spray 2 Sprays in nose as needed. 1 Bottle 3   • tramadol (ULTRAM) 50 MG Tab Take 1 Tab by mouth every four hours as needed (Moderate Pain (NRS Pain Scale 4-6; CPOT Pain Scale 3-5) if opiates not ordered or tolerated). 30 Tab 0   • warfarin (COUMADIN) 5 MG Tab Take 1 Tab by mouth COUMADIN-DAILY. 30 Tab 3   • potassium chloride SA (K-DUR) 10 MEQ Tab CR Take 2 Tabs by mouth every day.       Diet:   DIET ORDERS (Through next 24h)    Start     Ordered    02/03/17 1100  SUPPLEMENTS   ALL MEALS     Question:  Which Supplement  Answer:  BOOST GLUCOSE CONTROL    02/03/17 1059    02/01/17  1704  DIET ORDER   ALL MEALS     Question Answer Comment   Diet: Consistent Carbohydrate    Diet: Cardiac        02/01/17 1704          Anticipated Discharge Destination / Patient/Family Goal:  Destination: Home with Assistance Support System: Family   Anticipated home health services: OT, PT, Nursing, Social Work and Aide  Previously used  service/ provider: Not Applicable  Anticipated DME Needs: Oxygen and Walker  Outpatient Services: OT and PT  Alternative resources to address additional identified needs:     Pre-Screen Completed: 2/7/2017 10:25 AM Evangelist Romero

## 2017-02-07 NOTE — PROGRESS NOTES
Went up to patients room.  Scanned right chest and found a pocket of fluid.  Called Dr Woodruff and she came and performed the thoracentesis.  Patients vitals were monitored by floor nurse.  Patient tolerated procedure well and was in stable condition when I left.  Removed 400 ml of fluid and sent 0 ml to lab

## 2017-02-07 NOTE — CARE PLAN
Problem: Safety  Goal: Will remain free from injury  One person assistance needed. Pt steady overall with one episode of patient losing balance and needing to be caught by assisting RN to prevent fall. Pt states she didn't feel dizzy during episode but looked away from where she was moving and lost balance.     Problem: Respiratory:  Goal: Respiratory status will improve  Lungs clear-diminshed over majority of shift. Oxygen weaned down to 5 liters while at rest. PT-OT mobilized patient who tolerated 89-92 on 6L nasal cannula during ambulation.

## 2017-02-07 NOTE — PROGRESS NOTES
Cardiovascular Surgery Progress Note    Name: Kristen Salazar  MRN: [id number]  : 1950  Admit Date: 2017  8:18 PM  Procedure:  Procedure(s) and Anesthesia Type:     * MITRAL VALVE REPAIR - General     * KAREN - General  7 Day Post-Op    Vitals:  Patient Vitals for the past 8 hrs:   Temp SpO2 O2 Delivery O2 (LPM) Pulse Heart Rate (Monitored) Resp NIBP Weight   17 0852 - 96 % - 5 87 87 19 - -   17 0600 - 92 % Silicone Nasal Cannula 5 84 82 19 - -   17 0500 - 92 % - - 82 82 16 (!) 93/59 mmHg -   17 0400 36.8 °C (98.2 °F) 93 % Silicone Nasal Cannula 5 79 79 14 (!) 97/59 mmHg 62.8 kg (138 lb 7.2 oz)   17 0300 - 95 % - - 81 81 12 - -   17 0200 - 96 % Silicone Nasal Cannula 5 80 80 (!) 11 - -   17 0144 - 96 % - - 81 81 12 - -     Temp (24hrs), Av.9 °C (98.5 °F), Min:36.8 °C (98.2 °F), Max:37.1 °C (98.7 °F)      Respiratory:    Respiration: 19, Pulse Oximetry: 96 %, O2 Daily Delivery Respiratory : Silicone Nasal Cannula     Chest Tube Drains:          Fluids:    Intake/Output Summary (Last 24 hours) at 17 0925  Last data filed at 17 0600   Gross per 24 hour   Intake    930 ml   Output   2250 ml   Net  -1320 ml     Admit weight: Weight: 61 kg (134 lb 7.7 oz)  Current weight: Weight: 62.8 kg (138 lb 7.2 oz) (17 0400)    Labs:  Recent Labs      17   0455  17   0024  17   0500   WBC  11.1*  12.0*  12.0*  7.6   RBC  2.83*  2.81*  2.81*  2.90*   HEMOGLOBIN  8.8*  8.8*  8.8*  8.8*   HEMATOCRIT  26.4*  25.9*  25.9*  26.9*   MCV  93.3  92.2  92.2  92.8   MCH  31.1  31.3  31.3  30.3   MCHC  33.3*  34.0  34.0  32.7*   RDW  48.1  46.8  46.8  46.7   PLATELETCT  287  338  338  426   MPV  10.9  10.3  10.3  10.4     Recent Labs      17   0455  17   0024  17   0500   NEUTSPOLYS  79.10*  76.80*  71.00   LYMPHOCYTES  11.40*  12.40*  16.50*   MONOCYTES  8.10  9.20  10.10   EOSINOPHILS  0.70  0.60  1.20   BASOPHILS  0.20   0.20  0.10     Recent Labs      02/05/17   0455  02/06/17   0024  02/07/17   0500   SODIUM  135  129*  131*   POTASSIUM  3.5*  3.8  3.7   CHLORIDE  98  94*  94*   CO2  28  28  31   GLUCOSE  95  98  93   BUN  25*  18  16   CREATININE  0.66  0.62  0.64   CALCIUM  8.3*  8.3*  8.3*     Recent Labs      02/05/17   0455  02/06/17   1145  02/07/17   0500   INR  1.71*  1.69*  1.62*       Medications:  • furosemide  60 mg     • warfarin  7.5 mg     • docusate sodium  100 mg      And   • senna-docusate  1 Tab     • aspirin EC  81 mg     • multivitamin  1 Tab     • famotidine  20 mg     • oyster shell calcium/vitamin D  1 Tab     • pravastatin  20 mg         Exam:   Review of Systems   Constitutional: Negative.    Eyes: Negative.    Respiratory: Negative.    Cardiovascular: Negative.    Gastrointestinal: Negative.    Genitourinary: Negative.    Musculoskeletal: Negative.    Skin: Negative.    Neurological: Negative.    Endo/Heme/Allergies: Negative.    Psychiatric/Behavioral: Negative.        Physical Exam   Constitutional: She is oriented to person, place, and time. She appears well-developed. No distress.   HENT:   Head: Normocephalic and atraumatic.   Eyes: Conjunctivae are normal. Pupils are equal, round, and reactive to light.   Neck: Normal range of motion. No JVD present.   Cardiovascular: Normal rate and regular rhythm.  Exam reveals no gallop and no friction rub.    No murmur heard.  Pulmonary/Chest: She has decreased breath sounds in the right lower field and the left lower field.   Abdominal: Soft. She exhibits no distension.   Musculoskeletal: She exhibits edema.   Neurological: She is alert and oriented to person, place, and time.   Skin: Skin is warm and dry.   Surgical incisions CDI   Psychiatric: She has a normal mood and affect. Her behavior is normal.       Quality Measures:   EKG reviewed, Labs reviewed, Medications reviewed and Radiology images reviewed  Frank catheter: No Frank  Central line in place: Need  for access    DVT Prophylaxis: Warfarin (Coumadin)  DVT prophylaxis - mechanical: Not indicated at this time, ambulatory  Ulcer prophylaxis: No    Assessed for rehab: Patient returned to prior level of function, rehabilitation not indicated at this time      Assessment/Plan:  POD 1 - HOTN- on low dose epi/daphnie, gently diuresis, Vent - per pulm, keep mediastinal tubes, keep sanchez- strict I&O, CPM  POD 2 HDS, NSR, labs noted, doing well.  OK DC sanchez.  Rehab consult.  POD 3 HDS, NSR, labs noted doing well, had BM. DC temp wires and central lines. H/H low/stable--w/w. 2 view CXR today, wean O2 as able.  Plan for Rehab 1-2 days  POD 4 HDS, NSR, increased O2 overnight, better this AM. LLL thoracentesis this AM pending. Continue diuresis, planning for Rehab likely in AM.  POD 5 HDS, NSR, k low, replace, diuresing well, wts down, 800cc off left lung, feeling better today, min right effusion. No pneumothorax.  Cdiff neg.  OK for rehab today.  See dictated DC summary for details.    POD 6 HDS< NSR. Increased O2 requirements early this AM--denies any SOB or pain.  Additional lasix given.  CXR shows mild increase of bilat effusions.  800 cc out on L few days ago. R thoracentesis ordered for today.  Patient feeling well. Keep today, continue lasix/metalazone, to rehab maybe in AM.   POD 7 HDS, oxygen at 5 L NC.  Thoracentesis today 400 cc.  US left side enough to tap so will order for AM.   Neuro intact.  Wounds CDI.  INR trending up.  To rehab this afternoon after thoracentesis.     Active Hospital Problems    Diagnosis   • Mitral regurgitation [I34.0]   • Respiratory failure with hypoxia (CMS-HCC) [J96.91]   • Hyperlipidemia [E78.5]   • Hypertension [I10]   • CAP (community acquired pneumonia) [J18.9]   • Bradycardia [R00.1]   • Hypotension [I95.9]   • Normocytic anemia [D64.9]   • Pulmonary edema [J81.1]

## 2017-02-07 NOTE — CARE PLAN
Problem: Safety  Goal: Will remain free from falls  Intervention: Implement fall precautions    02/06/17 2000 02/07/17 0800   OTHER   Environmental Precautions --  Treaded Slipper Socks on Patient;Personal Belongings, Wastebasket, Call Bell etc. in Easy Reach;Report Given to Other Health Care Providers Regarding Fall Risk;Bed in Low Position;Communication Sign for Patients & Families;Mobility Assessed & Appropriate Sign Placed   IV Pole on Same Side of Bed as Bathroom Yes --    Bedrails --  Bedrails Closest to Bathroom Down   Chair/Bed Strip Alarm --  Patient Educated Regarding Fall Risk and Need for Bed Alarm, Understands and Continues to Refuse   Bed Alarm (Built in - for ICU ONLY) --  Patient Educated Regarding Fall Risk and Need for Bed Alarm, Understands and Continues to Refuse   Ambulates with standby assist pushing wheelchair. Tolerates well. Steady gait.      Problem: Respiratory:  Goal: Respiratory status will improve  Intervention: Administer and titrate oxygen therapy    02/07/17 1201   OTHER   O2 (LPM) 5   o2 sat 93-96% on 5L nc.

## 2017-02-07 NOTE — OR SURGEON
Immediate Post- Operative Note        PostOp Diagnosis: pleural effusion      Procedure(s): thoracentesis      Estimated Blood Loss: Less than 5 ml        Complications: None            2/7/2017     9:39 AM     Noemi Woodruff

## 2017-02-07 NOTE — THERAPY
"Physical Therapy Treatment completed.   Bed Mobility:  Supine to Sit:  (NT, in chair pre/post )  Transfers: Sit to Stand: Minimal Assist (of 2 )  Gait: Level Of Assist: Contact Guard Assist with Front-Wheel Walker       Plan of Care: Will benefit from Physical Therapy 3 times per week  Discharge Recommendations: Equipment: Will Continue to Assess for Equipment Needs.    Pt with improving mentation and internalization of education, improved activity tolerance. Continue to recommend placement prior to returning home due to her environmental barriers at home.   See \"Rehab Therapy-Acute\" Patient Summary Report for complete documentation.       "

## 2017-02-07 NOTE — RESPIRATORY CARE
COPD EDUCATION by COPD CLINICAL EDUCATOR  2/7/2017 at 9:06 AM by Sandra Chandler     Patient reviewed by COPD education team. Patient does not qualify for COPD program.

## 2017-02-07 NOTE — PROGRESS NOTES
Pulmonary Critical Care Progress Note        Chief Complaint: Worsening dyspnea and acute hypoxic respiratory failure    Date of service: 2/7/17    History of Present Illness: The patient is a 66-year-old female with a past medical history significant for hypertension, hyperlipidemia, and mitral valve prolapse who was in her usual state of health on 1/24 when she noted a cough and sudden onset of shortness of breath.  She checked her oxygen saturation at home and found to be 85%. She presented to the emergency department at [facility] where unfortunately she decompensated quickly requiring up to 15 L facemask and then eventually requiring intubation. She was initially hypertensive and tachycardic with an oxygen saturation of 86% on room air at the outside hospital and was transported on high peak and expiratory pressures on the ventilator with an FIO2 of 100%, achieving saturations in the low 90s. She became progressively more hypotensive requiring a norepinephrine drip. A stat bedside echo was performed with Dr. Delarosa in the ER, which revealed a ruptured mitral valve leaflet with severe MR as the likely cause of her decompensation.     ROS: (-) SOB, no CP, no abd pain, ambulates without difficulty     Interval Events:  24 hour interval history reviewed    - improving O2, underwent R thoracentesis with 400mL removed   - improved WBC    PFSH:  No change.    Respiratory:   4 lpm, IS 1100  Pulse Oximetry: 92 %    Exam: unlabored respirations, no intercostal retractions or accessory muscle use and rhonchi bibasilar, left > right  ImagingCXR  I have personally reviewed the chest x-ray my impression is  unchanged LLL atelectasis/infiltrates with resolved R effusion     CTA chest 1/25:  1.  No evidence pulmonary emboli.  2.  Moderate right and small left pleural effusions.  3.  Compressive atelectasis both lower lobes especially the left and left lung volume loss with mediastinal shift towards left.  4.   Patchy groundglass opacities within the right upper lobe consistent with focal areas of pneumonitis.  5.  7.5 mm left upper lobe nodule and smaller nodules within the lungs.        Invalid input(s): [id number]    HemoDynamics:  Pulse: 84, Heart Rate (Monitored): 82  NIBP: (!) 93/59 mmHg    Exam: regular rate and rhythm  Imaging: echo Reviewed           Neuro:  GCS Total Kali Coma Score: 15     Exam:non focal, AAO x 4  Imaging: Available data reviewed    Fluids:  Intake/Output       02/05/17 0700 - 02/06/17 0659 02/06/17 0700 - 02/07/17 0659 02/07/17 0700 - 02/08/17 0659      0213-0074 0474-0365 Total 6898-6329 8213-5411 Total 1328-1734 9915-3929 Total       Intake    P.O.  1320  550 1870  600  480 1080  --  -- --    P.O. 3665 813 1487  -- -- --    Total Intake 8851 844 7335  -- -- --       Output    Urine  1300  1950 3250  1500  1350 2850  --  -- --    Number of Times Voided 6 x 5 x 11 x 1 x -- 1 x -- -- --    Void (ml) 1300 1950 3250 1500 1350 2850 -- -- --    Stool  --  -- --  --  -- --  --  -- --    Number of Times Stooled 2 x -- 2 x -- -- -- -- -- --    Total Output 1300 1950 3250 1500 1350 2850 -- -- --       Net I/O     20 -1400 -1380 -900 -870 -1770 -- -- --        Weight: 62.8 kg (138 lb 7.2 oz)  Recent Labs      02/05/17   0455  02/06/17   0024  02/07/17   0500   SODIUM  135  129*  131*   POTASSIUM  3.5*  3.8  3.7   CHLORIDE  98  94*  94*   CO2  28  28  31   BUN  25*  18  16   CREATININE  0.66  0.62  0.64   CALCIUM  8.3*  8.3*  8.3*       GI/Nutrition:  Exam: abdomen is soft and non-tender, normal active bowel sounds  Imaging: Available data reviewed  Liver Function  Recent Labs      02/05/17 0455  02/06/17   0024  02/07/17   0500   GLUCOSE  95  98  93       Heme:  Recent Labs      02/05/17   0455  02/06/17   0024  02/06/17   1145  02/07/17   0500   RBC  2.83*  2.81*  2.81*   --   2.90*   HEMOGLOBIN  8.8*  8.8*  8.8*   --   8.8*   HEMATOCRIT  26.4*  25.9*  25.9*   --    26.9*   PLATELETCT  287  338  338   --   426   PROTHROMBTM  20.6*   --   20.4*  19.7*   INR  1.71*   --   1.69*  1.62*       Infectious Disease:  Temp  Av.9 °C (98.5 °F)  Min: 36.8 °C (98.2 °F)  Max: 37.1 °C (98.7 °F)  Micro: reviewed  Recent Labs      17   0455  17   0024  17   0500   WBC  11.1*  12.0*  12.0*  7.6   NEUTSPOLYS  79.10*  76.80*  71.00   LYMPHOCYTES  11.40*  12.40*  16.50*   MONOCYTES  8.10  9.20  10.10   EOSINOPHILS  0.70  0.60  1.20   BASOPHILS  0.20  0.20  0.10     Current Facility-Administered Medications   Medication Dose Frequency Provider Last Rate Last Dose   • furosemide (LASIX) injection 60 mg  60 mg Q DAY Lj Philip M.D.       • warfarin (COUMADIN) tablet 7.5 mg  7.5 mg COUMADIN-DAILY Fabi Schreiber, PHARMD   Stopped at 17 1800   • sodium chloride (OCEAN) 0.65 % nasal spray 2 Spray  2 Spray PRN Josr Al M.D.       • acetaminophen (TYLENOL) tablet 650 mg  650 mg Q4HRS PRN Josr Al M.D.   650 mg at 17 1135    Or   • acetaminophen (TYLENOL) suppository 650 mg  650 mg Q4HRS PRN Josr Al M.D.       • Respiratory Care per Protocol   Continuous RT Hannah Sumner, A.P.N.       • docusate sodium (COLACE) capsule 100 mg  100 mg QAM Hannah Sumner, A.P.N.   100 mg at 17 0816    And   • senna-docusate (PERICOLACE or SENOKOT S) 8.6-50 MG per tablet 1 Tab  1 Tab Nightly Hannah Sumner, A.P.N.   Stopped at 17 2100    And   • senna-docusate (PERICOLACE or SENOKOT S) 8.6-50 MG per tablet 1 Tab  1 Tab Q24HRS PRN Hannah Sumner, A.P.N.   1 Tab at 17 1001    And   • lactulose 20 GM/30ML solution 30 mL  30 mL Q24HRS PRN Hannah Sumner, A.P.N.        And   • bisacodyl (DULCOLAX) suppository 10 mg  10 mg Q24HRS PRN Hannah Sumner, A.P.N.        And   • fleet enema 133 mL  1 Each Once PRN Hannah Sumner, A.P.N.       • NS infusion   Continuous Hannah Sumner, A.P.N. 10 mL/hr at 17 1950     • aspirin EC (ECOTRIN) tablet 81 mg   81 mg DAILY Hannah Sumner A.P.N.   81 mg at 02/06/17 0816   • MD ALERT... warfarin (COUMADIN) per pharmacy protocol   PRN Hannah Sumner A.P.N.       • oxycodone immediate-release (ROXICODONE) tablet 5 mg  5 mg Q3HRS PRN Hannah Sumner A.P.N.       • oxycodone immediate release (ROXICODONE) tablet 10 mg  10 mg Q3HRS PRN Hannah Sumner, A.P.N.       • tramadol (ULTRAM) 50 MG tablet 50 mg  50 mg Q4HRS PRN Hannah Sumner A.P.N.   50 mg at 02/06/17 2156   • ondansetron (ZOFRAN) syringe/vial injection 4 mg  4 mg Q6HRS PRN Hannah Sumner A.P.N.   4 mg at 02/02/17 1941    Or   • prochlorperazine (COMPAZINE) injection 10 mg  10 mg Q6HRS PRN Hannah Sumner, A.P.N.       • mag hydrox-al hydrox-simeth (MAALOX PLUS ES or MYLANTA DS) suspension 30 mL  30 mL Q4HRS PRN Hannah Sumner, A.P.N.       • diphenhydrAMINE (BENADRYL) tablet/capsule 25 mg  25 mg HS PRN - MR X 1 Hannah Sumner, A.P.N.       • hydrocodone-acetaminophen (NORCO) 5-325 MG per tablet 1-2 Tab  1-2 Tab Q4HRS PRN Hannah Sumner A.P.N.   1 Tab at 02/06/17 1545   • artificial tears 1.4 % ophthalmic solution 1 Drop  1 Drop PRN Masoud Aguirre M.D.       • glucose 4 g chewable tablet 16 g  16 g Q15 MIN PRN James Pérez M.D.        And   • dextrose 50% (D50W) injection 25 mL  25 mL Q15 MIN PRN James Pérez M.D.       • multivitamin (THERAGRAN) tablet 1 Tab  1 Tab DAILY James Pérez M.D.   1 Tab at 02/06/17 0817   • lactulose 20 GM/30ML solution 30 mL  30 mL Q24HRS PRN Jeremy M Gonda, M.D.       • ipratropium-albuterol (DUONEB) nebulizer solution 3 mL  3 mL Q2HRS PRN (RT) Jeremy M Gonda, M.D.   3 mL at 02/06/17 0546   • famotidine (PEPCID) tablet 20 mg  20 mg Q12HRS Jeremy M Gonda, M.D.   20 mg at 02/06/17 2156   • oyster shell calcium/vitamin D 250-125 MG-UNIT tablet 1 Tab  1 Tab QDAY with Breakfast James Pérez M.D.   1 Tab at 02/06/17 0816   • pravastatin (PRAVACHOL) tablet 20 mg  20 mg Q EVENING Jeremy M Gonda, M.D.   20 mg at 02/06/17 2156      Last reviewed on 1/30/2017  8:02 PM by Purvi Montanez R.N.    Quality  Measures:  Medications reviewed, Labs reviewed and Radiology images reviewed  Frank catheter: No Frank      DVT Prophylaxis: Warfarin (Coumadin)  DVT prophylaxis - mechanical: SCDs  Ulcer prophylaxis: Yes    Assessed for rehab: Patient was assess for and/or received rehabilitation services during this hospitalization    Problems/Plan:  Acute Hypoxic Respiratory Failure - extubated 2/1   - RT/O2 therapies   - IS/PEP/Mobilize/IPV   - diurese   - R thoracentesis 2/7, ?recurrent on L  Acute Pulmonary Edema   - RT/O2 protocols   - diurese as tolerated  Acute Delirium   - improved  Tachycardia that ? A flutter   - presently sinus, off amio  Severe Mitral Valve Regurgitation    - cardiac cath on 1/25   - sp mv repair 1/31  Cardiogenic Shock likely related to severe mitral regurgitation   - s/p vasopressor/ionotropic therapy   - Cards following   - Cardiac cath on 1/25 was negative and heparin stopped   - CTA was negative  Acute Leukocytosis and concern for aspiration   - blood/sputum cultures sent and follow   - Ceftriaxone/Doxycycline 10 day course completed 2/3  Elevated Troponin   - cards following   - ASA  Hyperglycemia   - ISS  Hx of mitral valve prolapse  Hx of systemic arterial hypertension  Hx of Dyslipidemia   - statin therapy  Prophylaxis, diet, therapies, likely d/c to rehab tomorrow    Discussed patient condition and risk of morbidity and/or mortality with Family, RN, RT, Pharmacy, , [employer] [facility] resident, Charge nurse / hot rounds and Patient

## 2017-02-07 NOTE — FLOWSHEET NOTE
02/06/17 1242   Events/Summary/Plan   Events/Summary/Plan IPV IS   Interdisciplinary Plan of Care-Goals (Indications)   Obstructive Ventilatory Defect or Pulmonary Disease without Obvious Obstruction History / Diagnosis   Hyperinflation Protocol Indications Restrictive Lung Disorder / Consolidation;Pre or Post-op Abdominal, Thoracic or Orthopedic Surgery   Interdisciplinary Plan of Care-Outcomes    Hyperinflation Protocol Goals/Outcome Increased Vital Capacity or Return to Pre-operative Values;Stable Vital Capacity x24 hrs and Patient Understands / uses I.S.   SVN Group   #SVN Performed Yes   Given By: Mouthpiece   IPV Group   #IPV Performed Yes   Operational Pressure 35-40 psig (Adult)   Given By: Mouthpiece   All Arrows Up or 12 O'Clock to Start Yes   Frequency 3 O'Clock Position;9 O'Clock Position   Inspiratory Flow Adjusted to achieve chest wiggle   Incentive Spirometry Group   Breathing Exercises Yes   Incentive Spirometer Volume 1000 mL   Respiratory WDL   Respiratory (WDL) X   Chest Exam   Work Of Breathing / Effort Mild   Respiration (!) 25   Pulse 86   Heart Rate (Monitored) 87   Breath Sounds   RUL Breath Sounds Diminished   RML Breath Sounds Diminished   RLL Breath Sounds Diminished   PRAMOD Breath Sounds Diminished   LLL Breath Sounds Diminished   Secretions   Cough Dry   Oxygen   Pulse Oximetry 94 %   O2 (LPM) 5

## 2017-02-07 NOTE — CONSULTS
DATE OF SERVICE:  02/06/2017    REASON FOR CONSULTATION:  To address debility secondary to cardiopulmonary   debility.    HISTORY OF PRESENT ILLNESS:  This is a previously healthy independent   66-year-old female that was admitted to [facility] on   01/24/2017 for progressive respiratory insufficiency.  Patient went on to   require intubation and mechanical ventilation was treated in critical care.    She was evaluated by cardiology and was found to have a non-ST myocardial   infarction with right-sided heart failure.  Once she was medically stabilized,   she underwent the mitral valve repair on 01/31/2017 per Dr. Chris Schmitt   without any intraoperative or postoperative surgical complications.    Postoperatively, she was continued to be monitored in the telemetry unit.  She   currently has some ongoing pleural effusion and was pending a possible   thoracentesis at this time.  Continue monitoring for pulmonary status with   titrating the FiO2 as indicated.    She was successfully extubated off of the ventilator on 01/31/2017.  A   followup cardiac workup included an ejection fraction of 75.  She did found to   have severe mitral regurg, resulting in above surgery.  She underwent a   radical mitral valve repair as above and was placed on Coumadin   postoperatively.  Continue to be monitored for the pleural effusion with   thoracentesis pending at this time.  She had one initially on January 4th that   pulled off 825 mL, and removed.  Continue to monitor for further   thoracentesis today.  She was continued on IV Lasix and metolazone in the   interim.    Therapies have been initiated, at the time of this consult, she was requiring   contact-guard assist with her occupational therapy, physical therapy _____   requiring min assist with her transfers, gait had been initiated with min   assist with a front-wheel walker.  Speech and language was working with her   now that she had been extubated to  check her swallowing, continue to follow up   on her cognitive recovery.    Rehabilitation consult to address her rehab needs, rehab options, coordination   with further discharge planning.    Patient is FULL CODE.    ALLERGIES:  INCLUDE CODEINE AND LATEX.    CURRENT MEDICATIONS:  As per MARs on acute, reviewed.    PAST MEDICAL HISTORY:  She is followed by her primary care physician, Dr. Fox up in [place], also by her pulmonologist down here in [place],  ____.    She does have a history of systemic arterial hypertension, hyperlipidemia,   history of mitral valve prolapse, was followed by her cardiologist, Dr. Landin; also a history of nocturnal hypoxia requiring O2 at night at 2   liters, history of urinary tract infection in the past with a history of a   subdural hematoma and the questionable COPD.    PAST SURGICAL HISTORY:  She had it remarkable for subdural hematoma that was   evacuated in the past.  Also, history of knee surgery in the past.    SOCIAL HISTORY:  She lives up in [place] with her .  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  She is an ex-smoker, does not drink.  She has oxygen at night for her   hypoxia.  She is able to drive.  She states that she was getting around   previously without any assistive devices.  Plans are for the patient to return   back to home with her .  Her  did show me their home, at this   time, there is approximately 10 feet of snow to get through the walk _____.    FAMILY HISTORY:  Noncontributory.    REVIEW OF SYSTEMS:  Without change from acute documentation reviewed.    PHYSICAL EXAMINATION:  GENERAL:  The patient is sitting upright in a chair.  She is alert and   responsive.  She is working on her paperwork.  Her cognition is intact.  She   is wearing a nasal cannula.  VITAL SIGNS:  Temperature is 37.9, blood pressure is 98/61, pulse of 80,   respiratory rate around 18.  She is on a monitor, which shows a blood pressure   reading of 112/62 at this time.   She is approximately 5 feet 7 inches, 157   pounds.  HEENT:  She is normocephalic and atraumatic.  Pupils are equal and reactive to   light.  Extraocular muscles are intact.  Visual fields are full, visual   acuity is fair.  She has got a nasal cannula.  Her tongue is midline.  Dry   mucous membranes.  Fair dentition.  NECK:  Supple.  No JVD, thyromegaly or adenopathy appreciated.  HEART:  Regular rate and rhythm without any murmur or gallop, slight systolic   click noted.  LUNGS:  Decreased.  She does have some bilateral crackles.  ABDOMEN:  Soft, nontender, and nondistended.  EXTREMITIES:  Without any contractures.  NEUROLOGIC:  Her cognition is intact.  Her cranial nerves are intact.  She has   got good symmetrical motor tone, power, and bulk.  No focal deficits.    Sternal incision is clean, dry, and intact.  Skin is otherwise intact.    LABORATORY DATA:  INR was 1.69.  CBC on January 6th showed a white count of   12.8, hemoglobin of 8.8, hematocrit 25.9, and platelet count of 338.    Ultrasound on February 6th is pending.    IMPRESSION:  1.  Cardiac debility secondary to status post mitral regurgitation, status   post mitral valve repair on January 31st.  Continue to follow for wound care   recovery and cardiac surgery.  Follow with Dr. Schmitt as an outpatient.  2.  Status post acute respiratory failure with hypoxia, continue to be   monitored for the resolving community-acquired pneumonia and respiratory   failure, possible thoracentesis pending at this time.  Continue to follow up   on the resolution of pleural effusion.  3.  Hypertension, continue to be monitored.  4.  Normocytic anemia, continue to be monitored.    REHAB DIAGNOSES:  1.  Cardiopulmonary debility, anticipate need for acute rehab based on her   medical complexity with cardiopulmonary status.  The case was reviewed with   rehab admissions.  We will follow up with thoracentesis that has been   completed, medical and surgical workup was  completed while on acute.  2.  Deep venous thrombosis prophylaxis, continue the Coumadin.  3.  Bowel and bladder management, continue to be addressed with some rehab.  4.  Disposition:  We will continue advancing therapies on acute, once cleared   from the medical and surgical standpoint, we will follow up for acute rehab   admission to maximize her medical stability and functional recovery prior to   returning home with her  up in the [place] area.  I anticipate at least   14-21 days on acute rehab to maximize the patient's medical stability and   functional recovery prior to returning to [place].    Thank you for allowing me to participate in this patient's care.    This is Dr. Blade Vizcaino in coverage for [facility] Rehab Service.       ____________________________________     MD ABAD PAK / JANET    DD:  02/06/2017 14:46:09  DT:  02/06/2017 19:47:28    D#:  [id number]  Job#:  [id number]

## 2017-02-07 NOTE — CARE PLAN
Problem: Safety  Goal: Will remain free from injury  Outcome: PROGRESSING AS EXPECTED  Bed in lowest position, call light and personal possessions within reach.     Problem: Mobility  Goal: Risk for activity intolerance will decrease  Outcome: PROGRESSING AS EXPECTED  Patient ambulated with wheelchair push x2 around unit, took a shower, and cleaned midline incision site.

## 2017-02-08 ENCOUNTER — APPOINTMENT (OUTPATIENT)
Dept: RADIOLOGY | Facility: MEDICAL CENTER | Age: 67
DRG: 216 | End: 2017-02-08
Attending: CLINICAL NURSE SPECIALIST
Payer: MEDICARE

## 2017-02-08 ENCOUNTER — APPOINTMENT (OUTPATIENT)
Dept: RADIOLOGY | Facility: MEDICAL CENTER | Age: 67
DRG: 216 | End: 2017-02-08
Attending: INTERNAL MEDICINE
Payer: MEDICARE

## 2017-02-08 PROBLEM — R00.1 BRADYCARDIA: Status: RESOLVED | Noted: 2017-01-30 | Resolved: 2017-02-08

## 2017-02-08 PROBLEM — I95.9 HYPOTENSION: Status: RESOLVED | Noted: 2017-01-30 | Resolved: 2017-02-08

## 2017-02-08 PROBLEM — J96.91 RESPIRATORY FAILURE WITH HYPOXIA (HCC): Status: RESOLVED | Noted: 2017-01-30 | Resolved: 2017-02-08

## 2017-02-08 PROBLEM — J18.9 CAP (COMMUNITY ACQUIRED PNEUMONIA): Status: RESOLVED | Noted: 2017-01-30 | Resolved: 2017-02-08

## 2017-02-08 PROBLEM — I34.0 MITRAL REGURGITATION: Status: RESOLVED | Noted: 2017-01-30 | Resolved: 2017-02-08

## 2017-02-08 PROBLEM — Z98.890 S/P MVR (MITRAL VALVE REPAIR): Status: ACTIVE | Noted: 2017-02-08

## 2017-02-08 PROBLEM — D64.9 NORMOCYTIC ANEMIA: Status: RESOLVED | Noted: 2017-01-30 | Resolved: 2017-02-08

## 2017-02-08 PROBLEM — J81.1 PULMONARY EDEMA: Status: RESOLVED | Noted: 2017-01-24 | Resolved: 2017-02-08

## 2017-02-08 LAB
ANION GAP SERPL CALC-SCNC: 8 MMOL/L (ref 0–11.9)
BASOPHILS # BLD AUTO: 0.1 % (ref 0–1.8)
BASOPHILS # BLD: 0.01 K/UL (ref 0–0.12)
BUN SERPL-MCNC: 15 MG/DL (ref 8–22)
CALCIUM SERPL-MCNC: 8.3 MG/DL (ref 8.5–10.5)
CHLORIDE SERPL-SCNC: 94 MMOL/L (ref 96–112)
CO2 SERPL-SCNC: 30 MMOL/L (ref 20–33)
CREAT SERPL-MCNC: 0.77 MG/DL (ref 0.5–1.4)
EKG IMPRESSION: NORMAL
EOSINOPHIL # BLD AUTO: 0.09 K/UL (ref 0–0.51)
EOSINOPHIL NFR BLD: 1 % (ref 0–6.9)
ERYTHROCYTE [DISTWIDTH] IN BLOOD BY AUTOMATED COUNT: 46.8 FL (ref 35.9–50)
GFR SERPL CREATININE-BSD FRML MDRD: >60 ML/MIN/1.73 M 2
GLUCOSE SERPL-MCNC: 97 MG/DL (ref 65–99)
HCT VFR BLD AUTO: 28.8 % (ref 37–47)
HGB BLD-MCNC: 9.5 G/DL (ref 12–16)
IMM GRANULOCYTES # BLD AUTO: 0.07 K/UL (ref 0–0.11)
IMM GRANULOCYTES NFR BLD AUTO: 0.8 % (ref 0–0.9)
INR PPP: 1.45 (ref 0.87–1.13)
LYMPHOCYTES # BLD AUTO: 1.44 K/UL (ref 1–4.8)
LYMPHOCYTES NFR BLD: 15.7 % (ref 22–41)
MCH RBC QN AUTO: 30.7 PG (ref 27–33)
MCHC RBC AUTO-ENTMCNC: 33 G/DL (ref 33.6–35)
MCV RBC AUTO: 93.2 FL (ref 81.4–97.8)
MONOCYTES # BLD AUTO: 0.83 K/UL (ref 0–0.85)
MONOCYTES NFR BLD AUTO: 9.1 % (ref 0–13.4)
NEUTROPHILS # BLD AUTO: 6.71 K/UL (ref 2–7.15)
NEUTROPHILS NFR BLD: 73.3 % (ref 44–72)
NRBC # BLD AUTO: 0 K/UL
NRBC BLD AUTO-RTO: 0 /100 WBC
PLATELET # BLD AUTO: 494 K/UL (ref 164–446)
PMV BLD AUTO: 10.4 FL (ref 9–12.9)
POTASSIUM SERPL-SCNC: 3.6 MMOL/L (ref 3.6–5.5)
PROTHROMBIN TIME: 18.1 SEC (ref 12–14.6)
RBC # BLD AUTO: 3.09 M/UL (ref 4.2–5.4)
SODIUM SERPL-SCNC: 132 MMOL/L (ref 135–145)
WBC # BLD AUTO: 9.2 K/UL (ref 4.8–10.8)

## 2017-02-08 PROCEDURE — 71010 DX-CHEST-PORTABLE (1 VIEW): CPT

## 2017-02-08 PROCEDURE — A9270 NON-COVERED ITEM OR SERVICE: HCPCS | Performed by: NURSE PRACTITIONER

## 2017-02-08 PROCEDURE — 93010 ELECTROCARDIOGRAM REPORT: CPT | Performed by: INTERNAL MEDICINE

## 2017-02-08 PROCEDURE — 700111 HCHG RX REV CODE 636 W/ 250 OVERRIDE (IP): Performed by: HOSPITALIST

## 2017-02-08 PROCEDURE — 99233 SBSQ HOSP IP/OBS HIGH 50: CPT | Mod: GC | Performed by: INTERNAL MEDICINE

## 2017-02-08 PROCEDURE — 0W9B3ZZ DRAINAGE OF LEFT PLEURAL CAVITY, PERCUTANEOUS APPROACH: ICD-10-PCS | Performed by: RADIOLOGY

## 2017-02-08 PROCEDURE — 700105 HCHG RX REV CODE 258

## 2017-02-08 PROCEDURE — 700102 HCHG RX REV CODE 250 W/ 637 OVERRIDE(OP): Performed by: STUDENT IN AN ORGANIZED HEALTH CARE EDUCATION/TRAINING PROGRAM

## 2017-02-08 PROCEDURE — A9270 NON-COVERED ITEM OR SERVICE: HCPCS

## 2017-02-08 PROCEDURE — 80048 BASIC METABOLIC PNL TOTAL CA: CPT

## 2017-02-08 PROCEDURE — 700102 HCHG RX REV CODE 250 W/ 637 OVERRIDE(OP): Performed by: INTERNAL MEDICINE

## 2017-02-08 PROCEDURE — 700102 HCHG RX REV CODE 250 W/ 637 OVERRIDE(OP): Performed by: NURSE PRACTITIONER

## 2017-02-08 PROCEDURE — 770020 HCHG ROOM/CARE - TELE (206)

## 2017-02-08 PROCEDURE — 32555 ASPIRATE PLEURA W/ IMAGING: CPT | Mod: LT

## 2017-02-08 PROCEDURE — 700112 HCHG RX REV CODE 229: Performed by: NURSE PRACTITIONER

## 2017-02-08 PROCEDURE — 93005 ELECTROCARDIOGRAM TRACING: CPT | Performed by: INTERNAL MEDICINE

## 2017-02-08 PROCEDURE — 85610 PROTHROMBIN TIME: CPT

## 2017-02-08 PROCEDURE — A9270 NON-COVERED ITEM OR SERVICE: HCPCS | Performed by: STUDENT IN AN ORGANIZED HEALTH CARE EDUCATION/TRAINING PROGRAM

## 2017-02-08 PROCEDURE — 85025 COMPLETE CBC W/AUTO DIFF WBC: CPT

## 2017-02-08 PROCEDURE — A9270 NON-COVERED ITEM OR SERVICE: HCPCS | Performed by: INTERNAL MEDICINE

## 2017-02-08 PROCEDURE — 700102 HCHG RX REV CODE 250 W/ 637 OVERRIDE(OP)

## 2017-02-08 RX ORDER — POLYVINYL ALCOHOL 14 MG/ML
1 SOLUTION/ DROPS OPHTHALMIC PRN
Status: CANCELLED | OUTPATIENT
Start: 2017-02-08

## 2017-02-08 RX ORDER — FUROSEMIDE 20 MG/1
40 TABLET ORAL
Status: DISCONTINUED | OUTPATIENT
Start: 2017-02-08 | End: 2017-02-09 | Stop reason: HOSPADM

## 2017-02-08 RX ORDER — ONDANSETRON 4 MG/1
4 TABLET, ORALLY DISINTEGRATING ORAL 4 TIMES DAILY PRN
Status: DISCONTINUED | OUTPATIENT
Start: 2017-02-08 | End: 2017-02-09

## 2017-02-08 RX ORDER — ECHINACEA PURPUREA EXTRACT 125 MG
2 TABLET ORAL PRN
Status: CANCELLED | OUTPATIENT
Start: 2017-02-08

## 2017-02-08 RX ORDER — LACTULOSE 20 G/30ML
30 SOLUTION ORAL
Status: CANCELLED | OUTPATIENT
Start: 2017-02-08

## 2017-02-08 RX ORDER — BISACODYL 10 MG
10 SUPPOSITORY, RECTAL RECTAL
Status: CANCELLED | OUTPATIENT
Start: 2017-02-08

## 2017-02-08 RX ORDER — AMOXICILLIN 250 MG
1 CAPSULE ORAL NIGHTLY
Status: CANCELLED | OUTPATIENT
Start: 2017-02-08

## 2017-02-08 RX ORDER — HYDROCODONE BITARTRATE AND ACETAMINOPHEN 7.5; 325 MG/1; MG/1
1 TABLET ORAL EVERY 4 HOURS PRN
Status: DISCONTINUED | OUTPATIENT
Start: 2017-02-08 | End: 2017-02-09

## 2017-02-08 RX ORDER — ALUMINA, MAGNESIA, AND SIMETHICONE 2400; 2400; 240 MG/30ML; MG/30ML; MG/30ML
30 SUSPENSION ORAL EVERY 4 HOURS PRN
Status: CANCELLED | OUTPATIENT
Start: 2017-02-08

## 2017-02-08 RX ORDER — PRAVASTATIN SODIUM 20 MG
20 TABLET ORAL EVERY EVENING
Status: CANCELLED | OUTPATIENT
Start: 2017-02-08

## 2017-02-08 RX ORDER — TRAZODONE HYDROCHLORIDE 50 MG/1
50 TABLET ORAL
Status: DISCONTINUED | OUTPATIENT
Start: 2017-02-08 | End: 2017-02-18 | Stop reason: HOSPADM

## 2017-02-08 RX ORDER — AMOXICILLIN 250 MG
1 CAPSULE ORAL
Status: CANCELLED | OUTPATIENT
Start: 2017-02-08

## 2017-02-08 RX ORDER — OXYCODONE HYDROCHLORIDE AND ACETAMINOPHEN 5; 325 MG/1; MG/1
1 TABLET ORAL EVERY 4 HOURS PRN
Status: DISCONTINUED | OUTPATIENT
Start: 2017-02-08 | End: 2017-02-08

## 2017-02-08 RX ORDER — HYDROCODONE BITARTRATE AND ACETAMINOPHEN 5; 325 MG/1; MG/1
1-2 TABLET ORAL EVERY 4 HOURS PRN
Status: DISCONTINUED | OUTPATIENT
Start: 2017-02-08 | End: 2017-02-09

## 2017-02-08 RX ORDER — WARFARIN SODIUM 7.5 MG/1
7.5 TABLET ORAL
Qty: 30 TAB | Refills: 3 | Status: ON HOLD | DISCHARGE
Start: 2017-02-08 | End: 2017-02-18

## 2017-02-08 RX ORDER — IPRATROPIUM BROMIDE AND ALBUTEROL SULFATE 2.5; .5 MG/3ML; MG/3ML
3 SOLUTION RESPIRATORY (INHALATION)
Status: CANCELLED | OUTPATIENT
Start: 2017-02-08

## 2017-02-08 RX ORDER — DOCUSATE SODIUM 100 MG/1
100 CAPSULE, LIQUID FILLED ORAL EVERY MORNING
Status: CANCELLED | OUTPATIENT
Start: 2017-02-09

## 2017-02-08 RX ORDER — ACETAMINOPHEN 325 MG/1
650 TABLET ORAL EVERY 4 HOURS PRN
Status: CANCELLED | OUTPATIENT
Start: 2017-02-08

## 2017-02-08 RX ORDER — FAMOTIDINE 20 MG/1
20 TABLET, FILM COATED ORAL EVERY 12 HOURS
Status: CANCELLED | OUTPATIENT
Start: 2017-02-08

## 2017-02-08 RX ORDER — SODIUM CHLORIDE 9 MG/ML
INJECTION, SOLUTION INTRAVENOUS
Status: COMPLETED
Start: 2017-02-08 | End: 2017-02-08

## 2017-02-08 RX ORDER — ACETAMINOPHEN 650 MG/1
650 SUPPOSITORY RECTAL EVERY 4 HOURS PRN
Status: CANCELLED | OUTPATIENT
Start: 2017-02-08

## 2017-02-08 RX ORDER — ONDANSETRON 2 MG/ML
4 INJECTION INTRAMUSCULAR; INTRAVENOUS 4 TIMES DAILY PRN
Status: DISCONTINUED | OUTPATIENT
Start: 2017-02-08 | End: 2017-02-09

## 2017-02-08 RX ORDER — ENEMA 19; 7 G/133ML; G/133ML
1 ENEMA RECTAL
Status: CANCELLED | OUTPATIENT
Start: 2017-02-08

## 2017-02-08 RX ORDER — FUROSEMIDE 20 MG/1
40 TABLET ORAL
Status: CANCELLED | OUTPATIENT
Start: 2017-02-09

## 2017-02-08 RX ADMIN — SODIUM CHLORIDE 1000 ML: 9 INJECTION, SOLUTION INTRAVENOUS at 16:18

## 2017-02-08 RX ADMIN — WARFARIN SODIUM 7.5 MG: 7.5 TABLET ORAL at 18:07

## 2017-02-08 RX ADMIN — THERA TABS 1 TABLET: TAB at 08:22

## 2017-02-08 RX ADMIN — FUROSEMIDE 60 MG: 10 INJECTION, SOLUTION INTRAVENOUS at 08:23

## 2017-02-08 RX ADMIN — FAMOTIDINE 20 MG: 20 TABLET, FILM COATED ORAL at 20:36

## 2017-02-08 RX ADMIN — FAMOTIDINE 20 MG: 20 TABLET, FILM COATED ORAL at 08:22

## 2017-02-08 RX ADMIN — STANDARDIZED SENNA CONCENTRATE AND DOCUSATE SODIUM 1 TABLET: 8.6; 5 TABLET, FILM COATED ORAL at 20:36

## 2017-02-08 RX ADMIN — DOCUSATE SODIUM 100 MG: 100 CAPSULE ORAL at 08:22

## 2017-02-08 RX ADMIN — CALCIUM CARBONATE-CHOLECALCIFEROL TAB 250 MG-125 UNIT 1 TABLET: 250-125 TAB at 08:22

## 2017-02-08 RX ADMIN — PRAVASTATIN SODIUM 20 MG: 20 TABLET ORAL at 22:03

## 2017-02-08 RX ADMIN — OXYCODONE HYDROCHLORIDE 5 MG: 5 TABLET ORAL at 12:31

## 2017-02-08 RX ADMIN — HYDROCODONE BITARTRATE AND ACETAMINOPHEN 1 TABLET: 5; 325 TABLET ORAL at 14:04

## 2017-02-08 ASSESSMENT — ENCOUNTER SYMPTOMS
DIARRHEA: 0
ABDOMINAL PAIN: 0
PALPITATIONS: 0
CONSTITUTIONAL NEGATIVE: 1
FOCAL WEAKNESS: 0
PSYCHIATRIC NEGATIVE: 1
CHILLS: 0
VOMITING: 0
EYES NEGATIVE: 1
NEUROLOGICAL NEGATIVE: 1
GASTROINTESTINAL NEGATIVE: 1
FEVER: 0
SENSORY CHANGE: 0
WHEEZING: 0
MUSCULOSKELETAL NEGATIVE: 1
RESPIRATORY NEGATIVE: 1
CONSTIPATION: 0
NAUSEA: 0
CARDIOVASCULAR NEGATIVE: 1
SHORTNESS OF BREATH: 0

## 2017-02-08 ASSESSMENT — PAIN SCALES - GENERAL
PAINLEVEL_OUTOF10: 0
PAINLEVEL_OUTOF10: 4
PAINLEVEL_OUTOF10: 4
PAINLEVEL_OUTOF10: 0

## 2017-02-08 NOTE — PROGRESS NOTES
During US-guided Lt thoracentesis, received call from Monitors that pt had converted to a junctional rhythm. At this time, pt began complaining of dizziness complaining that she felt as if she might faint. Pt now in SR 80's. BP 60's/30's and remaining in 70/40's, decision to stop thoracentesis procedure with only about 150 of slow, frothy output. Charge notified, PMA paged, Bolus of 250 ML given, pt laid in bed, Pressures returning to /50-60 with MAP=65. Desirae Arguelles notified. PMA notified. Don  notified. Transfer to Cardiac Rehab postponed until tomorrow (2/8) AM.

## 2017-02-08 NOTE — CARE PLAN
Problem: Oxygenation:  Goal: Maintain adequate oxygenation dependent on patient condition  Outcome: PROGRESSING AS EXPECTED    Problem: Hyperinflation:  Goal: Prevent or improve atelectasis  Outcome: PROGRESSING AS EXPECTED  PT IS is 1100

## 2017-02-08 NOTE — THERAPY
"Occupational Therapy Treatment completed with focus on ADLs, ADL transfers, patient education and caregiver training.  Functional Status:  Pt very pleasant & motivated.  Pt required CGA for sit to stand from recliner chair.  Pt &  very eager for new learning.  Pt able to stand at sink to groom, sponge bath incision site & change gown with SBA for over 12 min. Pt required CGA for LB dressing.  Pt does need V/Q's for energy conservation techniques & how to pace herself.   very supportive & willing to help.  Plan of Care: Will benefit from Occupational Therapy 3 times per week  Discharge Recommendations:  Equipment Front-Wheel Walker and Shower Chair. Post-acute therapy recommended before discharged home.    See \"Rehab Therapy-Acute\" Patient Summary Report for complete documentation.   "

## 2017-02-08 NOTE — PROGRESS NOTES
Cardiovascular Surgery Progress Note    Name: Kristen Salazar  MRN: [id number]  : 1950  Admit Date: 2017  8:18 PM  Procedure:  Procedure(s) and Anesthesia Type:     * MITRAL VALVE REPAIR - General     * KAREN - General  8 Day Post-Op    Vitals:  Patient Vitals for the past 8 hrs:   Temp SpO2 O2 Delivery O2 (LPM) Pulse Heart Rate (Monitored) Resp NIBP Weight   17 1008 - 93 % - - 86 86 17 - -   17 0900 - 96 % - - 86 83 18 - -   17 0800 36.4 °C (97.6 °F) 97 % Silicone Nasal Cannula 4 91 90 (!) 24 (!) 92/56 mmHg -   17 0711 - 97 % - 4 91 92 19 - -   17 0700 - 96 % - - 90 91 (!) 22 - -   17 0600 - 92 % Silicone Nasal Cannula 4 82 83 15 (!) 83/50 mmHg -   17 0500 - 96 % - - 85 85 14 (!) 95/55 mmHg -   17 0400 36.6 °C (97.9 °F) 96 % Silicone Nasal Cannula 4 86 87 14 106/64 mmHg 63 kg (138 lb 14.2 oz)   17 0300 - 95 % - - 90 88 15 117/73 mmHg -     Temp (24hrs), Av.7 °C (98 °F), Min:36.4 °C (97.6 °F), Max:37 °C (98.6 °F)      Respiratory:    Respiration: 17, Pulse Oximetry: 93 %, O2 Daily Delivery Respiratory : Silicone Nasal Cannula     Chest Tube Drains:          Fluids:    Intake/Output Summary (Last 24 hours) at 17 1019  Last data filed at 17 0800   Gross per 24 hour   Intake   1060 ml   Output   2150 ml   Net  -1090 ml     Admit weight: Weight: 61 kg (134 lb 7.7 oz)  Current weight: Weight: 63 kg (138 lb 14.2 oz) (17 0400)    Labs:  Recent Labs      17   0024  17   0500  17   0215   WBC  12.0*  12.0*  7.6  9.2   RBC  2.81*  2.81*  2.90*  3.09*   HEMOGLOBIN  8.8*  8.8*  8.8*  9.5*   HEMATOCRIT  25.9*  25.9*  26.9*  28.8*   MCV  92.2  92.2  92.8  93.2   MCH  31.3  31.3  30.3  30.7   MCHC  34.0  34.0  32.7*  33.0*   RDW  46.8  46.8  46.7  46.8   PLATELETCT  338  338  426  494*   MPV  10.3  10.3  10.4  10.4     Recent Labs      17   0024  17   0500  17   0215   NEUTSPOLYS  76.80*  71.00   73.30*   LYMPHOCYTES  12.40*  16.50*  15.70*   MONOCYTES  9.20  10.10  9.10   EOSINOPHILS  0.60  1.20  1.00   BASOPHILS  0.20  0.10  0.10     Recent Labs      02/06/17   0024  02/07/17   0500  02/08/17   0215   SODIUM  129*  131*  132*   POTASSIUM  3.8  3.7  3.6   CHLORIDE  94*  94*  94*   CO2  28  31  30   GLUCOSE  98  93  97   BUN  18  16  15   CREATININE  0.62  0.64  0.77   CALCIUM  8.3*  8.3*  8.3*     Recent Labs      02/06/17   1145  02/07/17   0500  02/08/17   0215   INR  1.69*  1.62*  1.45*       Medications:  • furosemide  60 mg     • warfarin  7.5 mg     • docusate sodium  100 mg      And   • senna-docusate  1 Tab     • aspirin EC  81 mg     • multivitamin  1 Tab     • famotidine  20 mg     • oyster shell calcium/vitamin D  1 Tab     • pravastatin  20 mg         Exam:   Review of Systems   Constitutional: Negative.    Eyes: Negative.    Respiratory: Negative.    Cardiovascular: Negative.    Gastrointestinal: Negative.    Genitourinary: Negative.    Musculoskeletal: Negative.    Skin: Negative.    Neurological: Negative.    Endo/Heme/Allergies: Negative.    Psychiatric/Behavioral: Negative.        Physical Exam   Constitutional: She is oriented to person, place, and time. She appears well-developed. No distress.   HENT:   Head: Normocephalic and atraumatic.   Eyes: Conjunctivae are normal. Pupils are equal, round, and reactive to light.   Neck: Normal range of motion. No JVD present.   Cardiovascular: Normal rate and regular rhythm.  Exam reveals no gallop and no friction rub.    No murmur heard.  Pulmonary/Chest: She has decreased breath sounds in the right lower field and the left lower field.   Abdominal: Soft. She exhibits no distension.   Musculoskeletal: She exhibits edema.   Neurological: She is alert and oriented to person, place, and time.   Skin: Skin is warm and dry.   Surgical incisions CDI   Psychiatric: She has a normal mood and affect. Her behavior is normal.       Quality Measures:   EKG  reviewed, Labs reviewed, Medications reviewed and Radiology images reviewed  Sanchez catheter: No Sanchez  Central line in place: Need for access    DVT Prophylaxis: Warfarin (Coumadin)  DVT prophylaxis - mechanical: Not indicated at this time, ambulatory  Ulcer prophylaxis: No    Assessed for rehab: Patient returned to prior level of function, rehabilitation not indicated at this time      Assessment/Plan:  POD 1 - HOTN- on low dose epi/daphnie, gently diuresis, Vent - per pulm, keep mediastinal tubes, keep sanchez- strict I&O, CPM  POD 2 HDS, NSR, labs noted, doing well.  OK DC sanchez.  Rehab consult.  POD 3 HDS, NSR, labs noted doing well, had BM. DC temp wires and central lines. H/H low/stable--w/w. 2 view CXR today, wean O2 as able.  Plan for Rehab 1-2 days  POD 4 HDS, NSR, increased O2 overnight, better this AM. LLL thoracentesis this AM pending. Continue diuresis, planning for Rehab likely in AM.  POD 5 HDS, NSR, k low, replace, diuresing well, wts down, 800cc off left lung, feeling better today, min right effusion. No pneumothorax.  Cdiff neg.  OK for rehab today.  See dictated DC summary for details.    POD 6 HDS< NSR. Increased O2 requirements early this AM--denies any SOB or pain.  Additional lasix given.  CXR shows mild increase of bilat effusions.  800 cc out on L few days ago. R thoracentesis ordered for today.  Patient feeling well. Keep today, continue lasix/metalazone, to rehab maybe in AM.   POD 7 HDS, oxygen at 5 L NC.  Thoracentesis today 400 cc.  US left side enough to tap so will order for AM.   Neuro intact.  Wounds CDI.  INR trending up.  To rehab this afternoon after thoracentesis.   POD 8 HDS SR Neuro intact.  Wounds CDI.  IR to tap left pleural space and to rehab this afternoon. Holding coumadin for thoracentesis resume after done to target INR 2.0-3.0.    Active Hospital Problems    Diagnosis   • Mitral regurgitation [I34.0]   • Respiratory failure with hypoxia (CMS-HCC) [J96.91]   • Hyperlipidemia  [E78.5]   • Hypertension [I10]   • CAP (community acquired pneumonia) [J18.9]   • Bradycardia [R00.1]   • Hypotension [I95.9]   • Normocytic anemia [D64.9]   • Pulmonary edema [J81.1]

## 2017-02-08 NOTE — PROGRESS NOTES
Pulmonary Critical Care Progress Note        Chief Complaint: Worsening dyspnea and acute hypoxic respiratory failure    Date of service: 2/8/17    History of Present Illness: The patient is a 66-year-old female with a past medical history significant for hypertension, hyperlipidemia, and mitral valve prolapse who was in her usual state of health on 1/24 when she noted a cough and sudden onset of shortness of breath.  She checked her oxygen saturation at home and found to be 85%. She presented to the emergency department at [facility] where unfortunately she decompensated quickly requiring up to 15 L facemask and then eventually requiring intubation. She was initially hypertensive and tachycardic with an oxygen saturation of 86% on room air at the outside hospital and was transported on high peak and expiratory pressures on the ventilator with an FIO2 of 100%, achieving saturations in the low 90s. She became progressively more hypotensive requiring a norepinephrine drip. A stat bedside echo was performed with Dr. Delarosa in the ER, which revealed a ruptured mitral valve leaflet with severe MR as the likely cause of her decompensation.     ROS: (-) SOB, no CP, no abd pain, ambulates without difficulty     Interval Events:  24 hour interval history reviewed    - drop in BP overnight and again this afternoon   - unsuccessful L thora today with resultant tiny PTX    PFSH:  No change.    Respiratory:   4 lpm, IS 1100  Pulse Oximetry: 97 %    Exam: unlabored respirations, no intercostal retractions or accessory muscle use and rhonchi bibasilar, left > right  ImagingCXR  I have personally reviewed the chest x-ray my impression is  unchanged LLL atelectasis/infiltrates with recurring R effusion     CTA chest 1/25:  1.  No evidence pulmonary emboli.  2.  Moderate right and small left pleural effusions.  3.  Compressive atelectasis both lower lobes especially the left and left lung volume loss with  mediastinal shift towards left.  4.  Patchy groundglass opacities within the right upper lobe consistent with focal areas of pneumonitis.  5.  7.5 mm left upper lobe nodule and smaller nodules within the lungs.        Invalid input(s): [id number]    HemoDynamics:  Pulse: 91, Heart Rate (Monitored): 92  NIBP: (!) 83/50 mmHg    Exam: regular rate and rhythm  Imaging: echo Reviewed           Neuro:  GCS Total [place] Coma Score: 15     Exam:non focal, AAO x 4  Imaging: Available data reviewed    Fluids:  Intake/Output       02/06/17 0700 - 02/07/17 0659 02/07/17 0700 - 02/08/17 0659 02/08/17 0700 - 02/09/17 0659      2581-9409 0057-9828 Total 7215-1768 3058-0192 Total 0508-7078 9852-0521 Total       Intake    P.O.  600  480 1080  --  960 960  --  -- --    P.O.  -- 960 960 -- -- --    Total Intake  -- 960 960 -- -- --       Output    Urine  1500  1350 2850  950  1450 2400  --  -- --    Number of Times Voided 1 x -- 1 x -- -- -- -- -- --    Void (ml) 1500 1350 2850 950 1450 2400 -- -- --    Stool  --  -- --  --  -- --  --  -- --    Number of Times Stooled -- -- -- -- 1 x 1 x -- -- --    Total Output 1500 1350 2850 950 1450 2400 -- -- --       Net I/O     -900 -460 -3860 -950 -493 -4180 -- -- --        Weight: 63 kg (138 lb 14.2 oz)  Recent Labs      02/06/17   0024  02/07/17   0500  02/08/17   0215   SODIUM  129*  131*  132*   POTASSIUM  3.8  3.7  3.6   CHLORIDE  94*  94*  94*   CO2  28  31  30   BUN  18  16  15   CREATININE  0.62  0.64  0.77   CALCIUM  8.3*  8.3*  8.3*       GI/Nutrition:  Exam: abdomen is soft and non-tender, normal active bowel sounds  Imaging: Available data reviewed  Liver Function  Recent Labs      02/06/17   0024  02/07/17   0500  02/08/17   0215   GLUCOSE  98  93  97       Heme:  Recent Labs      02/06/17   0024  02/06/17   1145  02/07/17   0500  02/08/17   0215   RBC  2.81*  2.81*   --   2.90*  3.09*   HEMOGLOBIN  8.8*  8.8*   --   8.8*  9.5*   HEMATOCRIT  25.9*   25.9*   --   26.9*  28.8*   PLATELETCT  338  338   --   426  494*   PROTHROMBTM   --   20.4*  19.7*  18.1*   INR   --   1.69*  1.62*  1.45*       Infectious Disease:  Temp  Av.7 °C (98.1 °F)  Min: 36.4 °C (97.6 °F)  Max: 37 °C (98.6 °F)  Micro: reviewed  Recent Labs      17   0024  17   0500  17   0215   WBC  12.0*  12.0*  7.6  9.2   NEUTSPOLYS  76.80*  71.00  73.30*   LYMPHOCYTES  12.40*  16.50*  15.70*   MONOCYTES  9.20  10.10  9.10   EOSINOPHILS  0.60  1.20  1.00   BASOPHILS  0.20  0.10  0.10     Current Facility-Administered Medications   Medication Dose Frequency Provider Last Rate Last Dose   • furosemide (LASIX) injection 60 mg  60 mg Q DAY Lj Philip M.D.   60 mg at 17 0823   • warfarin (COUMADIN) tablet 7.5 mg  7.5 mg COUMADIN-DAILY KIRSTEN SosaD   Stopped at 17 1800   • sodium chloride (OCEAN) 0.65 % nasal spray 2 Spray  2 Spray PRN Josr Al M.D.   2 Spray at 17 0841   • acetaminophen (TYLENOL) tablet 650 mg  650 mg Q4HRS PRN Josr Al M.D.   650 mg at 17 1135    Or   • acetaminophen (TYLENOL) suppository 650 mg  650 mg Q4HRS PRN Josr Al M.D.       • Respiratory Care per Protocol   Continuous RT Hannah Sumner, A.P.N.       • docusate sodium (COLACE) capsule 100 mg  100 mg QAM Hannah Sumner, A.P.N.   100 mg at 17 0822    And   • senna-docusate (PERICOLACE or SENOKOT S) 8.6-50 MG per tablet 1 Tab  1 Tab Nightly Hannah Sumner, A.P.N.   1 Tab at 17 2100    And   • senna-docusate (PERICOLACE or SENOKOT S) 8.6-50 MG per tablet 1 Tab  1 Tab Q24HRS PRN Hannah Sumner, A.P.N.   1 Tab at 17 1001    And   • lactulose 20 GM/30ML solution 30 mL  30 mL Q24HRS PRN Hannah Sumner, A.P.N.        And   • bisacodyl (DULCOLAX) suppository 10 mg  10 mg Q24HRS PRN Hannah Sumner, A.P.N.        And   • fleet enema 133 mL  1 Each Once PRN Hannah Sumner, A.P.N.       • NS infusion   Continuous Hannah Sumner, A.P.N. 10 mL/hr  at 01/31/17 1950     • aspirin EC (ECOTRIN) tablet 81 mg  81 mg DAILY Hannah Sumner, A.P.N.   Stopped at 02/08/17 0900   • MD ALERT... warfarin (COUMADIN) per pharmacy protocol   PRN Hannah Sumner, A.P.N.       • oxycodone immediate-release (ROXICODONE) tablet 5 mg  5 mg Q3HRS PRN Hannah Sumner, A.P.N.       • oxycodone immediate release (ROXICODONE) tablet 10 mg  10 mg Q3HRS PRN Hannah Sumner, A.P.N.   10 mg at 02/07/17 0842   • tramadol (ULTRAM) 50 MG tablet 50 mg  50 mg Q4HRS PRN Hannah Sumner, A.P.N.   50 mg at 02/06/17 2156   • ondansetron (ZOFRAN) syringe/vial injection 4 mg  4 mg Q6HRS PRN Hannah Sumner, A.P.N.   4 mg at 02/02/17 1941    Or   • prochlorperazine (COMPAZINE) injection 10 mg  10 mg Q6HRS PRN Hannah Sumner, A.P.N.       • mag hydrox-al hydrox-simeth (MAALOX PLUS ES or MYLANTA DS) suspension 30 mL  30 mL Q4HRS PRN Hannah Sumner, A.P.N.       • diphenhydrAMINE (BENADRYL) tablet/capsule 25 mg  25 mg HS PRN - MR X 1 Hannah Sumner, A.P.N.       • hydrocodone-acetaminophen (NORCO) 5-325 MG per tablet 1-2 Tab  1-2 Tab Q4HRS PRN Hannah Sumner, A.P.N.   1 Tab at 02/06/17 1545   • artificial tears 1.4 % ophthalmic solution 1 Drop  1 Drop PRN Masoud Aguirre M.D.       • glucose 4 g chewable tablet 16 g  16 g Q15 MIN PRN James Pérez M.D.        And   • dextrose 50% (D50W) injection 25 mL  25 mL Q15 MIN PRN James Pérez M.D.       • multivitamin (THERAGRAN) tablet 1 Tab  1 Tab DAILY James Pérez M.D.   1 Tab at 02/08/17 0822   • lactulose 20 GM/30ML solution 30 mL  30 mL Q24HRS PRN Jeremy M Gonda, M.D.       • ipratropium-albuterol (DUONEB) nebulizer solution 3 mL  3 mL Q2HRS PRN (RT) Jeremy M Gonda, M.D.   3 mL at 02/06/17 0546   • famotidine (PEPCID) tablet 20 mg  20 mg Q12HRS Jeremy M Gonda, M.D.   20 mg at 02/08/17 0822   • oyster shell calcium/vitamin D 250-125 MG-UNIT tablet 1 Tab  1 Tab QDAY with Breakfast James Pérez M.D.   1 Tab at 02/08/17 0822   • pravastatin  (PRAVACHOL) tablet 20 mg  20 mg Q EVENING Jeremy M Gonda, M.D.   20 mg at 02/07/17 2149     Last reviewed on 1/30/2017  8:02 PM by Purvi Montanez R.N.    Quality  Measures:  Medications reviewed, Labs reviewed and Radiology images reviewed  Frank catheter: No Frank      DVT Prophylaxis: Warfarin (Coumadin)  DVT prophylaxis - mechanical: SCDs  Ulcer prophylaxis: Yes    Assessed for rehab: Patient was assess for and/or received rehabilitation services during this hospitalization    Problems/Plan:  Acute Hypoxic Respiratory Failure - extubated 2/1   - RT/O2 therapies   - IS/PEP/Mobilize/IPV   - hold on further diuresis for today   - R thoracentesis 2/7, recurrent   - L thoracentesis 2/8 --> tiny PTX, repeat CXR at 2000  Acute Pulmonary Edema - resolved  Acute Delirium - resolved  Tachycardia that ? A flutter   - presently sinus, off amio  Severe Mitral Valve Regurgitation    - cardiac cath on 1/25   - sp mv repair 1/31  Cardiogenic Shock likely related to severe mitral regurgitation   - s/p vasopressor/ionotropic therapy   - Cards following   - Cardiac cath on 1/25 was negative and heparin stopped   - CTA was negative  Aspiration PNA   - Ceftriaxone/Doxycycline 10 day course completed 2/3  Elevated Troponin   - cards following   - ASA  Hyperglycemia   - ISS  Hx of mitral valve prolapse  Hx of systemic arterial hypertension  Hx of Dyslipidemia   - statin therapy  Prophylaxis, diet, therapies, hopeful rehab tomorrow    Discussed with patient and CVS regarding recurring small effusions and dropping BPs all while not affecting respiratory status, mobility, and O2 requirement. Offered to patient either CT chest with recurrent thora vs tube drainage if necessary vs watchful waiting with outpatient f/u and resuming lasix in 1-2 days once BP stable with hopes that will resolve on own without developing infection nor trapped lung. Patient and  prefer watchful waiting approach which I agree with.    Discussed patient  condition and risk of morbidity and/or mortality with Family, RN, RT, Pharmacy, , [employer] [facility] resident, Charge nurse / hot rounds and Patient

## 2017-02-08 NOTE — CARE PLAN
Problem: Safety  Goal: Will remain free from injury  Outcome: PROGRESSING AS EXPECTED  Re-instated to  and wife that they need to call before getting out of bed. Bed in lowest position, tread socks on patient, and call light along with personal possessions within reach.     Problem: Knowledge Deficit  Goal: Knowledge of disease process/condition, treatment plan, diagnostic tests, and medications will improve  Outcome: PROGRESSING AS EXPECTED  Updated patient and , Eliud, on plan of care and what to expect for thoracentesis. All concerns addressed and all questions answered at this time.

## 2017-02-08 NOTE — PROGRESS NOTES
Inpatient Anticoagulation Service Note    Date: 2017  Reason for Anticoagulation: Mitral Valve Repair        Hemoglobin Value: 9.5  Hematocrit Value: 28.8  Lab Platelet Value: 494  Target INR: 2.0 to 3.0    INR from last 7 days     Date/Time INR Value    17 0215 (!)1.45    17 0500 (!)1.62    17 1145 (!)1.69    17 0455 (!)1.71    17 0445 (!)1.46    17 0600 (!)1.4    17 0210 (!)1.26        Dose from last 7 days     Date/Time Dose (mg)    17 0215 7.5    17 0500 0    17 1145 0    17 0455 5    17 1400 5    17 1300 5    17 1300 5    17 1300 5        Average Dose (mg):  (new start)  Significant Interactions: Aspirin, Statin  Bridge Therapy: No     Comments: Warfarin has been held for the past 2 days in anticipation of a thoracentesis.  This was performed this morning.  H/H is stable.  Initiate warfarin 7.5 mg daily tonight.    Plan:  INR with morning labs  Education Material Provided?: Yes (AET 17)  Pharmacist suggested discharge dosin.5 mg daily with follow up in 1 to 2 days     Fabi Schreiber

## 2017-02-08 NOTE — CARE PLAN
Problem: Post Op Day 4 CABG/Heart Valve Replacement  Goal: Optimal care of the Post Op CABG/Heart Valve replacement Post Op Day 4  Outcome: PROGRESSING AS EXPECTED  Intervention: Daily Weights  completed  Intervention: Shower daily and clean incisions twice daily with soap and water  Pt refusing shower, pt to be discharged in the evening, will ask pt in the afternoon if she would like to shower before discharge  Intervention: Up in chair for all meals  Pt up in chair for all meals  Intervention: Ambulate, increasing the distance each time x 3 and before bed  Pt ambulating with increasing distance  Intervention: IS q 1 hour while awake and record best IS volume  Best IS 1100, pt is self-motivated  Intervention: Consider removal of sanchez, chest tube and pacer wire if not already done  Sanchez, chest tubes, and pacer wires  Intervention: Discharge Education  completed  Intervention: Add special instructions for cardiac surgery discharge instructions - [facility] to after visit summary and review with patient  completed

## 2017-02-08 NOTE — PROGRESS NOTES
Received bedside report, assumed care of pt.  Pt resting comfortably up in chair. Significant other at bedside. Bedside table and call light within reach. No drips running, pt denying pain. VS stable.

## 2017-02-08 NOTE — CARE PLAN
Problem: Oxygenation:  Goal: Maintain adequate oxygenation dependent on patient condition  Outcome: PROGRESSING AS EXPECTED  Intervention: Manage oxygen therapy by monitoring pulse oximetry and/or ABG values  4L NC; IS 1000

## 2017-02-08 NOTE — DISCHARGE PLANNING
Dr. Camacho has accepted Kristen to acute rehab. Transport is scheduled at 4pm today, s/p throracentesis. Nursing to call report to ext [phone]. SW Don aware of specifics. TCC remains available to assist with transfer as needed. Thank you for the opportunity to assist with pt transition to post acute care at [facility].

## 2017-02-08 NOTE — DISCHARGE SUMMARY
CHIEF COMPLAINT ON ADMISSION  Chief Complaint   Patient presents with   • Respiratory Distress       CODE STATUS  Full Code    HPI & HOSPITAL COURSE   The patient is a 66-year-old female with a past    medical history significant for hypertension, hyperlipidemia, and mitral valve   prolapse who was in her usual state of health yesterday and in fact when    skiing all day with her .  This morning, she reported to her     that she was not feeling well with a nonproductive cough and some mild    shortness of breath.  She checked her oxygen saturation at home and found to    be 85%.  She presented to the emergency department at [facility]    where unfortunately she decompensated quickly requiring up to 15 L facemask    and then eventually requiring intubation.  She was initially hypertensive and    tachycardic with an oxygen saturation of 86% on room air at the outside    hospital and was transported on high peak and expiratory pressures on the    ventilator with an FIO2 of 100%, achieving saturations in the low 90s.  She    became progressively more hypotensive at this point and has been started on a    norepinephrine drip.  A stat bedside echo was performed with Dr. Delarosa, which   revealed a ruptured mitral valve leaflet with severe MR as the likely cause    of her decompensation.  She does have an elevated white count and temperature    up to 99 degrees and was started on empiric antibiotics in the meantime.  Per    her , she has not had any recent sick contacts and has been compliant    with her medications and was not having any chest pain or syncope or    palpitations.  Also no lower extremity edema.    Hospital course:   POD 1 - HOTN- on low dose epi/daphnie, gently diuresis, Vent - per pulm, keep mediastinal tubes, keep sanchez- strict I&O, CPM  POD 2 HDS, NSR, labs noted, doing well.  OK DC sanchez.  Rehab consult.  POD 3 HDS, NSR, labs noted doing well, had BM. DC temp wires and  central lines. H/H low/stable--w/w. 2 view CXR today, wean O2 as able.  Plan for Rehab 1-2 days  POD 4 HDS, NSR, increased O2 overnight, better this AM. LLL thoracentesis this AM pending. Continue diuresis, planning for Rehab likely in AM.  POD 5 HDS, NSR, k low, replace, diuresing well, wts down, 800cc off left lung, feeling better today, min right effusion. No pneumothorax.  Cdiff neg.  OK for rehab today.  See dictated DC summary for details.     POD 6 HDS, NSR. Increased O2 requirements early this AM--denies any SOB or pain.  Additional lasix given.  CXR shows mild increase of bilat effusions.  800 cc out on L few days ago. R thoracentesis ordered for today.  Patient feeling well. Keep today, continue lasix/metalazone, to rehab maybe in AM.    POD 7 HDS, oxygen at 5 L NC.  Thoracentesis today 400 cc.  US left side enough to tap so will order for AM.   Neuro intact.  Wounds CDI.  INR trending up.  To rehab this afternoon after thoracentesis.    POD 8 HDS SR Neuro intact.  Wounds CDI.  IR to tap left pleural space and to rehab this afternoon. Holding coumadin for thoracentesis resume after done to target INR 2.0-3.0.  POD 9 had vagal response yesterday during thoracentesis.  OK to go to rehab today.     Therefore, she is discharged in good and stable condition for further post-acute management.     DISCHARGE PROBLEM LIST  Active Problems:    Pulmonary edema POA: Unknown    Mitral regurgitation POA: Unknown    Respiratory failure with hypoxia (CMS-HCC) POA: Unknown    Hyperlipidemia POA: Unknown    Hypertension POA: Unknown    CAP (community acquired pneumonia) POA: Unknown    Bradycardia POA: Unknown    Hypotension POA: Unknown    Normocytic anemia POA: Unknown  Resolved Problems:    * No resolved hospital problems. *      FOLLOW UP  No future appointments.  BRODERICK Rankin  [address]  [address] [place] [id number]  [phone]    On 2/13/2017  10:20 am    Chris Schmitt M.D.  [address] [place] [address]  [phone]  [id number]  [place] [address]  [phone]    On 3/6/2017  12:45 pm      MEDICATIONS ON DISCHARGE   MelissaCarrieDe AndaKristen   Home Medication Instructions MADDY:[id number]    Printed on:02/08/17 2795   Medication Information                      artificial tears 1.4 % Solution  Place 1 Drop in both eyes as needed (discomfort/dry eyes).             aspirin EC 81 MG EC tablet  Take 1 Tab by mouth every day.             Calcium Carb-Cholecalciferol (OYSTER SHELL CALCIUM/VITAMIN D) 250-125 MG-UNIT Tab tablet  Take 1 Tab by mouth every day.             famotidine (PEPCID) 20 MG Tab  Take 1 Tab by mouth every 12 hours.             furosemide (LASIX) 10 MG/ML Solution  4 mL by Intravenous route every day.             glucosamine Sulfate 500 MG CAPS  Take 500 mg by mouth 3 times a day, with meals.             ipratropium-albuterol (DUONEB) 0.5-2.5 (3) MG/3ML nebulizer solution  3 mL by Nebulization route every four hours as needed for Shortness of Breath.             multivitamin (THERAGRAN) Tab  Take 1 Tab by mouth every day.             potassium chloride SA (K-DUR) 10 MEQ Tab CR  Take 2 Tabs by mouth every day.             pravastatin (PRAVACHOL) 20 MG Tab  Take 20 mg by mouth every day.             sodium chloride (OCEAN) 0.65 % Solution  Spray 2 Sprays in nose as needed.             tramadol (ULTRAM) 50 MG Tab  Take 1 Tab by mouth every four hours as needed (Moderate Pain (NRS Pain Scale 4-6; CPOT Pain Scale 3-5) if opiates not ordered or tolerated).             warfarin (COUMADIN) 5 MG Tab  Take 1 Tab by mouth COUMADIN-DAILY.                 DIET  Orders Placed This Encounter   Procedures   • DIET ORDER     Standing Status: Standing      Number of Occurrences: 1      Standing Expiration Date:      Order Specific Question:  Diet:     Answer:  Consistent Carbohydrate [4]     Order Specific Question:  Diet:     Answer:  Cardiac [6]       ACTIVITY  As tolerated.  Shower only.  No driving 1 month.  No heavy lifting heavier than 10  lbs.  No raising arms above head.    LINES, DRAINS, AND WOUNDS  This is an automated list. Peripheral IVs will be removed prior to discharge.  PIV Group Left;Lower Forearm 20g Flexible Catheter (Active)   Line Secured Transparent;Taped 2/8/2017  8:00 AM   Site Condition / Description Assessed;Clean;Dry;Intact 2/8/2017  8:00 AM   Dressing Type / Description Transparent;Clean;Dry;Intact 2/8/2017  8:00 AM   Dressing Status Observed 2/8/2017  8:00 AM   Saline Locked Yes 2/8/2017  8:00 AM   Infiltration Grading Used by [facility] 0 2/8/2017  8:00 AM   Phlebitis Scale (Used by [facility]) 0 2/8/2017  8:00 AM   Date Primary Tubing Changed 02/03/17 2/3/2017  5:00 PM   Date Secondary Tubing Changed 02/03/17 2/3/2017  5:00 PM   NEXT Primary Tubing Change  02/04/17 2/3/2017  5:00 PM   NEXT Secondary Tubing Change  02/04/17 2/3/2017  5:00 PM   NEXT Site Change Date 02/10/17 2/5/2017 11:00 AM       Surgical Incision  Incision Heart Prep (Active)   Wound Bed Pink 2/8/2017  8:00 AM   Drainage  None 2/8/2017  8:00 AM   Periwound Skin Pink;Normal;Intact 2/8/2017  8:00 AM   Daily - Wound Closure Open to Air 2/8/2017  8:00 AM   Dressing Options Open to Air 2/8/2017  8:00 AM   Dressing Status / Change Not Applicable 2/8/2017  8:00 AM   Daily - Dressing Change Observed 2/2/2017  8:00 AM   Dressing Change Frequency Daily 2/2/2017  8:00 AM   Next Dressing Change  02/01/17 2/1/2017  8:00 AM                  MENTAL STATUS ON TRANSFER  Level of Consciousness: Alert  Orientation : Oriented x 4  Speech: Speech Clear    CONSULTATIONS  Rehab    PROCEDURES  Radical mitral valve repair (P2 triangular resection,    34-mm Ferguson flexible annuloplasty band), left atrial appendage ligation and    intraoperative transesophageal echocardiography.    LABORATORY  Lab Results   Component Value Date/Time    SODIUM 132* 02/08/2017 02:15 AM    POTASSIUM 3.6 02/08/2017 02:15 AM    CHLORIDE 94* 02/08/2017 02:15 AM    CO2 30 02/08/2017 02:15 AM    GLUCOSE 97  02/08/2017 02:15 AM    BUN 15 02/08/2017 02:15 AM    CREATININE 0.77 02/08/2017 02:15 AM        Lab Results   Component Value Date/Time    WBC 9.2 02/08/2017 02:15 AM    HEMOGLOBIN 9.5* 02/08/2017 02:15 AM    HEMATOCRIT 28.8* 02/08/2017 02:15 AM    PLATELET COUNT 494* 02/08/2017 02:15 AM        Total time of the discharge process exceeds 31 minutes.

## 2017-02-09 ENCOUNTER — RESOLUTE PROFESSIONAL BILLING HOSPITAL PROF FEE (OUTPATIENT)
Dept: PHYSICAL MEDICINE AND REHAB | Facility: REHABILITATION | Age: 67
End: 2017-02-09
Payer: MEDICARE

## 2017-02-09 ENCOUNTER — HOSPITAL ENCOUNTER (INPATIENT)
Facility: REHABILITATION | Age: 67
LOS: 9 days | DRG: 949 | End: 2017-02-18
Attending: PHYSICAL MEDICINE & REHABILITATION | Admitting: PHYSICAL MEDICINE & REHABILITATION
Payer: MEDICARE

## 2017-02-09 VITALS
BODY MASS INDEX: 20.07 KG/M2 | OXYGEN SATURATION: 93 % | WEIGHT: 127.87 LBS | DIASTOLIC BLOOD PRESSURE: 74 MMHG | HEIGHT: 67 IN | RESPIRATION RATE: 16 BRPM | HEART RATE: 96 BPM | SYSTOLIC BLOOD PRESSURE: 105 MMHG | TEMPERATURE: 98.2 F

## 2017-02-09 LAB
ANION GAP SERPL CALC-SCNC: 8 MMOL/L (ref 0–11.9)
BASOPHILS # BLD AUTO: 0.2 % (ref 0–1.8)
BASOPHILS # BLD: 0.02 K/UL (ref 0–0.12)
BUN SERPL-MCNC: 10 MG/DL (ref 8–22)
CALCIUM SERPL-MCNC: 8.4 MG/DL (ref 8.5–10.5)
CHLORIDE SERPL-SCNC: 96 MMOL/L (ref 96–112)
CO2 SERPL-SCNC: 28 MMOL/L (ref 20–33)
CREAT SERPL-MCNC: 0.63 MG/DL (ref 0.5–1.4)
EOSINOPHIL # BLD AUTO: 0.09 K/UL (ref 0–0.51)
EOSINOPHIL NFR BLD: 1.1 % (ref 0–6.9)
ERYTHROCYTE [DISTWIDTH] IN BLOOD BY AUTOMATED COUNT: 46.5 FL (ref 35.9–50)
GFR SERPL CREATININE-BSD FRML MDRD: >60 ML/MIN/1.73 M 2
GLUCOSE SERPL-MCNC: 98 MG/DL (ref 65–99)
HCT VFR BLD AUTO: 31.5 % (ref 37–47)
HGB BLD-MCNC: 10.2 G/DL (ref 12–16)
IMM GRANULOCYTES # BLD AUTO: 0.04 K/UL (ref 0–0.11)
IMM GRANULOCYTES NFR BLD AUTO: 0.5 % (ref 0–0.9)
INR PPP: 1.27 (ref 0.87–1.13)
LYMPHOCYTES # BLD AUTO: 1.16 K/UL (ref 1–4.8)
LYMPHOCYTES NFR BLD: 14.1 % (ref 22–41)
MCH RBC QN AUTO: 30.2 PG (ref 27–33)
MCHC RBC AUTO-ENTMCNC: 32.4 G/DL (ref 33.6–35)
MCV RBC AUTO: 93.2 FL (ref 81.4–97.8)
MONOCYTES # BLD AUTO: 0.79 K/UL (ref 0–0.85)
MONOCYTES NFR BLD AUTO: 9.6 % (ref 0–13.4)
NEUTROPHILS # BLD AUTO: 6.1 K/UL (ref 2–7.15)
NEUTROPHILS NFR BLD: 74.5 % (ref 44–72)
NRBC # BLD AUTO: 0 K/UL
NRBC BLD AUTO-RTO: 0 /100 WBC
PLATELET # BLD AUTO: 585 K/UL (ref 164–446)
PMV BLD AUTO: 9.4 FL (ref 9–12.9)
POTASSIUM SERPL-SCNC: 3.5 MMOL/L (ref 3.6–5.5)
PROTHROMBIN TIME: 16.3 SEC (ref 12–14.6)
RBC # BLD AUTO: 3.38 M/UL (ref 4.2–5.4)
SODIUM SERPL-SCNC: 132 MMOL/L (ref 135–145)
WBC # BLD AUTO: 8.2 K/UL (ref 4.8–10.8)

## 2017-02-09 PROCEDURE — 700102 HCHG RX REV CODE 250 W/ 637 OVERRIDE(OP): Performed by: INTERNAL MEDICINE

## 2017-02-09 PROCEDURE — 94760 N-INVAS EAR/PLS OXIMETRY 1: CPT

## 2017-02-09 PROCEDURE — 700102 HCHG RX REV CODE 250 W/ 637 OVERRIDE(OP): Performed by: NURSE PRACTITIONER

## 2017-02-09 PROCEDURE — A9270 NON-COVERED ITEM OR SERVICE: HCPCS | Performed by: NURSE PRACTITIONER

## 2017-02-09 PROCEDURE — 99233 SBSQ HOSP IP/OBS HIGH 50: CPT | Mod: GC | Performed by: INTERNAL MEDICINE

## 2017-02-09 PROCEDURE — A9270 NON-COVERED ITEM OR SERVICE: HCPCS | Performed by: STUDENT IN AN ORGANIZED HEALTH CARE EDUCATION/TRAINING PROGRAM

## 2017-02-09 PROCEDURE — A9270 NON-COVERED ITEM OR SERVICE: HCPCS | Performed by: PHYSICAL MEDICINE & REHABILITATION

## 2017-02-09 PROCEDURE — A9270 NON-COVERED ITEM OR SERVICE: HCPCS | Performed by: HOSPITALIST

## 2017-02-09 PROCEDURE — 700112 HCHG RX REV CODE 229: Performed by: NURSE PRACTITIONER

## 2017-02-09 PROCEDURE — 302131 K PAD MOTOR: Performed by: PHYSICAL MEDICINE & REHABILITATION

## 2017-02-09 PROCEDURE — 99223 1ST HOSP IP/OBS HIGH 75: CPT | Performed by: PHYSICAL MEDICINE & REHABILITATION

## 2017-02-09 PROCEDURE — 700102 HCHG RX REV CODE 250 W/ 637 OVERRIDE(OP): Performed by: STUDENT IN AN ORGANIZED HEALTH CARE EDUCATION/TRAINING PROGRAM

## 2017-02-09 PROCEDURE — 80048 BASIC METABOLIC PNL TOTAL CA: CPT

## 2017-02-09 PROCEDURE — 85610 PROTHROMBIN TIME: CPT

## 2017-02-09 PROCEDURE — 700102 HCHG RX REV CODE 250 W/ 637 OVERRIDE(OP): Performed by: PHYSICAL MEDICINE & REHABILITATION

## 2017-02-09 PROCEDURE — 700112 HCHG RX REV CODE 229: Performed by: PHYSICAL MEDICINE & REHABILITATION

## 2017-02-09 PROCEDURE — A9270 NON-COVERED ITEM OR SERVICE: HCPCS | Performed by: INTERNAL MEDICINE

## 2017-02-09 PROCEDURE — 85025 COMPLETE CBC W/AUTO DIFF WBC: CPT

## 2017-02-09 PROCEDURE — 770010 HCHG ROOM/CARE - REHAB SEMI PRIVAT*

## 2017-02-09 PROCEDURE — 700102 HCHG RX REV CODE 250 W/ 637 OVERRIDE(OP): Performed by: HOSPITALIST

## 2017-02-09 RX ORDER — HYDROCODONE BITARTRATE AND ACETAMINOPHEN 5; 325 MG/1; MG/1
1-2 TABLET ORAL EVERY 4 HOURS PRN
Status: DISCONTINUED | OUTPATIENT
Start: 2017-02-09 | End: 2017-02-18 | Stop reason: HOSPADM

## 2017-02-09 RX ORDER — ENEMA 19; 7 G/133ML; G/133ML
1 ENEMA RECTAL
Status: DISCONTINUED | OUTPATIENT
Start: 2017-02-09 | End: 2017-02-18 | Stop reason: HOSPADM

## 2017-02-09 RX ORDER — WARFARIN SODIUM 7.5 MG/1
7.5 TABLET ORAL
Status: COMPLETED | OUTPATIENT
Start: 2017-02-09 | End: 2017-02-09

## 2017-02-09 RX ORDER — CYCLOBENZAPRINE HCL 10 MG
10 TABLET ORAL
Status: DISCONTINUED | OUTPATIENT
Start: 2017-02-09 | End: 2017-02-18 | Stop reason: HOSPADM

## 2017-02-09 RX ORDER — FUROSEMIDE 40 MG/1
40 TABLET ORAL
Status: DISCONTINUED | OUTPATIENT
Start: 2017-02-10 | End: 2017-02-10

## 2017-02-09 RX ORDER — DOCUSATE SODIUM 100 MG/1
100 CAPSULE, LIQUID FILLED ORAL EVERY MORNING
Status: DISCONTINUED | OUTPATIENT
Start: 2017-02-09 | End: 2017-02-18 | Stop reason: HOSPADM

## 2017-02-09 RX ORDER — HYDROCODONE BITARTRATE AND ACETAMINOPHEN 7.5; 325 MG/1; MG/1
1 TABLET ORAL EVERY 4 HOURS PRN
Status: DISCONTINUED | OUTPATIENT
Start: 2017-02-09 | End: 2017-02-18 | Stop reason: HOSPADM

## 2017-02-09 RX ORDER — IPRATROPIUM BROMIDE AND ALBUTEROL SULFATE 2.5; .5 MG/3ML; MG/3ML
3 SOLUTION RESPIRATORY (INHALATION)
Status: DISCONTINUED | OUTPATIENT
Start: 2017-02-09 | End: 2017-02-18 | Stop reason: HOSPADM

## 2017-02-09 RX ORDER — ECHINACEA PURPUREA EXTRACT 125 MG
2 TABLET ORAL PRN
Status: DISCONTINUED | OUTPATIENT
Start: 2017-02-09 | End: 2017-02-18 | Stop reason: HOSPADM

## 2017-02-09 RX ORDER — AMOXICILLIN 250 MG
1 CAPSULE ORAL
Status: DISCONTINUED | OUTPATIENT
Start: 2017-02-09 | End: 2017-02-18 | Stop reason: HOSPADM

## 2017-02-09 RX ORDER — ACETAMINOPHEN 650 MG/1
650 SUPPOSITORY RECTAL EVERY 4 HOURS PRN
Status: DISCONTINUED | OUTPATIENT
Start: 2017-02-09 | End: 2017-02-18 | Stop reason: HOSPADM

## 2017-02-09 RX ORDER — PRAVASTATIN SODIUM 20 MG
20 TABLET ORAL EVERY EVENING
Status: DISCONTINUED | OUTPATIENT
Start: 2017-02-09 | End: 2017-02-18 | Stop reason: HOSPADM

## 2017-02-09 RX ORDER — POLYVINYL ALCOHOL 14 MG/ML
1 SOLUTION/ DROPS OPHTHALMIC PRN
Status: DISCONTINUED | OUTPATIENT
Start: 2017-02-09 | End: 2017-02-18 | Stop reason: HOSPADM

## 2017-02-09 RX ORDER — BISACODYL 10 MG
10 SUPPOSITORY, RECTAL RECTAL
Status: DISCONTINUED | OUTPATIENT
Start: 2017-02-09 | End: 2017-02-18 | Stop reason: HOSPADM

## 2017-02-09 RX ORDER — ONDANSETRON 2 MG/ML
4 INJECTION INTRAMUSCULAR; INTRAVENOUS 4 TIMES DAILY PRN
Status: DISCONTINUED | OUTPATIENT
Start: 2017-02-09 | End: 2017-02-18 | Stop reason: HOSPADM

## 2017-02-09 RX ORDER — FAMOTIDINE 20 MG/1
20 TABLET, FILM COATED ORAL EVERY 12 HOURS
Status: DISCONTINUED | OUTPATIENT
Start: 2017-02-09 | End: 2017-02-09

## 2017-02-09 RX ORDER — HYDROCODONE BITARTRATE AND ACETAMINOPHEN 5; 325 MG/1; MG/1
1-2 TABLET ORAL EVERY 4 HOURS PRN
Status: DISCONTINUED | OUTPATIENT
Start: 2017-02-09 | End: 2017-02-09

## 2017-02-09 RX ORDER — ONDANSETRON 4 MG/1
4 TABLET, ORALLY DISINTEGRATING ORAL 4 TIMES DAILY PRN
Status: DISCONTINUED | OUTPATIENT
Start: 2017-02-09 | End: 2017-02-18 | Stop reason: HOSPADM

## 2017-02-09 RX ORDER — ACETAMINOPHEN 325 MG/1
650 TABLET ORAL EVERY 4 HOURS PRN
Status: DISCONTINUED | OUTPATIENT
Start: 2017-02-09 | End: 2017-02-18 | Stop reason: HOSPADM

## 2017-02-09 RX ORDER — LACTULOSE 20 G/30ML
30 SOLUTION ORAL
Status: DISCONTINUED | OUTPATIENT
Start: 2017-02-09 | End: 2017-02-18 | Stop reason: HOSPADM

## 2017-02-09 RX ORDER — AMOXICILLIN 250 MG
1 CAPSULE ORAL NIGHTLY
Status: DISCONTINUED | OUTPATIENT
Start: 2017-02-09 | End: 2017-02-18 | Stop reason: HOSPADM

## 2017-02-09 RX ORDER — ALUMINA, MAGNESIA, AND SIMETHICONE 2400; 2400; 240 MG/30ML; MG/30ML; MG/30ML
30 SUSPENSION ORAL EVERY 4 HOURS PRN
Status: DISCONTINUED | OUTPATIENT
Start: 2017-02-09 | End: 2017-02-18 | Stop reason: HOSPADM

## 2017-02-09 RX ORDER — POTASSIUM CHLORIDE 20 MEQ/1
40 TABLET, EXTENDED RELEASE ORAL ONCE
Status: DISCONTINUED | OUTPATIENT
Start: 2017-02-09 | End: 2017-02-09 | Stop reason: HOSPADM

## 2017-02-09 RX ORDER — CYCLOBENZAPRINE HCL 10 MG
10 TABLET ORAL 2 TIMES DAILY PRN
Status: DISCONTINUED | OUTPATIENT
Start: 2017-02-09 | End: 2017-02-18 | Stop reason: HOSPADM

## 2017-02-09 RX ADMIN — THERA TABS 1 TABLET: TAB at 08:10

## 2017-02-09 RX ADMIN — DOCUSATE SODIUM 100 MG: 100 CAPSULE ORAL at 08:10

## 2017-02-09 RX ADMIN — CALCIUM CARBONATE-CHOLECALCIFEROL TAB 250 MG-125 UNIT 1 TABLET: 250-125 TAB at 08:09

## 2017-02-09 RX ADMIN — FUROSEMIDE 40 MG: 20 TABLET ORAL at 08:10

## 2017-02-09 RX ADMIN — CYCLOBENZAPRINE HYDROCHLORIDE 10 MG: 10 TABLET, FILM COATED ORAL at 21:10

## 2017-02-09 RX ADMIN — WARFARIN SODIUM 7.5 MG: 7.5 TABLET ORAL at 17:41

## 2017-02-09 RX ADMIN — PRAVASTATIN SODIUM 20 MG: 20 TABLET ORAL at 21:10

## 2017-02-09 RX ADMIN — DOCUSATE SODIUM 100 MG: 100 CAPSULE, LIQUID FILLED ORAL at 21:10

## 2017-02-09 RX ADMIN — STANDARDIZED SENNA CONCENTRATE AND DOCUSATE SODIUM 1 TABLET: 8.6; 5 TABLET, FILM COATED ORAL at 21:10

## 2017-02-09 RX ADMIN — FAMOTIDINE 20 MG: 20 TABLET, FILM COATED ORAL at 08:10

## 2017-02-09 RX ADMIN — THERA TABS 1 TABLET: TAB at 21:10

## 2017-02-09 RX ADMIN — ASPIRIN 81 MG: 81 TABLET, COATED ORAL at 21:11

## 2017-02-09 RX ADMIN — ASPIRIN 81 MG: 81 TABLET ORAL at 08:10

## 2017-02-09 ASSESSMENT — ENCOUNTER SYMPTOMS
CHILLS: 0
GASTROINTESTINAL NEGATIVE: 1
NAUSEA: 0
FOCAL WEAKNESS: 0
STRIDOR: 0
EYES NEGATIVE: 1
PSYCHIATRIC NEGATIVE: 1
VOMITING: 0
FEVER: 0
SENSORY CHANGE: 0
NEUROLOGICAL NEGATIVE: 1
PALPITATIONS: 0
RESPIRATORY NEGATIVE: 1
MUSCULOSKELETAL NEGATIVE: 1
WHEEZING: 0
SHORTNESS OF BREATH: 0
CONSTITUTIONAL NEGATIVE: 1
CARDIOVASCULAR NEGATIVE: 1
FALLS: 0

## 2017-02-09 ASSESSMENT — LIFESTYLE VARIABLES
EVER_SMOKED: YES
EVER_SMOKED: YES

## 2017-02-09 ASSESSMENT — PAIN SCALES - GENERAL
PAINLEVEL_OUTOF10: 0
PAINLEVEL_OUTOF10: 2
PAINLEVEL_OUTOF10: 0
PAINLEVEL_OUTOF10: 0

## 2017-02-09 ASSESSMENT — PATIENT HEALTH QUESTIONNAIRE - PHQ9
SUM OF ALL RESPONSES TO PHQ QUESTIONS 1-9: 0
2. FEELING DOWN, DEPRESSED, IRRITABLE, OR HOPELESS: NOT AT ALL
1. LITTLE INTEREST OR PLEASURE IN DOING THINGS: NOT AT ALL
SUM OF ALL RESPONSES TO PHQ9 QUESTIONS 1 AND 2: 0

## 2017-02-09 NOTE — PROGRESS NOTES
Patient admitted to facility at 1115 via wheelchair; accompanied by hospital transport.  Patient assisted to room and positioned in bed for comfort and safety; call light within reach.  Patient assisted with stowing belongings and oriented to room and facility.  Admission assessment performed and documented in computer.  Admission paperwork completed; signed copies placed in chart.  Will continue to monitor.

## 2017-02-09 NOTE — PROGRESS NOTES
Admitted via wc to room 127-01 at 1100 with DX of debility r/t CABG. Pleasant and cooperative, AOx . Dr Camacho attending. SBA to transfer. Incisions at chest and upper abd are dry and appear to be healing without complication.  here and is supportive.

## 2017-02-09 NOTE — PROGRESS NOTES
12 hour chart check completed. Bedside report received from IVY Garcia. Pt resting comfortably in bed;. Denies pain or discomfort at this time.  Family and pt updated on POC. All questions and concerns addressed and answered at this time. Fall and safety precautions in place.

## 2017-02-09 NOTE — DISCHARGE INSTRUCTIONS
Discharge Instructions    Discharged to [facility] home by medical transportation with escort. Discharged via wheelchair, hospital escort: Yes.  Special equipment needed: Oxygen    Be sure to schedule a follow-up appointment with your primary care doctor or any specialists as instructed.     Discharge Plan:   Influenza Vaccine Indication: Indicated: 65 years and older    I understand that a diet low in cholesterol, fat, and sodium is recommended for good health. Unless I have been given specific instructions below for another diet, I accept this instruction as my diet prescription.   Other diet: cardiac    Special Instructions: heart surgery and coumadin    Cardiac Surgery Discharge Instructions/[facility]    Activity:  1. NO driving for 4 weeks after surgery. You may ride as a passenger.  2. NO lifting of any item over 10 lbs (e.g. gallon of milk) for 6 weeks after surgery.  Do not raise both arms above head, only one at a time.  Do not push or pull with your arms.  3. Walk as much as possible! Walk a minimum of 4 times per day. Depending on your fatigue and comfort level, you may walk as much as you wish. There is no maximum.  4. Other physical activities (sex, housework, gardening, etc.) are OK after 4 weeks or after 8 weeks if you want to golf (start by putting, then advance to chipping, then to driving).  5. Continue using incentive spirometer for 2 weeks.  If you are going home on oxygen and you were not on oxygen prior to surgery, keep using until you are oxygen free.  6. Weigh daily and write it down starting  the next morning after you arrive home. Call your doctor for a weight gain of 2-4 lbs in 1-2 days.    Incision Care:  1. SHOWER EVERYDAY-no baths. Make sure to clean your incision(s) twice daily with plain, perfume and dye-free soap (if you shower in the morning, stand at the sink at bedtime and clean incision with soap and water). Then pat incision(s) dry with clean towel. Avoid creams or  lotions on the incision(s).    2. If there is any increase in redness or swelling, or separation of the incision line, or thick drainage* from any of the incisions, call [facility] ([phone]). * Clear, thin drainage is not abnormal especially from the leg incision and/or chest tube sites.                    3. Continue to wear your LC Stockings for 4 weeks on the leg(s) with the incision(s) or swelling. You may take off the stocking(s) when in bed or when the leg(s) are elevated.    General Instructions:  1. If you are on the blood thinner Coumadin (warfarin), you will need your coumadin levels checked periodically.  2. You have been referred to Cardiac Rehab which is highly recommended for you after heart surgery. You can start Cardiac Rehab 30 days after surgery.  If you do not have an appointment at the time of discharge call [phone] to schedule an appointment.  3. Your Primary Care Doctor typically handles Home Oxygen. The Home Oxygen service that drops off your tanks should be checking your oxygen saturation levels. Oxygen may be stopped when you are > 90 % saturated on room air. Check with your Primary Care Doctor if you are unsure.    Prescription Refills/Questions:   Call your usual Pharmacy for ALL refills   1. Pain medication questions only: Call [facility] at [phone].  (Remember that refills may require additional physician approval and will need at least 24 hours’ notice. Please call for refills on Mondays through Fridays from 9 am to 4 pm).  2. For all other medications (except pain medication): Call your Cardiology Group or Primary Care Doctor (not [facility]) for refills or questions.    [facility] IS ALWAYS AVAILABLE TO ANSWER YOUR QUESTIONS. DO NOT HESITATE TO CALL!    · Is patient discharged on Warfarin / Coumadin?   Yes    You are receiving the drug warfarin. Please understand the importance of monitoring warfarin with scheduled PT/INR blood  "draws.    IMPORTANT: HOW TO USE THIS INFORMATION:  This is a summary and does NOT have all possible information about this product. This information does not assure that this product is safe, effective, or appropriate for you. This information is not individual medical advice and does not substitute for the advice of your health care professional. Always ask your health care professional for complete information about this product and your specific health needs.      WARFARIN - ORAL (WARF-uh-rin)      COMMON BRAND NAME(S): Coumadin      WARNING:  Warfarin can cause very serious (possibly fatal) bleeding. This is more likely to occur when you first start taking this medication or if you take too much warfarin. To decrease your risk for bleeding, your doctor or other health care provider will monitor you closely and check your lab results (INR test) to make sure you are not taking too much warfarin. Keep all medical and laboratory appointments. Tell your doctor right away if you notice any signs of serious bleeding. See also Side Effects section.      USES:  This medication is used to treat blood clots (such as in deep vein thrombosis-DVT or pulmonary embolus-PE) and/or to prevent new clots from forming in your body. Preventing harmful blood clots helps to reduce the risk of a stroke or heart attack. Conditions that increase your risk of developing blood clots include a certain type of irregular heart rhythm (atrial fibrillation), heart valve replacement, recent heart attack, and certain surgeries (such as hip/knee replacement). Warfarin is commonly called a \"blood thinner,\" but the more correct term is \"anticoagulant.\" It helps to keep blood flowing smoothly in your body by decreasing the amount of certain substances (clotting proteins) in your blood.      HOW TO USE:  Read the Medication Guide provided by your pharmacist before you start taking warfarin and each time you get a refill. If you have any questions, ask " your doctor or pharmacist. Take this medication by mouth with or without food as directed by your doctor or other health care professional, usually once a day. It is very important to take it exactly as directed. Do not increase the dose, take it more frequently, or stop using it unless directed by your doctor. Dosage is based on your medical condition, laboratory tests (such as INR), and response to treatment. Your doctor or other health care provider will monitor you closely while you are taking this medication to determine the right dose for you. Use this medication regularly to get the most benefit from it. To help you remember, take it at the same time each day. It is important to eat a balanced, consistent diet while taking warfarin. Some foods can affect how warfarin works in your body and may affect your treatment and dose. Avoid sudden large increases or decreases in your intake of foods high in vitamin K (such as broccoli, cauliflower, cabbage, brussels sprouts, kale, spinach, and other green leafy vegetables, liver, green tea, certain vitamin supplements). If you are trying to lose weight, check with your doctor before you try to go on a diet. Cranberry products may also affect how your warfarin works. Limit the amount of cranberry juice (16 ounces/480 milliliters a day) or other cranberry products you may drink or eat.      SIDE EFFECTS:  Nausea, loss of appetite, or stomach/abdominal pain may occur. If any of these effects persist or worsen, tell your doctor or pharmacist promptly. Remember that your doctor has prescribed this medication because he or she has judged that the benefit to you is greater than the risk of side effects. Many people using this medication do not have serious side effects. This medication can cause serious bleeding if it affects your blood clotting proteins too much (shown by unusually high INR lab results). Even if your doctor stops your medication, this risk of bleeding can  continue for up to a week. Tell your doctor right away if you have any signs of serious bleeding, including: unusual pain/swelling/discomfort, unusual/easy bruising, prolonged bleeding from cuts or gums, persistent/frequent nosebleeds, unusually heavy/prolonged menstrual flow, pink/dark urine, coughing up blood, vomit that is bloody or looks like coffee grounds, severe headache, dizziness/fainting, unusual or persistent tiredness/weakness, bloody/black/tarry stools, chest pain, shortness of breath, difficulty swallowing. Tell your doctor right away if any of these unlikely but serious side effects occur: persistent nausea/vomiting, severe stomach/abdominal pain, yellowing eyes/skin. This drug rarely has caused very serious (possibly fatal) problems if its effects lead to small blood clots (usually at the beginning of treatment). This can lead to severe skin/tissue damage that may require surgery or amputation if left untreated. Patients with certain blood conditions (protein C or S deficiency) may be at greater risk. Get medical help right away if any of these rare but serious side effects occur: painful/red/purplish patches on the skin (such as on the toe, breast, abdomen), change in the amount of urine, vision changes, confusion, slurred speech, weakness on one side of the body. A very serious allergic reaction to this drug is rare. However, get medical help right away if you notice any symptoms of a serious allergic reaction, including: rash, itching/swelling (especially of the face/tongue/throat), severe dizziness, trouble breathing. This is not a complete list of possible side effects. If you notice other effects not listed above, contact your doctor or pharmacist. In the [place] - Call your doctor for medical advice about side effects. You may report side effects to FDA at 3-196-FDA-6291. In [place] - Call your doctor for medical advice about side effects. You may report side effects to [employer] at  7-674-342-0012.      PRECAUTIONS:  Before taking warfarin, tell your doctor or pharmacist if you are allergic to it; or if you have any other allergies. This product may contain inactive ingredients, which can cause allergic reactions or other problems. Talk to your pharmacist for more details. Before using this medication, tell your doctor or pharmacist your medical history, especially of: blood disorders (such as anemia, hemophilia), bleeding problems (such as bleeding of the stomach/intestines, bleeding in the brain), blood vessel disorders (such as aneurysms), recent major injury/surgery, liver disease, alcohol use, mental/mood disorders (including memory problems), frequent falls/injuries. It is important that all your doctors and dentists know that you take warfarin. Before having surgery or any medical/dental procedures, tell your doctor or dentist that you are taking this medication and about all the products you use (including prescription drugs, nonprescription drugs, and herbal products). Avoid getting injections into the muscles. If you must have an injection into a muscle (for example, a flu shot), it should be given in the arm. This way, it will be easier to check for bleeding and/or apply pressure bandages. This medication may cause stomach bleeding. Daily use of alcohol while using this medicine will increase your risk for stomach bleeding and may also affect how this medication works. Limit or avoid alcoholic beverages. If you have not been eating well, if you have an illness or infection that causes fever, vomiting, or diarrhea for more than 2 days, or if you start using any antibiotic medications, contact your doctor or pharmacist immediately because these conditions can affect how warfarin works. This medication can cause heavy bleeding. To lower the chance of getting cut, bruised, or injured, use great caution with sharp objects like safety razors and nail cutters. Use an electric razor when  shaving and a soft toothbrush when brushing your teeth. Avoid activities such as contact sports. If you fall or injure yourself, especially if you hit your head, call your doctor immediately. Your doctor may need to check you. The Food & Drug Administration has stated that generic warfarin products are interchangeable. However, consult your doctor or pharmacist before switching warfarin products. Be careful not to take more than one medication that contains warfarin unless specifically directed by the doctor or health care provider who is monitoring your warfarin treatment. Older adults may be at greater risk for bleeding while using this drug. This medication is not recommended for use during pregnancy because of serious (possibly fatal) harm to an unborn baby. Discuss the use of reliable forms of birth control with your doctor. If you become pregnant or think you may be pregnant, tell your doctor immediately. If you are planning pregnancy, discuss a plan for managing your condition with your doctor before you become pregnant. Your doctor may switch the type of medication you use during pregnancy. Very small amounts of this medication may pass into breast milk but is unlikely to harm a nursing infant. Consult your doctor before breast-feeding.      DRUG INTERACTIONS:  Drug interactions may change how your medications work or increase your risk for serious side effects. This document does not contain all possible drug interactions. Keep a list of all the products you use (including prescription/nonprescription drugs and herbal products) and share it with your doctor and pharmacist. Do not start, stop, or change the dosage of any medicines without your doctor's approval. Warfarin interacts with many prescription, nonprescription, vitamin, and herbal products. This includes medications that are applied to the skin or inside the vagina or rectum. The interactions with warfarin usually result in an increase or decrease  "in the \"blood-thinning\" (anticoagulant) effect. Your doctor or other health care professional should closely monitor you to prevent serious bleeding or clotting problems. While taking warfarin, it is very important to tell your doctor or pharmacist of any changes in medications, vitamins, or herbal products that you are taking. Some products that may interact with this drug include: capecitabine, imatinib, mifepristone. Aspirin, aspirin-like drugs (salicylates), and nonsteroidal anti-inflammatory drugs (NSAIDs such as ibuprofen, naproxen, celecoxib) may have effects similar to warfarin. These drugs may increase the risk of bleeding problems if taken during treatment with warfarin. Carefully check all prescription/nonprescription product labels (including drugs applied to the skin such as pain-relieving creams) since the products may contain NSAIDs or salicylates. Talk to your doctor about using a different medication (such as acetaminophen) to treat pain/fever. Low-dose aspirin and related drugs (such as clopidogrel, ticlopidine) should be continued if prescribed by your doctor for specific medical reasons such as heart attack or stroke prevention. Consult your doctor or pharmacist for more details. Many herbal products interact with warfarin. Tell your doctor before taking any herbal products, especially bromelains, coenzyme Q10, cranberry, danshen, dong quai, fenugreek, garlic, ginkgo biloba, ginseng, and Akeley's wort, among others. This medication may interfere with a certain laboratory test to measure theophylline levels, possibly causing false test results. Make sure laboratory personnel and all your doctors know you use this drug.      OVERDOSE:  If overdose is suspected, contact a poison control center or emergency room immediately. [place] residents can call the [place] National Poison Hotline at 1-737.105.2921. [place] residents can call a provincial poison control center. Symptoms of overdose may include: " bloody/black/tarry stools, pink/dark urine, unusual/prolonged bleeding.      NOTES:  Do not share this medication with others. Laboratory and/or medical tests (such as INR, complete blood count) must be performed periodically to monitor your progress or check for side effects. Consult your doctor for more details.      MISSED DOSE:  For the best possible benefit, do not miss any doses. If you do miss a dose and remember on the same day, take it as soon as you remember. If you remember on the next day, skip the missed dose and resume your usual dosing schedule. Do not double the dose to catch up because this could increase your risk for bleeding. Keep a record of missed doses to give to your doctor or pharmacist. Contact your doctor or pharmacist if you miss 2 or more doses in a row.      STORAGE:  Store at room temperature away from light and moisture. Do not store in the bathroom. Keep all medications away from children and pets. Do not flush medications down the toilet or pour them into a drain unless instructed to do so. Properly discard this product when it is  or no longer needed. Consult your pharmacist or local waste disposal company for more details about how to safely discard your product.      MEDICAL ALERT:  Your condition and medication can cause complications in a medical emergency. For information about enrolling in MedicAlert, call 1-258.123.4177 ([place]) or 1-456.461.8251 ([place]).      Information last revised 2010 Copyright(c)  [employer]             · Is patient Post Blood Transfusion?  No  Depression / Suicide Risk    As you are discharged from this [facility] facility, it is important to learn how to keep safe from harming yourself.    Recognize the warning signs:  · Abrupt changes in personality, positive or negative- including increase in energy   · Giving away possessions  · Change in eating patterns- significant weight changes-  positive or negative  · Change in  sleeping patterns- unable to sleep or sleeping all the time   · Unwillingness or inability to communicate  · Depression  · Unusual sadness, discouragement and loneliness  · Talk of wanting to die  · Neglect of personal appearance   · Rebelliousness- reckless behavior  · Withdrawal from people/activities they love  · Confusion- inability to concentrate     If you or a loved one observes any of these behaviors or has concerns about self-harm, here's what you can do:  · Talk about it- your feelings and reasons for harming yourself  · Remove any means that you might use to hurt yourself (examples: pills, rope, extension cords, firearm)  · Get professional help from the community (Mental Health, Substance Abuse, psychological counseling)  · Do not be alone:Call your Safe Contact- someone whom you trust who will be there for you.  · Call your local CRISIS HOTLINE 899-3675 or 326-702-8050  · Call your local Children's Mobile Crisis Response Team [place] (746) 153-3779 or www.[employer]  · Call the toll free National Suicide Prevention Hotlines   · National Suicide Prevention Lifeline 308-530-UQQG (9481)  · National Hope Line Network 800-SUICIDE (645-1736)    Discharge Instructions    Discharged to other by [facility] van with relative. Discharged via wheelchair, hospital escort: Yes.  Special equipment needed: Oxygen    Be sure to schedule a follow-up appointment with your primary care doctor or any specialists as instructed.     Discharge Plan:   Influenza Vaccine Indication: Patient Refuses (open heart surgical patient.)    I understand that a diet low in cholesterol, fat, and sodium is recommended for good health. Unless I have been given specific instructions below for another diet, I accept this instruction as my diet prescription.   Other diet: Cardiac    Special Instructions:   [facility] Cardiac Surgery Discharge Instructions    Activity:  1. NO driving for 4 weeks after surgery. You may ride as  a passenger.  2. NO lifting of any item over 10 lbs (e.g. gallon of milk) for 6 weeks after surgery.  3. Walk as much as possible! Walk a minimum of once a day. Depending on your fatigue and comfort level, you may walk as much as you wish. There is no maximum.  4. Other physical activities (sex, housework, gardening, etc.) are OK after 4 weeks or after 8 weeks if you want to golf (start by putting, then advance to chipping, then to driving).  5. Continue using incentive spirometer for 2 weeks, especially if going home on oxygen.  6. Weigh daily and write it down starting  the next morning after you arrive home. Call your doctor for a weight gain of 2-4 lbs in 1-2 days.    Incision Care:  1. SHOWER ONLY - no baths. Clean incision(s) daily with plain soap or any other dye or perfume free soap. Then pat incision(s) dry with clean towel. Avoid creams or lotions on the incision(s).  2. If there is any increase in redness or swelling, or separation of the incision line, or thick drainage* from any of the incisions, call [facility] ([phone]). * Clear, thin drainage is not abnormal especially from the leg incision and/or chest tube sites.                    3. Continue to wear your LC Stockings for 4 weeks on the leg(s) with the incision(s) or swelling. You may take off the stocking(s) when in bed or when the leg(s) are elevated.    Prescription Refills/Questions: Call your usual Pharmacy for ALL refills   (Remember that refills may require additional physician approval and will need at least 24 hours' notice. Please call for refills on Mondays through Fridays from 9 am to 4 pm).    1. Pain medication questions only: Call [facility] ([phone]).  2. For all other medications (except pain medication): Call your Cardiology Group or Primary Care Doctor (not [facility]) for refills or questions.    General Instructions:  1. If you are on the blood thinner Coumadin (warfarin), you will  need your coumadin levels checked periodically. For any questions about scheduling, ask your Cardiology Group or your Home Health Nurse whether this has been arranged for you.     2. Cardiac Rehab is highly recommended. If you do not have an appointment at the time of discharge call [facility] @ [phone] to schedule your initial interview.      3. Your Primary Care Doctor typically handles Home Oxygen. The Home Oxygen service that drops off your tanks should be checking your oxygen saturation levels. Oxygen may be stopped when you are > 90 % saturated on room air. Check with your Primary Care Doctor if you are unsure.    [facility] IS ALWAYS AVAILABLE TO ANSWER YOUR QUESTIONS. DO NOT HESITATE TO CALL!      · Is patient discharged on Warfarin / Coumadin?   Yes    You are receiving the drug warfarin. Please understand the importance of monitoring warfarin with scheduled PT/INR blood draws.  Follow-up with a call to your personal Doctor's office in 3 days to schedule a PT/INR. .    IMPORTANT: HOW TO USE THIS INFORMATION:  This is a summary and does NOT have all possible information about this product. This information does not assure that this product is safe, effective, or appropriate for you. This information is not individual medical advice and does not substitute for the advice of your health care professional. Always ask your health care professional for complete information about this product and your specific health needs.      WARFARIN - ORAL (WARF-uh-rin)      COMMON BRAND NAME(S): Coumadin      WARNING:  Warfarin can cause very serious (possibly fatal) bleeding. This is more likely to occur when you first start taking this medication or if you take too much warfarin. To decrease your risk for bleeding, your doctor or other health care provider will monitor you closely and check your lab results (INR test) to make sure you are not taking too much warfarin. Keep all medical and laboratory appointments.  "Tell your doctor right away if you notice any signs of serious bleeding. See also Side Effects section.      USES:  This medication is used to treat blood clots (such as in deep vein thrombosis-DVT or pulmonary embolus-PE) and/or to prevent new clots from forming in your body. Preventing harmful blood clots helps to reduce the risk of a stroke or heart attack. Conditions that increase your risk of developing blood clots include a certain type of irregular heart rhythm (atrial fibrillation), heart valve replacement, recent heart attack, and certain surgeries (such as hip/knee replacement). Warfarin is commonly called a \"blood thinner,\" but the more correct term is \"anticoagulant.\" It helps to keep blood flowing smoothly in your body by decreasing the amount of certain substances (clotting proteins) in your blood.      HOW TO USE:  Read the Medication Guide provided by your pharmacist before you start taking warfarin and each time you get a refill. If you have any questions, ask your doctor or pharmacist. Take this medication by mouth with or without food as directed by your doctor or other health care professional, usually once a day. It is very important to take it exactly as directed. Do not increase the dose, take it more frequently, or stop using it unless directed by your doctor. Dosage is based on your medical condition, laboratory tests (such as INR), and response to treatment. Your doctor or other health care provider will monitor you closely while you are taking this medication to determine the right dose for you. Use this medication regularly to get the most benefit from it. To help you remember, take it at the same time each day. It is important to eat a balanced, consistent diet while taking warfarin. Some foods can affect how warfarin works in your body and may affect your treatment and dose. Avoid sudden large increases or decreases in your intake of foods high in vitamin K (such as broccoli, " cauliflower, cabbage, brussels sprouts, kale, spinach, and other green leafy vegetables, liver, green tea, certain vitamin supplements). If you are trying to lose weight, check with your doctor before you try to go on a diet. Cranberry products may also affect how your warfarin works. Limit the amount of cranberry juice (16 ounces/480 milliliters a day) or other cranberry products you may drink or eat.      SIDE EFFECTS:  Nausea, loss of appetite, or stomach/abdominal pain may occur. If any of these effects persist or worsen, tell your doctor or pharmacist promptly. Remember that your doctor has prescribed this medication because he or she has judged that the benefit to you is greater than the risk of side effects. Many people using this medication do not have serious side effects. This medication can cause serious bleeding if it affects your blood clotting proteins too much (shown by unusually high INR lab results). Even if your doctor stops your medication, this risk of bleeding can continue for up to a week. Tell your doctor right away if you have any signs of serious bleeding, including: unusual pain/swelling/discomfort, unusual/easy bruising, prolonged bleeding from cuts or gums, persistent/frequent nosebleeds, unusually heavy/prolonged menstrual flow, pink/dark urine, coughing up blood, vomit that is bloody or looks like coffee grounds, severe headache, dizziness/fainting, unusual or persistent tiredness/weakness, bloody/black/tarry stools, chest pain, shortness of breath, difficulty swallowing. Tell your doctor right away if any of these unlikely but serious side effects occur: persistent nausea/vomiting, severe stomach/abdominal pain, yellowing eyes/skin. This drug rarely has caused very serious (possibly fatal) problems if its effects lead to small blood clots (usually at the beginning of treatment). This can lead to severe skin/tissue damage that may require surgery or amputation if left untreated.  Patients with certain blood conditions (protein C or S deficiency) may be at greater risk. Get medical help right away if any of these rare but serious side effects occur: painful/red/purplish patches on the skin (such as on the toe, breast, abdomen), change in the amount of urine, vision changes, confusion, slurred speech, weakness on one side of the body. A very serious allergic reaction to this drug is rare. However, get medical help right away if you notice any symptoms of a serious allergic reaction, including: rash, itching/swelling (especially of the face/tongue/throat), severe dizziness, trouble breathing. This is not a complete list of possible side effects. If you notice other effects not listed above, contact your doctor or pharmacist. In the [place] - Call your doctor for medical advice about side effects. You may report side effects to FDA at 9-002-PMA-7530. In [place] - Call your doctor for medical advice about side effects. You may report side effects to [facility] [place] at 1-875.718.8210.      PRECAUTIONS:  Before taking warfarin, tell your doctor or pharmacist if you are allergic to it; or if you have any other allergies. This product may contain inactive ingredients, which can cause allergic reactions or other problems. Talk to your pharmacist for more details. Before using this medication, tell your doctor or pharmacist your medical history, especially of: blood disorders (such as anemia, hemophilia), bleeding problems (such as bleeding of the stomach/intestines, bleeding in the brain), blood vessel disorders (such as aneurysms), recent major injury/surgery, liver disease, alcohol use, mental/mood disorders (including memory problems), frequent falls/injuries. It is important that all your doctors and dentists know that you take warfarin. Before having surgery or any medical/dental procedures, tell your doctor or dentist that you are taking this medication and about all the products you use (including  prescription drugs, nonprescription drugs, and herbal products). Avoid getting injections into the muscles. If you must have an injection into a muscle (for example, a flu shot), it should be given in the arm. This way, it will be easier to check for bleeding and/or apply pressure bandages. This medication may cause stomach bleeding. Daily use of alcohol while using this medicine will increase your risk for stomach bleeding and may also affect how this medication works. Limit or avoid alcoholic beverages. If you have not been eating well, if you have an illness or infection that causes fever, vomiting, or diarrhea for more than 2 days, or if you start using any antibiotic medications, contact your doctor or pharmacist immediately because these conditions can affect how warfarin works. This medication can cause heavy bleeding. To lower the chance of getting cut, bruised, or injured, use great caution with sharp objects like safety razors and nail cutters. Use an electric razor when shaving and a soft toothbrush when brushing your teeth. Avoid activities such as contact sports. If you fall or injure yourself, especially if you hit your head, call your doctor immediately. Your doctor may need to check you. The Food & Drug Administration has stated that generic warfarin products are interchangeable. However, consult your doctor or pharmacist before switching warfarin products. Be careful not to take more than one medication that contains warfarin unless specifically directed by the doctor or health care provider who is monitoring your warfarin treatment. Older adults may be at greater risk for bleeding while using this drug. This medication is not recommended for use during pregnancy because of serious (possibly fatal) harm to an unborn baby. Discuss the use of reliable forms of birth control with your doctor. If you become pregnant or think you may be pregnant, tell your doctor immediately. If you are planning  "pregnancy, discuss a plan for managing your condition with your doctor before you become pregnant. Your doctor may switch the type of medication you use during pregnancy. Very small amounts of this medication may pass into breast milk but is unlikely to harm a nursing infant. Consult your doctor before breast-feeding.      DRUG INTERACTIONS:  Drug interactions may change how your medications work or increase your risk for serious side effects. This document does not contain all possible drug interactions. Keep a list of all the products you use (including prescription/nonprescription drugs and herbal products) and share it with your doctor and pharmacist. Do not start, stop, or change the dosage of any medicines without your doctor's approval. Warfarin interacts with many prescription, nonprescription, vitamin, and herbal products. This includes medications that are applied to the skin or inside the vagina or rectum. The interactions with warfarin usually result in an increase or decrease in the \"blood-thinning\" (anticoagulant) effect. Your doctor or other health care professional should closely monitor you to prevent serious bleeding or clotting problems. While taking warfarin, it is very important to tell your doctor or pharmacist of any changes in medications, vitamins, or herbal products that you are taking. Some products that may interact with this drug include: capecitabine, imatinib, mifepristone. Aspirin, aspirin-like drugs (salicylates), and nonsteroidal anti-inflammatory drugs (NSAIDs such as ibuprofen, naproxen, celecoxib) may have effects similar to warfarin. These drugs may increase the risk of bleeding problems if taken during treatment with warfarin. Carefully check all prescription/nonprescription product labels (including drugs applied to the skin such as pain-relieving creams) since the products may contain NSAIDs or salicylates. Talk to your doctor about using a different medication (such as " acetaminophen) to treat pain/fever. Low-dose aspirin and related drugs (such as clopidogrel, ticlopidine) should be continued if prescribed by your doctor for specific medical reasons such as heart attack or stroke prevention. Consult your doctor or pharmacist for more details. Many herbal products interact with warfarin. Tell your doctor before taking any herbal products, especially bromelains, coenzyme Q10, cranberry, danshen, dong quai, fenugreek, garlic, ginkgo biloba, ginseng, and Wartrace's wort, among others. This medication may interfere with a certain laboratory test to measure theophylline levels, possibly causing false test results. Make sure laboratory personnel and all your doctors know you use this drug.      OVERDOSE:  If overdose is suspected, contact a poison control center or emergency room immediately. [place] residents can call the [place] National Poison Hotline at 1-980.505.2574. [place] residents can call a provincial poison control center. Symptoms of overdose may include: bloody/black/tarry stools, pink/dark urine, unusual/prolonged bleeding.      NOTES:  Do not share this medication with others. Laboratory and/or medical tests (such as INR, complete blood count) must be performed periodically to monitor your progress or check for side effects. Consult your doctor for more details.      MISSED DOSE:  For the best possible benefit, do not miss any doses. If you do miss a dose and remember on the same day, take it as soon as you remember. If you remember on the next day, skip the missed dose and resume your usual dosing schedule. Do not double the dose to catch up because this could increase your risk for bleeding. Keep a record of missed doses to give to your doctor or pharmacist. Contact your doctor or pharmacist if you miss 2 or more doses in a row.      STORAGE:  Store at room temperature away from light and moisture. Do not store in the bathroom. Keep all medications away from children and pets. Do  not flush medications down the toilet or pour them into a drain unless instructed to do so. Properly discard this product when it is  or no longer needed. Consult your pharmacist or local waste disposal company for more details about how to safely discard your product.      MEDICAL ALERT:  Your condition and medication can cause complications in a medical emergency. For information about enrolling in MedicAlert, call 1-434.341.7653 ([place]) or 1-270.316.3208 ([place]).      Information last revised 2010 Copyright(c)  [employer]             · Is patient Post Blood Transfusion?  No    Depression / Suicide Risk    As you are discharged from this [facility] facility, it is important to learn how to keep safe from harming yourself.    Recognize the warning signs:  · Abrupt changes in personality, positive or negative- including increase in energy   · Giving away possessions  · Change in eating patterns- significant weight changes-  positive or negative  · Change in sleeping patterns- unable to sleep or sleeping all the time   · Unwillingness or inability to communicate  · Depression  · Unusual sadness, discouragement and loneliness  · Talk of wanting to die  · Neglect of personal appearance   · Rebelliousness- reckless behavior  · Withdrawal from people/activities they love  · Confusion- inability to concentrate     If you or a loved one observes any of these behaviors or has concerns about self-harm, here's what you can do:  · Talk about it- your feelings and reasons for harming yourself  · Remove any means that you might use to hurt yourself (examples: pills, rope, extension cords, firearm)  · Get professional help from the community (Mental Health, Substance Abuse, psychological counseling)  · Do not be alone:Call your Safe Contact- someone whom you trust who will be there for you.  · Call your local CRISIS HOTLINE 091-4130 or 293-470-6627  · Call your local Children's Mobile Crisis Response  Team [facility] (372) 842-8718 or www.Keecker  · Call the toll free National Suicide Prevention Hotlines   · National Suicide Prevention Lifeline 178-948-WURC (6385)  · National Hope Line Network 800-SUICIDE (066-5874)    Heart Attack  A heart attack (myocardial infarction, MI) causes damage to your heart that cannot be fixed. A heart attack can happen when a heart (coronary) artery becomes blocked or narrowed. This cuts off the blood supply and oxygen to your heart.  When one or more of your coronary arteries becomes blocked, that area of your heart begins to die. This causes the pain you feel during a heart attack. Heart attack pain can also occur in one part of the body but be felt in another part of the body (referred pain). You may feel referred heart attack pain in your left arm, neck, or jaw. Pain may even be felt in the right arm.  CAUSES   Many conditions can cause a heart attack. These include:   · Atherosclerosis. This is when a fatty substance (plaque) gradually builds up in the arteries. This buildup can block or reduce the blood supply to one or more of the heart arteries.  · A blood clot. A blood clot can develop suddenly when plaque breaks up (ruptures) within a heart artery. A blood clot can block the heart artery, which prevents blood flow to the heart.    · Severe tightening (spasm) of the heart artery. This cuts off blood flow through the artery.    RISK FACTORS  People at risk for heart attack usually have one or more of the following risk factors:   2. High blood pressure (hypertension).  3. High cholesterol.  4. Smoking.  5. Being male.  6. Being overweight or obese.  7. Older aged.     8. A family history of heart disease.  9. Lack of exercise.  10. Diabetes.  11. Stress.  12. Drinking too much alcohol.  13. Using illegal street drugs, such as cocaine and methamphetamines.  SYMPTOMS   Heart attack symptoms can vary from person to person. Symptoms depend on factors like gender and  age.   2. In both men and women, heart attack symptoms can include the followin. Chest pain. This may feel like crushing, squeezing, or a feeling of pressure.  2. Shortness of breath.  3. Heartburn or indigestion with or without vomiting, shortness of breath, or sweating.  4. Sudden cold sweats.  5. Sudden light-headedness.  6. Upper back pain.    3. Women can have unique heart attack symptoms, such as:    1. Unexplained feelings of nervousness or anxiety.  2. Discomfort between the shoulder blades or upper back.  3. Tingling in the hands and arms.    4. Older people (of both genders) can have subtle heart attack symptoms, such as:    1. Sweating.  2. Shortness of breath.  3. General tiredness or not feeling well.    DIAGNOSIS   Diagnosing a heart attack involves several tests. They include:   3. An assessment of your vital signs. This includes checking your:  1. Heart rhythm.  2. Blood pressure.  3. Breathing rate.  4. Oxygen level.    4. An ECG (electrocardiogram) to measure the electrical activity of your heart.  5. Blood tests called cardiac markers. In these tests, blood is drawn at scheduled times to check for the specific proteins or enzymes released by damaged heart muscle.  6. A chest X-ray.  7. An echocardiogram to evaluate heart motion and blood flow.  8. Coronary angiography to look at the heart arteries.    TREATMENT   Treatment for a heart attack may include:   3. Medicine that breaks up or dissolves blood clots in the heart artery.  4. Angioplasty.  5. Cardiac stent placement.  6. Intra-aortic balloon pump therapy (IABP).  7. Open heart surgery (coronary artery bypass graft, CABG).  HOME CARE INSTRUCTIONS  · Take medicines only as directed by your health care provider. You may need to take medicine after a heart attack to:    ¨ Keep your blood from clotting too easily.  ¨ Control your blood pressure.  ¨ Lower your cholesterol.  ¨ Control abnormal heart rhythms.    · Do not take the following  medicines unless your health care provider approves:  ¨ Nonsteroidal anti-inflammatory drugs (NSAIDs), such as ibuprofen, naproxen, or celecoxib.  ¨ Vitamin supplements that contain vitamin A, vitamin E, or both.  ¨ Hormone replacement therapy that contains estrogen with or without progestin.  · Make lifestyle changes as directed by your health care provider. These may include:    ¨ Quitting smoking, if you smoke.  ¨ Getting regular exercise. Ask your health care provider to suggest some activities that are safe for you.  ¨ Eating a heart-healthy diet. A registered dietitian can help you learn healthy eating options.  ¨ Maintaining a healthy weight.    ¨ Managing diabetes, if necessary.  ¨ Reducing stress.  ¨ Limiting how much alcohol you drink.  SEEK IMMEDIATE MEDICAL CARE IF:   · You have sudden, unexplained chest discomfort.  · You have sudden, unexplained discomfort in your arms, back, neck, or jaw.  · You have shortness of breath at any time.  · You suddenly start to sweat or your skin gets clammy.  · You feel nauseous or vomit.  · You suddenly feel light-headed or dizzy.  · Your heart begins to beat fast or feels like it is skipping beats.  These symptoms may represent a serious problem that is an emergency. Do not wait to see if the symptoms will go away. Get medical help right away. Call your local emergency services (911 in the [place] Do not drive yourself to the hospital.     This information is not intended to replace advice given to you by your health care provider. Make sure you discuss any questions you have with your health care provider.     Document Released: 12/18/2006 Document Revised: 01/08/2016 Document Reviewed: 02/20/2015  [employer] Interactive Patient Education ©2016 [employer]

## 2017-02-09 NOTE — DISCHARGE PLANNING
Per SLOANE Villela, pt medically clear for [facility] transfer today. Physiatry Dr. Camacho will accept pt. Transport arranged via [facility] transport services for 10:30am today. Per Desirae, transport task ID [id number]. [facility] remains available as needed to assist with transition to [facility].

## 2017-02-09 NOTE — CARE PLAN
Problem: Safety  Goal: Will remain free from injury  Outcome: PROGRESSING AS EXPECTED  Patient A&Ox4. Free from injury. Rings appropriately. Steady on her feet.  Knowledgeable of sternal precautions and fall safety. See safety interventions and assessments in the flowsheet    Problem: Discharge Barriers/Planning  Goal: Patient’s continuum of care needs will be met  Outcome: MET Date Met:  02/09/17  Discharge education provided with the teach back method. Patient given time to ask questions and questions answered. Discharge paper work reviewed with patient and sent with a hard copy. Please see education tab for more information

## 2017-02-09 NOTE — IP AVS SNAPSHOT
" Home Care Instructions                                                                                                                  Name:Kristen Salazar  Medical Record Number:[id number]  CSN: [id number]    YOB: 1950   Age: 66 y.o.  Sex: female  HT:1.702 m (5' 7\") WT: 56 kg (123 lb 7.3 oz)          Admit Date: 2/9/2017     Discharge Date:   Today's Date: 2/18/2017  Attending Doctor:  Ashley Camacho M.D.                  Allergies:  Codeine and Latex            Discharge Instructions           [facility] NURSING DISCHARGE INSTRUCTIONS    Blood Pressure : 136/78 mmHg  Weight: 56 kg (123 lb 7.3 oz)  Nursing recommendations for Kristen Salazar at time of discharge are as follows:  Client verbalized understanding of all discharge instructions and prescriptions.     Review all your home medications and newly ordered medications with your doctor and/or pharmacist. Follow medication instructions as directed by your doctor and/or pharmacist.    Pain Management:   Discharge Pain Medication Instructions:  Comfort Goal: Comfort at Rest  Notify your primary care provider if pain is unrelieved with these measures, if the pain is new, or increased in intensity.    Discharge Skin Characteristics:  (Upper chest incision; heart prep BIANCA)  Discharge Skin Exam:    Surgical Incision  Incision Heart Prep (Active)   Drainage  None 2/17/2017  8:00 PM   Periwound Skin Pink 2/17/2017  8:00 PM   Daily - Wound Closure Dermabond 2/17/2017  8:00 PM   Dressing Options Open to Air 2/17/2017  8:00 PM   Weekly Photo (Inpatient Only) 02/09/17 2/9/2017 12:00 PM       Surgical Incision  Incision Bilateral Upper Abdomen (Active)   Drainage  None 2/17/2017  8:00 PM   Periwound Skin Pink 2/17/2017  8:00 PM   Daily - Wound Closure Dermabond 2/17/2017  8:00 PM   Dressing Options Open to Air 2/17/2017  8:00 PM   Weekly Photo (Inpatient Only) 02/09/17 2/9/2017 12:00 PM     Skin / Wound Care Instructions: Please " contact your primary care physician for any change in skin integrity.     If You Have Surgical Incisions / Wounds:  Monitor surgical site(s) for signs of increased swelling, redness or symptoms of drainage from the site or fever as this could indicate signs and symptoms of infection. If these symptoms are noted, notifiy your primary care provider.      Discharge Safety Instructions: Should Have ADULT SUPERVISION     Discharge Safety Concerns: Weakness  The interdisciplinary team has made recommendation that you should have adult supervision in the house due to weakness  Anti-embolic stockings are required during the day and off at night to increase circulation to the lower extremities.    Discharge Diet: cardiac     Discharge Liquids: thin  Discharge Bowel Function: Continent  Please contact your primary care physician for any changes in bowel habits.  Discharge Bowel Program:    Discharge Bladder Function: Continent  Discharge Urinary Devices:        Nursing Discharge Plan:   Pneumococcal Vaccine Given - only chart on this line when given: Given (See MAR)  Influenza Vaccine Indication: Patient Refuses    Case Management Discharge Instructions:   Discharge Location: Home with Outpatient Services  Agency Name/Address/Phone: [facility] at [address] [place], [place] [phone] (they will call you to schedule appointments)  Home Health:    Outpatient Services: Physical Therapy  DME Provider/Phone: [facility] for wheel chair at [phone]  Medical Equipment Ordered: Wheelchair  Prescription Faxed to:        Discharge Medication Instructions:  Below are the medications your physician expects you to take upon discharge:    Suicidal Feelings: How to Help Yourself    Suicide is the taking of one's own life. If you feel as though life is getting too tough to handle and are thinking about suicide, get help right away. To get help:  · Call your local emergency services (911 in the [place]  · Call a suicide hotline  to speak with a trained counselor who understands how you are feeling. The following is a list of suicide hotlines in the [place]. For a list of hotlines in [place], visit www.suicide.org/hotlines/international/dulmtl-oftfstc-jpcjueef.html.  ·  1-665-617-TALK (1-665.134.5812).  ·  7-489-WLWQVSQ (1-535.844.1983).  ·  1-441.912.1674. This is a hotline for Greek speakers.  ·  3-174-272-4TTY (1-197.917.4141). This is a hotline for TTY users.  ·  7-467-7-U-TERRANCE (1-136.196.8721). This is a hotline for lesbian, dean, bisexual, transgender, or questioning youth.  · Contact a crisis center or a local suicide prevention center. To find a crisis center or suicide prevention center:  · Call your local hospital, clinic, community service organization, mental health center, social service provider, or health department. Ask for assistance in connecting to a crisis center.  · Visit www.suicidepreventionlifeline.org/getinvolved/ for a list of crisis centers in the [place], or visit www.suicideprevention.ca/gjeacerv-uqzbh-vnvetev/find-a-crisis-centre for a list of centers in [place].  · Visit the following websites:  ·  National Suicide Prevention Lifeline: www.suicidepreventionlifeline.org  ·  Hopeline: www.hopeline.com  ·  American Foundation for Suicide Prevention: www.afsp.org  ·  The Terrance Project (for lesbian, dean, bisexual, transgender, or questioning youth): www.thetrevorproject.org  HOW CAN I HELP MYSELF FEEL BETTER?  · Promise yourself that you will not do anything drastic when you have suicidal feelings. Remember, there is hope. Many people have gotten through suicidal thoughts and feelings, and you will, too. You may have gotten through them before, and this proves that you can get through them again.  · Let family, friends, teachers, or counselors know how you are feeling. Try not to isolate yourself from those who care about you. Remember, they will want to help you. Talk with someone every day,  even if you do not feel sociable. Face-to-face conversation is best.  · Call a mental health professional and see one regularly.  · Visit your primary health care provider every year.  · Eat a well-balanced diet, and space your meals so you eat regularly.  · Get plenty of rest.  · Avoid alcohol and drugs, and remove them from your home. They will only make you feel worse.  · If you are thinking of taking a lot of medicine, give your medicine to someone who can give it to you one day at a time. If you are on antidepressants and are concerned you will overdose, let your health care provider know so he or she can give you safer medicines. Ask your mental health professional about the possible side effects of any medicines you are taking.  · Remove weapons, poisons, knives, and anything else that could harm you from your home.  · Try to stick to routines. Follow a schedule every day. Put self-care on your schedule.  · Make a list of realistic goals, and cross them off when you achieve them. Accomplishments give a sense of worth.  · Wait until you are feeling better before doing the things you find difficult or unpleasant.  · Exercise if you are able. You will feel better if you exercise for even a half hour each day.  · Go out in the sun or into nature. This will help you recover from depression faster. If you have a favorite place to walk, go there.  · Do the things that have always given you pleasure. Play your favorite music, read a good book, paint a picture, play your favorite instrument, or do anything else that takes your mind off your depression if it is safe to do.  · Keep your living space well lit.  · When you are feeling well, write yourself a letter about tips and support that you can read when you are not feeling well.  · Remember that life's difficulties can be sorted out with help. Conditions can be treated. You can work on thoughts and strategies that serve you well.     This information is not intended  to replace advice given to you by your health care provider. Make sure you discuss any questions you have with your health care provider.     Document Released: 06/23/2004 Document Revised: 01/08/2016 Document Reviewed: 04/14/2015  [employer] Interactive Patient Education ©2016 [employer]        Hypertension  Hypertension, commonly called high blood pressure, is when the force of blood pumping through your arteries is too strong. Your arteries are the blood vessels that carry blood from your heart throughout your body. A blood pressure reading consists of a higher number over a lower number, such as 110/72. The higher number (systolic) is the pressure inside your arteries when your heart pumps. The lower number (diastolic) is the pressure inside your arteries when your heart relaxes. Ideally you want your blood pressure below 120/80.  Hypertension forces your heart to work harder to pump blood. Your arteries may become narrow or stiff. Having untreated or uncontrolled hypertension can cause heart attack, stroke, kidney disease, and other problems.  RISK FACTORS  Some risk factors for high blood pressure are controllable. Others are not.   Risk factors you cannot control include:   · Race. You may be at higher risk if you are .  · Age. Risk increases with age.  · Gender. Men are at higher risk than women before age 45 years. After age 65, women are at higher risk than men.  Risk factors you can control include:  · Not getting enough exercise or physical activity.  · Being overweight.  · Getting too much fat, sugar, calories, or salt in your diet.  · Drinking too much alcohol.  SIGNS AND SYMPTOMS  Hypertension does not usually cause signs or symptoms. Extremely high blood pressure (hypertensive crisis) may cause headache, anxiety, shortness of breath, and nosebleed.  DIAGNOSIS  To check if you have hypertension, your health care provider will measure your blood pressure while you are seated, with your  arm held at the level of your heart. It should be measured at least twice using the same arm. Certain conditions can cause a difference in blood pressure between your right and left arms. A blood pressure reading that is higher than normal on one occasion does not mean that you need treatment. If it is not clear whether you have high blood pressure, you may be asked to return on a different day to have your blood pressure checked again. Or, you may be asked to monitor your blood pressure at home for 1 or more weeks.  TREATMENT  Treating high blood pressure includes making lifestyle changes and possibly taking medicine. Living a healthy lifestyle can help lower high blood pressure. You may need to change some of your habits.  Lifestyle changes may include:  · Following the DASH diet. This diet is high in fruits, vegetables, and whole grains. It is low in salt, red meat, and added sugars.  · Keep your sodium intake below 2,300 mg per day.  · Getting at least 30-45 minutes of aerobic exercise at least 4 times per week.  · Losing weight if necessary.  · Not smoking.  · Limiting alcoholic beverages.  · Learning ways to reduce stress.  Your health care provider may prescribe medicine if lifestyle changes are not enough to get your blood pressure under control, and if one of the following is true:  · You are 18-59 years of age and your systolic blood pressure is above 140.  · You are 60 years of age or older, and your systolic blood pressure is above 150.  · Your diastolic blood pressure is above 90.  · You have diabetes, and your systolic blood pressure is over 140 or your diastolic blood pressure is over 90.  · You have kidney disease and your blood pressure is above 140/90.  · You have heart disease and your blood pressure is above 140/90.  Your personal target blood pressure may vary depending on your medical conditions, your age, and other factors.  HOME CARE INSTRUCTIONS  · Have your blood pressure rechecked as  directed by your health care provider.    · Take medicines only as directed by your health care provider. Follow the directions carefully. Blood pressure medicines must be taken as prescribed. The medicine does not work as well when you skip doses. Skipping doses also puts you at risk for problems.  · Do not smoke.    · Monitor your blood pressure at home as directed by your health care provider.   SEEK MEDICAL CARE IF:   · You think you are having a reaction to medicines taken.  · You have recurrent headaches or feel dizzy.  · You have swelling in your ankles.  · You have trouble with your vision.  SEEK IMMEDIATE MEDICAL CARE IF:  · You develop a severe headache or confusion.  · You have unusual weakness, numbness, or feel faint.  · You have severe chest or abdominal pain.  · You vomit repeatedly.  · You have trouble breathing.  MAKE SURE YOU:   · Understand these instructions.  · Will watch your condition.  · Will get help right away if you are not doing well or get worse.     This information is not intended to replace advice given to you by your health care provider. Make sure you discuss any questions you have with your health care provider.     Document Released: 12/18/2006 Document Revised: 05/03/2016 Document Reviewed: 10/10/2014  [employer] Interactive Patient Education ©2016 [employer]    Fall Prevention in the Home   Falls can cause injuries and can affect people from all age groups. There are many simple things that you can do to make your home safe and to help prevent falls.  WHAT CAN I DO ON THE OUTSIDE OF MY HOME?  · Regularly repair the edges of walkways and driveways and fix any cracks.  · Remove high doorway thresholds.  · Trim any shrubbery on the main path into your home.  · Use bright outdoor lighting.  · Clear walkways of debris and clutter, including tools and rocks.  · Regularly check that handrails are securely fastened and in good repair. Both sides of any steps should have  handrails.  · Install guardrails along the edges of any raised decks or porches.  · Have leaves, snow, and ice cleared regularly.  · Use sand or salt on walkways during winter months.  · In the garage, clean up any spills right away, including grease or oil spills.  WHAT CAN I DO IN THE BATHROOM?  · Use night lights.  · Install grab bars by the toilet and in the tub and shower. Do not use towel bars as grab bars.  · Use non-skid mats or decals on the floor of the tub or shower.  · If you need to sit down while you are in the shower, use a plastic, non-slip stool..  · Keep the floor dry. Immediately clean up any water that spills on the floor.  · Remove soap buildup in the tub or shower on a regular basis.  · Attach bath mats securely with double-sided non-slip rug tape.  · Remove throw rugs and other tripping hazards from the floor.  WHAT CAN I DO IN THE BEDROOM?  · Use night lights.  · Make sure that a bedside light is easy to reach.  · Do not use oversized bedding that drapes onto the floor.  · Have a firm chair that has side arms to use for getting dressed.  · Remove throw rugs and other tripping hazards from the floor.  WHAT CAN I DO IN THE KITCHEN?   · Clean up any spills right away.  · Avoid walking on wet floors.  · Place frequently used items in easy-to-reach places.  · If you need to reach for something above you, use a sturdy step stool that has a grab bar.  · Keep electrical cables out of the way.  · Do not use floor polish or wax that makes floors slippery. If you have to use wax, make sure that it is non-skid floor wax.  · Remove throw rugs and other tripping hazards from the floor.  WHAT CAN I DO IN THE STAIRWAYS?  · Do not leave any items on the stairs.  · Make sure that there are handrails on both sides of the stairs. Fix handrails that are broken or loose. Make sure that handrails are as long as the stairways.  · Check any carpeting to make sure that it is firmly attached to the stairs. Fix any  carpet that is loose or worn.  · Avoid having throw rugs at the top or bottom of stairways, or secure the rugs with carpet tape to prevent them from moving.  · Make sure that you have a light switch at the top of the stairs and the bottom of the stairs. If you do not have them, have them installed.  WHAT ARE SOME OTHER FALL PREVENTION TIPS?  · Wear closed-toe shoes that fit well and support your feet. Wear shoes that have rubber soles or low heels.  · When you use a stepladder, make sure that it is completely opened and that the sides are firmly locked. Have someone hold the ladder while you are using it. Do not climb a closed stepladder.  · Add color or contrast paint or tape to grab bars and handrails in your home. Place contrasting color strips on the first and last steps.  · Use mobility aids as needed, such as canes, walkers, scooters, and crutches.  · Turn on lights if it is dark. Replace any light bulbs that burn out.  · Set up furniture so that there are clear paths. Keep the furniture in the same spot.  · Fix any uneven floor surfaces.  · Choose a carpet design that does not hide the edge of steps of a stairway.  · Be aware of any and all pets.  · Review your medicines with your healthcare provider. Some medicines can cause dizziness or changes in blood pressure, which increase your risk of falling.  Talk with your health care provider about other ways that you can decrease your risk of falls. This may include working with a physical therapist or  to improve your strength, balance, and endurance.     This information is not intended to replace advice given to you by your health care provider. Make sure you discuss any questions you have with your health care provider.     Document Released: 12/08/2003 Document Revised: 05/03/2016 Document Reviewed: 01/22/2016  [employer] Interactive Patient Education ©2016 [employer]      Physical Therapy Discharge Instructions for Kristen DAVIS  Martin    2/17/2017    Weight Bearing Status - Patient Should: Bear Weight as Tolerated Right Leg, Bear Weight as Tolerated Left Leg, Complete Non-Weighted Activities of Daily Living  Level of Assist Required for Ambulation: No Assist on Flat Surfaces, No Assist on Curbs, No Assist on Stairs  Distance Patient May Ambulate: 900 feet to 1500 feet or more as tolerated  Device Recommended for Ambulation: None, Stair Rails  Level of Assist Required to Propel Wheelchair:  (not applicable)  Level of Assist Required for Transfers: Requires No Assist  Device Recommended for Transfers: None  Home Exercise Program: Refer to Home Exercise Program Handout for Details  Prosthesis / Orthosis Recommendation / Location: No Prosthesis  or Orthosis Recommended    Occupational Therapy Discharge Instructions for Kristen DAVIS Martin    2/17/2017    Level of Assist Required for Eating: Able to Complete Eating without Assist  Level of Assist Required for Grooming: Able to Complete Grooming without Assist  Level of Assist Required for Dressing: Able to Complete Dressing without Assist  Level of Assist Required for Toileting: Able to Complete Toileting without Assist  Level of Assist Required for Toilet Transfer: Able to Complete Toilet Transfer without Assist  Equipment for Toilet Transfer: Grab Bars by Toilet  Level of Assist Required for Bathing: Able to Complete Bathing without Assist  Equipment for Bathing: Tub Transfer Bench, Hand Held Shower Head, Grab Bars in Tub / Shower  Level of Assist Required for Shower Transfer: Able to Complete Shower Transfer without Assist  Equipment for Shower Transfer: Tub Transfer Bench, Grab Bars in Tub / Shower  Level of Assist Required for Home Mgmt: Requires Physical Assist with Home Management  Level of Assist Required for Meal Prep: Able to Complete Meal Preparation without Assist  Driving: May not Drive, Please Contact Physician for Further Information  Home Exercise Program: None Issued    It  "was great working with you Kristen! You did a fantastic job here, have a great new year! Damaris Dailey MS OTR/L      Warfarin: What You Need to Know  Warfarin is an anticoagulant. Anticoagulants help prevent the formation of blood clots. They also help stop the growth of blood clots. Warfarin is sometimes referred to as a \"blood thinner.\"   Normally, when body tissues are cut or damaged, the blood clots in order to prevent blood loss. Sometimes clots form inside your blood vessels and obstruct the flow of blood through your circulatory system (thrombosis). These clots may travel through your bloodstream and become lodged in smaller blood vessels in your brain, which can cause a stroke, or in your lungs (pulmonary embolism).  WHO SHOULD USE WARFARIN?  Warfarin is prescribed for people at risk of developing harmful blood clots:  · People with surgically implanted mechanical heart valves, irregular heart rhythms called atrial fibrillation, and certain clotting disorders.  · People who have developed harmful blood clotting in the past, including those who have had a stroke or a pulmonary embolism, or thrombosis in their legs (deep vein thrombosis [DVT]).  · People with an existing blood clot, such as a pulmonary embolism.  WARFARIN DOSING  Warfarin tablets come in different strengths. Each tablet strength is a different color, with the amount of warfarin (in milligrams) clearly printed on the tablet. If the color of your tablet is different than usual when you receive a new prescription, report it immediately to your pharmacist or health care provider.  WARFARIN MONITORING  The goal of warfarin therapy is to lessen the clotting tendency of blood but not prevent clotting completely. Your health care provider will monitor the anticoagulation effect of warfarin closely and adjust your dose as needed. For your safety, blood tests called prothrombin time (PT) or international normalized ratio (INR) are used to measure the " "effects of warfarin. Both of these tests can be done with a finger stick or a blood draw.  The longer it takes the blood to clot, the higher the PT or INR. Your health care provider will inform you of your \"target\" PT or INR range. If, at any time, your PT or INR is above the target range, there is a risk of bleeding. If your PT or INR is below the target range, there is a risk of clotting.  Whether you are started on warfarin while you are in the hospital or in your health care provider's office, you will need to have your PT or INR checked within one week of starting the medicine. Initially, some people are asked to have their PT or INR checked as much as twice a week.  Once you are on a stable maintenance dose, the PT or INR is checked less often, usually once every 2 to 4 weeks. The warfarin dose may be adjusted if the PT or INR is not within the target range. It is important to keep all laboratory and health care provider follow-up appointments. Not keeping appointments could result in a chronic or permanent injury, pain, or disability because warfarin is a medicine that requires close monitoring.  WHAT ARE THE SIDE EFFECTS OF WARFARIN?  · Too much warfarin can cause bleeding (hemorrhage) from any part of the body. This may include bleeding from the gums, blood in the urine, bloody or dark stools, a nosebleed that is not easily stopped, coughing up blood, or vomiting blood.  · Too little warfarin can increase the risk of blood clots.  · Too little or too much warfarin can also increase the risk of a stroke.  · Warfarin use may cause a skin rash or irritation, an unusual fever, continual nausea or stomach upset, or severe pain in your joints or back.  SPECIAL PRECAUTIONS WHILE TAKING WARFARIN  Warfarin should be taken exactly as directed. It is very important to take warfarin as directed since bleeding or blood clots could result in chronic or permanent injury, pain, or disability.  · Take your medicine at the " same time every day. If you forget to take your dose, you can take it if it is within 6 hours of when it was due.  · Do not change the dose of warfarin on your own to make up for missed or extra doses.  · If you miss more than 2 doses in a row, you should contact your health care provider for advice.  Avoid situations that cause bleeding. You may have a tendency to bleed more easily than usual while taking warfarin. The following actions can limit bleeding:  · Using a softer toothbrush.  · Flossing with waxed floss rather than unwaxed floss.  · Shaving with an electric razor rather than a blade.  · Limiting the use of sharp objects.  · Avoiding potentially harmful activities, such as contact sports.  Warfarin and Pregnancy or Breastfeeding  · Warfarin is not advised during the first trimester of pregnancy due to an increased risk of birth defects. In certain situations, a woman may take warfarin after her first trimester of pregnancy. A woman who becomes pregnant or plans to become pregnant while taking warfarin should notify her health care provider immediately.  · Although warfarin does not pass into breast milk, a woman who wishes to breastfeed while taking warfarin should also consult with her health care provider.  Alcohol, Smoking, and Illicit Drug Use  · Alcohol affects how warfarin works in the body. It is best to avoid alcoholic drinks or consume very small amounts while taking warfarin. In general, alcohol intake should be limited to 1 oz (30 mL) of liquor, 6 oz (180 mL) of wine, or 12 oz (360 mL) of beer each day. Notify your health care provider if you change your alcohol intake.  · Smoking affects how warfarin works. It is best to avoid smoking while taking warfarin. Notify your health care provider if you change your smoking habits.  · It is best to avoid all illicit drugs while taking warfarin since there are few studies that show how warfarin interacts with these drugs.  Other Medicines and Dietary  Supplements  Many prescription and over-the-counter medicines can interfere with warfarin. Be sure all of your health care providers know you are taking warfarin. Notify your health care provider who prescribed warfarin for you or your pharmacist before starting or stopping any new medicines, including over-the-counter vitamins, dietary supplements, and pain medicines. Your warfarin dose may need to be adjusted.  Some common over-the-counter medicines that may increase the risk of bleeding while taking warfarin include:   · Acetaminophen.  · Aspirin.  · Nonsteroidal anti-inflammatory medicines (NSAIDs), such as ibuprofen or naproxen.  · Vitamin E.  Dietary Considerations   Foods that have moderate or high amounts of vitamin K can interfere with warfarin. Avoid major changes in your diet or notify your health care provider before changing your diet. Eat a consistent amount of foods that have moderate or high amounts of vitamin K. Eating less foods containing vitamin K can increase the risk of bleeding. Eating more foods containing vitamin K can increase the risk of blood clots. Additional questions about dietary considerations can be discussed with a dietitian.  Foods that are very high in vitamin K:  · Greens, such as Swiss chard and beet, dae, mustard, or turnip greens (fresh or frozen, cooked).  · Kale (fresh or frozen, cooked).  · Parsley (raw).  · Spinach (cooked).  Foods that are high in vitamin K:  · Asparagus (frozen, cooked).  · Broccoli.  · Bok david (cooked).  · [place] sprouts (fresh or frozen, cooked).  · Cabbage (cooked).  ·  Coleslaw.  Foods that are moderately high in vitamin K:  · Blueberries.  · Black-eyed peas.  · Endive (raw).  · Green leaf lettuce (raw).  · Green scallions (raw).  · Kale (raw).  · Okra (frozen, cooked).  · Plantains (fried).  · Bam lettuce (raw).  · Sauerkraut (canned).  · Spinach (raw).  CALL YOUR CLINIC OR HEALTH CARE PROVIDER IF YOU:  · Plan to have any surgery or  "procedure.  · Feel sick, especially if you have diarrhea or vomiting.  · Experience or anticipate any major changes in your diet.  · Start or stop a prescription or over-the-counter medicine.  · Become, plan to become, or think you may be pregnant.  · Are having heavier than usual menstrual periods.  · Have had a fall, accident, or any symptoms of bleeding or unusual bruising.  · Develop an unusual fever.  CALL 911 IN THE [place] OR GO TO THE EMERGENCY DEPARTMENT IF YOU:   · Think you may be having an allergic reaction to warfarin. The signs of an allergic reaction could include itching, rash, hives, swelling, chest tightness, or trouble breathing.  · See signs of blood in your urine. The signs could include reddish, pinkish, or tea-colored urine.  · See signs of blood in your stools. The signs could include bright red or black stools.  · Vomit or cough up blood. In these instances, the blood could have either a bright red or a \"coffee-grounds\" appearance.  · Have bleeding that will not stop after applying pressure for 30 minutes such as cuts, nosebleeds, or other injuries.  · Have severe pain in your joints or back.  · Have a new and severe headache.  · Have sudden weakness or numbness of your face, arm, or leg, especially on one side of your body.  · Have sudden confusion or trouble understanding.  · Have sudden trouble seeing in one or both eyes.  · Have sudden trouble walking, dizziness, loss of balance, or coordination.  · Have trouble speaking or understanding (aphasia).     This information is not intended to replace advice given to you by your health care provider. Make sure you discuss any questions you have with your health care provider.     Document Released: 12/18/2006 Document Revised: 01/08/2016 Document Reviewed: 06/13/2014  Elsevier Interactive Patient Education ©2016 [employer]        Follow-up Information     1. Follow up with Young Salinas M.D. On 3/1/2017.    Specialty:  Internal Medicine    " Why:  Wednesday at 11:45 (please check in at 11:30)(records will be sent)    Contact information    [address]  [address]  [facility] [address] [phone]          2. Follow up with Chris Schmitt M.D. On 3/6/2017.    Specialty:  Cardiac Surgery    Why:  Monday at 12:45 pm    Contact information    [id number]  [address]  [place] [address] [phone]          3. Follow up with Tom Landin D.O. On 3/15/2017.    Specialty:  Cardiology    Why:  Wednesday at 3:00 pm    Contact information    [address]  [address]  [place] [address] [phone]           Discharge Medication Instructions:    Below are the medications your physician expects you to take upon discharge:    Review all your home medications and newly ordered medications with your doctor and/or pharmacist. Follow medication instructions as directed by your doctor and/or pharmacist.    Please keep your medication list with you and share with your physician.               Medication List      START taking these medications        Instructions    Morning Afternoon Evening Bedtime    cyclobenzaprine 10 MG Tabs   Last time this was given:  10 mg on 2/13/2017  8:10 PM   Commonly known as:  FLEXERIL    Take 1 Tab by mouth every bedtime.   Dose:  10 mg                        glucosamine/chondroitin 500-400 MG Caps   Last time this was given:  1 Tab on 2/18/2017  8:00 AM   Commonly known as:  GLUCOSAM/CHRONDROIT 500-400    Take 1 Tab by mouth 2 Times a Day.   Dose:  1 Tab                        melatonin 3 MG Tabs   Last time this was given:  3 mg on 2/17/2017  7:58 PM    Take 1 Tab by mouth every bedtime.   Dose:  3 mg                          CHANGE how you take these medications        Instructions    Morning Afternoon Evening Bedtime    warfarin 2.5 MG Tabs   What changed:    - medication strength  - Another medication with the same name was removed. Continue taking this medication, and follow the directions you see here.   Last time  this was given:  7.5 mg on 2/17/2017  5:49 PM   Commonly known as:  COUMADIN    Doctor's comments:  Take 3 tabs per day, or as directed by your physician   Take 3 Tabs by mouth COUMADIN-DAILY.   Dose:  7.5 mg                          CONTINUE taking these medications        Instructions    Morning Afternoon Evening Bedtime    artificial tears 1.4 % Soln    Place 1 Drop in both eyes as needed (discomfort/dry eyes).   Dose:  1 Drop                        aspirin 81 MG EC tablet   Last time this was given:  81 mg on 2/18/2017  8:20 AM    Take 1 Tab by mouth every day.   Dose:  81 mg                        glucosamine Sulfate 500 MG Caps    Take 500 mg by mouth 3 times a day, with meals.   Dose:  500 mg                        multivitamin Tabs   Last time this was given:  1 Tab on 2/18/2017  8:20 AM    Take 1 Tab by mouth every day.   Dose:  1 Tab                        oyster shell calcium/vitamin D 250-125 MG-UNIT Tabs tablet   Last time this was given:  1 Tab on 2/18/2017  8:20 AM    Take 1 Tab by mouth every day.   Dose:  1 Tab                        pravastatin 20 MG Tabs   Last time this was given:  20 mg on 2/17/2017  7:58 PM   Commonly known as:  PRAVACHOL    Take 1 Tab by mouth every day.   Dose:  20 mg                        sodium chloride 0.65 % Soln   Commonly known as:  OCEAN    Spray 2 Sprays in nose as needed.   Dose:  2 Spray                        tramadol 50 MG Tabs   Last time this was given:  50 mg on 2/17/2017  7:58 PM   Commonly known as:  ULTRAM    Take 1 Tab by mouth every four hours as needed (Moderate Pain (NRS Pain Scale 4-6; CPOT Pain Scale 3-5) if opiates not ordered or tolerated).   Dose:  50 mg                          STOP taking these medications     famotidine 20 MG Tabs   Commonly known as:  PEPCID       furosemide 10 MG/ML Soln   Commonly known as:  LASIX       ipratropium-albuterol 0.5-2.5 (3) MG/3ML nebulizer solution   Commonly known as:  DUONEB       potassium chloride SA 10 MEQ  Tbcr   Commonly known as:  K-DUR               Instructions           Diet / Nutrition:    Follow any diet instructions given to you by your doctor or the dietician, including how much salt (sodium) you are allowed each day.    If you are overweight, talk to your doctor about a weight reduction plan.    Activity:    Remain physically active following your doctor's instructions about exercise and activity.    Rest often.     Any time you become even a little tired or short of breath, SIT DOWN and rest.    Worsening Symptoms:    Report any of the following signs and symptoms to the doctor's office immediately:    *Pain of jaw, arm, or neck  *Chest pain not relieved by medication                               *Dizziness or loss of consciousness  *Difficulty breathing even when at rest   *More tired than usual                                       *Bleeding drainage or swelling of surgical site  *Swelling of feet, ankles, legs or stomach                 *Fever (>100ºF)  *Pink or blood tinged sputum  *Weight gain (3lbs/day or 5lbs /week)           *Shock from internal defibrillator (if applicable)  *Palpitations or irregular heartbeats                *Cool and/or numb extremities    Stroke Awareness    Common Risk Factors for Stroke include:    Age  Atrial Fibrillation  Carotid Artery Stenosis  Diabetes Mellitus  Excessive alcohol consumption  High blood pressure  Overweight   Physical inactivity  Smoking    Warning signs and symptoms of a stroke include:    *Sudden numbness or weakness of the face, arm or leg (especially on one side of the body).  *Sudden confusion, trouble speaking or understanding.  *Sudden trouble seeing in one or both eyes.  *Sudden trouble walking, dizziness, loss of balance or coordination.Sudden severe headache with no known cause.    It is very important to get treatment quickly when a stroke occurs. If you experience any of the above warning signs, call 911 immediately.                    Disclaimer         Quit Smoking / Tobacco Use:    I understand the use of any tobacco products increases my chance of suffering from future heart disease or stroke and could cause other illnesses which may shorten my life. Quitting the use of tobacco products is the single most important thing I can do to improve my health. For further information on smoking / tobacco cessation call a Toll Free Quit Line at 1-628.967.4534 (*[employer] [place]) or 1-514.474.5917 (American [employer] Association) or you can access the web based program at www.lungusa.org.    [place] Tobacco Users Help Line:  (278) 508-3604       Toll Free: 1-738.578.4743  Quit Tobacco Program [facility] (231)323-7282    Crisis Hotline:    [place] Crisis Hotline:  5-411-ENWVBAE or 1-923.683.3768    [place] Crisis Hotline:    1-269.223.8350 or 946-671-8872    Discharge Survey:   Thank you for choosing [facility]. We hope we did everything we could to make your hospital stay a pleasant one. You may be receiving a phone survey and we would appreciate your time and participation in answering the questions. Your input is very valuable to us in our efforts to improve our service to our patients and their families.        My signature on this form indicates that:    1. I have reviewed and understand the above information.  2. My questions regarding this information have been answered to my satisfaction.  3. I have formulated a plan with my discharge nurse to obtain my prescribed medications for home.                  Disclaimer         __________________________________                     __________       ________                       Patient Signature                                                 Date                    Time

## 2017-02-09 NOTE — DISCHARGE PLANNING
Per update from [place], pt's transfer to Rehab on hold until tomorrow. TCC to follow for updates in the morning.

## 2017-02-09 NOTE — PROGRESS NOTES
Cardiovascular Surgery Progress Note    Name: Kristen Salazar  MRN: [id number]  : 1950  Admit Date: 2017  8:18 PM  Procedure:  Procedure(s) and Anesthesia Type:     * MITRAL VALVE REPAIR - General     * KAREN - General  9 Day Post-Op    Vitals:  Patient Vitals for the past 8 hrs:   Temp SpO2 O2 Delivery O2 (LPM) Pulse Resp NIBP Weight   17 0900 - 93 % Silicone Nasal Cannula 2 96 - 106/59 mmHg -   17 0800 36.8 °C (98.2 °F) - Silicone Nasal Cannula 2 - - - -   17 0710 - 95 % - 1 87 16 - -   17 0600 - 92 % - - 88 - - 58 kg (127 lb 13.9 oz)   17 0500 - 94 % - - 89 - 117/72 mmHg -   17 0400 36.9 °C (98.5 °F) 93 % Silicone Nasal Cannula - 85 - 117/72 mmHg -   17 0300 - 93 % - - 85 - - -   17 0200 - 94 % - - 85 - - -     Temp (24hrs), Av.8 °C (98.2 °F), Min:36.5 °C (97.7 °F), Max:36.9 °C (98.5 °F)      Respiratory:    Respiration: 16, Pulse Oximetry: 93 %, O2 Daily Delivery Respiratory : Silicone Nasal Cannula     Chest Tube Drains:          Fluids:    Intake/Output Summary (Last 24 hours) at 17 09  Last data filed at 17 06   Gross per 24 hour   Intake   1410 ml   Output   2900 ml   Net  -1490 ml     Admit weight: Weight: 61 kg (134 lb 7.7 oz)  Current weight: Weight: 58 kg (127 lb 13.9 oz) (17 0600)    Labs:  Recent Labs      17   0500  17   0215  17   0645   WBC  7.6  9.2  8.2   RBC  2.90*  3.09*  3.38*   HEMOGLOBIN  8.8*  9.5*  10.2*   HEMATOCRIT  26.9*  28.8*  31.5*   MCV  92.8  93.2  93.2   MCH  30.3  30.7  30.2   MCHC  32.7*  33.0*  32.4*   RDW  46.7  46.8  46.5   PLATELETCT  426  494*  585*   MPV  10.4  10.4  9.4     Recent Labs      17   0500  17   0215  17   0645   NEUTSPOLYS  71.00  73.30*  74.50*   LYMPHOCYTES  16.50*  15.70*  14.10*   MONOCYTES  10.10  9.10  9.60   EOSINOPHILS  1.20  1.00  1.10   BASOPHILS  0.10  0.10  0.20     Recent Labs      17   0500  17   0215  17    0430   SODIUM  131*  132*  132*   POTASSIUM  3.7  3.6  3.5*   CHLORIDE  94*  94*  96   CO2  31  30  28   GLUCOSE  93  97  98   BUN  16  15  10   CREATININE  0.64  0.77  0.63   CALCIUM  8.3*  8.3*  8.4*     Recent Labs      02/07/17   0500  02/08/17   0215  02/09/17   0430   INR  1.62*  1.45*  1.27*       Medications:  • potassium chloride (KCl)  40 mEq     • furosemide  40 mg     • warfarin  7.5 mg     • docusate sodium  100 mg      And   • senna-docusate  1 Tab     • aspirin EC  81 mg     • multivitamin  1 Tab     • famotidine  20 mg     • oyster shell calcium/vitamin D  1 Tab     • pravastatin  20 mg         Exam:   Review of Systems   Constitutional: Negative.    Eyes: Negative.    Respiratory: Negative.    Cardiovascular: Negative.    Gastrointestinal: Negative.    Genitourinary: Negative.    Musculoskeletal: Negative.    Skin: Negative.    Neurological: Negative.    Endo/Heme/Allergies: Negative.    Psychiatric/Behavioral: Negative.        Physical Exam   Constitutional: She is oriented to person, place, and time. She appears well-developed. No distress.   HENT:   Head: Normocephalic and atraumatic.   Eyes: Conjunctivae are normal. Pupils are equal, round, and reactive to light.   Neck: Normal range of motion. No JVD present.   Cardiovascular: Normal rate and regular rhythm.  Exam reveals no gallop and no friction rub.    No murmur heard.  Pulmonary/Chest: She has decreased breath sounds in the right lower field and the left lower field.   Abdominal: Soft. She exhibits no distension.   Musculoskeletal: She exhibits edema.   Neurological: She is alert and oriented to person, place, and time.   Skin: Skin is warm and dry.   Surgical incisions CDI   Psychiatric: She has a normal mood and affect. Her behavior is normal.       Quality Measures:   EKG reviewed, Labs reviewed, Medications reviewed and Radiology images reviewed  Frank catheter: No Frank  Central line in place: Need for access    DVT Prophylaxis:  Warfarin (Coumadin)  DVT prophylaxis - mechanical: Not indicated at this time, ambulatory  Ulcer prophylaxis: No    Assessed for rehab: Patient returned to prior level of function, rehabilitation not indicated at this time      Assessment/Plan:  POD 1 - HOTN- on low dose epi/daphnie, gently diuresis, Vent - per pulm, keep mediastinal tubes, keep sanchez- strict I&O, CPM  POD 2 HDS, NSR, labs noted, doing well.  OK DC sanchez.  Rehab consult.  POD 3 HDS, NSR, labs noted doing well, had BM. DC temp wires and central lines. H/H low/stable--w/w. 2 view CXR today, wean O2 as able.  Plan for Rehab 1-2 days  POD 4 HDS, NSR, increased O2 overnight, better this AM. LLL thoracentesis this AM pending. Continue diuresis, planning for Rehab likely in AM.  POD 5 HDS, NSR, k low, replace, diuresing well, wts down, 800cc off left lung, feeling better today, min right effusion. No pneumothorax.  Cdiff neg.  OK for rehab today.  See dictated DC summary for details.    POD 6 HDS< NSR. Increased O2 requirements early this AM--denies any SOB or pain.  Additional lasix given.  CXR shows mild increase of bilat effusions.  800 cc out on L few days ago. R thoracentesis ordered for today.  Patient feeling well. Keep today, continue lasix/metalazone, to rehab maybe in AM.   POD 7 HDS, oxygen at 5 L NC.  Thoracentesis today 400 cc.  US left side enough to tap so will order for AM.   Neuro intact.  Wounds CDI.  INR trending up.  To rehab this afternoon after thoracentesis.   POD 8 HDS SR Neuro intact.  Wounds CDI.  IR to tap left pleural space and to rehab this afternoon. Holding coumadin for thoracentesis resume after done to target INR 2.0-3.0.  POD 9 HDS, junctional last night.  Now SR.  Happened during thoracentesis.  Probably vagal.  OK to go to rehab today.       Active Hospital Problems    Diagnosis   • Hyperlipidemia [E78.5]   • Hypertension [I10]

## 2017-02-09 NOTE — PROGRESS NOTES
PROCEDURE: LT THORACENTESIS  SONOGRAPHER: Elise DELCID  RADIOLOGIST: Dr. Ambriz    PT in upright position for LT thoracentesis done portably. PT's vitals were monitored by floor RN. 100 mL was removed from LT chest. During drainage, the PT began to feel dizzy and nauseous. Floor RN was monitoring PT continuously. Procedure was terminated due to PT condition. PT was left with floor RNs in stable condition. STAT chest xray was ordered.

## 2017-02-09 NOTE — FLOWSHEET NOTE
02/09/17 1204   Type of Assessment   Assessment Yes   Patient History   Pulmonary Diagnosis hx Mitral valve regurgitation, pleural effusions, elevated QTc   Surgical Procedures MV repair, Thoracentesis   Home O2 Yes   Oxygen liter flow 2   Oxygen at night only Yes   Nocturnal CPAP No   Home Treatments/Frequency No   COPD Risk Screening   Do you have a history of COPD? No   COPD Population Screener   During the past 4 weeks, how much did you feel short of breath? 0   Do you ever cough up any mucus or phlegm? 0   In the past 12 months, you do less than you used to because of your breathing problems 0   Have you smoked at least 100 cigarettes in your entire life? 2   How old are you? 1   COPD Screening Score 4   Smoking History   Have you Ever Smoked Yes   Have you Smoked in the Last 12 Mos No   Confirm Quit Date 02/09/77  (quit 40 yrs ago only smoked 4-5 years)   Level Of Consciousness   Level of Consciousness Alert   Respiratory WDL   Respiratory (WDL) X   Chest Exam   Work Of Breathing / Effort Mild   Respiration 18   Pulse 88   Breath Sounds   RUL Breath Sounds Clear   RML Breath Sounds Clear;Diminished   RLL Breath Sounds Diminished   PRAMOD Breath Sounds Clear   LLL Breath Sounds Diminished   Secretions   Cough Non Productive   Oximetry   #Pulse Oximetry (Single Determination) Yes   Oxygen   Home O2 Use Prior To Admission? Yes   Home O2 LPM Flow 2 LPM   Home O2 Delivery Method Nasal Cannula   Home O2 Frequency of Use At Sleep   Pulse Oximetry 95 %   O2 (LPM) 2   O2 Daily Delivery Respiratory  Silicone Nasal Cannula   Protocol Pathways   Protocol Pathways Yes   Incentive Spirometer Instruct   Predicted (ml) 2300   60% (ml) 1380   40% (ml) 920   Actual (ml) 1000   Hyperinflation Protocol   Hyperinflation Protocol Indications Pre or Post-op Abdominal, Thoracic or Orthopedic Surgery   I.S. 40-60% of Predicted PEP BID and PRN, Breathing Exercise QID by RT - Reassess every 24 hours (No history of Chest Trauma or  Pulmonary Disease   Hyperinflation Protocol Goals/Outcome Greater Than 60% of Predicted I.S. Volume x 24 hrs;Stable Vital Capacity x24 hrs and Patient Understands / uses I.S.;Improvement in Previously Absent or Diminished Breath Sounds   O2 Protocol   O2 Protocol Indications Room Air SpO2 Less Than 90%   Continuous oxygen initiated for: Continuous Home Oxygen / CPAP / BIPAP   PRN oxygen initiated for: Sleep Apnea (nocturnal O2)   O2 Protocol Goals/Outcome SpO2 greater than 90% with exertion;Normoxemia on Oxygen, SpO2 greater than 90%;Return to home Oxygen therapy baseline

## 2017-02-09 NOTE — PROGRESS NOTES
[facility] Patient transported via wheel chair and van at 1050.  in the room and aware of transfer. All belongings gathered and sent with patient and her . Education provided on discharge instructions and patient sent with hard copy.

## 2017-02-09 NOTE — PROGRESS NOTES
Care assumed following bedside report with Night RN. Patient sitting up in chair. Denies any needs at this time.

## 2017-02-09 NOTE — PROGRESS NOTES
Pharmacy Warfarin Consult   2/9/2017     66 y.o.   female     Indication for anticoagulation: Bioprosthetic Valve Replacement    Goal INR = 2-3    Recent Labs      02/07/17   0500  02/08/17   0215  02/09/17   0430  02/09/17   0645   INR  1.62*  1.45*  1.27*   --    HEMOGLOBIN  8.8*  9.5*   --   10.2*   HEMATOCRIT  26.9*  28.8*   --   31.5*       Pertinent Drug/Drug Interactions:  Aspirin, Statin  Outpatient Warfarin Regimen:  None noted  Recent Warfarin Dosing:    Dose from last 7 days     Date/Time Dose (mg)    02/09/17 1100 7.5          Bridge Therapy: None        1.  Coumadin 7.5mg tonight for INR= 1.27.         [facility]

## 2017-02-09 NOTE — CARE PLAN
Problem: Infection  Goal: Will remain free from infection  Outcome: PROGRESSING AS EXPECTED  Pt incision assessed for s/s of infection. Pt up to sink to wash incision daily with soap/ water.  Pt vital signs monitored q 4. Pt educated on s/s of potential infection.    Problem: Psychosocial Needs:  Goal: Level of anxiety will decrease  Outcome: PROGRESSING AS EXPECTED  Anxiety triggers identified. Pt updated and involved in POC. Calming techniques provided to pt. Pt encouraged to verbalize questions and concern as well as participation in care.

## 2017-02-09 NOTE — PROGRESS NOTES
[facility] Progress Note               Author: Lj Alegrebabatunde Date & Time created: 2017  6:31 AM     Interval History:    2017    Tolerating laps around the ICU hallways very well    K 3.5, 40 meq ordered PO   Transfer to rehab       Review of Systems   Constitutional: Negative for fever and chills.   Respiratory: Negative for shortness of breath, wheezing and stridor.    Cardiovascular: Negative for chest pain and palpitations.   Gastrointestinal: Negative for nausea and vomiting.   Musculoskeletal: Negative for falls.   Neurological: Negative for sensory change and focal weakness.         Physical Exam   Constitutional: She is oriented to person, place, and time. No distress.   HENT:   Head: Atraumatic.   Eyes: Conjunctivae are normal. Pupils are equal, round, and reactive to light.   Neck: Normal range of motion.   Cardiovascular: Normal rate and regular rhythm.    Systolic click   Pulmonary/Chest: Breath sounds normal. No respiratory distress. She has no wheezes.   Abdominal: Soft. Bowel sounds are normal.   Neurological: She is alert and oriented to person, place, and time.   Skin: Skin is warm and dry.       Labs:  Recent Results (from the past 24 hour(s))   EKG    Collection Time: 17  2:36 PM   Result Value Ref Range    Report       [facility]    Test Date:  2017  Pt Name:    LILY SMARTCarrieVELAZQUEZ             Department: 161  MRN:        [id number]                      Room:       15  Gender:     F                            Technician: [facility]  :        [id number]                   Requested By:JEREMY M GONDA  Order #:    [id number]                    Reading MD: Matias Monreal MD    Measurements  Intervals                                Axis  Rate:       78                           P:          65  AZ:         176                          QRS:        -2  QRSD:       94                           T:          50  QT:         400  QTc:        456    Interpretive Statements  SINUS  RHYTHM  LATE PRECORDIAL R/S TRANSITION  BORDERLINE T ABNORMALITIES, ANTERIOR LEADS  Compared to ECG 2017 06:57:58  Ventricular premature complex(es) no longer present  T-wave abnormality still present    Electronically Signed On 2017 15:00:41 PST by Matias Monreal MD     PROTHROMBIN TIME    Collection Time: 17  4:30 AM   Result Value Ref Range    PT 16.3 (H) 12.0 - 14.6 sec    INR 1.27 (H) 0.87 - 1.13   BASIC METABOLIC PANEL    Collection Time: 17  4:30 AM   Result Value Ref Range    Sodium 132 (L) 135 - 145 mmol/L    Potassium 3.5 (L) 3.6 - 5.5 mmol/L    Chloride 96 96 - 112 mmol/L    Co2 28 20 - 33 mmol/L    Glucose 98 65 - 99 mg/dL    Bun 10 8 - 22 mg/dL    Creatinine 0.63 0.50 - 1.40 mg/dL    Calcium 8.4 (L) 8.5 - 10.5 mg/dL    Anion Gap 8.0 0.0 - 11.9   ESTIMATED GFR    Collection Time: 17  4:30 AM   Result Value Ref Range    GFR If African American >60 >60 mL/min/1.73 m 2    GFR If Non African American >60 >60 mL/min/1.73 m 2       Hemodynamics:  Temp (24hrs), Av.7 °C (98.1 °F), Min:36.4 °C (97.6 °F), Max:36.9 °C (98.5 °F)  Temperature: 36.9 °C (98.5 °F)  Pulse  Av.4  Min: 51  Max: 129Heart Rate (Monitored): 87  NIBP: 117/72 mmHg    Respiratory:    Respiration: 17, Pulse Oximetry: 92 %, O2 Daily Delivery Respiratory : Silicone Nasal Cannula     Work Of Breathing / Effort: Mild  RUL Breath Sounds: Clear, RML Breath Sounds: Diminished, RLL Breath Sounds: Diminished, PRAMOD Breath Sounds: Clear, LLL Breath Sounds: Diminished  Fluids:    Intake/Output Summary (Last 24 hours) at 17 0956  Last data filed at 17 0600   Gross per 24 hour   Intake    930 ml   Output   2250 ml   Net  -1320 ml        GI/Nutrition:  Orders Placed This Encounter   Procedures   • DIET ORDER     Standing Status: Standing      Number of Occurrences: 1      Standing Expiration Date:      Order Specific Question:  Diet:     Answer:  Consistent Carbohydrate [4]     Order Specific Question:  Diet:      Answer:  Cardiac [6]     Medications:  Current Facility-Administered Medications   Medication Last Dose   • furosemide (LASIX) tablet 40 mg Stopped at 02/08/17 1045   • warfarin (COUMADIN) tablet 7.5 mg 7.5 mg at 02/08/17 1807   • sodium chloride (OCEAN) 0.65 % nasal spray 2 Spray 2 Spray at 02/07/17 0841   • acetaminophen (TYLENOL) tablet 650 mg 650 mg at 02/04/17 1135    Or   • acetaminophen (TYLENOL) suppository 650 mg     • Respiratory Care per Protocol     • docusate sodium (COLACE) capsule 100 mg 100 mg at 02/08/17 0822    And   • senna-docusate (PERICOLACE or SENOKOT S) 8.6-50 MG per tablet 1 Tab 1 Tab at 02/08/17 2036    And   • senna-docusate (PERICOLACE or SENOKOT S) 8.6-50 MG per tablet 1 Tab 1 Tab at 02/03/17 1001    And   • lactulose 20 GM/30ML solution 30 mL      And   • bisacodyl (DULCOLAX) suppository 10 mg      And   • fleet enema 133 mL     • aspirin EC (ECOTRIN) tablet 81 mg Stopped at 02/08/17 0900   • MD ALERT... warfarin (COUMADIN) per pharmacy protocol     • oxycodone immediate-release (ROXICODONE) tablet 5 mg 5 mg at 02/08/17 1231   • oxycodone immediate release (ROXICODONE) tablet 10 mg 10 mg at 02/07/17 0842   • tramadol (ULTRAM) 50 MG tablet 50 mg 50 mg at 02/06/17 2156   • ondansetron (ZOFRAN) syringe/vial injection 4 mg 4 mg at 02/02/17 1941    Or   • prochlorperazine (COMPAZINE) injection 10 mg     • mag hydrox-al hydrox-simeth (MAALOX PLUS ES or MYLANTA DS) suspension 30 mL     • diphenhydrAMINE (BENADRYL) tablet/capsule 25 mg     • hydrocodone-acetaminophen (NORCO) 5-325 MG per tablet 1-2 Tab 1 Tab at 02/08/17 1404   • artificial tears 1.4 % ophthalmic solution 1 Drop     • glucose 4 g chewable tablet 16 g      And   • dextrose 50% (D50W) injection 25 mL     • multivitamin (THERAGRAN) tablet 1 Tab 1 Tab at 02/08/17 0822   • lactulose 20 GM/30ML solution 30 mL     • ipratropium-albuterol (DUONEB) nebulizer solution 3 mL 3 mL at 02/06/17 0546   • famotidine (PEPCID) tablet 20 mg 20 mg  at 02/08/17 2036   • oyster shell calcium/vitamin D 250-125 MG-UNIT tablet 1 Tab 1 Tab at 02/08/17 0822   • pravastatin (PRAVACHOL) tablet 20 mg 20 mg at 02/08/17 2203     Facility-Administered Medications Ordered in Other Encounters   Medication Last Dose   • ondansetron (ZOFRAN ODT) dispertab 4 mg      Or   • ondansetron (ZOFRAN) syringe/vial injection 4 mg     • trazodone (DESYREL) tablet 50 mg     • hydrocodone-acetaminophen (NORCO) 5-325 MG per tablet 1-2 Tab     • hydrocodone-acetaminophen (NORCO) 7.5-325 MG per tablet 1 Tab       Medical Decision Making, by Problem:  Active Hospital Problems    Diagnosis   • Hyperlipidemia [E78.5]   • Hypertension [I10]     Quality  Measures:    Frank catheter: No Frank      DVT Prophylaxis: Warfarin (Coumadin)    Ulcer prophylaxis: Yes        Plan:    1. Acute hypoxemic respiratory failure    Acute pulmonary edema d/t severe MR  Hypertensive Emergency  Demand ischemia  Prolonged Qtc    CAP  due to severe MR    Extubated on 1/31/2017 (Intubated 1/24, failed extubation on 1/28).  Echo: EF 75%, severe MR, RVSP 40  Cardiac cath: normal coronary arteries  CTA: no PE  KAREN: EF 65%, mod MR, no rupture of cord or tendinae  Renal duplex neg for stenosis  S/p ceftriaxone & doxy.  S/P radical mitral valve repair (P2 triangular resection, 34-mm Ferguson flexible annuloplasty band), left atrial appendage ligation and  intraoperative transesophageal echocardiography on 1/31/2017. On coumadin (will be held until thoracentesis).  Will reduce lasix from 60 iv bid to 40mg po qd. Cont metolazone at current dose.  CT team on board, likely transfer to rehab today after her L thoracentesis.      2. Volume overload  2 view CXR shows pleural effusions, thora performed 2/4/17 with 825cc removed. R sided thoracentesis done today (2/7/17) with 400 cc removed. Pocket of fluid noted on L side and thoracentesis to be done this morning and CT surgery team planning to discharge pt to rehab after the  procedure.  Will reduce lasix from 60 iv bid to 40mg po qd. Cont metolazone at current dose.    3. Ear fullness and nasal discharge  - Symptoms improved after nasal rinse. Recommended to continue as needed.    Chronic medical issues:   4. DLD: cont pravastatin  5. Chronic back/knee/neck pain: pain management  6. HTN: losartan held, diuretics as above.

## 2017-02-09 NOTE — PROGRESS NOTES
Pulmonary Critical Care Progress Note        Chief Complaint: Worsening dyspnea and acute hypoxic respiratory failure    Date of service: 2/9/17    History of Present Illness: The patient is a 66-year-old female with a past medical history significant for hypertension, hyperlipidemia, and mitral valve prolapse who was in her usual state of health on 1/24 when she noted a cough and sudden onset of shortness of breath.  She checked her oxygen saturation at home and found to be 85%. She presented to the emergency department at [facility] where unfortunately she decompensated quickly requiring up to 15 L facemask and then eventually requiring intubation. She was initially hypertensive and tachycardic with an oxygen saturation of 86% on room air at the outside hospital and was transported on high peak and expiratory pressures on the ventilator with an FIO2 of 100%, achieving saturations in the low 90s. She became progressively more hypotensive requiring a norepinephrine drip. A stat bedside echo was performed with Dr. Delarosa in the ER, which revealed a ruptured mitral valve leaflet with severe MR as the likely cause of her decompensation.     ROS: (-) SOB, no CP, no abd pain, ambulates without difficulty     Interval Events:  24 hour interval history reviewed    - resolved PTX on repeat CXR last night   - low K, replaced    PFSH:  No change.    Respiratory:   1 lpm, IS 1200  Pulse Oximetry: 95 %    Exam: unlabored respirations, no intercostal retractions or accessory muscle use and rhonchi bibasilar, left > right  ImagingCXR  I have personally reviewed the chest x-ray my impression is  stable B LL infiltrates and small effusions     CTA chest 1/25:  1.  No evidence pulmonary emboli.  2.  Moderate right and small left pleural effusions.  3.  Compressive atelectasis both lower lobes especially the left and left lung volume loss with mediastinal shift towards left.  4.  Patchy groundglass opacities within  the right upper lobe consistent with focal areas of pneumonitis.  5.  7.5 mm left upper lobe nodule and smaller nodules within the lungs.        Invalid input(s): [id number]    HemoDynamics:  Pulse: 87, Heart Rate (Monitored): 87  NIBP: 117/72 mmHg    Exam: regular rate and rhythm  Imaging: echo Reviewed           Neuro:  GCS Total Kali Coma Score: 15     Exam:non focal, AAO x 4  Imaging: Available data reviewed    Fluids:  Intake/Output       02/07/17 0700 - 02/08/17 0659 02/08/17 0700 - 02/09/17 0659 02/09/17 0700 - 02/10/17 0659      3752-0408 4997-4638 Total 7148-2519 3205-6335 Total 4253-6167 1303-1109 Total       Intake    P.O.  --  960 960  550  960 1510  --  -- --    P.O. -- 960 960  -- -- --    Total Intake -- 960 960  -- -- --       Output    Urine  950  1450 2400  1400  1700 3100  --  -- --    Number of Times Voided -- -- -- 5 x 4 x 9 x -- -- --    Void (ml) 950 1450 2400 1400 1700 3100 -- -- --    Stool  --  -- --  --  -- --  --  -- --    Number of Times Stooled -- 1 x 1 x 1 x 0 x 1 x -- -- --    Total Output 950 1450 2400 1400 1700 3100 -- -- --       Net I/O     -950 490 -3890 -900 -630 -1590 -- -- --        Weight: 58 kg (127 lb 13.9 oz)  Recent Labs      02/07/17   0500  02/08/17   0215  02/09/17   0430   SODIUM  131*  132*  132*   POTASSIUM  3.7  3.6  3.5*   CHLORIDE  94*  94*  96   CO2  31  30  28   BUN  16  15  10   CREATININE  0.64  0.77  0.63   CALCIUM  8.3*  8.3*  8.4*       GI/Nutrition:  Exam: abdomen is soft and non-tender, normal active bowel sounds  Imaging: Available data reviewed  Liver Function  Recent Labs      02/07/17   0500  02/08/17   0215  02/09/17   0430   GLUCOSE  93  97  98       Heme:  Recent Labs      02/07/17   0500  02/08/17   0215  02/09/17   0430  02/09/17   0645   RBC  2.90*  3.09*   --   3.38*   HEMOGLOBIN  8.8*  9.5*   --   10.2*   HEMATOCRIT  26.9*  28.8*   --   31.5*   PLATELETCT  426  494*   --   585*   PROTHROMBTM  19.7*  18.1*   16.3*   --    INR  1.62*  1.45*  1.27*   --        Infectious Disease:  Temp  Av.7 °C (98.1 °F)  Min: 36.4 °C (97.6 °F)  Max: 36.9 °C (98.5 °F)  Micro: reviewed  Recent Labs      17   0500  17   0215  17   0645   WBC  7.6  9.2  8.2   NEUTSPOLYS  71.00  73.30*  74.50*   LYMPHOCYTES  16.50*  15.70*  14.10*   MONOCYTES  10.10  9.10  9.60   EOSINOPHILS  1.20  1.00  1.10   BASOPHILS  0.10  0.10  0.20     Current Facility-Administered Medications   Medication Dose Frequency Provider Last Rate Last Dose   • furosemide (LASIX) tablet 40 mg  40 mg Q DAY Lj Philip M.D.   Stopped at 17 1045   • warfarin (COUMADIN) tablet 7.5 mg  7.5 mg COUMADIN-DAILY KIRSTEN SosaD   7.5 mg at 17 1807   • sodium chloride (OCEAN) 0.65 % nasal spray 2 Spray  2 Spray PRN Josr Al M.D.   2 Spray at 17 0841   • acetaminophen (TYLENOL) tablet 650 mg  650 mg Q4HRS PRN Josr Al M.D.   650 mg at 17 1135    Or   • acetaminophen (TYLENOL) suppository 650 mg  650 mg Q4HRS PRN Josr Al M.D.       • Respiratory Care per Protocol   Continuous RT Hannah Sumner, A.P.N.       • docusate sodium (COLACE) capsule 100 mg  100 mg QAM Hannah Sumner A.P.N.   100 mg at 17 0822    And   • senna-docusate (PERICOLACE or SENOKOT S) 8.6-50 MG per tablet 1 Tab  1 Tab Nightly Hannah Sumner, A.P.N.   1 Tab at 176    And   • senna-docusate (PERICOLACE or SENOKOT S) 8.6-50 MG per tablet 1 Tab  1 Tab Q24HRS PRN Hannah Sumner A.P.N.   1 Tab at 17 1001    And   • lactulose 20 GM/30ML solution 30 mL  30 mL Q24HRS PRN Hannah Sumner, A.P.N.        And   • bisacodyl (DULCOLAX) suppository 10 mg  10 mg Q24HRS PRN Hannah Sumner, A.P.N.        And   • fleet enema 133 mL  1 Each Once PRN Hannah Sumner, A.P.N.       • aspirin EC (ECOTRIN) tablet 81 mg  81 mg DAILY Hannah Sumner, A.P.N.   Stopped at 17 0900   • MD ALERT... warfarin (COUMADIN) per pharmacy protocol   PRN  Hannah Sumner, A.P.N.       • oxycodone immediate-release (ROXICODONE) tablet 5 mg  5 mg Q3HRS PRN Hannah Sumner, A.P.N.   5 mg at 02/08/17 1231   • oxycodone immediate release (ROXICODONE) tablet 10 mg  10 mg Q3HRS PRN Hannah Sumner, A.P.N.   10 mg at 02/07/17 0842   • tramadol (ULTRAM) 50 MG tablet 50 mg  50 mg Q4HRS PRN Hannah Sumner, A.P.N.   50 mg at 02/06/17 2156   • ondansetron (ZOFRAN) syringe/vial injection 4 mg  4 mg Q6HRS PRN Hannah Sumner, A.P.N.   4 mg at 02/02/17 1941    Or   • prochlorperazine (COMPAZINE) injection 10 mg  10 mg Q6HRS PRN Hannah Sumner, A.P.N.       • mag hydrox-al hydrox-simeth (MAALOX PLUS ES or MYLANTA DS) suspension 30 mL  30 mL Q4HRS PRN Hannah Sumner, A.P.N.       • diphenhydrAMINE (BENADRYL) tablet/capsule 25 mg  25 mg HS PRN - MR X 1 Hannah Sumner, A.P.N.       • artificial tears 1.4 % ophthalmic solution 1 Drop  1 Drop PRN Masoud Aguirre M.D.       • glucose 4 g chewable tablet 16 g  16 g Q15 MIN PRN James Pérez M.D.        And   • dextrose 50% (D50W) injection 25 mL  25 mL Q15 MIN PRN James Pérez M.D.       • multivitamin (THERAGRAN) tablet 1 Tab  1 Tab DAILY James Pérez M.D.   1 Tab at 02/08/17 0822   • lactulose 20 GM/30ML solution 30 mL  30 mL Q24HRS PRN Jeremy M Gonda, M.D.       • ipratropium-albuterol (DUONEB) nebulizer solution 3 mL  3 mL Q2HRS PRN (RT) Jeremy M Gonda, M.D.   3 mL at 02/06/17 0546   • famotidine (PEPCID) tablet 20 mg  20 mg Q12HRS Jeremy M Gonda, M.D.   20 mg at 02/08/17 2036   • oyster shell calcium/vitamin D 250-125 MG-UNIT tablet 1 Tab  1 Tab QDAY with Breakfast James Pérez M.D.   1 Tab at 02/08/17 0822   • pravastatin (PRAVACHOL) tablet 20 mg  20 mg Q EVENING Jeremy M Gonda, M.D.   20 mg at 02/08/17 2203     Facility-Administered Medications Ordered in Other Encounters   Medication Dose Frequency Provider Last Rate Last Dose   • ondansetron (ZOFRAN ODT) dispertab 4 mg  4 mg 4X/DAY PRN Ashley Camacho M.D.        Or    • ondansetron (ZOFRAN) syringe/vial injection 4 mg  4 mg 4X/DAY PRN Ashley Camacho M.D.       • trazodone (DESYREL) tablet 50 mg  50 mg QHS PRN Ashley Camacho M.D.       • hydrocodone-acetaminophen (NORCO) 5-325 MG per tablet 1-2 Tab  1-2 Tab Q4HRS PRN Ashley Camacho M.D.         Last reviewed on 1/30/2017  8:02 PM by Purvi Montanez R.N.    Quality  Measures:  Medications reviewed, Labs reviewed and Radiology images reviewed  Frank catheter: No Frank      DVT Prophylaxis: Warfarin (Coumadin)  DVT prophylaxis - mechanical: SCDs  Ulcer prophylaxis: Yes    Assessed for rehab: Patient was assess for and/or received rehabilitation services during this hospitalization    Problems/Plan:  Acute Hypoxic Respiratory Failure - extubated 2/1   - RT/O2 therapies   - IS/PEP/Mobilize/IPV   - hold on further diuresis for today   - R thoracentesis 2/7, recurrent   - L thoracentesis 2/8  Acute Pulmonary Edema - resolved  Acute Delirium - resolved  Tachycardia that ? A flutter   - presently sinus, off amio  Severe Mitral Valve Regurgitation    - cardiac cath on 1/25   - sp mv repair 1/31  Cardiogenic Shock likely related to severe mitral regurgitation   - s/p vasopressor/ionotropic therapy   - Cards following   - Cardiac cath on 1/25 was negative and heparin stopped   - CTA was negative  Aspiration PNA   - Ceftriaxone/Doxycycline 10 day course completed 2/3  Elevated Troponin   - cards following   - ASA  Hyperglycemia   - ISS  Hx of mitral valve prolapse  Hx of systemic arterial hypertension  Hx of Dyslipidemia   - statin therapy  Prophylaxis, diet, therapies    Ok to transfer to rehab today from pulm/CC standpoint. Needs outpatient pulm f/u with repeat chest imaging in 1-2 weeks to eval effusions.    Discussed patient condition and risk of morbidity and/or mortality with Family, RN, RT, Pharmacy, , [employer] resident, Charge nurse / hot rounds and Patient

## 2017-02-09 NOTE — FLOWSHEET NOTE
02/09/17 0710   Events/Summary/Plan   Events/Summary/Plan IS    Non-Invasive Resp Device Site Inspection Completed Intact   Interdisciplinary Plan of Care-Goals (Indications)   Blunt Chest Indications Thoracic Surgery   Interdisciplinary Plan of Care-Outcomes    Blunt Chest IS Outcome Patient is Using I.S., Exceeds 60% of Predicted, and Understands Need for I.S.   Education   Education Yes - Pt. / Family has been Instructed in use of Respiratory Equipment   Incentive Spirometry Group   Incentive Spirometry Instruction Yes   Breathing Exercises Yes   Incentive Spirometer Volume 1100 mL   Incentive Spirometer Date Last Changed 02/01/17   Incentive Spirometer Next Change Date (Q 30 Days) 03/03/17   Respiratory WDL   Respiratory (WDL) X   Chest Exam   Work Of Breathing / Effort Mild   Respiration 16   Pulse 87   Breath Sounds   Pre/Post Intervention Pre Intervention Assessment   RUL Breath Sounds Clear   RML Breath Sounds Diminished   RLL Breath Sounds Diminished   PRAMOD Breath Sounds Clear   LLL Breath Sounds Diminished   Oximetry   Continuous Oximetry Yes   O2 Alarms Set & Reviewed Yes   Oxygen   Pulse Oximetry 95 %   O2 (LPM) 1   O2 Daily Delivery Respiratory  Silicone Nasal Cannula

## 2017-02-10 ENCOUNTER — APPOINTMENT (OUTPATIENT)
Dept: RADIOLOGY | Facility: REHABILITATION | Age: 67
DRG: 949 | End: 2017-02-10
Attending: HOSPITALIST
Payer: MEDICARE

## 2017-02-10 LAB
INR PPP: 1.52 (ref 0.87–1.13)
PROTHROMBIN TIME: 18.8 SEC (ref 12–14.6)

## 2017-02-10 PROCEDURE — 36415 COLL VENOUS BLD VENIPUNCTURE: CPT

## 2017-02-10 PROCEDURE — 99233 SBSQ HOSP IP/OBS HIGH 50: CPT | Performed by: PHYSICAL MEDICINE & REHABILITATION

## 2017-02-10 PROCEDURE — 99223 1ST HOSP IP/OBS HIGH 75: CPT | Performed by: HOSPITALIST

## 2017-02-10 PROCEDURE — 94760 N-INVAS EAR/PLS OXIMETRY 1: CPT

## 2017-02-10 PROCEDURE — 97535 SELF CARE MNGMENT TRAINING: CPT

## 2017-02-10 PROCEDURE — 97116 GAIT TRAINING THERAPY: CPT

## 2017-02-10 PROCEDURE — 85610 PROTHROMBIN TIME: CPT

## 2017-02-10 PROCEDURE — A9270 NON-COVERED ITEM OR SERVICE: HCPCS | Performed by: PHYSICAL MEDICINE & REHABILITATION

## 2017-02-10 PROCEDURE — 97530 THERAPEUTIC ACTIVITIES: CPT

## 2017-02-10 PROCEDURE — 700112 HCHG RX REV CODE 229: Performed by: PHYSICAL MEDICINE & REHABILITATION

## 2017-02-10 PROCEDURE — 97166 OT EVAL MOD COMPLEX 45 MIN: CPT

## 2017-02-10 PROCEDURE — 700102 HCHG RX REV CODE 250 W/ 637 OVERRIDE(OP): Performed by: PHYSICAL MEDICINE & REHABILITATION

## 2017-02-10 PROCEDURE — 97162 PT EVAL MOD COMPLEX 30 MIN: CPT

## 2017-02-10 PROCEDURE — 770010 HCHG ROOM/CARE - REHAB SEMI PRIVAT*

## 2017-02-10 PROCEDURE — 71020 DX-CHEST-2 VIEWS: CPT

## 2017-02-10 RX ORDER — LANOLIN ALCOHOL/MO/W.PET/CERES
3 CREAM (GRAM) TOPICAL
Status: DISCONTINUED | OUTPATIENT
Start: 2017-02-10 | End: 2017-02-18 | Stop reason: HOSPADM

## 2017-02-10 RX ORDER — WARFARIN SODIUM 7.5 MG/1
7.5 TABLET ORAL
Status: COMPLETED | OUTPATIENT
Start: 2017-02-10 | End: 2017-02-10

## 2017-02-10 RX ORDER — FUROSEMIDE 20 MG/1
20 TABLET ORAL
Status: DISCONTINUED | OUTPATIENT
Start: 2017-02-11 | End: 2017-02-11

## 2017-02-10 RX ORDER — TRAMADOL HYDROCHLORIDE 50 MG/1
50 TABLET ORAL EVERY 6 HOURS PRN
Status: DISCONTINUED | OUTPATIENT
Start: 2017-02-10 | End: 2017-02-18 | Stop reason: HOSPADM

## 2017-02-10 RX ORDER — POTASSIUM CHLORIDE 750 MG/1
10 TABLET, FILM COATED, EXTENDED RELEASE ORAL DAILY
Status: DISCONTINUED | OUTPATIENT
Start: 2017-02-10 | End: 2017-02-11

## 2017-02-10 RX ADMIN — CALCIUM CARBONATE-CHOLECALCIFEROL TAB 250 MG-125 UNIT 1 TABLET: 250-125 TAB at 08:20

## 2017-02-10 RX ADMIN — STANDARDIZED SENNA CONCENTRATE AND DOCUSATE SODIUM 1 TABLET: 8.6; 5 TABLET, FILM COATED ORAL at 20:43

## 2017-02-10 RX ADMIN — FUROSEMIDE 40 MG: 40 TABLET ORAL at 05:11

## 2017-02-10 RX ADMIN — WARFARIN SODIUM 7.5 MG: 7.5 TABLET ORAL at 17:36

## 2017-02-10 RX ADMIN — DOCUSATE SODIUM 100 MG: 100 CAPSULE, LIQUID FILLED ORAL at 08:20

## 2017-02-10 RX ADMIN — THERA TABS 1 TABLET: TAB at 08:20

## 2017-02-10 RX ADMIN — ASPIRIN 81 MG: 81 TABLET, COATED ORAL at 08:20

## 2017-02-10 RX ADMIN — PRAVASTATIN SODIUM 20 MG: 20 TABLET ORAL at 20:44

## 2017-02-10 RX ADMIN — TRAMADOL HYDROCHLORIDE 50 MG: 50 TABLET, FILM COATED ORAL at 20:43

## 2017-02-10 RX ADMIN — POTASSIUM CHLORIDE 10 MEQ: 10 TABLET, EXTENDED RELEASE ORAL at 17:36

## 2017-02-10 RX ADMIN — Medication 3 MG: at 20:44

## 2017-02-10 ASSESSMENT — GAIT ASSESSMENTS
DISTANCE (FEET): 110
DISTANCE (FEET): 40
GAIT LEVEL OF ASSIST: CONTACT GUARD ASSIST
ASSISTIVE DEVICE: FRONT WHEEL WALKER
GAIT LEVEL OF ASSIST: CONTACT GUARD ASSIST
DEVIATION: BRADYKINETIC;DECREASED HEEL STRIKE;DECREASED TOE OFF
DEVIATION: BRADYKINETIC;DECREASED TOE OFF;DECREASED HEEL STRIKE

## 2017-02-10 ASSESSMENT — PAIN SCALES - GENERAL: PAINLEVEL_OUTOF10: 2

## 2017-02-10 ASSESSMENT — ACTIVITIES OF DAILY LIVING (ADL): TOILETING: INDEPENDENT

## 2017-02-10 ASSESSMENT — BRIEF INTERVIEW FOR MENTAL STATUS (BIMS)
INITIAL REPETITION OF BED BLUE SOCK - FIRST ATTEMPT: 3
ASKED TO RECALL SOCK: NO, COULD NOT RECALL
WHAT MONTH IS IT: ACCURATE WITHIN 5 DAYS
ASKED TO RECALL BLUE: YES, NO CUE REQUIRED
WHAT YEAR IS IT: CORRECT
ASKED TO RECALL BED: NO, COULD NOT RECALL
BIMS SUMMARY SCORE: 11
WHAT DAY OF THE WEEK IS IT: CORRECT

## 2017-02-10 NOTE — PROGRESS NOTES
Received shift report and assumed care of patient.  Patient awake, calm and stable, currently positioned in bed for comfort and safety; call light within reach.  Denies pain or discomfort at this time.  Will continue to monitor.

## 2017-02-10 NOTE — REHAB-DIETARY IDT TEAM NOTE
Dietary  Nutrition       Dietary Problems           Problem: Other Problem (see comments)     Description: Diagnosis: Malnutrition (acute/ non-severe) r/t inadequate oral intake in the setting of MVR as evidenced by intake <75% of needs x 1 month, 6% weight loss x 1 month, moderate depletion of muscle mass/ subcutaneous body fat, with visible temporal/ clavicle wasting.           Goal: Other Goal     Description:  Monitor/Evaluation: RD following weekly.     Goal: Intake > 50% of nutrient needs via meals, supplements, snacks to promote weight gain/ nutrition optimization/ healing.             Nutrition Care/ Consult For s/p CABG s/p MVR     Assessment:    Admitting Diagnosis: mitral valve prolapse s/p MVR    Pertinent PMH: HTN, Nocturnal Hypoxemia, SDH s/p evacuation, pleural effusions s/p thoracentesis x 3, Arthritis, Osteoporosis    Additional Information: Required intubation s/t acute resp. failure.     Patient seen in cafeteria at lunch,  with her.  She reports her appetite is picking up as she is feeling better.  Ate 75% of lunch, denies any GI/Mastication issues.  No difficulty self feeding.      Patient reports a usual body weight of 60-61.3kg.  She notes she was very active PTA.  Has never been this thin.  She reports weight loss while hospitalized s/t poor appetite which is currently resolving.      Patient reports she eats rather healthy- no processed foods. Tries to eat all natural.  We discussed nutrient dense snack options between meals. Patient agreeable to fruit, granola,and yogurt OR fruit smoothie made with fresh fruit, yogurt/ skim milk and Beneprotein between meals.     Nutrition rep has seen patient, she knows how to fill out the menu.  I'm adjusting her diet to Cardiac.  No indication for Consistent CHO need. Note some hypoglycemia while in [facility].     Appropriate for Education? Yes  Education in Past? Yes  Appetite: Fair/ Good and Improving   Diet: Consistent CHO/ Cardiac    PO since  admission: 10-90% of meals ( improving)     Labs: Na+ 132, K + 3.5, Ca 8.4 , A1C WNL , Lipid Panel WNL    Medications: Ecotrin, Flexeril, Lasix 40mg q 24hrs, MVI, Vitamin D+ Calcium, Coumadin    PRN Medications: noted  IVF: n/a     Height: 170.2cm  Weight: 56.4kg  Usual Body Weight: 60-61.3kg  % Usual Body Weight: 94%  % Weight Change: 6% (significant x1 month)    BMI: 19.47 (underweight for age)     Skin: sx incision abdomen/ heart prep    GI: BM 2/9  Vitals: 36.4C, 113/73, HR 74, RR 20, 2L NC    I/Os: n/a no output documented     Nutrient Needs:  Kcal: 6955-3112         BEE= 1128x 1.3-1.4 promote weight gain    Protein: 56-68g/day ( 1-1.2g/kg)  Fluid: 1400mL /day       Diagnosis: Malnutrition (acute/ non-severe) r/t inadequate oral intake in the setting of MVR as evidenced by intake <75% of needs x 1 month, 6% weight loss x 1 month, moderate depletion of muscle mass/ subcutaneous body fat, with visible temporal/ clavicle wasting.     Intervention/ Recommendations/POC:  1. Adjust diet to Cardiac. Add nutrient dense high protein/kcal snacks BID.  2.Encourage adequate PO/fluid intake.    3. Nutrition rep to see regarding food prefs/ honor within dietary restrictions (if indicated)  4. Monitor PO intake, weight, labs, medication adjustments, skin integrity, GI function, vitals, I/Os, and overall hydration status.  Adjust nutritional POC pending clinical outcomes.    5. Consider supplemental KCl in the setting of diuresis. (Lasix)     Monitor/Evaluation: RD following weekly.    Goal: Intake > 50% of nutrient needs via meals, supplements, snacks to promote weight gain/ nutrition optimization/ healing.                                 Section completed by:  Cecy Godfrey RD

## 2017-02-10 NOTE — PROGRESS NOTES
Pharmacy Warfarin Consult   2/10/2017     66 y.o.   female     Indication for anticoagulation: Bioprosthetic Valve Replacement    Goal INR = 2 - 3    Recent Labs      02/08/17   0215  02/09/17   0430  02/09/17   0645  02/10/17   0527   INR  1.45*  1.27*   --   1.52*   HEMOGLOBIN  9.5*   --   10.2*   --    HEMATOCRIT  28.8*   --   31.5*   --        Pertinent Drug/Drug Interactions:  Aspirin, Statin  Outpatient Warfarin Regimen:  None noted  Recent Warfarin Dosing:    Dose from last 7 days     Date/Time Dose (mg)    02/10/17 0527 7.5    02/09/17 1115 7.5    02/09/17 1100 --          Bridge Therapy: None        1.  Coumadin 7.5mg tonight for INR= 1.52        Liban Daugherty [facility]

## 2017-02-10 NOTE — FLOWSHEET NOTE
On O2 at 2L, sat=94%. Home use is 2L with sleep. Wean during day as tolerated. I.S. Done, achieved 1250ml with good effort and understanding.     02/10/17 1245   Events/Summary/Plan   Events/Summary/Plan I.S./cough   Non-Invasive Resp Device Site Inspection Completed Intact   Location Nasal cannula   Interdisciplinary Plan of Care-Goals (Indications)   Hyperinflation Protocol Indications Physical Exam   Interdisciplinary Plan of Care-Outcomes    Hyperinflation Protocol Goals/Outcome Stable Vital Capacity x24 hrs and Patient Understands / uses I.S.   Education   Education Yes - Pt. / Family has been Instructed in use of Respiratory Equipment   Incentive Spirometry Group   Incentive Spirometry Instruction Yes   Breathing Exercises Yes   Incentive Spirometer Volume 1250 mL  (good effort and understanding)   Incentive Spirometer Date Last Changed 02/09/17   Incentive Spirometer Next Change Date (Q 30 Days) 03/09/17   Respiratory WDL   Respiratory (WDL) X   Chest Exam   Respiration 18   Pulse 91   Breath Sounds   RUL Breath Sounds Clear   RML Breath Sounds Clear;Diminished   RLL Breath Sounds Diminished   PRAMOD Breath Sounds Clear   LLL Breath Sounds Diminished   Secretions   Cough Non Productive   How Sputum Obtained Cough on Request   Oximetry   #Pulse Oximetry (Single Determination) Yes   Oxygen   Home O2 Use Prior To Admission? Yes   Home O2 LPM Flow 2 LPM   Home O2 Delivery Method Silicone Nasal Cannula   Home O2 Frequency of Use At Sleep   Pulse Oximetry 94 %   O2 (LPM) 2   O2 Daily Delivery Respiratory  Silicone Nasal Cannula

## 2017-02-10 NOTE — THERAPY
Physical Therapy Initial Plan of Care Note    1) Assessment: Patient is 66 y.o. female with a diagnosis of mitral valve repair.  Additional factors influencing patient status / progress (ie: cognitive factors, co-morbidities, social support, etc): independent PLOF, good social support, SDH, HTN, osteoporosis.  Long term and short term goals have been discussed with patient and they are in agreement.  2) Plan: Recommend Physical Therapy  minutes per day 5-7 days per week for 2  weeks for the following treatments:  PT Group Therapy, PT Gait Training, PT Therapeutic Exercises, PT Neuro Re-Ed/Balance and PT Therapeutic Activity.  3) Goals:  Please refer to care plan for goals.

## 2017-02-10 NOTE — CARE PLAN
Problem: Other Problem (see comments)  Goal: Other Goal  Monitor/Evaluation: RD following weekly.   Goal: Intake > 50% of nutrient needs via meals, supplements, snacks to promote weight gain/ nutrition optimization/ healing.   Outcome: PROGRESSING AS EXPECTED

## 2017-02-10 NOTE — H&P
REHABILITATION HISTORY AND PHYSICAL/POST ADMISSION EVALUATION    2/9/2017  4:45 PM  Kristen Salazar  RH27/01    Reason for admission: MVP s/p MVR  HPI:  The patient is a 66 y.o. female with a past medical history of hypertension, nocturnal hypoxemia, remote subdural hematoma status post evacuation, and mitral valve prolapse; now admitted for acute inpatient rehabilitation with functional debility secondary to open heart surgery for mitral valve repair.     Patient reports that she is very healthy at baseline with known history of mitral valve prolapse monitored by cardiologist Dr. Landin with recent checkup 3 months prior. She went alpine skiing and then the following day on January 24, 2017, was not feeling well with shortness of breath and a cough as well as hypoxia. She went to the emergency department at [facility] where she had acute respiratory failure requiring intubation. She was hypotensive. Echocardiogram showed a ruptured mitral valve leaflet with severe mitral regurgitation causing diffuse acute cardiogenic pulmonary edema and cardiogenic shock and elevated troponin due to demand ischemia. She went to the cath lab and was found to have normal coronary arteries. She went to the OR on 1/31/2017 for radicle mitral valve repair with Dr. Schmitt. Postoperative complications included pleural effusions requiring 3 thoracentesis. 2 on the left and 1 on the right. She has normocytic anemia, hyponatremia, hypokalemia, continued respiratory insufficiency requiring oxygen.      Patient was evaluated by Rehab Medicine physician and Physical Therapy and Occupational Therapy and determined to be appropriate for acute inpatient rehab and was transferred to [facility] on 2/9/2017.    Pre-mobidly, the patient lived in a two level home in [place] with spouse.  With this acute therapeutic intervention, this patient hopes to improve her functional status, and return to independent living  with the supportive care of spouse.    On admission the patient reports lumbar axial back pain. She reports this is 6 out of 10 in severity, stabbing in nature, with no referral down the legs. Proetz history of chronic back pain secondary to lumbar stenosis from L2-L5 for which it was recommended she have fusion surgery. She has tried Flexeril prior. So reports a history of peripheral neuropathy of unknown etiology after seeing a neurologist. He has a history of nocturnal hypoxemia but negative sleep apnea for which she has 2 L of oxygen at home at night. She denies any difficulty in her mentation since her open heart surgery. She denies any difficulty swallowing. Reports she has had a sore throat but this is improving. She is very physically active and wants to get back to skiing swimming and daily walking eventually.      REVIEW OF SYSTEMS:     ROS  GEN: Denies fevers, chills, or weight changes.  HEENT: Denies double or blurry vision, decreased visual acuity, hearing difficulty, or swallowing difficulty.   CARDIOVASCULAR: Denies chest pain, palpitations or edema. Notable for external discomfort.  RESPIRATORY: Denies cough or shortness of breath. Notable for history of nocturnal hypoxia, but also using oxygen during the day for respiratory insufficiency currently.  GASTROINTESTINAL: Denies abdominal pain, constipation, diarrhea or incontinence.  GENITOURINARY: Denies dysuria, frequency, or incontinence.  MUSCULOSKELETAL: Denies joint pain, swelling or redness. Notable for low back pain.  NEUROLOGICAL: Denies focal weakness, numbness, or tingling. Audible for generalized weakness.  PSYCHOLOGICAL: Denies depression or anxiety.  INTEGUMENT: Denies rashes or lesions.  ENDOCRINE: Denies excessive thirst, heat or cold intolerance.  HEMATOLOGIC: Denies history of easy bruising or bleeding.      PMH:  Past Medical History   Diagnosis Date   • Allergy    • Arthritis    • Osteoporosis    • Heart murmur    • Fracture    •  Hypoxia, sleep related      several years   • MVP (mitral valve prolapse)        PSH:  Past Surgical History   Procedure Laterality Date   • Knee arthroscopy     • Mitral valve repair  1/31/2017     Procedure: MITRAL VALVE REPAIR;  Surgeon: Chris Schmitt M.D.;  Location: SURGERY [facility];  Service:    • Bryant  1/31/2017     Procedure: BRYANT;  Surgeon: Chris Schmitt M.D.;  Location: SURGERY [facility];  Service:        FAMILY HISTORY:  No family history on file.  Mother had CHF, father with pancreatic cancer.     MEDICATIONS:  Current Facility-Administered Medications   Medication Dose   • acetaminophen (TYLENOL) tablet 650 mg  650 mg    Or   • acetaminophen (TYLENOL) suppository 650 mg  650 mg   • aspirin EC (ECOTRIN) tablet 81 mg  81 mg   • docusate sodium (COLACE) capsule 100 mg  100 mg    And   • senna-docusate (PERICOLACE or SENOKOT S) 8.6-50 MG per tablet 1 Tab  1 Tab    And   • senna-docusate (PERICOLACE or SENOKOT S) 8.6-50 MG per tablet 1 Tab  1 Tab    And   • lactulose 20 GM/30ML solution 30 mL  30 mL    And   • bisacodyl (DULCOLAX) suppository 10 mg  10 mg    And   • fleet enema 133 mL  1 Each   • mag hydrox-al hydrox-simeth (MAALOX PLUS ES or MYLANTA DS) suspension 30 mL  30 mL   • MD ALERT... warfarin (COUMADIN) per pharmacy protocol     • famotidine (PEPCID) tablet 20 mg  20 mg   • ipratropium-albuterol (DUONEB) nebulizer solution 3 mL  3 mL   • artificial tears 1.4 % ophthalmic solution 1 Drop  1 Drop   • [START ON 2/10/2017] furosemide (LASIX) tablet 40 mg  40 mg   • multivitamin (THERAGRAN) tablet 1 Tab  1 Tab   • [START ON 2/10/2017] oyster shell calcium/vitamin D 250-125 MG-UNIT tablet 1 Tab  1 Tab   • pravastatin (PRAVACHOL) tablet 20 mg  20 mg   • sodium chloride (OCEAN) 0.65 % nasal spray 2 Spray  2 Spray   • warfarin (COUMADIN) tablet 7.5 mg  7.5 mg   • hydrocodone-acetaminophen (NORCO) 7.5-325 MG per tablet 1 Tab  1 Tab   • hydrocodone-acetaminophen (NORCO) 5-325 MG per tablet 1-2  "Tab  1-2 Tab   • ondansetron (ZOFRAN ODT) dispertab 4 mg  4 mg    Or   • ondansetron (ZOFRAN) syringe/vial injection 4 mg  4 mg   • cyclobenzaprine (FLEXERIL) tablet 10 mg  10 mg   • cyclobenzaprine (FLEXERIL) tablet 10 mg  10 mg   • trazodone (DESYREL) tablet 50 mg  50 mg       ALLERGIES:  Codeine and Latex    PSYCHOSOCIAL HISTORY:  Living Site:  Home  Living With:  spouse  Caregiver's availability:  available  Number of stairs:  6 steps to enter from the garage, then 4 steps to enter a 2 story house  Substance use history: Smoked for about 5 years when she was in her 20s, drinks one glass of wine a day, no drugs.  Is  and has no children.  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  Very physically active, skis hikes and swims.    LEVEL OF FUNCTION PRIOR TO DISABILTY:  Independent    LEVEL OF FUNCTION PRIOR TO ADMISSION to [facility]:  Min assist for transfers, contact guard assist him to 120 feet with a front wheeled walker.    CURRENT LEVEL OF FUNCTION:   Same as level of function prior to admission to [facility]    PHYSICAL EXAM:     VITAL SIGNS:   height is 1.702 m (5' 7\") and weight is 56.4 kg (124 lb 5.4 oz). Her temperature is 36.6 °C (97.9 °F). Her blood pressure is 107/67 and her pulse is 90. Her respiration is 18 and oxygen saturation is 93%.     GENERAL: No apparent distress  HEENT: Normocephalic/atraumatic  CARDIAC: Regular rate and rhythm, normal S1, S2   LUNGS: Clear to auscultation   ABDOMINAL: bowel sounds present, soft, nontender and nondistended    EXTREMITIES: no contractures, spasticity, or edema.  No calf tenderness bilaterally.    NEURO:    Mental status:  A&Ox4 (person, place, date, situation) answers questions appropriately follows commands  Speech: fluent, no aphasia or dysarthria    Motor:  5 out of 5 throughout the bilateral upper and lower extremities except for 4-5 on the left EHL.    Sensory: intact to light touch through " out, impaired vibration at the bilateral great toes and ankles, absent proprioception at the bilateral great toes.    LABS:  Recent Labs      02/07/17   0500  02/08/17   0215  02/09/17   0430   SODIUM  131*  132*  132*   POTASSIUM  3.7  3.6  3.5*   CHLORIDE  94*  94*  96   CO2  31  30  28   GLUCOSE  93  97  98   BUN  16  15  10   CREATININE  0.64  0.77  0.63   CALCIUM  8.3*  8.3*  8.4*     Recent Labs      02/07/17   0500  02/08/17   0215  02/09/17   0645   WBC  7.6  9.2  8.2   RBC  2.90*  3.09*  3.38*   HEMOGLOBIN  8.8*  9.5*  10.2*   HEMATOCRIT  26.9*  28.8*  31.5*   MCV  92.8  93.2  93.2   MCH  30.3  30.7  30.2   MCHC  32.7*  33.0*  32.4*   RDW  46.7  46.8  46.5   PLATELETCT  426  494*  585*   MPV  10.4  10.4  9.4     Recent Labs      02/07/17   0500  02/08/17 0215  02/09/17   0430   INR  1.62*  1.45*  1.27*         PRIMARY REHAB DIAGNOSIS:    This patient is a 66 y.o. female admitted for acute inpatient rehabilitation status post mitral valve repair.    IMPAIRMENTS:   ADLs/IADLs  Mobility    SECONDARY DIAGNOSIS/MEDICAL CO-MORBIDITIES AFFECTING FUNCTION:  Respiratory insufficiency, nocturnal hypoxia  Physical Effusion status post thoracentesis  Hypertension  Anemia  Hyponatremia  Hyperlipidemia    RELEVANT CHANGES SINCE PREADMISSION EVALUATION:    Status unchanged    The patient's rehabilitation potential is Excellent  The patient's medical prognosis is excellent    PLAN:   Discussion and Recommendations:   1. The patient requires an acute inpatient rehabilitation program with a coordinated program of care at an intensity and frequency not available at a lower level of care. This recommendation is substantiated by the patient's medical physicians who recommend that the patient's intervention and assessment of medical issues needs to be done at an acute level of care for patient's safety and maximum outcome.   2. A coordinated program of care will be supplied by an interdisciplinary team of physical therapy,  occupational therapy, rehab physician, rehab nursing, and, if needed, speech therapy and rehab psychology. Rehab team presents a patient-specific rehabilitation and education program concentrating on prevention of future problems related to accessibility, mobility, skin, bowel, bladder, sexuality, and psychosocial and medical/surgical problems.   3. Need for Rehabilitation Physician: The rehab physician will be evaluating the patient on a multi-weekly basis to help coordinate the program of care. The rehab physician communicates between medical physicians, therapists, and nurses to maximize the patient's potential outcome. Specific areas in which the rehab physician will be providing daily assessment include the following:   A. Assessing the patient's heart rate and blood pressure response (vitals monitoring) to activity and making adjustments in medications or conservative measures as needed.   B. The rehab physician will be assessing the frequency at which the program can be increased to allow the patient to reach optimal functional outcome.   C. The rehab physician will also provide assessments in daily skin care, especially in light of patient's impairments in mobility.   D. The rehab physician will provide special expertise in understanding how to work with functional impairment and recommend appropriate interventions, compensatory techniques, and education that will facilitate the patient's outcome.   4. Rehab R.N.   The rehab RN will be working with patient to carry over in room mobility and activities of daily living when the patient is not in 3 hours of skilled therapy. Rehab nursing will be working in conjunction with rehab physician to address all the medical issues above and continue to assess laboratory work and discuss abnormalities with the treating physicians, assess vitals, and response to activity, and discuss and report abnormalities with the rehab physician. Rehab RN will also continue daily skin  care, supervise bladder/bowel program, instruct in medication administration, and ensure patient safety.       REHABILITATION ISSUES/ADVERSE POTENTIAL:  1.  Status post mitral valve repair: Patient demonstrates functional deficits in strength, balance, coordination, and ADL's. Patient is admitted to [facility] for comprehensive rehabilitation therapy as described below.   Rehabilitation nursing monitors bowel and bladder control, educates on medication administration, co-morbidities and monitors patient safety.    Therapies to treat at intensity and frequency of (may change after completion of evaluation by all therapeutic disciplines):       PT:  Physical therapy to address mobility, transfer, gait training and evaluation for adaptive equipment needs 1.5 hour/day at least 5 days/week for the duration of the ELOS (see below)       OT:  Occupational therapy to address ADLs, self-care, home management training, functional mobility/transfers and assistive device evaluation, and community re-integration 1.5 hour/day at least 5 days/week for the duration of the ELOS (see below).     2.  DVT prophylaxis:  Patient is on Coumadin for anticoagulation upon transfer. Encourage OOB. Monitor daily for signs and symptoms of DVT including but not limited to swelling and pain to prevent the development of DVT that may interfere with therapies.    3.  GI prophylaxis:  On prilosec to prevent gastritis/dyspepsia which may interfere with therapies.    4.  Pain: We'll start Flexeril for back spasms and pain. Oral analgesics as needed. K pad.     5.  Nutrition/Dysphagia: Dietician monitors nutrient intake, recommend supplements prn and provide nutrition education to pt/family to promote optimal nutrition for wound healing/recovery.     6.  Bladder/bowel:  Start bowel and bladder program as described below, to prevent constipation, urinary retention (which may lead to UTI), and urinary incontinence (which will impact  upon pt's functional independence).   - TV Q3h while awake with post void bladder scans, I&O cath for PVRs >400  - up to commode after meal     7.  Skin/dermal ulcer prophylaxis: Monitor for new skin conditions with q.2 h. turns as required to prevent the development of skin breakdown.     8.  Cognition/Behavior:  Psychologist Dr. Ozuna provides adjustment counseling to illness and psychosocial barriers that may be potential barriers to rehabilitation.     9. Respiratory therapy: RT performs O2 management prn, breathing retraining, pulmonary hygiene and bronchospasm management prn to optimize participation in therapies.     MEDICAL CO-MORBIDITIES/ADVERSE POTENTIAL AFFECTING FUNCTION:    Mitral valve prolapse status post mitral valve repair -continue sternal precautions.  Aspirin and Coumadin.    Respiratory insufficiency, nocturnal hypoxia - 2 oxygen. Respiratory therapist to see patient. Titrate oxygen during the day.    Physical effusions status post bilateral thoracentesis -continue incentive spirometry. Continue Lasix. Continue inhalers as needed.    Hypertension - is on losartan at home. Currently not on blood pressure medications, other than Lasix. Continue to monitor.    Anemia - normocytic. Likely post operative. Check morning labs.    Hyponatremia - mild. Check morning labs.    Hyperlipidemia - continue statin.    GI prophylaxis - will discontinue Pepcid as this has been known to cause hyponatremia. Patient not complaining of heartburn. Continue to monitor.     DVT prophylaxis - Coumadin.    Pt was seen today for >70 min, and entire time spent in face-to-face contact was >50% in counseling and coordination of care as detailed in A/P above.        GOALS/EXPECTED LEVEL OF FUNCTION BASED ON CURRENT MEDICAL AND FUNCTIONAL STATUS (may change based on patient's medical status and rate of impairment recovery):  Transfers:   Modified Independent  Mobility/Gait:   Modified Independent  ADL's:   Modified  Independent    DISPOSITION: Discharge to pre-morbid independent living setting with the supportive care of patient's spouse.      ELOS: 7-10 days

## 2017-02-10 NOTE — CARE PLAN
Problem: Safety  Goal: Will remain free from falls  Outcome: PROGRESSING AS EXPECTED  Safety precautions are in place to avoid accidental injury including call light and personal possessions within easy reach, hourly rounding and assessing need for toileting and pain. Patient is in agreement for safety measures and verbalizes understanding of indication. Will continue to monitor for accidental injury and prevent by continuing safety precautions.     Problem: Pain Management  Goal: Pain level will decrease to patient’s comfort goal  Outcome: PROGRESSING AS EXPECTED  Pt does not report any pain at initial rounding. Per pt, she had been taking Tramadol at [facility] for occasional shoulder and back pain. Will follow up in the morning. Pt also requesting K-pad, will follow up in the morning also.    Problem: Skin Integrity  Goal: Risk for impaired skin integrity will decrease  Outcome: PROGRESSING AS EXPECTED  Pt's skin assessed at start of shift and frequently throughout shift. Pt's sternal incision and abdominal stab sites intact and approximated with dermabond. No s/s infection noted. Will continue to monitor and assess pt's skin integrity, and offer additional support as needed, throughout the shift.

## 2017-02-10 NOTE — THERAPY
Occupational Therapy Initial Plan of Care Note    1) Assessment:  Patient is 66 y.o. female s/p mitral valve repair.  Additional factors influencing patient status / progress (ie: cognitive factors, co-morbidities, social support, etc): Independent PLOF, good social support.  Long term and short term goals have been discussed with patient and they are in agreement.  2) Plan:  Recommend Occupational Therapy  minutes per day 5-7 days per week for 10  days for the following treatments:  OT Group Therapy, OT Self Care/ADL, OT Community Reintegration, OT Neuro Re-Ed/Balance, OT Therapeutic Activity, OT Evaluation and OT Therapeutic Exercise.  3) Goals:  Please refer to care plan for goals.

## 2017-02-10 NOTE — PROGRESS NOTES
Received shift report and assumed care of patient. Patient awake, calm and stable, currently positioned in bed for comfort and safety; call light within reach and bed alarm in place. Denies pain or discomfort at this time.

## 2017-02-10 NOTE — CARE PLAN
Problem: Safety  Goal: Will remain free from injury  Intervention: Provide assistance with mobility  Patient uses call light consistently and appropriately this shift.  Waits for assistance when needed and does not attempt self transfer.  Able to verbalize needs.  Will continue to monitor.      Problem: Urinary Elimination:  Goal: Ability to reestablish a normal urinary elimination pattern will improve  Patient voiding adequate amounts of clear yellow urine.  Denies flank pain or dysuria; afebrile.  Will continue to monitor.

## 2017-02-10 NOTE — DIETARY
Nutrition Care/ Consult For s/p CABG s/p MVR     Assessment:    Admitting Diagnosis: mitral valve prolapse s/p MVR   Pertinent PMH: HTN, Nocturnal Hypoxemia, SDH s/p evacuation, pleural effusions s/p thoracentesis x 3, Arthritis, Osteoporosis    Additional Information: Required intubation s/t acute resp. failure.     Patient seen in cafeteria at lunch,  with her.  She reports her appetite is picking up as she is feeling better.  Ate 75% of lunch, denies any GI/Mastication issues.  No difficulty self feeding.      Patient reports a usual body weight of 60-61.3kg.  She notes she was very active PTA.  Has never been this thin.  She reports weight loss while hospitalized s/t poor appetite which is currently resolving.      Patient reports she eats rather healthy- no processed foods. Tries to eat all natural.  We discussed nutrient dense snack options between meals. Patient agreeable to fruit, granola,and yogurt OR fruit smoothie made with fresh fruit, yogurt/ skim milk and Beneprotein between meals.     Nutrition rep has seen patient, she knows how to fill out the menu.  I'm adjusting her diet to Cardiac.  No indication for Consistent CHO need. Note some hypoglycemia while in [facility].     Appropriate for Education? Yes  Education in Past? Yes  Appetite: Fair/ Good and Improving   Diet: Consistent CHO/ Cardiac   PO since admission: 10-90% of meals ( improving)     Labs: Na+ 132, K + 3.5, Ca 8.4 , A1C WNL , Lipid Panel WNL   Medications: Ecotrin, Flexeril, Lasix 40mg q 24hrs, MVI, Vitamin D+ Calcium, Coumadin   PRN Medications: noted  IVF: n/a     Height: 170.2cm  Weight: 56.4kg  Usual Body Weight: 60-61.3kg  % Usual Body Weight: 94%  % Weight Change: 6% (significant x1 month)   BMI: 19.47 (underweight for age)     Skin: sx incision abdomen/ heart prep   GI: BM 2/9  Vitals: 36.4C, 113/73, HR 74, RR 20, 2L NC   I/Os: n/a no output documented     Nutrient Needs:  Kcal: 2956-2820 BEE= 1128x 1.3-1.4 promote weight  gain   Protein: 56-68g/day ( 1-1.2g/kg)  Fluid: 1400mL /day       Diagnosis: Malnutrition (acute/ non-severe) r/t inadequate oral intake in the setting of MVR as evidenced by intake <75% of needs x 1 month, 6% weight loss x 1 month, moderate depletion of muscle mass/ subcutaneous body fat, with visible temporal/ clavicle wasting.     Intervention/ Recommendations/POC:  1. Adjust diet to Cardiac. Add nutrient dense high protein/kcal snacks BID.  2.Encourage adequate PO/fluid intake.   3. Nutrition rep to see regarding food prefs/ honor within dietary restrictions (if indicated)  4. Monitor PO intake, weight, labs, medication adjustments, skin integrity, GI function, vitals, I/Os, and overall hydration status.  Adjust nutritional POC pending clinical outcomes.   5. Consider supplemental KCl in the setting of diuresis. (Lasix)     Monitor/Evaluation: RD following weekly.   Goal: Intake > 50% of nutrient needs via meals, supplements, snacks to promote weight gain/ nutrition optimization/ healing.

## 2017-02-11 LAB
ANION GAP SERPL CALC-SCNC: 7 MMOL/L (ref 0–11.9)
BNP SERPL-MCNC: 205 PG/ML (ref 0–100)
BUN SERPL-MCNC: 13 MG/DL (ref 8–22)
CALCIUM SERPL-MCNC: 8.3 MG/DL (ref 8.5–10.5)
CHLORIDE SERPL-SCNC: 100 MMOL/L (ref 96–112)
CO2 SERPL-SCNC: 28 MMOL/L (ref 20–33)
CREAT SERPL-MCNC: 0.68 MG/DL (ref 0.5–1.4)
GFR SERPL CREATININE-BSD FRML MDRD: >60 ML/MIN/1.73 M 2
GLUCOSE SERPL-MCNC: 86 MG/DL (ref 65–99)
INR PPP: 1.69 (ref 0.87–1.13)
MAGNESIUM SERPL-MCNC: 2.2 MG/DL (ref 1.5–2.5)
PHOSPHATE SERPL-MCNC: 4.1 MG/DL (ref 2.5–4.5)
POTASSIUM SERPL-SCNC: 3.6 MMOL/L (ref 3.6–5.5)
PROTHROMBIN TIME: 20.4 SEC (ref 12–14.6)
SODIUM SERPL-SCNC: 135 MMOL/L (ref 135–145)

## 2017-02-11 PROCEDURE — 97530 THERAPEUTIC ACTIVITIES: CPT

## 2017-02-11 PROCEDURE — 700102 HCHG RX REV CODE 250 W/ 637 OVERRIDE(OP): Performed by: PHYSICAL MEDICINE & REHABILITATION

## 2017-02-11 PROCEDURE — 97110 THERAPEUTIC EXERCISES: CPT

## 2017-02-11 PROCEDURE — 94760 N-INVAS EAR/PLS OXIMETRY 1: CPT

## 2017-02-11 PROCEDURE — A9270 NON-COVERED ITEM OR SERVICE: HCPCS | Performed by: PHYSICAL MEDICINE & REHABILITATION

## 2017-02-11 PROCEDURE — 80048 BASIC METABOLIC PNL TOTAL CA: CPT

## 2017-02-11 PROCEDURE — 84100 ASSAY OF PHOSPHORUS: CPT

## 2017-02-11 PROCEDURE — 97116 GAIT TRAINING THERAPY: CPT

## 2017-02-11 PROCEDURE — 700102 HCHG RX REV CODE 250 W/ 637 OVERRIDE(OP): Performed by: HOSPITALIST

## 2017-02-11 PROCEDURE — 770010 HCHG ROOM/CARE - REHAB SEMI PRIVAT*

## 2017-02-11 PROCEDURE — 83880 ASSAY OF NATRIURETIC PEPTIDE: CPT

## 2017-02-11 PROCEDURE — 97535 SELF CARE MNGMENT TRAINING: CPT

## 2017-02-11 PROCEDURE — 700112 HCHG RX REV CODE 229: Performed by: PHYSICAL MEDICINE & REHABILITATION

## 2017-02-11 PROCEDURE — 36415 COLL VENOUS BLD VENIPUNCTURE: CPT

## 2017-02-11 PROCEDURE — 99232 SBSQ HOSP IP/OBS MODERATE 35: CPT | Performed by: HOSPITALIST

## 2017-02-11 PROCEDURE — 83735 ASSAY OF MAGNESIUM: CPT

## 2017-02-11 PROCEDURE — 85610 PROTHROMBIN TIME: CPT

## 2017-02-11 PROCEDURE — 97112 NEUROMUSCULAR REEDUCATION: CPT

## 2017-02-11 PROCEDURE — A9270 NON-COVERED ITEM OR SERVICE: HCPCS | Performed by: HOSPITALIST

## 2017-02-11 RX ORDER — POTASSIUM CHLORIDE 20 MEQ/1
40 TABLET, EXTENDED RELEASE ORAL ONCE
Status: COMPLETED | OUTPATIENT
Start: 2017-02-11 | End: 2017-02-11

## 2017-02-11 RX ORDER — WARFARIN SODIUM 7.5 MG/1
7.5 TABLET ORAL
Status: COMPLETED | OUTPATIENT
Start: 2017-02-11 | End: 2017-02-11

## 2017-02-11 RX ADMIN — CALCIUM CARBONATE-CHOLECALCIFEROL TAB 250 MG-125 UNIT 1 TABLET: 250-125 TAB at 09:11

## 2017-02-11 RX ADMIN — Medication 3 MG: at 20:35

## 2017-02-11 RX ADMIN — PRAVASTATIN SODIUM 20 MG: 20 TABLET ORAL at 20:35

## 2017-02-11 RX ADMIN — ACETAMINOPHEN 650 MG: 325 TABLET, FILM COATED ORAL at 15:28

## 2017-02-11 RX ADMIN — THERA TABS 1 TABLET: TAB at 09:10

## 2017-02-11 RX ADMIN — TRAMADOL HYDROCHLORIDE 50 MG: 50 TABLET, FILM COATED ORAL at 20:35

## 2017-02-11 RX ADMIN — ASPIRIN 81 MG: 81 TABLET, COATED ORAL at 09:11

## 2017-02-11 RX ADMIN — POTASSIUM CHLORIDE 40 MEQ: 20 TABLET, EXTENDED RELEASE ORAL at 15:23

## 2017-02-11 RX ADMIN — DOCUSATE SODIUM 100 MG: 100 CAPSULE, LIQUID FILLED ORAL at 09:11

## 2017-02-11 RX ADMIN — POTASSIUM CHLORIDE 10 MEQ: 10 TABLET, EXTENDED RELEASE ORAL at 09:11

## 2017-02-11 RX ADMIN — WARFARIN SODIUM 7.5 MG: 7.5 TABLET ORAL at 18:16

## 2017-02-11 RX ADMIN — FUROSEMIDE 20 MG: 20 TABLET ORAL at 06:12

## 2017-02-11 ASSESSMENT — PAIN SCALES - GENERAL
PAINLEVEL_OUTOF10: 5
PAINLEVEL_OUTOF10: 2
PAINLEVEL_OUTOF10: 2
PAINLEVEL_OUTOF10: 3

## 2017-02-11 ASSESSMENT — GAIT ASSESSMENTS
DEVIATION: BRADYKINETIC;DECREASED TOE OFF;DECREASED HEEL STRIKE
DISTANCE (FEET): 220
DEVIATION: BRADYKINETIC;DECREASED HEEL STRIKE;DECREASED TOE OFF
GAIT LEVEL OF ASSIST: STAND BY ASSIST
GAIT LEVEL OF ASSIST: STAND BY ASSIST
ASSISTIVE DEVICE: FRONT WHEEL WALKER
ASSISTIVE DEVICE: FRONT WHEEL WALKER

## 2017-02-11 ASSESSMENT — ENCOUNTER SYMPTOMS
BLURRED VISION: 0
COUGH: 0
FEVER: 0
DIARRHEA: 0
NERVOUS/ANXIOUS: 0
DIZZINESS: 0

## 2017-02-11 NOTE — PROGRESS NOTES
Hospital Medicine Progress Note, Adult, Complex               Author: Kurtis Damian Date & Time created: 2/11/2017  11:06 AM     Interval History:  No significant events or changes since last visit  Patient denies SOB, cough, or diarrhea  Patient slept ok last night  Continue managing fluid overload      Review of Systems:  Review of Systems   Constitutional: Negative for fever.   Eyes: Negative for blurred vision.   Respiratory: Negative for cough.    Cardiovascular: Negative for chest pain.   Gastrointestinal: Negative for diarrhea.   Musculoskeletal: Negative for joint pain.   Neurological: Negative for dizziness.   Psychiatric/Behavioral: The patient is not nervous/anxious.        Physical Exam:  Physical Exam   Constitutional: She is oriented to person, place, and time. No distress.   HENT:   Mouth/Throat: No oropharyngeal exudate.   Eyes: EOM are normal.   Neck: No JVD present. No tracheal deviation present.   Cardiovascular: Normal rate, regular rhythm, S1 normal and S2 normal.    Pulmonary/Chest: Effort normal. She has no wheezes. She has no rales.   Abdominal: Soft. Bowel sounds are normal. Hernia confirmed negative in the right inguinal area and confirmed negative in the left inguinal area.   Neurological: She is alert and oriented to person, place, and time. No sensory deficit.   Skin: Skin is warm and dry. No cyanosis.   Psychiatric: She has a normal mood and affect. Her behavior is normal.   Nursing note and vitals reviewed.      Labs:        Invalid input(s): [id number]  Recent Labs      02/11/17   0529   BNPBTYPENAT  205*     Recent Labs      02/09/17   0430  02/11/17   0529   SODIUM  132*  135   POTASSIUM  3.5*  3.6   CHLORIDE  96  100   CO2  28  28   BUN  10  13   CREATININE  0.63  0.68   MAGNESIUM   --   2.2   PHOSPHORUS   --   4.1   CALCIUM  8.4*  8.3*     Recent Labs      02/09/17   0430  02/11/17   0529   GLUCOSE  98  86     Recent Labs      02/09/17   0430  02/09/17   0645  02/10/17   0527   17   0529   RBC   --   3.38*   --    --    HEMOGLOBIN   --   10.2*   --    --    HEMATOCRIT   --   31.5*   --    --    PLATELETCT   --   585*   --    --    PROTHROMBTM  16.3*   --   18.8*  20.4*   INR  1.27*   --   1.52*  1.69*     Recent Labs      17   0645   WBC  8.2   NEUTSPOLYS  74.50*   LYMPHOCYTES  14.10*   MONOCYTES  9.60   EOSINOPHILS  1.10   BASOPHILS  0.20           Hemodynamics:  Temp (24hrs), Av.4 °C (97.6 °F), Min:36.1 °C (96.9 °F), Max:36.7 °C (98.1 °F)  Temperature: 36.6 °C (97.9 °F)  Pulse  Av.1  Min: 74  Max: 98   Blood Pressure : 103/70 mmHg     Respiratory:    Respiration: 18, Pulse Oximetry: 93 %, O2 Daily Delivery Respiratory : Silicone Nasal Cannula     Work Of Breathing / Effort: Mild  RUL Breath Sounds: Clear, RML Breath Sounds: Clear, RLL Breath Sounds: Clear;Diminished, PRAMOD Breath Sounds: Clear, LLL Breath Sounds: Clear;Diminished  Fluids:    Intake/Output Summary (Last 24 hours) at 17 1106  Last data filed at 17 0915   Gross per 24 hour   Intake    820 ml   Output      0 ml   Net    820 ml        GI/Nutrition:  Orders Placed This Encounter   Procedures   • DIET ORDER     Standing Status: Standing      Number of Occurrences: 1      Standing Expiration Date:      Order Specific Question:  Diet:     Answer:  Cardiac [6]     Medical Decision Making, by Problem:  Active Hospital Problems    Diagnosis   • S/P MVR (mitral valve repair) [Z98.890]     *  Ruptured MV Prolapse  S/P surgical repair    *  Recent B/L Pleural Effusion  2nd to recent MV repair  No lung crackles  No edema  BNP: 205 (2/10)  S/P thoracentesis (2 on the left and 1 on the right)  CXR (2/10): small B/L pleural effusions  On Lasix: 40 mg daily --> 20 mg daily () --> will d/c (last dose )  On KCL: 20 meq daily --> 10 meq daily () --> will d/c (last dose )  Will give KCL 40 meq x 1 extra dose today ()  Monitor K+: 3.5 --> 3.6 ()    *  S/P Hyponatremia  Na: 132 --> 135  (2/11)  Monitor    *  Hx SDH: hx evacuation  *  Arthritis  *  Osteoporosis  *  ? Sleep Apnea  *  Hx Knee Arthroscopy        Labs reviewed and Medications reviewed        DVT Prophylaxis: Warfarin (Coumadin)

## 2017-02-11 NOTE — IDT DISCHARGE PLANNING
CASE MANAGEMENT INITIAL ASSESSMENT    Admit Date:  2/9/2017     I met with patientto discuss role of case management / discharge planning / team conference.   Patient is a  66 y.o. female transferred from [facility].  Her admitting physician for rehab is Dr. Camacho.    Diagnosis: S/P MVR (mitral valve repair)    Co-morbidities:   Patient Active Problem List    Diagnosis Date Noted   • S/P MVR (mitral valve repair) 02/08/2017   • Hyperlipidemia 01/30/2017   • Hypertension 01/30/2017   • Cervical spondylosis 11/15/2016   • Neck pain 10/24/2016   • Falls 07/19/2016   • Left knee pain 07/19/2016   • Loss of balance 06/28/2016   • Dizziness 06/28/2016   • Right wrist pain 06/28/2016   • Plantar fasciitis of right foot 06/06/2016   • Peroneal tendinitis of right lower extremity 06/06/2016   • Lumbar spinal stenosis 05/17/2016   • Hamstring tear 03/28/2016   • Primary osteoarthritis of both knees 01/19/2016   • Lumbar spondylosis 12/10/2015   • Bilateral low back pain without sciatica 12/01/2015   • Right hip pain 12/01/2015     Prior Living Situation:  Housing / Facility: 2 Story House  Lives with - Patient's Self Care Capacity: Spouse    Prior Level of Function:  Medication Management: Independent  Finances: Independent  Home Management: Independent  Shopping: Independent  Prior Level Of Mobility: Independent Without Device in Community, Independent Without Device in Home  Driving / Transportation: Driving Independent    Support Systems:  Primary   I Eliud De Anda at [phone]  Other support systems:        Previous Services Utilized:   Equipment Owned: Oxygen  Prior Services: Home-Independent    Other Information:  Occupation (Pre-Hospital Vocational): Retired Due To Age  Pharmacy: [facility]     Additional Case Management Questions:  Have you ever received case management services for yourself or a family member? yes    Do you feel you have an an understanding of of what services  provide?  yes    Do you  have any additional questions regarding case management?    No    Patient / Family Goal:  Patient / Family Goal: Patient will return home with her .  She is agreeable to home health or outpatient therapy.  I will follow to assist.    Plan:  1. Continue to follow patient through hospitalization and provide discharge planning in collaboration with patient, family, physicians and ancillary services.     2. Utilize community resources to ensure a safe discharge.

## 2017-02-11 NOTE — CONSULTS
DATE OF SERVICE:  02/10/2017    REFERRING PHYSICIAN:  Dr. Camacho.    REASON FOR CONSULTATION:  Recent mitral valve repair with fluid overload.    HISTORY OF PRESENT ILLNESS:  This is a 66-year-old female who had a known   history of mitral valve prolapse and was monitored by her cardiologist, Dr. Landin with a recent checkup about 3 months ago.  The patient, however, was   recently alpine skiing and then on the following day, she was not feeling   well, had some shortness of breath and a cough with hypoxia that seemed to be   worsening.  She did go to the hospital for further evaluation.  The patient   was admitted and did have diagnostic workup done.  The patient was found to be   in acute respiratory failure and had to be intubated.  She was also   hypotensive.  The patient did have an echocardiogram done which showed a   ruptured mitral valve leaflet with severe mitral regurgitation causing diffuse   acute cardiogenic pulmonary edema and cardiogenic shock along with elevated   troponin due to demand ischemia.  The patient was taken to the cath lab and   was found to have normal coronary arteries, subsequently cardiovascular   surgery was consulted and Dr. Schmitt did take the patient to the OR on   January 31st for a mitral valve repair.  The patient tolerated the procedure   well; however, postoperatively, she was noted to have significant pleural   effusions and she did have 3 thoracentesis done with 2 on the left and 1 on   the right.  The patient was stabilized, did well and was given physical   therapy while in the hospital; however, because of her weakness and debility,   rehab was consulted, evaluated the patient, and she was deemed a good rehab   candidate.  The patient was transferred over to the rehab facility on   02/09/2017.    At the rehabilitation facility because the patient did have open heart surgery   with mitral valve repair and she is on diuretics, there was a need for   evaluation and  monitoring and because of this, internal medicine was consulted   to help evaluate and manage with the patient.  Currently, the patient denies   any fever, chills, nausea, vomiting, headaches, blurry vision, or chest pain.    REVIEW OF SYSTEMS:  All review of systems are negative per AMA and CMS   criteria, except for that stated in the HPI.    PAST MEDICAL HISTORY:  1.  History of mitral valve prolapse with ruptured valve, status post surgical   repair.  2.  Recent bilateral pleural effusion status post thoracentesis with 2 on the   left and 1 on the right.  3.  History of subdural hematoma.  4.  Arthritis.  5.  Osteoporosis.  6.  What appears to be sleep apnea.    PAST SURGICAL HISTORY:  1.  Status post recent mitral valve repair.  2.  History of knee arthroscopy.  3.  History of subdural hematoma evacuation.    ALLERGIES:  CODEINE AND LATEX.    MEDICATIONS:  The patient is currently on the following medications at the   rehab facility.  1.  Tylenol 650 mg p.o. q. 4 hours p.r.n.  2.  Artificial tears p.r.n.  3.  Aspirin 81 mg p.o. daily.  4.  Flexeril 10 mg p.o. p.r.n.  5.  Flexeril 10 mg p.o. nightly.  6.  Colace 100 mg p.o. daily.  7.  Senokot 1 tablet p.o. nightly.  8.  Lactulose 30 mL p.o. p.r.n.  9.  Dulcolax 10 mg suppository p.r.n.  10.  Lasix 40 mg p.o. daily.  11.  Norco 5/325 mg 1-2 tablets p.o. q. 4 hours p.r.n.  12.  Norco 7.5/325 mg 1 tablet p.o. q. 4 hours p.r.n.  13.  DuoNeb inhaler p.r.n.  14.  Maalox 30 mL p.o. p.r.n.  15.  Coumadin dosing per pharmacy.  16.  Melatonin 3 mg p.o. nightly.  17.  Multivitamin 1 tablet p.o. daily.  18.  Zofran 4 mg q. 4 hours p.r.n.  19.  Calcium/vitamin D 250/125 mg 1 tablet p.o. daily.  20.  K-Dur 10 mEq p.o. daily.  21.  Pravachol 20 mg p.o. daily.  22.  Chaves nasal spray p.r.n.  23.  Ultram 50 mg p.o. q. 6 hours p.r.n.  24.  Trazodone 50 mg p.o. p.r.n. nightly p.r.n. insomnia.    SOCIAL HISTORY:  The patient denies any tobacco use and drinks occasional    alcohol.    FAMILY HISTORY:  Reviewed and is not pertinent to this consultation.    PHYSICAL EXAMINATION:  VITAL SIGNS:  Temperature 97.5, blood pressure 113/73, heart rate 74, and   respiratory rate 20.  GENERAL APPEARANCE:  The patient is lying in bed, in no acute distress.  HEENT:  Head examination was normocephalic and atraumatic.  Pupils were equal   and reactive to light and accommodation.  Oral mucosa is pink and moist.  NECK:  Soft and supple.  There is no JVD.  There is no carotid bruits noted.  LUNGS:  Clear to auscultation bilaterally.  There are no wheezes, rales, or   rhonchi.  HEART:  Regular rate and rhythm, normal S1, S2.  There are no murmurs, rubs,   or gallops appreciated.  ABDOMEN:  Soft, nontender, and nondistended.  Bowel sounds present in all 4   quadrants.  EXTREMITIES:  No clubbing, cyanosis or edema.  Pulses were intact.  NEUROLOGIC:  Cranial nerves II through XII were grossly intact.    LABORATORY DATA:  WBC 8.2, hemoglobin 10.2, and hematocrit 31.5.  Sodium 132,   potassium 3.5, bicarb 28, BUN 10, creatinine 0.63.    ASSESSMENT:  1.  Status post mitral valve repair with fluid overload.  2.  Hyponatremia with a sodium of 132.  3.  Hypokalemia with a potassium of 3.5.  4.  For the other assessments, please see the past medical history above.    PLAN:  At this time, the patient has been admitted to the [facility]   [facility] and is undergoing their comprehensive rehab therapy program.    For the patient's fluid overload, this is secondary to her recent mitral valve   repair.  The patient is currently on Lasix 40 mg daily.  At this time, she   does not have any lower extremity edema and I do not hear any lung crackles.    However, the breath sounds at the bases were little bit diminished.  At this   time, I will go ahead and get a repeat chest x-ray for further evaluation as   well as a BNP.  I will go ahead and decrease her Lasix to 20 mg daily.  I will   also add on some potassium  supplements.  We will continue to monitor the   patient while in the hospital.  If her chest x-ray looks good, we will   consider discontinuing the Lasix in a few days.    For the patient's hyponatremia, her sodium is mildly low at 132 and likely   secondary to the diuretics and for now, we will continue to monitor her while   in the hospital.    For the patient's hypokalemia, her potassium is mildly low at 3.5 and this is   secondary to her Lasix.  We will go ahead and add on some potassium   supplements of 20 mEq daily.    This case was discussed with the physiatrist.    Thank you for the consultation.  We will follow the patient with you.       ____________________________________     MD ALICIA HOLT / JANET    DD:  02/10/2017 15:47:02  DT:  02/10/2017 21:35:31    D#:  [id number]  Job#:  [id number]

## 2017-02-11 NOTE — FLOWSHEET NOTE
On O2 at 1L, sat=94%. Continue to wean during the day, and monitor with therapies. Home use is 2L with sleep.     02/11/17 0930   Events/Summary/Plan   Events/Summary/Plan I.S./cough   Non-Invasive Resp Device Site Inspection Completed Intact   Location Nasal cannula   Interdisciplinary Plan of Care-Goals (Indications)   Hyperinflation Protocol Indications Physical Exam   Interdisciplinary Plan of Care-Outcomes    Hyperinflation Protocol Goals/Outcome Stable Vital Capacity x24 hrs and Patient Understands / uses I.S.   Education   Education Yes - Pt. / Family has been Instructed in use of Respiratory Equipment   Incentive Spirometry Group   Incentive Spirometry Instruction Yes   Breathing Exercises Yes   Incentive Spirometer Volume 1250 mL  (good effort and understanding)   Incentive Spirometer Date Last Changed 02/09/17   Incentive Spirometer Next Change Date (Q 30 Days) 03/09/17   Respiratory WDL   Respiratory (WDL) X   Chest Exam   Respiration 18   Pulse 83   Breath Sounds   RUL Breath Sounds Clear   RML Breath Sounds Clear   RLL Breath Sounds Clear;Diminished   PRAMOD Breath Sounds Clear   LLL Breath Sounds Clear;Diminished   Secretions   Cough Strong;Non Productive   How Sputum Obtained Cough on Request   Oximetry   #Pulse Oximetry (Single Determination) Yes   Oxygen   Home O2 Use Prior To Admission? Yes   Home O2 LPM Flow 2 LPM   Home O2 Delivery Method Silicone Nasal Cannula   Home O2 Frequency of Use At Sleep   Pulse Oximetry 93 %   O2 (LPM) 1  (weaned to 1L during the day. Home use 2L with sleep.)   O2 Daily Delivery Respiratory  Silicone Nasal Cannula

## 2017-02-11 NOTE — PROGRESS NOTES
Inpatient Anticoagulation Service Note    Date: 2017  Reason for Anticoagulation: Bioprosthetic Valve Replacement   Target INR: 2.0 to 3.0    INR from last 7 days     Date/Time INR Value    17 0529 (!)1.69    02/10/17 0527 (!)1.52        Dose from last 7 days     Date/Time Dose (mg)    17 1159 7.5    02/10/17 0527 7.5    17 1115 7.5    17 1100 --        Significant Interactions: Aspirin, Statin       Plan:  Coumadin 7.5 mg PO tonight for INR 1.69     Pharmacist suggested discharge dosin.5 mg daily     Brittney BakerD

## 2017-02-11 NOTE — PROGRESS NOTES
Assumed patient care.  Received report from Dahiana HAYNES.  Patient up in chair, A&O x4, no complaints of pain.  Sternal incision BIANCA, well approximated.

## 2017-02-11 NOTE — PROGRESS NOTES
"Rehab Progress Note     Chief complaint: Poor sleep, follow-up mitral valve prolapse  Date of Service: February 10, 2017    Interval Events (Subjective)  Patient seen and examined. No acute events overnight. Reports did not sleep well. Would like tramadol which she had previously. Also has a sore throat for which she would like cough drops. Has had intermittent electric shocklike pain to the right dorsal thumb since her surgery. Does have a history of cervical spine arthritis. Reports better today as compared to yesterday.    Objective:  VITAL SIGNS: /70 mmHg  Pulse 87  Temp(Src) 36.1 °C (96.9 °F)  Resp 20  Ht 1.702 m (5' 7\")  Wt 56.4 kg (124 lb 5.4 oz)  BMI 19.47 kg/m2  SpO2 95%  LMP  (LMP Unknown)  Gen: alert, no apparent distress  CV: regular rate and rhythm, no murmurs, no peripheral edema, sternal incision c/d/i  Resp: clear to ascultation bilaterally, normal respiratory effort  GI: soft, non-tender abdomen, bowel sounds present  Msk: no joint swelling or tenderness      Recent Results (from the past 72 hour(s))   CBC with Differential    Collection Time: 02/08/17  2:15 AM   Result Value Ref Range    WBC 9.2 4.8 - 10.8 K/uL    RBC 3.09 (L) 4.20 - 5.40 M/uL    Hemoglobin 9.5 (L) 12.0 - 16.0 g/dL    Hematocrit 28.8 (L) 37.0 - 47.0 %    MCV 93.2 81.4 - 97.8 fL    MCH 30.7 27.0 - 33.0 pg    MCHC 33.0 (L) 33.6 - 35.0 g/dL    RDW 46.8 35.9 - 50.0 fL    Platelet Count 494 (H) 164 - 446 K/uL    MPV 10.4 9.0 - 12.9 fL    Neutrophils-Polys 73.30 (H) 44.00 - 72.00 %    Lymphocytes 15.70 (L) 22.00 - 41.00 %    Monocytes 9.10 0.00 - 13.40 %    Eosinophils 1.00 0.00 - 6.90 %    Basophils 0.10 0.00 - 1.80 %    Immature Granulocytes 0.80 0.00 - 0.90 %    Nucleated RBC 0.00 /100 WBC    Neutrophils (Absolute) 6.71 2.00 - 7.15 K/uL    Lymphs (Absolute) 1.44 1.00 - 4.80 K/uL    Monos (Absolute) 0.83 0.00 - 0.85 K/uL    Eos (Absolute) 0.09 0.00 - 0.51 K/uL    Baso (Absolute) 0.01 0.00 - 0.12 K/uL    Immature " Granulocytes (abs) 0.07 0.00 - 0.11 K/uL    NRBC (Absolute) 0.00 K/uL   PROTHROMBIN TIME    Collection Time: 17  2:15 AM   Result Value Ref Range    PT 18.1 (H) 12.0 - 14.6 sec    INR 1.45 (H) 0.87 - 1.13   BASIC METABOLIC PANEL    Collection Time: 17  2:15 AM   Result Value Ref Range    Sodium 132 (L) 135 - 145 mmol/L    Potassium 3.6 3.6 - 5.5 mmol/L    Chloride 94 (L) 96 - 112 mmol/L    Co2 30 20 - 33 mmol/L    Glucose 97 65 - 99 mg/dL    Bun 15 8 - 22 mg/dL    Creatinine 0.77 0.50 - 1.40 mg/dL    Calcium 8.3 (L) 8.5 - 10.5 mg/dL    Anion Gap 8.0 0.0 - 11.9   ESTIMATED GFR    Collection Time: 17  2:15 AM   Result Value Ref Range    GFR If African American >60 >60 mL/min/1.73 m 2    GFR If Non African American >60 >60 mL/min/1.73 m 2   EKG    Collection Time: 17  2:36 PM   Result Value Ref Range    Report       [facility]    Test Date:  2017  Pt Name:    LILY ARGUETA             Department: 161  MRN:        [id number]                      Room:       [facility]  Gender:     F                            Technician: [facility]  :        [id number]                   Requested By:JEREMY M GONDA  Order #:    [id number]                    Reading MD: Matias Monreal MD    Measurements  Intervals                                Axis  Rate:       78                           P:          65  VA:         176                          QRS:        -2  QRSD:       94                           T:          50  QT:         400  QTc:        456    Interpretive Statements  SINUS RHYTHM  LATE PRECORDIAL R/S TRANSITION  BORDERLINE T ABNORMALITIES, ANTERIOR LEADS  Compared to ECG 2017 06:57:58  Ventricular premature complex(es) no longer present  T-wave abnormality still present    Electronically Signed On 2017 15:00:41 PST by Matias Monreal MD     PROTHROMBIN TIME    Collection Time: 17  4:30 AM   Result Value Ref Range    PT 16.3 (H) 12.0 - 14.6 sec    INR 1.27 (H) 0.87 - 1.13   BASIC  METABOLIC PANEL    Collection Time: 02/09/17  4:30 AM   Result Value Ref Range    Sodium 132 (L) 135 - 145 mmol/L    Potassium 3.5 (L) 3.6 - 5.5 mmol/L    Chloride 96 96 - 112 mmol/L    Co2 28 20 - 33 mmol/L    Glucose 98 65 - 99 mg/dL    Bun 10 8 - 22 mg/dL    Creatinine 0.63 0.50 - 1.40 mg/dL    Calcium 8.4 (L) 8.5 - 10.5 mg/dL    Anion Gap 8.0 0.0 - 11.9   ESTIMATED GFR    Collection Time: 02/09/17  4:30 AM   Result Value Ref Range    GFR If African American >60 >60 mL/min/1.73 m 2    GFR If Non African American >60 >60 mL/min/1.73 m 2   CBC WITH DIFFERENTIAL    Collection Time: 02/09/17  6:45 AM   Result Value Ref Range    WBC 8.2 4.8 - 10.8 K/uL    RBC 3.38 (L) 4.20 - 5.40 M/uL    Hemoglobin 10.2 (L) 12.0 - 16.0 g/dL    Hematocrit 31.5 (L) 37.0 - 47.0 %    MCV 93.2 81.4 - 97.8 fL    MCH 30.2 27.0 - 33.0 pg    MCHC 32.4 (L) 33.6 - 35.0 g/dL    RDW 46.5 35.9 - 50.0 fL    Platelet Count 585 (H) 164 - 446 K/uL    MPV 9.4 9.0 - 12.9 fL    Neutrophils-Polys 74.50 (H) 44.00 - 72.00 %    Lymphocytes 14.10 (L) 22.00 - 41.00 %    Monocytes 9.60 0.00 - 13.40 %    Eosinophils 1.10 0.00 - 6.90 %    Basophils 0.20 0.00 - 1.80 %    Immature Granulocytes 0.50 0.00 - 0.90 %    Nucleated RBC 0.00 /100 WBC    Neutrophils (Absolute) 6.10 2.00 - 7.15 K/uL    Lymphs (Absolute) 1.16 1.00 - 4.80 K/uL    Monos (Absolute) 0.79 0.00 - 0.85 K/uL    Eos (Absolute) 0.09 0.00 - 0.51 K/uL    Baso (Absolute) 0.02 0.00 - 0.12 K/uL    Immature Granulocytes (abs) 0.04 0.00 - 0.11 K/uL    NRBC (Absolute) 0.00 K/uL   PROTHROMBIN TIME    Collection Time: 02/10/17  5:27 AM   Result Value Ref Range    PT 18.8 (H) 12.0 - 14.6 sec    INR 1.52 (H) 0.87 - 1.13       Current Facility-Administered Medications   Medication Frequency   • melatonin tablet 3 mg QHS   • tramadol (ULTRAM) 50 MG tablet 50 mg Q6HRS PRN   • potassium chloride ER (KLOR-CON) tablet 10 mEq DAILY   • [START ON 2/11/2017] furosemide (LASIX) tablet 20 mg Q DAY   • acetaminophen (TYLENOL)  tablet 650 mg Q4HRS PRN    Or   • acetaminophen (TYLENOL) suppository 650 mg Q4HRS PRN   • aspirin EC (ECOTRIN) tablet 81 mg DAILY   • docusate sodium (COLACE) capsule 100 mg QAM    And   • senna-docusate (PERICOLACE or SENOKOT S) 8.6-50 MG per tablet 1 Tab Nightly    And   • senna-docusate (PERICOLACE or SENOKOT S) 8.6-50 MG per tablet 1 Tab Q24HRS PRN    And   • lactulose 20 GM/30ML solution 30 mL Q24HRS PRN    And   • bisacodyl (DULCOLAX) suppository 10 mg Q24HRS PRN    And   • fleet enema 133 mL Once PRN   • mag hydrox-al hydrox-simeth (MAALOX PLUS ES or MYLANTA DS) suspension 30 mL Q4HRS PRN   • MD ALERT... warfarin (COUMADIN) per pharmacy protocol PRN   • ipratropium-albuterol (DUONEB) nebulizer solution 3 mL Q2HRS PRN   • artificial tears 1.4 % ophthalmic solution 1 Drop PRN   • multivitamin (THERAGRAN) tablet 1 Tab DAILY   • oyster shell calcium/vitamin D 250-125 MG-UNIT tablet 1 Tab QDAY with Breakfast   • pravastatin (PRAVACHOL) tablet 20 mg Q EVENING   • sodium chloride (OCEAN) 0.65 % nasal spray 2 Spray PRN   • hydrocodone-acetaminophen (NORCO) 7.5-325 MG per tablet 1 Tab Q4HRS PRN   • hydrocodone-acetaminophen (NORCO) 5-325 MG per tablet 1-2 Tab Q4HRS PRN   • ondansetron (ZOFRAN ODT) dispertab 4 mg 4X/DAY PRN    Or   • ondansetron (ZOFRAN) syringe/vial injection 4 mg 4X/DAY PRN   • cyclobenzaprine (FLEXERIL) tablet 10 mg QHS   • cyclobenzaprine (FLEXERIL) tablet 10 mg BID PRN   • trazodone (DESYREL) tablet 50 mg QHS PRN       Orders Placed This Encounter   Procedures   • DIET ORDER     Standing Status: Standing      Number of Occurrences: 1      Standing Expiration Date:      Order Specific Question:  Diet:     Answer:  Cardiac [6]       Assessment:  Active Hospital Problems    Diagnosis   • S/P MVR (mitral valve repair)       Medical Decision Making and Plan:    Labs reviewed. Medications reviewed. Appreciate the assistance of the hospitalist.    Mitral valve prolapse status post mitral valve repair  -continue sternal precautions.  Aspirin and Coumadin.    Respiratory insufficiency, nocturnal hypoxia - 2 oxygen. Respiratory therapist to see patient. Titrate oxygen during the day.    Pleural effusions status post bilateral thoracentesis -continue incentive spirometry. Continue Lasix. Continue inhalers as needed.    Hypertension - is on losartan at home. Currently not on blood pressure medications, other than Lasix. Continue to monitor.    Anemia - normocytic. Likely post operative. Continue to monitor.    Hyponatremia - mild. Continue to monitor.    Hypokalemia - mild. Start potassium chloride. Continue to monitor.    Hyperlipidemia - continue statin.    Right thumb pain - neuropathic in nature. Differential includes cervical stenosis, brachial plexus injury, or less likely radial nerve injury. Continue to monitor. May need nerve pain medicines and/or outpatient nerve conduction study.    GI prophylaxis - will discontinue Pepcid as this has been known to cause hyponatremia. Patient not complaining of heartburn. Continue to monitor.     DVT prophylaxis - Coumadin.    Total time:  >35 minutes.  I spent greater than 50% of the time for patient care and coordination on this date, including unit/floor time, and face-to-face time with the patient as per assessment and plan above.    Ashley Camacho M.D.

## 2017-02-11 NOTE — PROGRESS NOTES
Report received. Assumed pt care. Pt is currently resting in bed, talking on her cell phone. No signs of distress noted. Will return to discuss pt's plan of care and answer pt's questions when pt has finished her phone call. No non-verbal signs or symptoms of pain evident at this time. Bed alarm on. Bed in lowest position; appropriate siderails up. Non-skid socks in place. Call light w/in reach. Personal possessions w/in reach. Will continue to monitor; will monitor and assess cardiovascular and integumentary status frequently throughout shift.

## 2017-02-11 NOTE — CARE PLAN
"Problem: Knowledge Deficit  Goal: Knowledge of disease process/condition, treatment plan, diagnostic tests, and medications will improve  Outcome: PROGRESSING AS EXPECTED  Pt encouraged to ask any questions and express any concerns about the plan of care. All pt's questions regarding the plan of care, medications, and procedures answered as needed throughout the shift. Purpose and importance of each medication reviewed w/pt during med pass; pt verbalizes understanding of this teaching. During nighttime med pass, pt asked about the results of her chest x-ray that was performed during day shift today, and asks if this will impact whether or not she will, \"need to keep taking lasix.\" Reviewed results/provider's impressions of x-ray w/pt; pt acknowledges. Dr. Camacho's note from approximately 1813 today states to, \"Continue Lasix.\" Will continue to monitor, answer pt questions, and provide pt education as needed throughout the shift.    Problem: Respiratory:  Goal: Respiratory status will improve  Outcome: PROGRESSING AS EXPECTED  Pt's respiratory status assessed at start of shift and frequently throughout shift. Pt's work of breathing assessed; no signs of respiratory distress present at this time. Pt's ordered continuous oxygen, at 2 L/min via nasal cannula, in place at start of shift. Will continue to monitor and assess pt's respiratory status throughout the shift.    Problem: Pain Management  Goal: Pain level will decrease to patient’s comfort goal  Outcome: PROGRESSING AS EXPECTED  Pt's pain assessed at appropriate intervals and frequently throughout shift. Pt offered pharmacological and non-pharmacological measures for pain relief as needed. Pt is receiving scheduled pain medications, and has PRN pain medications available. Thus far this shift, pt has had one dose of PRN pain medication, as follows:    2043: Tramadol 50 mg. Pt refused her scheduled flexeril 10 mg tonight, requesting a dose of PRN tramadol " instead.    Will continue to monitor and assess pt's pain level, and provide pain relief measures as needed, throughout the shift.

## 2017-02-12 LAB
INR PPP: 1.78 (ref 0.87–1.13)
PROTHROMBIN TIME: 21.3 SEC (ref 12–14.6)

## 2017-02-12 PROCEDURE — 36415 COLL VENOUS BLD VENIPUNCTURE: CPT

## 2017-02-12 PROCEDURE — 85610 PROTHROMBIN TIME: CPT

## 2017-02-12 PROCEDURE — A9270 NON-COVERED ITEM OR SERVICE: HCPCS | Performed by: PHYSICAL MEDICINE & REHABILITATION

## 2017-02-12 PROCEDURE — 94760 N-INVAS EAR/PLS OXIMETRY 1: CPT

## 2017-02-12 PROCEDURE — 770010 HCHG ROOM/CARE - REHAB SEMI PRIVAT*

## 2017-02-12 PROCEDURE — 700102 HCHG RX REV CODE 250 W/ 637 OVERRIDE(OP): Performed by: PHYSICAL MEDICINE & REHABILITATION

## 2017-02-12 PROCEDURE — 700112 HCHG RX REV CODE 229: Performed by: PHYSICAL MEDICINE & REHABILITATION

## 2017-02-12 PROCEDURE — 99232 SBSQ HOSP IP/OBS MODERATE 35: CPT | Performed by: HOSPITALIST

## 2017-02-12 RX ORDER — WARFARIN SODIUM 4 MG/1
8 TABLET ORAL
Status: COMPLETED | OUTPATIENT
Start: 2017-02-12 | End: 2017-02-12

## 2017-02-12 RX ADMIN — THERA TABS 1 TABLET: TAB at 09:07

## 2017-02-12 RX ADMIN — CALCIUM CARBONATE-CHOLECALCIFEROL TAB 250 MG-125 UNIT 1 TABLET: 250-125 TAB at 09:08

## 2017-02-12 RX ADMIN — STANDARDIZED SENNA CONCENTRATE AND DOCUSATE SODIUM 1 TABLET: 8.6; 5 TABLET, FILM COATED ORAL at 20:52

## 2017-02-12 RX ADMIN — ASPIRIN 81 MG: 81 TABLET, COATED ORAL at 09:08

## 2017-02-12 RX ADMIN — Medication 3 MG: at 20:52

## 2017-02-12 RX ADMIN — TRAMADOL HYDROCHLORIDE 50 MG: 50 TABLET, FILM COATED ORAL at 20:52

## 2017-02-12 RX ADMIN — PRAVASTATIN SODIUM 20 MG: 20 TABLET ORAL at 20:52

## 2017-02-12 RX ADMIN — DOCUSATE SODIUM 100 MG: 100 CAPSULE, LIQUID FILLED ORAL at 09:08

## 2017-02-12 RX ADMIN — WARFARIN SODIUM 8 MG: 4 TABLET ORAL at 17:42

## 2017-02-12 RX ADMIN — TRAZODONE HYDROCHLORIDE 50 MG: 50 TABLET ORAL at 01:14

## 2017-02-12 ASSESSMENT — ENCOUNTER SYMPTOMS
NERVOUS/ANXIOUS: 0
ABDOMINAL PAIN: 0
VOMITING: 0
SHORTNESS OF BREATH: 0
NAUSEA: 0
DIARRHEA: 0
CHILLS: 0
FEVER: 0

## 2017-02-12 ASSESSMENT — PAIN SCALES - GENERAL
PAINLEVEL_OUTOF10: 4
PAINLEVEL_OUTOF10: 0
PAINLEVEL_OUTOF10: 4
PAINLEVEL_OUTOF10: 0
PAINLEVEL_OUTOF10: 2
PAINLEVEL_OUTOF10: 2

## 2017-02-12 NOTE — PROGRESS NOTES
Report received. Assumed pt care. Pt is currently resting in bed. No signs of distress noted. Pt's plan of care discussed w/pt; pt's questions answered at this time. Pt reports pain at this time, but states that it is at a tolerable level. Bed alarm on. Bed in lowest position; appropriate siderails up. Non-skid socks in place. Call light w/in reach. Personal possessions w/in reach. Will continue to monitor; will monitor and assess pain level and integumentary status frequently throughout shift.

## 2017-02-12 NOTE — FLOWSHEET NOTE
02/12/17 1015   Events/Summary/Plan   Non-Invasive Resp Device Site Inspection Completed Intact   Location nasal canula   Chest Exam   Respiration 18   Pulse 85   Oximetry   #Pulse Oximetry (Single Determination) Yes   Oxygen   Home O2 Use Prior To Admission? Yes   Home O2 LPM Flow 2 LPM   Home O2 Delivery Method Nasal Cannula   Home O2 Frequency of Use At Sleep   Pulse Oximetry 94 %   O2 (LPM) 1   O2 Daily Delivery Respiratory  Nasal Cannula
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
## 2017-02-12 NOTE — PROGRESS NOTES
Inpatient Anticoagulation Service Note    Date: 2017  Reason for Anticoagulation: Bioprosthetic Valve Replacement      Target INR: 2.0 to 3.0    INR from last 7 days     Date/Time INR Value    17 0532 (!)1.78    17 0529 (!)1.69    02/10/17 0527 (!)1.52        Dose from last 7 days     Date/Time Dose (mg)    17 1146 8    17 1159 7.5    02/10/17 0527 7.5    17 1115 7.5    17 1100 --        Significant Interactions: Aspirin, Statin       Plan:  Coumadin 8 mg PO tonight for INR 1.78     Pharmacist suggested discharge dosin.5 mg daily, though may require frequent monitoring     Brittney BakerD

## 2017-02-12 NOTE — CARE PLAN
Problem: Safety  Goal: Will remain free from injury  Outcome: PROGRESSING AS EXPECTED  Fall precautions in place. Treaded socks on pt. Appropriate signs on doorway. Appropriate bedrails up/in place for pt safety at this time(see Doc Flowsheet). Bed in lowest position and locked. Call light, phone, and personal possessions within reach. Pt requires Contact Guard-level of assistance. Thus far this shift, pt has demonstrated good safety awareness, and no impulsivity. Importance of utilizing call light to request assistance reviewed w/pt at start of shift and frequently throughout shift; pt verbalizes understanding of this teaching. Pt utilizes call light appropriately. Bed alarm on. Hourly rounding completed throughout shift.     Problem: Respiratory:  Goal: Respiratory status will improve  Outcome: PROGRESSING AS EXPECTED  Pt's respiratory status assessed at start of shift and frequently throughout shift. Pt's work of breathing assessed; no signs of respiratory distress present at this time. Pt's ordered continuous oxygen, at 2 L/min via nasal cannula, in place at start of shift. Will continue to monitor and assess pt's respiratory status throughout the shift.    Problem: Pain Management  Goal: Pain level will decrease to patient’s comfort goal  Outcome: PROGRESSING AS EXPECTED  Pt's pain assessed at appropriate intervals and frequently throughout shift. Pt offered pharmacological and non-pharmacological measures for pain relief as needed. Pt is receiving scheduled pain medications, and has PRN pain medications available. Thus far this shift, pt has had one dose of PRN pain medication, as follows:    2035: Tramadol 50 mg. Pt again refused her scheduled flexeril 10 mg tonight, stating that the first dose of tramadol given at bedtime last night both relieved her pain effectively and helped her sleep very well.    Will continue to monitor and assess pt's pain level, and provide pain relief measures as needed, throughout  "the shift.    Problem: Psychosocial needs  Goal: Anxiety reduction  Outcome: PROGRESSING AS EXPECTED  During 0000 rounds, pt was found awake in bed. When asked if she was feeling okay, pt stated that she was concerned that her  was not answering his cell phone. Pt stated that, following waking up to urinate at approximately 2320, she attempted to call her 's cell phone multiple times, only to have it go to voicemail each time. Pt states that she is worried because her  stated that he would, \"check in\" with her when he returned from visiting her at the hospital to their home in [place]. Pt states that her  has accidentally left his phone in the car before, or may have turned it off and not received her phone calls, but that she was still worried. After speaking with pt for approximately 10-15 minutes, pt stated that she felt some relief from, \"having verbalized\" her concerns, and that she wanted to try to get some sleep.     At approximately 0114, pt stated that while she had relaxed since speaking with this RN earlier, she was still having difficulty sleeping, and would like, \"something to help her sleep.\" PRN trazodone 50 mg administered at this time per pt request. Pt cannot remember if she has taken this medication before; advised pt to notify staff if she experiences any symptoms that could be indicative of an adverse reaction to the medication(itchiness, difficulty breathing, etc.), which pt acknowledges. Will continue to monitor.        "

## 2017-02-12 NOTE — PROGRESS NOTES
Received report; assumed pt care. Pt calm and stable, no s/s of distress. Pt taking shower, safety precautions in place. Will continue to monitor.

## 2017-02-13 LAB
INR PPP: 1.81 (ref 0.87–1.13)
PROTHROMBIN TIME: 21.5 SEC (ref 12–14.6)

## 2017-02-13 PROCEDURE — 99232 SBSQ HOSP IP/OBS MODERATE 35: CPT | Performed by: PHYSICAL MEDICINE & REHABILITATION

## 2017-02-13 PROCEDURE — 94760 N-INVAS EAR/PLS OXIMETRY 1: CPT

## 2017-02-13 PROCEDURE — A9270 NON-COVERED ITEM OR SERVICE: HCPCS | Performed by: PHYSICAL MEDICINE & REHABILITATION

## 2017-02-13 PROCEDURE — 97535 SELF CARE MNGMENT TRAINING: CPT

## 2017-02-13 PROCEDURE — 97110 THERAPEUTIC EXERCISES: CPT

## 2017-02-13 PROCEDURE — 97116 GAIT TRAINING THERAPY: CPT

## 2017-02-13 PROCEDURE — 97530 THERAPEUTIC ACTIVITIES: CPT

## 2017-02-13 PROCEDURE — 97112 NEUROMUSCULAR REEDUCATION: CPT

## 2017-02-13 PROCEDURE — 85610 PROTHROMBIN TIME: CPT

## 2017-02-13 PROCEDURE — 770010 HCHG ROOM/CARE - REHAB SEMI PRIVAT*

## 2017-02-13 PROCEDURE — 97537 COMMUNITY/WORK REINTEGRATION: CPT

## 2017-02-13 PROCEDURE — 99232 SBSQ HOSP IP/OBS MODERATE 35: CPT | Performed by: HOSPITALIST

## 2017-02-13 PROCEDURE — 700112 HCHG RX REV CODE 229: Performed by: PHYSICAL MEDICINE & REHABILITATION

## 2017-02-13 PROCEDURE — 36415 COLL VENOUS BLD VENIPUNCTURE: CPT

## 2017-02-13 PROCEDURE — 700102 HCHG RX REV CODE 250 W/ 637 OVERRIDE(OP): Performed by: PHYSICAL MEDICINE & REHABILITATION

## 2017-02-13 RX ORDER — WARFARIN SODIUM 4 MG/1
8 TABLET ORAL
Status: COMPLETED | OUTPATIENT
Start: 2017-02-13 | End: 2017-02-13

## 2017-02-13 RX ADMIN — CALCIUM CARBONATE-CHOLECALCIFEROL TAB 250 MG-125 UNIT 1 TABLET: 250-125 TAB at 08:35

## 2017-02-13 RX ADMIN — CYCLOBENZAPRINE HYDROCHLORIDE 10 MG: 10 TABLET, FILM COATED ORAL at 20:10

## 2017-02-13 RX ADMIN — ASPIRIN 81 MG: 81 TABLET, COATED ORAL at 08:35

## 2017-02-13 RX ADMIN — PRAVASTATIN SODIUM 20 MG: 20 TABLET ORAL at 20:10

## 2017-02-13 RX ADMIN — THERA TABS 1 TABLET: TAB at 08:36

## 2017-02-13 RX ADMIN — DOCUSATE SODIUM 100 MG: 100 CAPSULE, LIQUID FILLED ORAL at 08:35

## 2017-02-13 RX ADMIN — WARFARIN SODIUM 8 MG: 4 TABLET ORAL at 18:13

## 2017-02-13 RX ADMIN — Medication 3 MG: at 20:10

## 2017-02-13 RX ADMIN — TRAMADOL HYDROCHLORIDE 50 MG: 50 TABLET, FILM COATED ORAL at 20:11

## 2017-02-13 RX ADMIN — STANDARDIZED SENNA CONCENTRATE AND DOCUSATE SODIUM 1 TABLET: 8.6; 5 TABLET, FILM COATED ORAL at 20:10

## 2017-02-13 ASSESSMENT — GAIT ASSESSMENTS
DEVIATION: BRADYKINETIC
GAIT LEVEL OF ASSIST: STAND BY ASSIST
DISTANCE (FEET): 1000
DISTANCE (FEET): 700
ASSISTIVE DEVICE: FRONT WHEEL WALKER
GAIT LEVEL OF ASSIST: SUPERVISED

## 2017-02-13 ASSESSMENT — PAIN SCALES - GENERAL
PAINLEVEL_OUTOF10: 2
PAINLEVEL_OUTOF10: 0
PAINLEVEL_OUTOF10: 5
PAINLEVEL_OUTOF10: 3
PAINLEVEL_OUTOF10: 1

## 2017-02-13 ASSESSMENT — BALANCE ASSESSMENTS
LOOK OVER LEFT AND RIGHT SHOULDERS WHILE STANDING: 4
SITTING UNSUPPORTED: 4
REACHING FORWARD WITH OUTSTRETCHED ARM WHILE STANDING: 4
STANDING UNSUPPORTED: 4
PICK UP OBJECT FROM THE FLOOR FROM A STANDING POSITION: 3
SITTING TO STANDING: 4
PLACE ALTERNATE FOOT ON STEP OR STOOL WHILE STANDING UNSUPPORTED: 3
STANDING UNSUPPORTED ONE FOOT IN FRONT: 3
STANDING ON ONE LEG: 3
TURN 360 DEGREES: 4
STANDING UNSUPPORTED WITH FEET TOGETHER: 4
STANDING TO SITTING: 4
TRANSFERS: 4
LONG VERSION TOTAL SCORE (MAX 56): 51
STANDING UNSUPPORTED WITH EYES CLOSED: 3

## 2017-02-13 ASSESSMENT — ENCOUNTER SYMPTOMS
HEADACHES: 0
DIZZINESS: 0
BLURRED VISION: 0
NAUSEA: 0
SHORTNESS OF BREATH: 0
FEVER: 0
VOMITING: 0
HALLUCINATIONS: 0
PALPITATIONS: 0

## 2017-02-13 NOTE — FLOWSHEET NOTE
02/13/17 0950   Events/Summary/Plan   Events/Summary/Plan Pt on RA sats 93% will conitnue RA trials. Pt leaving for doctor appointment will leave with O2 and once back continue to wean.   Non-Invasive Resp Device Site Inspection Completed Intact   Location nasal cannula bilat ears   Interdisciplinary Plan of Care-Goals (Indications)   Obstructive Ventilatory Defect or Pulmonary Disease without Obvious Obstruction Strong Subjective / Objective Improvement   Education   Education Yes - Pt. / Family has been Instructed in use of Home Oxygen;Yes - Pt. / Family has been Instructed in use of Respiratory Equipment   Respiratory WDL   Respiratory (WDL) X   Chest Exam   Work Of Breathing / Effort Mild   Respiration 18   Pulse 96   Secretions   Cough Non Productive   Oximetry   #Pulse Oximetry (Single Determination) Yes   Oxygen   Pulse Oximetry 93 %   O2 (LPM) 0   O2 (FiO2) 21   O2 Daily Delivery Respiratory  Room Air with O2 Available
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
## 2017-02-13 NOTE — REHAB-PT IDT TEAM NOTE
"Physical Therapy  Mobility  Bed mobility:   SBA  Bed /Chair/Wheelchair Transfer:  5 - Standby Prompting/Supervision or Set-up  Bed/Chair/Wheelchair Transfer Description:  Increased time, Supervision for safety, Verbal cueing (Pt. transferred from standing with FWW to/from supine on tall, flat, queen-sized bed with SBA.)  Walk:  5 - Standby Prompting/Supervision or Set-up  Walk Description:  Walker, Supervision for safety (700 ft x1, 500 ft x1, spv with FWW)  Wheelchair:  5 - Standby Prompting/Supervision or Set-up  Wheelchair Description:  Extra time, Supervision for safety (Pt. mobilized self in wheelchair on level terrain indoors x 150 ft. with LEs only with supervision.)  Stairs 5 - Standby Prompting/Supervision or Set-up  Stairs DescriptionHand rails, Supervision for safety (18 4\" steps, 12 6\" steps with 1 HR, SBA)  Patient/Family Training/Education: TBD  DME/DC Recommendations:  TBD  Strengths:  Able to follow instructions, Alert and oriented, Effective communication skills, Good carryover of learning, Independent PLOF, Making steady progress towards goals, Motivated for self care and independence, Pleasant and cooperative and Willingly participates in therapeutic activities  Barriers:   Decreased endurance and Other: Has 2nd story bedroom in [place]  # of short term goals set= 3  # of short term goals met=2        Physical Therapy Problems           Problem: Mobility Transfers     Dates: Start: 02/10/17       Goal: STG-Within one week, patient will transfer bed to chair     Dates: Start: 02/10/17      Description: 1) Individualized goal: SPT without AD and SPV    2) Interventions:  PT Group Therapy, PT Gait Training, PT Therapeutic Exercises, PT Neuro Re-Ed/Balance and PT Therapeutic Activity        Note: Goal Note filed on 02/13/17 1517 by Rosa M Dubon    Goal: STG-Within one week, patient will transfer bed to chair  Outcome: NOT MET  Pt still requires FWW and SBA level            Problem: PT-Long Term " Goals     Dates: Start: 02/10/17       Goal: LTG-By discharge, patient will ambulate     Dates: Start: 02/10/17      Description: 1) Individualized goal:  AMB x 200 feet without AD, mod I    2) Interventions:  PT Group Therapy, PT Gait Training, PT Therapeutic Exercises, PT Neuro Re-Ed/Balance and PT Therapeutic Activity            Goal: LTG-By discharge, patient will transfer one surface to another     Dates: Start: 02/10/17      Description: 1) Individualized goal: SPT independent    2) Interventions:  PT Group Therapy, PT Gait Training, PT Therapeutic Exercises, PT Neuro Re-Ed/Balance and PT Therapeutic Activity            Goal: LTG-By discharge, patient will ambulate up/down flight of stairs     Dates: Start: 02/10/17      Description: 1) Individualized goal: Up/down 12 steps with LHR, mod I    2) Interventions:  PT Group Therapy, PT Gait Training, PT Therapeutic Exercises, PT Neuro Re-Ed/Balance and PT Therapeutic Activity                    Section completed by:  Rosa M Dubon, SPT/ Yesenia Parker, PT, DPT

## 2017-02-13 NOTE — CARE PLAN
Problem: Communication  Goal: The ability to communicate needs accurately and effectively will improve  Outcome: PROGRESSING AS EXPECTED  Pt is primarily English-speaking. No expressive aphasia noted. Pt has been able to express her needs clearly. Thus far this shift, pt and RN have been able to communicate effectively. Will continue to monitor and assess throughout shift.    Problem: Infection  Goal: Will remain free from infection  Outcome: PROGRESSING AS EXPECTED  Pt assessed for signs and symptoms of infection at start of shift and throughout shift. All wounds assessed for signs of infection. Pt's vital signs stable. Pt's latest MEWS score at this time is 1. No signs or symptoms of infection evident at this time. Will continue to monitor and assess pt for signs and symptoms of infection throughout shift.     Problem: Respiratory:  Goal: Respiratory status will improve  Outcome: PROGRESSING AS EXPECTED  Pt's respiratory status assessed at start of shift and frequently throughout shift. Pt's work of breathing assessed; no signs of respiratory distress present at this time. Pt's ordered continuous oxygen, at 2 L/min via nasal cannula, in place at start of shift. Will continue to monitor and assess pt's respiratory status throughout the shift.    Problem: Pain Management  Goal: Pain level will decrease to patient’s comfort goal  Pt's pain assessed at appropriate intervals and frequently throughout shift. Pt offered pharmacological and non-pharmacological measures for pain relief as needed. Pt is receiving scheduled pain medications, and has PRN pain medications available. Thus far this shift, pt has had one dose of PRN pain medication, as follows:    2052: Tramadol 50 mg. Pt refused her scheduled flexeril for the third consecutive night, again stating that the tramadol both provides better pain relief, and helps her sleep more soundly as well.    Will continue to monitor and assess pt's pain level, and provide pain  relief measures as needed, throughout the shift.

## 2017-02-13 NOTE — PROGRESS NOTES
Report received. Assumed pt care. Pt is currently resting in bed. No signs of distress noted. Pt's plan of care discussed w/pt; pt's questions answered at this time. Pt reports pain at this time; will treat per MAR. Bed in lowest position; appropriate siderails up. Non-skid socks in place. Call light w/in reach. Personal possessions w/in reach. Will continue to monitor; will monitor and assess pain level and integumentary status frequently throughout shift.

## 2017-02-13 NOTE — CARE PLAN
Problem: Mobility  Goal: STG-Within one week, patient will ambulate community distances  1) Individualized goal: AMB x 150 feet with FWW and SBA  2) Interventions: PT Group Therapy, PT Gait Training, PT Therapeutic Exercises, PT Neuro Re-Ed/Balance and PT Therapeutic Activity  Outcome: MET Date Met:  02/13/17  Goal: STG-Within one week, patient will ascend and descend four to six stairs  1) Individualized goal: Up/down 4 steps with L HR and SBA  2) Interventions: PT Group Therapy, PT Gait Training, PT Therapeutic Exercises, PT Neuro Re-Ed/Balance and PT Therapeutic Activity    Outcome: MET Date Met:  02/13/17    Problem: Mobility Transfers  Goal: STG-Within one week, patient will transfer bed to chair  1) Individualized goal: SPT without AD and SPV  2) Interventions: PT Group Therapy, PT Gait Training, PT Therapeutic Exercises, PT Neuro Re-Ed/Balance and PT Therapeutic Activity    Outcome: NOT MET  Pt still requires FWW and SBA level

## 2017-02-13 NOTE — FLOWSHEET NOTE
02/13/17 0805   Events/Summary/Plan   Events/Summary/Plan Pt is on 1Lpm sats 95% did RA trial and monitor pt, pt does have home oxygen 2Lpm at night has for years, will wean as tolerated during the day. Pt using IS bedside aida well   Non-Invasive Resp Device Site Inspection Completed Intact   Location nasal cannula bilate ears   Interdisciplinary Plan of Care-Goals (Indications)   Obstructive Ventilatory Defect or Pulmonary Disease without Obvious Obstruction Strong Subjective / Objective Improvement   Education   Education Yes - Pt. / Family has been Instructed in use of Home Oxygen;Yes - Pt. / Family has been Instructed in use of Respiratory Equipment   Incentive Spirometry Group   Breathing Exercises Yes   Incentive Spirometer Volume 1250 mL   Respiratory WDL   Respiratory (WDL) X   Chest Exam   Work Of Breathing / Effort Mild   Respiration 18   Pulse 96   Breath Sounds   RUL Breath Sounds Clear   RML Breath Sounds Clear;Diminished   RLL Breath Sounds Diminished   PRAMOD Breath Sounds Clear   LLL Breath Sounds Diminished   Secretions   Cough Non Productive   Oximetry   #Pulse Oximetry (Single Determination) Yes   Oxygen   Home O2 Use Prior To Admission? Yes   Home O2 LPM Flow 2 LPM   Home O2 Delivery Method Nasal Cannula   Home O2 Frequency of Use At Sleep   Pulse Oximetry 95 %   O2 (LPM) 0   O2 (FiO2) 21   O2 Daily Delivery Respiratory  Room Air with O2 Available
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
## 2017-02-13 NOTE — REHAB-PHARMACY IDT TEAM NOTE
Pharmacy  Pharmacy  Antibiotics/Antifungals/Antivirals:  Medication:      Active Orders     None        Route:        NA  Stop Date:  NA  Reason:      NA  Antihypertensives/Cardiac:  Medication:    Orders (72h ago through future)    Start     Ordered    02/11/17 0600  furosemide (LASIX) tablet 20 mg   Q DAY,   Status:  Discontinued      02/10/17 1548    02/10/17 0600  furosemide (LASIX) tablet 40 mg   Q DAY,   Status:  Discontinued      02/09/17 1133    02/09/17 2100  pravastatin (PRAVACHOL) tablet 20 mg   EVERY EVENING      02/09/17 1133        Patient Vitals for the past 24 hrs:   BP Pulse   02/13/17 0950 - 96   02/13/17 0831 - (!) 106   02/13/17 0805 - 96   02/13/17 0700 121/72 mmHg 91   02/12/17 1900 124/75 mmHg 96   02/12/17 1408 109/69 mmHg 86       Anticoagulation:  Medication: Coumadin per pharmacy, Aspirin.  INR:    Recent Labs      02/10/17   0527  02/11/17   0529  02/12/17   0532  02/13/17   0530   INR  1.52*  1.69*  1.78*  1.81*     Dose from last 7 days     Date/Time Dose (mg)    02/13/17 0530 8    02/12/17 1146 8    02/11/17 1159 7.5    02/10/17 0527 7.5    02/09/17 1115 7.5    02/09/17 1100 --          Other key medications: A review of the medication list reveals no issues at this time.    Section completed by: Liban Daugherty [facility]

## 2017-02-14 LAB
INR PPP: 1.83 (ref 0.87–1.13)
PROTHROMBIN TIME: 21.7 SEC (ref 12–14.6)

## 2017-02-14 PROCEDURE — 36415 COLL VENOUS BLD VENIPUNCTURE: CPT

## 2017-02-14 PROCEDURE — A9270 NON-COVERED ITEM OR SERVICE: HCPCS | Performed by: PHYSICAL MEDICINE & REHABILITATION

## 2017-02-14 PROCEDURE — 97116 GAIT TRAINING THERAPY: CPT

## 2017-02-14 PROCEDURE — 700112 HCHG RX REV CODE 229: Performed by: PHYSICAL MEDICINE & REHABILITATION

## 2017-02-14 PROCEDURE — 97535 SELF CARE MNGMENT TRAINING: CPT

## 2017-02-14 PROCEDURE — 99233 SBSQ HOSP IP/OBS HIGH 50: CPT | Performed by: HOSPITALIST

## 2017-02-14 PROCEDURE — 97110 THERAPEUTIC EXERCISES: CPT

## 2017-02-14 PROCEDURE — 99233 SBSQ HOSP IP/OBS HIGH 50: CPT | Performed by: PHYSICAL MEDICINE & REHABILITATION

## 2017-02-14 PROCEDURE — 85610 PROTHROMBIN TIME: CPT

## 2017-02-14 PROCEDURE — 700102 HCHG RX REV CODE 250 W/ 637 OVERRIDE(OP): Performed by: PHYSICAL MEDICINE & REHABILITATION

## 2017-02-14 PROCEDURE — 97530 THERAPEUTIC ACTIVITIES: CPT

## 2017-02-14 PROCEDURE — 97112 NEUROMUSCULAR REEDUCATION: CPT

## 2017-02-14 PROCEDURE — 770010 HCHG ROOM/CARE - REHAB SEMI PRIVAT*

## 2017-02-14 RX ORDER — SODIUM PHOSPHATE,MONO-DIBASIC 19G-7G/118
1 ENEMA (ML) RECTAL 2 TIMES DAILY
Status: DISCONTINUED | OUTPATIENT
Start: 2017-02-14 | End: 2017-02-18 | Stop reason: HOSPADM

## 2017-02-14 RX ORDER — WARFARIN SODIUM 5 MG/1
10 TABLET ORAL
Status: COMPLETED | OUTPATIENT
Start: 2017-02-14 | End: 2017-02-14

## 2017-02-14 RX ADMIN — Medication 1 TABLET: at 20:52

## 2017-02-14 RX ADMIN — STANDARDIZED SENNA CONCENTRATE AND DOCUSATE SODIUM 1 TABLET: 8.6; 5 TABLET, FILM COATED ORAL at 20:51

## 2017-02-14 RX ADMIN — DOCUSATE SODIUM 100 MG: 100 CAPSULE, LIQUID FILLED ORAL at 08:22

## 2017-02-14 RX ADMIN — WARFARIN SODIUM 10 MG: 5 TABLET ORAL at 17:07

## 2017-02-14 RX ADMIN — ASPIRIN 81 MG: 81 TABLET, COATED ORAL at 08:22

## 2017-02-14 RX ADMIN — THERA TABS 1 TABLET: TAB at 08:22

## 2017-02-14 RX ADMIN — CALCIUM CARBONATE-CHOLECALCIFEROL TAB 250 MG-125 UNIT 1 TABLET: 250-125 TAB at 08:22

## 2017-02-14 RX ADMIN — PRAVASTATIN SODIUM 20 MG: 20 TABLET ORAL at 20:51

## 2017-02-14 RX ADMIN — TRAMADOL HYDROCHLORIDE 50 MG: 50 TABLET, FILM COATED ORAL at 20:50

## 2017-02-14 RX ADMIN — Medication 3 MG: at 20:51

## 2017-02-14 ASSESSMENT — PAIN SCALES - GENERAL
PAINLEVEL_OUTOF10: 0
PAINLEVEL_OUTOF10: 5

## 2017-02-14 ASSESSMENT — GAIT ASSESSMENTS
DISTANCE (FEET): 300
DEVIATION: BRADYKINETIC;ANTALGIC
GAIT LEVEL OF ASSIST: SUPERVISED
GAIT LEVEL OF ASSIST: SUPERVISED
DEVIATION: DECREASED BASE OF SUPPORT
DISTANCE (FEET): 2280

## 2017-02-14 ASSESSMENT — ENCOUNTER SYMPTOMS
CHILLS: 0
VOMITING: 0
DIARRHEA: 0
NERVOUS/ANXIOUS: 0
FEVER: 0
ABDOMINAL PAIN: 0
SHORTNESS OF BREATH: 0
NAUSEA: 0

## 2017-02-14 NOTE — REHAB-COLLABORATIVE ONGOING IDT TEAM NOTE
Weekly Interdisciplinary Team Conference Note    Weekly Interdisciplinary Team Conference # 1  Date:  2/14/2017    Clinicians present and reporting at team conference include the following:   MD: Dr. Ashley Camacho M.D.  RN:  Philly Watkins RN   PT:   Brittney Oakley PT, MSPT  OT:  Damaris Dailey DPT, OTR/L   ST:  Not Applicable  CM:  Mayi Mckee RN, CCM  REC:  None  RT:  Raissa Esquivel, RRT  [facility]:  Rehana Ritchie [facility]  Other:   None  All reporting clinicians have a working knowledge of this patient's plan of care.    Targeted DC Date:  2/18/17     Medical    Patient Active Problem List    Diagnosis Date Noted   • S/P MVR (mitral valve repair) 02/08/2017   • Hyperlipidemia 01/30/2017   • Hypertension 01/30/2017   • Cervical spondylosis 11/15/2016   • Neck pain 10/24/2016   • Falls 07/19/2016   • Left knee pain 07/19/2016   • Loss of balance 06/28/2016   • Dizziness 06/28/2016   • Right wrist pain 06/28/2016   • Plantar fasciitis of right foot 06/06/2016   • Peroneal tendinitis of right lower extremity 06/06/2016   • Lumbar spinal stenosis 05/17/2016   • Hamstring tear 03/28/2016   • Primary osteoarthritis of both knees 01/19/2016   • Lumbar spondylosis 12/10/2015   • Bilateral low back pain without sciatica 12/01/2015   • Right hip pain 12/01/2015     Results     Procedure Component Value Ref Range Date/Time    PROTHROMBIN TIME [[id number]]  (Abnormal) Collected:  02/14/17 0626    Order Status:  Completed Lab Status:  Final result Updated:  02/14/17 0644    Specimen Information:  Blood      PT 21.7 (H) 12.0 - 14.6 sec      INR 1.83 (H) 0.87 - 1.13       Comment: INR - Non-therapeutic Reference Range: 0.87-1.13  INR - Therapeutic Reference Range: 2.0-4.0         Narrative:      Indicate which anticoagulants the patient is on:->COUMADIN        Nursing  Diet/Nutrition:  Cardiac diet/Thin Liquid  Medication Administration:  Whole with Liquid Wash  % consumed at meals in last 24 hours:  Consumed % of meals per  documentation.  Breakfast:  100%   Lunch:  100%  Dinner:  100%   Snack schedule:  None  Supplement:  Other: none  Appetite:  Excellent  Fluid Intake/Output in past 24 hours: In: 460 [P.O.:460]  Out: -   Admit Weight:  Weight: 56.4 kg (124 lb 5.4 oz)  Weight Last 7 Days   [56 kg (123 lb 7.3 oz)-56.4 kg (124 lb 5.4 oz)] 56 kg (123 lb 7.3 oz) (02/12 0630)    Pain Issues:    Location:  Back (02/13 2211)  Lower (02/13 2211)         Severity:  Mild   Scheduled pain medications:  none     PRN pain medications used in last 24 hours:  Tramadol 50 mg.po every 6 hours.po prn  Non Pharmacologic Interventions:  warm blanket, distraction, relaxation, repositioned and rest  Effectiveness of pain management plan:  good=patient states acceptable comfort after interventions    Bowel:    Admission Bowel Assist Score:  4 - Minimal Assistance  Bowel Assist Score:  4 - Minimal Assistance  Bowel Accidents in last 7 days:     Last bowel movement: 02/14/17  Stool: Large, Formed     Usual bowel pattern:  daily  Scheduled bowel medications:  docusate sodium (COLACE) and senna-docusate (PERICOLACE or SENOKOT S)   PRN bowel medications used in last 24 hours:  None  Nursing Interventions:  Increased time, Scheduled medication, Supervision, Verbal cueing, Positioning on commode/toilet  Effectiveness of bowel program:   good=regular, predictable, controlled emptying of bowel  Bladder:    Admission Bladder Assist Score:  5 - Standby Prompting/Supervision or Set-up  Bladder Assist Score:  4 - Minimal Assistance  Bladder Accidents in last 7 days:  0  PVR range for past 24-48 hours:  [22]   Intermittent Catheterization: n/a  Medications affecting bladder:  None    Time void schedule/voiding pattern:  Not needed  Interventions:  Increased time, Supervision, Verbal cueing, Time void patient initiated  Effectiveness of bladder training:  Good=regular, predictable, emptying of bladder, patient initiates time voiding      Patient Lines/Drains/Airways  "Status    Active Current Wounds     Name: Placement date: Placement time: Site: Days:    Surgical Incision  Incision Heart Prep 01/31/17  1608    13    Surgical Incision  Incision Bilateral Upper Abdomen 01/31/17      13                   Interventions: keep surgical incision clean and dry, monitor for s/s of infection.  Effectiveness of intervention:  wound is improving     Sleep/wake cycle:   Average hours slept:  Sleeps 4-6 hours without waking  Sleep medication usage:  melatonin 5 mg    Patient/Family Training/Education:  Fall Prevention, General Self Care, Pain Management, Safe Transfers and Wound Care  Discharge Supply Recommendations:  Other: none  Strengths: Alert and oriented, Willingly participates in therapeutic activities, Able to follow instructions, Supportive family, Pleasant and cooperative, Effective communication skills and Motivated for self care and independence   Barriers:   Generalized weakness       Long Term Goals:   At discharge patient will be able to [place] safely and independently at home and in the community.    Section completed by:  Alma Sharp R.N.           Mobility  Bed mobility:   SBA  Bed /Chair/Wheelchair Transfer:  5 - Standby Prompting/Supervision or Set-up  Bed/Chair/Wheelchair Transfer Description:  Increased time, Supervision for safety, Verbal cueing (Pt. transferred from standing with FWW to/from supine on tall, flat, queen-sized bed with SBA.)  Walk:  5 - Standby Prompting/Supervision or Set-up  Walk Description:  Walker, Supervision for safety (700 ft x1, 500 ft x1, spv with FWW)  Wheelchair:  5 - Standby Prompting/Supervision or Set-up  Wheelchair Description:  Extra time, Supervision for safety (Pt. mobilized self in wheelchair on level terrain indoors x 150 ft. with LEs only with supervision.)  Stairs 5 - Standby Prompting/Supervision or Set-up  Stairs DescriptionHand rails, Supervision for safety (18 4\" steps, 12 6\" steps with 1 HR, SBA)  Patient/Family " Training/Education: TBD  DME/DC Recommendations:  TBD  Strengths:  Able to follow instructions, Alert and oriented, Effective communication skills, Good carryover of learning, Independent PLOF, Making steady progress towards goals, Motivated for self care and independence, Pleasant and cooperative and Willingly participates in therapeutic activities  Barriers:   Decreased endurance and Other: Has 2nd story bedroom in [facility]  # of short term goals set= 3  # of short term goals met=2        Physical Therapy Problems           Problem: Mobility Transfers     Dates: Start: 02/10/17       Goal: STG-Within one week, patient will transfer bed to chair     Dates: Start: 02/10/17      Description: 1) Individualized goal: SPT without AD and SPV    2) Interventions:  PT Group Therapy, PT Gait Training, PT Therapeutic Exercises, PT Neuro Re-Ed/Balance and PT Therapeutic Activity        Note: Goal Note filed on 02/13/17 9832 by Rosa M Dubon    Goal: STG-Within one week, patient will transfer bed to chair  Outcome: NOT MET  Pt still requires FWW and SBA level            Problem: PT-Long Term Goals     Dates: Start: 02/10/17       Goal: LTG-By discharge, patient will ambulate     Dates: Start: 02/10/17      Description: 1) Individualized goal:  AMB x 200 feet without AD, mod I    2) Interventions:  PT Group Therapy, PT Gait Training, PT Therapeutic Exercises, PT Neuro Re-Ed/Balance and PT Therapeutic Activity            Goal: LTG-By discharge, patient will transfer one surface to another     Dates: Start: 02/10/17      Description: 1) Individualized goal: SPT independent    2) Interventions:  PT Group Therapy, PT Gait Training, PT Therapeutic Exercises, PT Neuro Re-Ed/Balance and PT Therapeutic Activity            Goal: LTG-By discharge, patient will ambulate up/down flight of stairs     Dates: Start: 02/10/17      Description: 1) Individualized goal: Up/down 12 steps with LHR, mod I    2) Interventions:  PT Group Therapy, PT  Gait Training, PT Therapeutic Exercises, PT Neuro Re-Ed/Balance and PT Therapeutic Activity                    Section completed by:  Rosa M Dubon, SPT/ Yesenia Parker, PT, DPT    Activities of Daily Living  Eatin - Modified Independent  Eating Description:  Increased time, Supervision for safety  Groomin - Modified Independent  Grooming Description:     Bathin - SPV  Bathing Description:  Grab bar, Tub bench, Hand held shower, Supervision for safety  Upper Body Dressin - Modified Independent  Upper Body Dressing Description:  Increased time  Lower Body Dressin - SPV  Lower Body Dressing Description:  5 - Standby Prompting/Supervsion or Set-up  Toiletin - SPV  Toileting Description:  Grab bar, Increased time, Supervision for safety  Toilet Transfer:  5 - SPV  Toilet Transfer Description:  5 - Standby Prompting/Supervision or Set-up  Tub / Shower Transfer:  5 - SPV  Tub / Shower Transfer Description:  Grab bar, Increased time, Supervision for safety  IADL:  SBA for lt hot meal prep   Family Training/Education:  To be determined   DME/DC Recommendations:  Shower chair, reacher     Strengths:  Alert and oriented, Effective communication skills, Good carryover of learning, Good insight into deficits/needs, Independent PLOF, Making steady progress towards goals, Motivated for self care and independence, Pleasant and cooperative, Supportive family and Willingly participates in therapeutic activities  Barriers:  Decreased endurance, Generalized weakness, Home accessibility and Poor balance     # of short term goals set= 4    # of short term goals met= 3        Occupational Therapy Goals           Problem: Functional Transfers     Dates: Start: 02/10/17       Goal: STG-Within one week, patient will transfer to toilet     Dates: Start: 17      Description: 1) Individualized Goal: at Mod I  level using DME if needed    2) Interventions: OT Group Therapy, OT Self Care/ADL, OT Community  Reintegration, OT Neuro Re-Ed/Balance, OT Therapeutic Activity, OT Evaluation and OT Therapeutic Exercise               Problem: IADL's     Dates: Start: 02/10/17       Goal: STG-Within one week, patient will prepare a meal     Dates: Start: 02/10/17      Description: 1) Individualized Goal: at SPV level using DME if needed    2) Interventions:  OT Group Therapy, OT Self Care/ADL, OT Community Reintegration, OT Neuro Re-Ed/Balance, OT Therapeutic Activity, OT Evaluation and OT Therapeutic Exercise    Note: Goal Note filed on 02/14/17 0812 by Damaris Dailey O.T.    Goal: STG-Within one week, patient will prepare a meal  Outcome: NOT MET  Pt SBA using FWW for lt hot meal prep          Goal: STG-Within one week, patient will make a bed     Dates: Start: 02/14/17      Description: 1) Individualized Goal: at SPV level using DME if needed    2) Interventions: OT Group Therapy, OT Self Care/ADL, OT Community Reintegration, OT Neuro Re-Ed/Balance, OT Therapeutic Activity, OT Evaluation and OT Therapeutic Exercise             Goal: STG-Within one week, patient will utilize washer and dryer     Dates: Start: 02/14/17      Description: 1) Individualized Goal: at SPV level using DME if needed    2) Interventions: OT Group Therapy, OT Self Care/ADL, OT Community Reintegration, OT Neuro Re-Ed/Balance, OT Therapeutic Activity, OT Evaluation and OT Therapeutic Exercise               Problem: OT Long Term Goals     Dates: Start: 02/10/17       Goal: LTG-By discharge, patient will complete basic self care tasks     Dates: Start: 02/10/17      Description: 1) Individualized Goal: Mod I using AE/DME if needed    2) Interventions:  OT Group Therapy, OT Self Care/ADL, OT Community Reintegration, OT Neuro Re-Ed/Balance, OT Therapeutic Activity, OT Evaluation and OT Therapeutic Exercise        Goal: LTG-By discharge, patient will perform bathroom transfers     Dates: Start: 02/10/17      Description: 1) Individualized Goal: Mod I  using AE/DME if needed    2) Interventions:  OT Group Therapy, OT Self Care/ADL, OT Community Reintegration, OT Neuro Re-Ed/Balance, OT Therapeutic Activity, OT Evaluation and OT Therapeutic Exercise        Goal: LTG-By discharge, patient will complete basic home management     Dates: Start: 02/10/17      Description: 1) Individualized Goal: Mod I using DME if needed    2) Interventions:  OT Group Therapy, OT Self Care/ADL, OT Community Reintegration, OT Neuro Re-Ed/Balance, OT Therapeutic Activity, OT Evaluation and OT Therapeutic Exercise              Section completed by:  Damaris Dailey O.T.          Nutrition       Dietary Problems           Problem: Other Problem (see comments)     Description: Diagnosis: Malnutrition (acute/ non-severe) r/t inadequate oral intake in the setting of MVR as evidenced by intake <75% of needs x 1 month, 6% weight loss x 1 month, moderate depletion of muscle mass/ subcutaneous body fat, with visible temporal/ clavicle wasting.           Goal: Other Goal     Description:  Monitor/Evaluation: RD following weekly.     Goal: Intake > 50% of nutrient needs via meals, supplements, snacks to promote weight gain/ nutrition optimization/ healing.             Nutrition Care/ Consult For s/p CABG s/p MVR     Assessment:    Admitting Diagnosis: mitral valve prolapse s/p MVR    Pertinent PMH: HTN, Nocturnal Hypoxemia, SDH s/p evacuation, pleural effusions s/p thoracentesis x 3, Arthritis, Osteoporosis    Additional Information: Required intubation s/t acute resp. failure.     Patient seen in cafeteria at lunch,  with her.  She reports her appetite is picking up as she is feeling better.  Ate 75% of lunch, denies any GI/Mastication issues.  No difficulty self feeding.      Patient reports a usual body weight of 60-61.3kg.  She notes she was very active PTA.  Has never been this thin.  She reports weight loss while hospitalized s/t poor appetite which is currently resolving.       Patient reports she eats rather healthy- no processed foods. Tries to eat all natural.  We discussed nutrient dense snack options between meals. Patient agreeable to fruit, granola,and yogurt OR fruit smoothie made with fresh fruit, yogurt/ skim milk and Beneprotein between meals.     Nutrition rep has seen patient, she knows how to fill out the menu.  I'm adjusting her diet to Cardiac.  No indication for Consistent CHO need. Note some hypoglycemia while in [facility].     Appropriate for Education? Yes  Education in Past? Yes  Appetite: Fair/ Good and Improving   Diet: Consistent CHO/ Cardiac    PO since admission: 10-90% of meals ( improving)     Labs: Na+ 132, K + 3.5, Ca 8.4 , A1C WNL , Lipid Panel WNL    Medications: Ecotrin, Flexeril, Lasix 40mg q 24hrs, MVI, Vitamin D+ Calcium, Coumadin    PRN Medications: noted  IVF: n/a     Height: 170.2cm  Weight: 56.4kg  Usual Body Weight: 60-61.3kg  % Usual Body Weight: 94%  % Weight Change: 6% (significant x1 month)    BMI: 19.47 (underweight for age)     Skin: sx incision abdomen/ heart prep    GI: BM 2/9  Vitals: 36.4C, 113/73, HR 74, RR 20, 2L NC    I/Os: n/a no output documented     Nutrient Needs:  Kcal: 6378-1953         BEE= 1128x 1.3-1.4 promote weight gain    Protein: 56-68g/day ( 1-1.2g/kg)  Fluid: 1400mL /day       Diagnosis: Malnutrition (acute/ non-severe) r/t inadequate oral intake in the setting of MVR as evidenced by intake <75% of needs x 1 month, 6% weight loss x 1 month, moderate depletion of muscle mass/ subcutaneous body fat, with visible temporal/ clavicle wasting.     Intervention/ Recommendations/POC:  1. Adjust diet to Cardiac. Add nutrient dense high protein/kcal snacks BID.  2.Encourage adequate PO/fluid intake.    3. Nutrition rep to see regarding food prefs/ honor within dietary restrictions (if indicated)  4. Monitor PO intake, weight, labs, medication adjustments, skin integrity, GI function, vitals, I/Os, and overall hydration status.   Adjust nutritional POC pending clinical outcomes.    5. Consider supplemental KCl in the setting of diuresis. (Lasix)     Monitor/Evaluation: RD following weekly.    Goal: Intake > 50% of nutrient needs via meals, supplements, snacks to promote weight gain/ nutrition optimization/ healing.                                 Section completed by:  Cecy Godfrey RD    REHAB-Pharmacy IDT Team Note by Liban Daugherty RPH at 2/13/2017  1:32 PM  Version 1 of 1    Author:  Liban Daugherty RPH Service:  (none) Author Type:  Pharmacist    Filed:  2/13/2017  1:34 PM Note Time:  2/13/2017  1:32 PM Status:  Signed    :  Liban Daugherty RPH (Pharmacist)           Pharmacy  Pharmacy  Antibiotics/Antifungals/Antivirals:  Medication:      Active Orders     None        Route:        NA  Stop Date:  NA  Reason:      NA  Antihypertensives/Cardiac:  Medication:    Orders (72h ago through future)    Start     Ordered    02/11/17 0600  furosemide (LASIX) tablet 20 mg   Q DAY,   Status:  Discontinued      02/10/17 1548    02/10/17 0600  furosemide (LASIX) tablet 40 mg   Q DAY,   Status:  Discontinued      02/09/17 1133    02/09/17 2100  pravastatin (PRAVACHOL) tablet 20 mg   EVERY EVENING      02/09/17 1133        Patient Vitals for the past 24 hrs:   BP Pulse   02/13/17 0950 - 96   02/13/17 0831 - (!) 106   02/13/17 0805 - 96   02/13/17 0700 121/72 mmHg 91   02/12/17 1900 124/75 mmHg 96   02/12/17 1408 109/69 mmHg 86       Anticoagulation:  Medication: Coumadin per pharmacy, Aspirin.  INR:    Recent Labs      02/10/17   0527  02/11/17   0529  02/12/17   0532  02/13/17   0530   INR  1.52*  1.69*  1.78*  1.81*     Dose from last 7 days     Date/Time Dose (mg)    02/13/17 0530 8    02/12/17 1146 8    02/11/17 1159 7.5    02/10/17 0527 7.5    02/09/17 1115 7.5    02/09/17 1100 --          Other key medications: A review of the medication list reveals no issues at this time.    Section completed by: Liban Daugherty RP        DC Planning  DC  destination/dispostion:  Patient lives with her spouse in a 2 level home with 6 entry steps.    Referrals: [facility] Needs: Patient uses oxygen at night from [facility].  She has no other dme.  I will follow for recommendations. She has follow up scheduled with her surgeon and cardiology.    Barriers to discharge:       Strengths: very motivated and  is supportive.    Section completed by:  Mayi Mckee R.N.      Physician Summary  Dr. Ashley Camacho M.D. participated and led team conference discussion.

## 2017-02-14 NOTE — REHAB-RESPIRATORY IDT TEAM NOTE
Respiratory Therapy  Respiratory Therapy  Patient's respiratory plan of care for hyperinflation therapy is:  Hyperinflation Protocol Indications: Physical Exam  Hyperinflation Protocol Goals/Outcome: Stable Vital Capacity x24 hrs and Patient Understands / uses I.S.     Patient's respiratory plan of care for oxygen therapy is:  O2 Protocol Indications: Room Air SpO2 Less Than 90%  Continuous oxygen initiated for:: Continuous Home Oxygen 2Lpm at night  PRN oxygen initiated for:: Sleep Apnea (nocturnal O2)   O2 Protocol Goals/Outcome: SpO2 greater than 90% with exertion;Normoxemia on Oxygen, SpO2 greater than 90%;Return to home Oxygen therapy baseline, Pt on RA during the day aida well is back to home regime using O2 at night.    Section completed by:  Raissa Esquivel, RRT

## 2017-02-14 NOTE — REHAB-NURSING IDT TEAM NOTE
Nursing  Nursing  Diet/Nutrition:  Cardiac diet/Thin Liquid  Medication Administration:  Whole with Liquid Wash  % consumed at meals in last 24 hours:  Consumed % of meals per documentation.  Breakfast:  100%   Lunch:  100%  Dinner:  100%   Snack schedule:  None  Supplement:  Other: none  Appetite:  Excellent  Fluid Intake/Output in past 24 hours: In: 460 [P.O.:460]  Out: -   Admit Weight:  Weight: 56.4 kg (124 lb 5.4 oz)  Weight Last 7 Days   [56 kg (123 lb 7.3 oz)-56.4 kg (124 lb 5.4 oz)] 56 kg (123 lb 7.3 oz) (02/12 0630)    Pain Issues:    Location:  Back (02/13 2211)  Lower (02/13 2211)         Severity:  Mild   Scheduled pain medications:  none     PRN pain medications used in last 24 hours:  Tramadol 50 mg.po every 6 hours.po prn  Non Pharmacologic Interventions:  warm blanket, distraction, relaxation, repositioned and rest  Effectiveness of pain management plan:  good=patient states acceptable comfort after interventions    Bowel:    Admission Bowel Assist Score:  4 - Minimal Assistance  Bowel Assist Score:  4 - Minimal Assistance  Bowel Accidents in last 7 days:     Last bowel movement: 02/14/17  Stool: Large, Formed     Usual bowel pattern:  daily  Scheduled bowel medications:  docusate sodium (COLACE) and senna-docusate (PERICOLACE or SENOKOT S)   PRN bowel medications used in last 24 hours:  None  Nursing Interventions:  Increased time, Scheduled medication, Supervision, Verbal cueing, Positioning on commode/toilet  Effectiveness of bowel program:   good=regular, predictable, controlled emptying of bowel  Bladder:    Admission Bladder Assist Score:  5 - Standby Prompting/Supervision or Set-up  Bladder Assist Score:  4 - Minimal Assistance  Bladder Accidents in last 7 days:  0  PVR range for past 24-48 hours:  [22]   Intermittent Catheterization: n/a  Medications affecting bladder:  None    Time void schedule/voiding pattern:  Not needed  Interventions:  Increased time, Supervision, Verbal cueing, Time  void patient initiated  Effectiveness of bladder training:  Good=regular, predictable, emptying of bladder, patient initiates time voiding      Patient Lines/Drains/Airways Status    Active Current Wounds     Name: Placement date: Placement time: Site: Days:    Surgical Incision  Incision Heart Prep 01/31/17  1608    13    Surgical Incision  Incision Bilateral Upper Abdomen 01/31/17      13                   Interventions: keep surgical incision clean and dry, monitor for s/s of infection.  Effectiveness of intervention:  wound is improving     Sleep/wake cycle:   Average hours slept:  Sleeps 4-6 hours without waking  Sleep medication usage:  melatonin 5 mg    Patient/Family Training/Education:  Fall Prevention, General Self Care, Pain Management, Safe Transfers and Wound Care  Discharge Supply Recommendations:  Other: none  Strengths: Alert and oriented, Willingly participates in therapeutic activities, Able to follow instructions, Supportive family, Pleasant and cooperative, Effective communication skills and Motivated for self care and independence   Barriers:   Generalized weakness       Long Term Goals:   At discharge patient will be able to [place] safely and independently at home and in the community.    Section completed by:  Alma Sharp R.N.

## 2017-02-14 NOTE — PROGRESS NOTES
Pharmacy Warfarin Consult   2/14/2017     66 y.o.   female     Indication for anticoagulation: Bioprosthetic Valve Replacement    Goal INR = 2-3    Recent Labs      02/12/17   0532  02/13/17   0530  02/14/17   0626   INR  1.78*  1.81*  1.83*       Pertinent Drug/Drug Interactions:  Aspirin, Statin  Outpatient Warfarin Regimen:  None noted   Recent Warfarin Dosing:    Dose from last 7 days     Date/Time Dose (mg)    02/14/17 0626 10    02/13/17 0530 8    02/12/17 1146 8    02/11/17 1159 7.5    02/10/17 0527 7.5    02/09/17 1115 7.5    02/09/17 1100 --          Bridge Therapy: None        1.  Coumadin 10mg tonight for INR = 1.81        [facility]

## 2017-02-14 NOTE — REHAB-CM IDT TEAM NOTE
Case Management   DC Planning  DC destination/dispostion:  Patient lives with her spouse in a 2 level home with 6 entry steps.    Referrals: [facility]    DC Needs: Patient uses oxygen at night from [facility].  She has no other dme.  I will follow for recommendations. She has follow up scheduled with her surgeon and cardiology.    Barriers to discharge:       Strengths: very motivated and  is supportive.    Section completed by:  Mayi Mckee R.N.

## 2017-02-14 NOTE — REHAB-OT IDT TEAM NOTE
Occupational Therapy  Activities of Daily Living  Eatin - Modified Independent  Eating Description:  Increased time, Supervision for safety  Groomin - Modified Independent  Grooming Description:     Bathin - SPV  Bathing Description:  Grab bar, Tub bench, Hand held shower, Supervision for safety  Upper Body Dressin - Modified Independent  Upper Body Dressing Description:  Increased time  Lower Body Dressin - SPV  Lower Body Dressing Description:  5 - Standby Prompting/Supervsion or Set-up  Toiletin - SPV  Toileting Description:  Grab bar, Increased time, Supervision for safety  Toilet Transfer:  5 - SPV  Toilet Transfer Description:  5 - Standby Prompting/Supervision or Set-up  Tub / Shower Transfer:  5 - SPV  Tub / Shower Transfer Description:  Grab bar, Increased time, Supervision for safety  IADL:  SBA for lt hot meal prep   Family Training/Education:  To be determined   DME/DC Recommendations:  Shower chair, reacher     Strengths:  Alert and oriented, Effective communication skills, Good carryover of learning, Good insight into deficits/needs, Independent PLOF, Making steady progress towards goals, Motivated for self care and independence, Pleasant and cooperative, Supportive family and Willingly participates in therapeutic activities  Barriers:  Decreased endurance, Generalized weakness, Home accessibility and Poor balance     # of short term goals set= 4    # of short term goals met= 3        Occupational Therapy Goals           Problem: Functional Transfers     Dates: Start: 02/10/17       Goal: STG-Within one week, patient will transfer to toilet     Dates: Start: 17      Description: 1) Individualized Goal: at Mod I  level using DME if needed    2) Interventions: OT Group Therapy, OT Self Care/ADL, OT Community Reintegration, OT Neuro Re-Ed/Balance, OT Therapeutic Activity, OT Evaluation and OT Therapeutic Exercise               Problem: IADL's     Dates: Start: 02/10/17        Goal: STG-Within one week, patient will prepare a meal     Dates: Start: 02/10/17      Description: 1) Individualized Goal: at SPV level using DME if needed    2) Interventions:  OT Group Therapy, OT Self Care/ADL, OT Community Reintegration, OT Neuro Re-Ed/Balance, OT Therapeutic Activity, OT Evaluation and OT Therapeutic Exercise    Note: Goal Note filed on 02/14/17 0812 by Damaris Dailey, O.T.    Goal: STG-Within one week, patient will prepare a meal  Outcome: NOT MET  Pt SBA using FWW for lt hot meal prep          Goal: STG-Within one week, patient will make a bed     Dates: Start: 02/14/17      Description: 1) Individualized Goal: at SPV level using DME if needed    2) Interventions: OT Group Therapy, OT Self Care/ADL, OT Community Reintegration, OT Neuro Re-Ed/Balance, OT Therapeutic Activity, OT Evaluation and OT Therapeutic Exercise             Goal: STG-Within one week, patient will utilize washer and dryer     Dates: Start: 02/14/17      Description: 1) Individualized Goal: at SPV level using DME if needed    2) Interventions: OT Group Therapy, OT Self Care/ADL, OT Community Reintegration, OT Neuro Re-Ed/Balance, OT Therapeutic Activity, OT Evaluation and OT Therapeutic Exercise               Problem: OT Long Term Goals     Dates: Start: 02/10/17       Goal: LTG-By discharge, patient will complete basic self care tasks     Dates: Start: 02/10/17      Description: 1) Individualized Goal: Mod I using AE/DME if needed    2) Interventions:  OT Group Therapy, OT Self Care/ADL, OT Community Reintegration, OT Neuro Re-Ed/Balance, OT Therapeutic Activity, OT Evaluation and OT Therapeutic Exercise        Goal: LTG-By discharge, patient will perform bathroom transfers     Dates: Start: 02/10/17      Description: 1) Individualized Goal: Mod I using AE/DME if needed    2) Interventions:  OT Group Therapy, OT Self Care/ADL, OT Community Reintegration, OT Neuro Re-Ed/Balance, OT Therapeutic Activity, OT  Evaluation and OT Therapeutic Exercise        Goal: LTG-By discharge, patient will complete basic home management     Dates: Start: 02/10/17      Description: 1) Individualized Goal: Mod I using DME if needed    2) Interventions:  OT Group Therapy, OT Self Care/ADL, OT Community Reintegration, OT Neuro Re-Ed/Balance, OT Therapeutic Activity, OT Evaluation and OT Therapeutic Exercise              Section completed by:  Damaris Dailey OTAYLOR

## 2017-02-14 NOTE — CARE PLAN
Problem: Bathing  Goal: STG-Within one week, patient will bathe  1) Individualized Goal: at SPV level using AE/DME if needed  2) Interventions: OT Group Therapy, OT Self Care/ADL, OT Community Reintegration, OT Neuro Re-Ed/Balance, OT Therapeutic Activity, OT Evaluation and OT Therapeutic Exercise  Outcome: MET Date Met:  02/14/17  Pt is at SPV level    Problem: Dressing  Goal: STG-Within one week, patient will retrieve clothing  1) Individualized Goal: at SPV level using DME if needed  2) Interventions: OT Group Therapy, OT Self Care/ADL, OT Community Reintegration, OT Neuro Re-Ed/Balance, OT Therapeutic Activity, OT Evaluation and OT Therapeutic Exercise   Outcome: MET Date Met:  02/14/17  Pt is at SPV level using FWW    Problem: Functional Transfers  Goal: STG-Within one week, patient will transfer to tub/shower  1) Individualized Goal: at SPV level using DME if needed  2) Interventions: OT Group Therapy, OT Self Care/ADL, OT Community Reintegration, OT Neuro Re-Ed/Balance, OT Therapeutic Activity, OT Evaluation and OT Therapeutic Exercise   Outcome: MET Date Met:  02/14/17  Pt is at SPV level    Problem: IADL’s  Goal: STG-Within one week, patient will prepare a meal  1) Individualized Goal: at SPV level using DME if needed  2) Interventions: OT Group Therapy, OT Self Care/ADL, OT Community Reintegration, OT Neuro Re-Ed/Balance, OT Therapeutic Activity, OT Evaluation and OT Therapeutic Exercise   Outcome: NOT MET  Pt SBA using FWW for lt hot meal prep

## 2017-02-14 NOTE — CARE PLAN
Problem: Safety  Goal: Will remain free from injury  Outcome: PROGRESSING AS EXPECTED  Pt.uses call light within reach,  With good safety awareness but hourly rounding continue for safety. Only need standby assist to the toilet.    Problem: Pain Management  Goal: Pain level will decrease to patient’s comfort goal  Outcome: PROGRESSING AS EXPECTED  Pt. Request Tramadol for pain with good relief, also had sleeping medication. Resting and sleeping now comfortably. No other complaints made, continue monitor condition.

## 2017-02-14 NOTE — PROGRESS NOTES
"Rehab Progress Note     Chief complaint: follow-up mitral valve prolapse  Date of Service: February 14, 2017    Interval Events (Subjective)  Patient seen and examined. No acute events overnight. Reports right posterior shoulder discomfort. Denies any other pain. Thinks that therapy is going well. Would like to take her home arthritis medications including glucosamine. No other complaints.      Objective:  VITAL SIGNS: BP 99/61 mmHg  Pulse 80  Temp(Src) 36.6 °C (97.8 °F)  Resp 19  Ht 1.702 m (5' 7\")  Wt 56 kg (123 lb 7.3 oz)  BMI 19.33 kg/m2  SpO2 95%  LMP  (LMP Unknown)  Gen: alert, no apparent distress  CV: regular rate and rhythm, no murmurs, no peripheral edema, sternal incision c/d/i  Resp: clear to ascultation bilaterally, normal respiratory effort  GI: soft, non-tender abdomen, bowel sounds present      Recent Results (from the past 72 hour(s))   PROTHROMBIN TIME    Collection Time: 02/12/17  5:32 AM   Result Value Ref Range    PT 21.3 (H) 12.0 - 14.6 sec    INR 1.78 (H) 0.87 - 1.13   PROTHROMBIN TIME    Collection Time: 02/13/17  5:30 AM   Result Value Ref Range    PT 21.5 (H) 12.0 - 14.6 sec    INR 1.81 (H) 0.87 - 1.13   PROTHROMBIN TIME    Collection Time: 02/14/17  6:26 AM   Result Value Ref Range    PT 21.7 (H) 12.0 - 14.6 sec    INR 1.83 (H) 0.87 - 1.13       Current Facility-Administered Medications   Medication Frequency   • pneumococcal 13-Coleen Conj Vacc (PREVNAR 13) syringe 0.5 mL Once PRN   • melatonin tablet 3 mg QHS   • tramadol (ULTRAM) 50 MG tablet 50 mg Q6HRS PRN   • acetaminophen (TYLENOL) tablet 650 mg Q4HRS PRN    Or   • acetaminophen (TYLENOL) suppository 650 mg Q4HRS PRN   • aspirin EC (ECOTRIN) tablet 81 mg DAILY   • docusate sodium (COLACE) capsule 100 mg QAM    And   • senna-docusate (PERICOLACE or SENOKOT S) 8.6-50 MG per tablet 1 Tab Nightly    And   • senna-docusate (PERICOLACE or SENOKOT S) 8.6-50 MG per tablet 1 Tab Q24HRS PRN    And   • lactulose 20 GM/30ML solution 30 mL " Q24HRS PRN    And   • bisacodyl (DULCOLAX) suppository 10 mg Q24HRS PRN    And   • fleet enema 133 mL Once PRN   • mag hydrox-al hydrox-simeth (MAALOX PLUS ES or MYLANTA DS) suspension 30 mL Q4HRS PRN   • MD ALERT... warfarin (COUMADIN) per pharmacy protocol PRN   • ipratropium-albuterol (DUONEB) nebulizer solution 3 mL Q2HRS PRN   • artificial tears 1.4 % ophthalmic solution 1 Drop PRN   • multivitamin (THERAGRAN) tablet 1 Tab DAILY   • oyster shell calcium/vitamin D 250-125 MG-UNIT tablet 1 Tab QDAY with Breakfast   • pravastatin (PRAVACHOL) tablet 20 mg Q EVENING   • sodium chloride (OCEAN) 0.65 % nasal spray 2 Spray PRN   • hydrocodone-acetaminophen (NORCO) 7.5-325 MG per tablet 1 Tab Q4HRS PRN   • hydrocodone-acetaminophen (NORCO) 5-325 MG per tablet 1-2 Tab Q4HRS PRN   • ondansetron (ZOFRAN ODT) dispertab 4 mg 4X/DAY PRN    Or   • ondansetron (ZOFRAN) syringe/vial injection 4 mg 4X/DAY PRN   • cyclobenzaprine (FLEXERIL) tablet 10 mg QHS   • cyclobenzaprine (FLEXERIL) tablet 10 mg BID PRN   • trazodone (DESYREL) tablet 50 mg QHS PRN       Orders Placed This Encounter   Procedures   • DIET ORDER     Standing Status: Standing      Number of Occurrences: 1      Standing Expiration Date:      Order Specific Question:  Diet:     Answer:  Cardiac [6]       Assessment:  Active Hospital Problems    Diagnosis   • S/P MVR (mitral valve repair)     FIMs  Modified independent eating/grooming/upper body dressing  Supervision bathing/toileting/transfers/lower body dressing  Supervision ambulation, stairs  Min assist bowel, bladder  Modified independent comprehension/social interaction  Supervision expression/problem solving  Min assist memory    Date of conference: 2/14/2017    RN issues: none, eating well, uses pain pill at night only, moving bowels    PT: barriers: SBA for 18 stairs, flight of stairs in house, ambulates without AD    OT: barriers: mildly impaired balance    RT: off O2 during day, continue O2 at night, has  O2 at home    CM/social support:     Anticipated DC date: Sat 2/18    Home health: outpatient PT, cardiac rehab    Equip: none    Follow up: surgeon, PCP, neurology    Medical Decision Making and Plan:    Labs reviewed. Medications reviewed. Appreciate the assistance of the hospitalist.    Mitral valve prolapse status post mitral valve repair - continue sternal precautions.  Continue aspirin and Coumadin.    Right posterior shoulder pain - likely muscular in nature. Advised to let therapists know so they can do some manual treatments.    Respiratory insufficiency, nocturnal hypoxia - 2 oxygen. Respiratory therapist to see patient. Titrate oxygen during the day.    Pleural effusions status post bilateral thoracentesis - improved. Continue incentive spirometry. Lasix discontinued.     Hypertension - is on losartan at home. Currently not on blood pressure medications. Continue to monitor.    Anemia - normocytic. Likely post operative. Continue to monitor.    Hyperlipidemia - continue statin.    Right thumb pain - neuropathic in nature. Differential includes cervical stenosis, brachial plexus injury, or less likely radial nerve injury. Continue to monitor. Improved. May need nerve pain medicines and/or outpatient nerve conduction study.    GI prophylaxis - will discontinue Pepcid as this has been known to cause hyponatremia. Patient not complaining of heartburn. Continue to monitor.     DVT prophylaxis - Coumadin. Dosing per pharmacy.    Total time:  >35 minutes.  I spent greater than 50% of the time for patient care and coordination on this date, including unit/floor time, and face-to-face time with the patient as per assessment and plan above.    Ashley Camacho M.D.

## 2017-02-14 NOTE — PROGRESS NOTES
Pharmacy Warfarin Consult   2/14/2017     66 y.o.   female     Indication for anticoagulation: Bioprosthetic Valve Replacement    Goal INR = 2 - 3    Recent Labs      02/12/17   0532  02/13/17   0530  02/14/17   0626   INR  1.78*  1.81*  1.83*       Pertinent Drug/Drug Interactions:  Aspirin, Statin  Outpatient Warfarin Regimen:  None noted  Recent Warfarin Dosing:    Dose from last 7 days     Date/Time Dose (mg)    02/14/17 0626 10    02/13/17 0530 8    02/12/17 1146 8    02/11/17 1159 7.5    02/10/17 0527 7.5    02/09/17 1115 7.5    02/09/17 1100 --          Bridge Therapy: None        1.  Coumadin 10 mg tonight for INR= 1.83        Liban Daugherty [facility]

## 2017-02-15 LAB
INR PPP: 1.96 (ref 0.87–1.13)
PROTHROMBIN TIME: 22.9 SEC (ref 12–14.6)

## 2017-02-15 PROCEDURE — 3E0234Z INTRODUCTION OF SERUM, TOXOID AND VACCINE INTO MUSCLE, PERCUTANEOUS APPROACH: ICD-10-PCS | Performed by: PHYSICAL MEDICINE & REHABILITATION

## 2017-02-15 PROCEDURE — A9270 NON-COVERED ITEM OR SERVICE: HCPCS | Performed by: PHYSICAL MEDICINE & REHABILITATION

## 2017-02-15 PROCEDURE — 700112 HCHG RX REV CODE 229: Performed by: PHYSICAL MEDICINE & REHABILITATION

## 2017-02-15 PROCEDURE — 97530 THERAPEUTIC ACTIVITIES: CPT

## 2017-02-15 PROCEDURE — 99232 SBSQ HOSP IP/OBS MODERATE 35: CPT | Performed by: PHYSICAL MEDICINE & REHABILITATION

## 2017-02-15 PROCEDURE — 97112 NEUROMUSCULAR REEDUCATION: CPT

## 2017-02-15 PROCEDURE — 90670 PCV13 VACCINE IM: CPT | Performed by: PHYSICAL MEDICINE & REHABILITATION

## 2017-02-15 PROCEDURE — 700111 HCHG RX REV CODE 636 W/ 250 OVERRIDE (IP): Performed by: PHYSICAL MEDICINE & REHABILITATION

## 2017-02-15 PROCEDURE — 36415 COLL VENOUS BLD VENIPUNCTURE: CPT

## 2017-02-15 PROCEDURE — 90471 IMMUNIZATION ADMIN: CPT

## 2017-02-15 PROCEDURE — 770010 HCHG ROOM/CARE - REHAB SEMI PRIVAT*

## 2017-02-15 PROCEDURE — 700102 HCHG RX REV CODE 250 W/ 637 OVERRIDE(OP): Performed by: PHYSICAL MEDICINE & REHABILITATION

## 2017-02-15 PROCEDURE — 97116 GAIT TRAINING THERAPY: CPT

## 2017-02-15 PROCEDURE — 97537 COMMUNITY/WORK REINTEGRATION: CPT

## 2017-02-15 PROCEDURE — 97110 THERAPEUTIC EXERCISES: CPT

## 2017-02-15 PROCEDURE — 94760 N-INVAS EAR/PLS OXIMETRY 1: CPT

## 2017-02-15 PROCEDURE — 85610 PROTHROMBIN TIME: CPT

## 2017-02-15 RX ORDER — WARFARIN SODIUM 1 MG/1
1 TABLET ORAL
Status: DISCONTINUED | OUTPATIENT
Start: 2017-02-15 | End: 2017-02-15

## 2017-02-15 RX ORDER — WARFARIN SODIUM 5 MG/1
10 TABLET ORAL
Status: COMPLETED | OUTPATIENT
Start: 2017-02-15 | End: 2017-02-15

## 2017-02-15 RX ADMIN — THERA TABS 1 TABLET: TAB at 09:12

## 2017-02-15 RX ADMIN — Medication 3 MG: at 20:03

## 2017-02-15 RX ADMIN — DOCUSATE SODIUM 100 MG: 100 CAPSULE, LIQUID FILLED ORAL at 09:12

## 2017-02-15 RX ADMIN — Medication 1 TABLET: at 20:00

## 2017-02-15 RX ADMIN — ASPIRIN 81 MG: 81 TABLET, COATED ORAL at 09:12

## 2017-02-15 RX ADMIN — PRAVASTATIN SODIUM 20 MG: 20 TABLET ORAL at 20:03

## 2017-02-15 RX ADMIN — PNEUMOCOCCAL 13-VALENT CONJUGATE VACCINE 0.5 ML: 2.2; 2.2; 2.2; 2.2; 2.2; 4.4; 2.2; 2.2; 2.2; 2.2; 2.2; 2.2; 2.2 INJECTION, SUSPENSION INTRAMUSCULAR at 09:26

## 2017-02-15 RX ADMIN — CALCIUM CARBONATE-CHOLECALCIFEROL TAB 250 MG-125 UNIT 1 TABLET: 250-125 TAB at 09:12

## 2017-02-15 RX ADMIN — TRAMADOL HYDROCHLORIDE 50 MG: 50 TABLET, FILM COATED ORAL at 20:08

## 2017-02-15 RX ADMIN — WARFARIN SODIUM 10 MG: 5 TABLET ORAL at 17:40

## 2017-02-15 RX ADMIN — Medication 1 TABLET: at 08:00

## 2017-02-15 RX ADMIN — STANDARDIZED SENNA CONCENTRATE AND DOCUSATE SODIUM 1 TABLET: 8.6; 5 TABLET, FILM COATED ORAL at 20:03

## 2017-02-15 ASSESSMENT — PAIN SCALES - GENERAL
PAINLEVEL_OUTOF10: 0
PAINLEVEL_OUTOF10: 1
PAINLEVEL_OUTOF10: 3

## 2017-02-15 NOTE — CARE PLAN
Problem: Safety  Goal: Will remain free from falls  Outcome: PROGRESSING AS EXPECTED  Safety precautions are in place to avoid accidental injury including call light and personal possessions within easy reach, hourly rounding and assessing need for toileting and pain. Patient is in agreement for safety measures and verbalizes understanding of indication. Will continue to monitor for accidental injury and prevent by continuing safety precautions.     Problem: Pain Management  Goal: Pain level will decrease to patient’s comfort goal  Outcome: PROGRESSING AS EXPECTED  Pt reports 5/10 pain to back at initial rounding. Pt medicated with Tramadol per pt request with good result. Pt sleeping upon reassessment.

## 2017-02-15 NOTE — DIETARY
Nutrition Services:   Pt is on a cardiac diet. Pt states her appetite is great and eating most of her meals. Discussed snack options, obtained preferences. Pt had questions regarding cardiac diet, answered pt's questions. Per chart pt %. Wt on 2/12 - 56 kg via stand up scale, wt stable.     Pertinent Labs 2/11: Reviewed  Pertinent Meds: flexeril, colace, pericolace, melatonin, MVI, calcium/vitamin D, pravachol, coumadin  Fluids: No IV fluids noted in MAR   Skin: surgical incision bilateral upper abdomen, surgical incision heart prep  GI: Last BM 2/14    PLAN/RECOMMEND -   1) Nutrition rep to see patient for meal and snack preferences.  2) Encourage PO  3) Weekly weights to monitor fluid and nutrition status  4) Obtain supplement order per RD as needed.    RD available prn

## 2017-02-15 NOTE — CARE PLAN
Problem: Other Problem (see comments)  Goal: Other Goal  Monitor/Evaluation: RD following weekly.   Goal: Intake > 50% of nutrient needs via meals, supplements, snacks to promote weight gain/ nutrition optimization/ healing.   Outcome: MET Date Met:  02/15/17  Per chart pt %

## 2017-02-15 NOTE — DISCHARGE PLANNING
Case Management:  Met with patient and reviewed team conference discussion.  She is in agreement with discharge target of 2/18/17.  We will refer for outpatient therapy per our discussion.  Will follow.

## 2017-02-15 NOTE — PROGRESS NOTES
Pharmacy Warfarin Consult   2/15/2017     66 y.o.   female     Indication for anticoagulation: Bioprosthetic Valve Replacement    Goal INR = 2-3    Recent Labs      02/13/17   0530  02/14/17   0626  02/15/17   0623   INR  1.81*  1.83*  1.96*       Pertinent Drug/Drug Interactions:  Aspirin, Statin  Outpatient Warfarin Regimen:  None noted  Recent Warfarin Dosing:    Dose from last 7 days     Date/Time Dose (mg)    02/15/17 0623 10    02/14/17 0626 10    02/13/17 0530 8    02/12/17 1146 8    02/11/17 1159 7.5    02/10/17 0527 7.5    02/09/17 1115 7.5    02/09/17 1100 --          Bridge Therapy: None        1.  Coumadin 10mg tonight for INR= 1.96        Jon [facility]

## 2017-02-15 NOTE — FLOWSHEET NOTE
02/15/17 0801   Events/Summary/Plan   Events/Summary/Plan Pt on RA aida well using O2 just at night home use pt aida well is stable will monitor.   Non-Invasive Resp Device Site Inspection Completed Intact   Location nasal cannula ears   Interdisciplinary Plan of Care-Goals (Indications)   Obstructive Ventilatory Defect or Pulmonary Disease without Obvious Obstruction Strong Subjective / Objective Improvement;History / Diagnosis   Education   Education Yes - Pt. / Family has been Instructed in use of Home Oxygen   Respiratory WDL   Respiratory (WDL) X   Chest Exam   Work Of Breathing / Effort Mild   Respiration 18   Pulse 92   Breath Sounds   RUL Breath Sounds Clear   RML Breath Sounds Clear   RLL Breath Sounds Diminished   PRAMOD Breath Sounds Clear   LLL Breath Sounds Diminished   Secretions   Cough Non Productive   Oximetry   #Pulse Oximetry (Single Determination) Yes   Oxygen   Home O2 Use Prior To Admission? Yes   Home O2 LPM Flow 2 LPM   Home O2 Delivery Method Nasal Cannula   Home O2 Frequency of Use At Sleep   Pulse Oximetry 93 %   O2 (LPM) 0   O2 (FiO2) 21   O2 Daily Delivery Respiratory  Room Air with O2 Available

## 2017-02-15 NOTE — PROGRESS NOTES
Received shift report and assumed care of patient. Patient awake, calm and stable, currently positioned in wheelchair for comfort and safety; call light within reach and bed alarm in place. Denies pain or discomfort at this time.

## 2017-02-15 NOTE — PROGRESS NOTES
"Rehab Progress Note     Chief complaint: follow-up mitral valve prolapse  Date of Service: February 15, 2017    Interval Events (Subjective)  Patient seen and examined. No acute events overnight. Reports arm and right shoulder feel better today. He has less sharp shooting pains in her legs. Think therapy is going quite well. Her neighbor/friend is here visiting her. No other complaints.    Objective:  VITAL SIGNS: /64 mmHg  Pulse 88  Temp(Src) 36.4 °C (97.6 °F)  Resp 18  Ht 1.702 m (5' 7\")  Wt 56 kg (123 lb 7.3 oz)  BMI 19.33 kg/m2  SpO2 92%  LMP  (LMP Unknown)  Gen: alert, no apparent distress  CV: regular rate and rhythm, no murmurs, no peripheral edema, sternal incision c/d/i  Resp: clear to ascultation bilaterally, normal respiratory effort  GI: soft, non-tender abdomen, bowel sounds present      Recent Results (from the past 72 hour(s))   PROTHROMBIN TIME    Collection Time: 02/13/17  5:30 AM   Result Value Ref Range    PT 21.5 (H) 12.0 - 14.6 sec    INR 1.81 (H) 0.87 - 1.13   PROTHROMBIN TIME    Collection Time: 02/14/17  6:26 AM   Result Value Ref Range    PT 21.7 (H) 12.0 - 14.6 sec    INR 1.83 (H) 0.87 - 1.13   PROTHROMBIN TIME    Collection Time: 02/15/17  6:23 AM   Result Value Ref Range    PT 22.9 (H) 12.0 - 14.6 sec    INR 1.96 (H) 0.87 - 1.13       Current Facility-Administered Medications   Medication Frequency   • warfarin (COUMADIN) tablet 10 mg COUMADIN-ONCE   • JOINTASTIN CAPS (Patient own supplied medication in cart drawer) BID   • melatonin tablet 3 mg QHS   • tramadol (ULTRAM) 50 MG tablet 50 mg Q6HRS PRN   • acetaminophen (TYLENOL) tablet 650 mg Q4HRS PRN    Or   • acetaminophen (TYLENOL) suppository 650 mg Q4HRS PRN   • aspirin EC (ECOTRIN) tablet 81 mg DAILY   • docusate sodium (COLACE) capsule 100 mg QAM    And   • senna-docusate (PERICOLACE or SENOKOT S) 8.6-50 MG per tablet 1 Tab Nightly    And   • senna-docusate (PERICOLACE or SENOKOT S) 8.6-50 MG per tablet 1 Tab Q24HRS " PRN    And   • lactulose 20 GM/30ML solution 30 mL Q24HRS PRN    And   • bisacodyl (DULCOLAX) suppository 10 mg Q24HRS PRN    And   • fleet enema 133 mL Once PRN   • mag hydrox-al hydrox-simeth (MAALOX PLUS ES or MYLANTA DS) suspension 30 mL Q4HRS PRN   • MD ALERT... warfarin (COUMADIN) per pharmacy protocol PRN   • ipratropium-albuterol (DUONEB) nebulizer solution 3 mL Q2HRS PRN   • artificial tears 1.4 % ophthalmic solution 1 Drop PRN   • multivitamin (THERAGRAN) tablet 1 Tab DAILY   • oyster shell calcium/vitamin D 250-125 MG-UNIT tablet 1 Tab QDAY with Breakfast   • pravastatin (PRAVACHOL) tablet 20 mg Q EVENING   • sodium chloride (OCEAN) 0.65 % nasal spray 2 Spray PRN   • hydrocodone-acetaminophen (NORCO) 7.5-325 MG per tablet 1 Tab Q4HRS PRN   • hydrocodone-acetaminophen (NORCO) 5-325 MG per tablet 1-2 Tab Q4HRS PRN   • ondansetron (ZOFRAN ODT) dispertab 4 mg 4X/DAY PRN    Or   • ondansetron (ZOFRAN) syringe/vial injection 4 mg 4X/DAY PRN   • cyclobenzaprine (FLEXERIL) tablet 10 mg QHS   • cyclobenzaprine (FLEXERIL) tablet 10 mg BID PRN   • trazodone (DESYREL) tablet 50 mg QHS PRN       Orders Placed This Encounter   Procedures   • DIET ORDER     Standing Status: Standing      Number of Occurrences: 1      Standing Expiration Date:      Order Specific Question:  Diet:     Answer:  Cardiac [6]       Assessment:  Active Hospital Problems    Diagnosis   • S/P MVR (mitral valve repair)     Date of conference: 2/14/2017  Anticipated DC date: Sat 2/18  Home health: outpatient PT, cardiac rehab  Equip: none  Follow up: surgeon, PCP, neurology    Medical Decision Making and Plan:    Labs reviewed. Medications reviewed. Appreciate the assistance of the hospitalist.    Mitral valve prolapse status post mitral valve repair - continue sternal precautions.  Continue aspirin and Coumadin.    Right posterior shoulder pain - improved, likely muscular in nature. Continue modalities with therapy.    Respiratory insufficiency,  nocturnal hypoxia - 2L oxygen. Respiratory therapist to see patient. Titrate oxygen during the day.     Pleural effusions status post bilateral thoracentesis - improved. Continue incentive spirometry. Lasix discontinued.     Hypertension - is on losartan at home. Currently not on blood pressure medications. Continue to monitor.    Anemia - normocytic. Likely post operative. Continue to monitor.    Hyperlipidemia - continue statin.    Right thumb pain - neuropathic in nature. Differential includes cervical stenosis, brachial plexus injury, or less likely radial nerve injury. Continue to monitor. Improved. May need nerve pain medicines and/or outpatient nerve conduction study.    GI prophylaxis - will discontinue Pepcid as this has been known to cause hyponatremia. Patient not complaining of heartburn. Continue to monitor.     DVT prophylaxis - Coumadin. Dosing per pharmacy.    Total time:  >25 minutes.  I spent greater than 50% of the time for patient care and coordination on this date, including unit/floor time, and face-to-face time with the patient as per assessment and plan above.    Ashley Camacho M.D.
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
## 2017-02-16 LAB
ALBUMIN SERPL BCP-MCNC: 3 G/DL (ref 3.2–4.9)
ALBUMIN/GLOB SERPL: 1.2 G/DL
ALP SERPL-CCNC: 69 U/L (ref 30–99)
ALT SERPL-CCNC: 16 U/L (ref 2–50)
ANION GAP SERPL CALC-SCNC: 9 MMOL/L (ref 0–11.9)
AST SERPL-CCNC: 18 U/L (ref 12–45)
BASOPHILS # BLD AUTO: 0.2 % (ref 0–1.8)
BASOPHILS # BLD: 0.01 K/UL (ref 0–0.12)
BILIRUB SERPL-MCNC: 0.3 MG/DL (ref 0.1–1.5)
BNP SERPL-MCNC: 190 PG/ML (ref 0–100)
BUN SERPL-MCNC: 13 MG/DL (ref 8–22)
CALCIUM SERPL-MCNC: 9 MG/DL (ref 8.5–10.5)
CHLORIDE SERPL-SCNC: 101 MMOL/L (ref 96–112)
CO2 SERPL-SCNC: 25 MMOL/L (ref 20–33)
CREAT SERPL-MCNC: 0.65 MG/DL (ref 0.5–1.4)
EOSINOPHIL # BLD AUTO: 0.05 K/UL (ref 0–0.51)
EOSINOPHIL NFR BLD: 1.1 % (ref 0–6.9)
ERYTHROCYTE [DISTWIDTH] IN BLOOD BY AUTOMATED COUNT: 46.5 FL (ref 35.9–50)
GFR SERPL CREATININE-BSD FRML MDRD: >60 ML/MIN/1.73 M 2
GLOBULIN SER CALC-MCNC: 2.6 G/DL (ref 1.9–3.5)
GLUCOSE SERPL-MCNC: 86 MG/DL (ref 65–99)
HCT VFR BLD AUTO: 28.4 % (ref 37–47)
HGB BLD-MCNC: 9 G/DL (ref 12–16)
IMM GRANULOCYTES # BLD AUTO: 0.01 K/UL (ref 0–0.11)
IMM GRANULOCYTES NFR BLD AUTO: 0.2 % (ref 0–0.9)
INR PPP: 2 (ref 0.87–1.13)
LYMPHOCYTES # BLD AUTO: 1.18 K/UL (ref 1–4.8)
LYMPHOCYTES NFR BLD: 24.9 % (ref 22–41)
MCH RBC QN AUTO: 29.7 PG (ref 27–33)
MCHC RBC AUTO-ENTMCNC: 31.7 G/DL (ref 33.6–35)
MCV RBC AUTO: 93.7 FL (ref 81.4–97.8)
MONOCYTES # BLD AUTO: 0.61 K/UL (ref 0–0.85)
MONOCYTES NFR BLD AUTO: 12.9 % (ref 0–13.4)
NEUTROPHILS # BLD AUTO: 2.88 K/UL (ref 2–7.15)
NEUTROPHILS NFR BLD: 60.7 % (ref 44–72)
NRBC # BLD AUTO: 0 K/UL
NRBC BLD AUTO-RTO: 0 /100 WBC
PLATELET # BLD AUTO: 454 K/UL (ref 164–446)
PMV BLD AUTO: 10.2 FL (ref 9–12.9)
POTASSIUM SERPL-SCNC: 4.2 MMOL/L (ref 3.6–5.5)
PROT SERPL-MCNC: 5.6 G/DL (ref 6–8.2)
PROTHROMBIN TIME: 23.3 SEC (ref 12–14.6)
RBC # BLD AUTO: 3.03 M/UL (ref 4.2–5.4)
SODIUM SERPL-SCNC: 135 MMOL/L (ref 135–145)
WBC # BLD AUTO: 4.7 K/UL (ref 4.8–10.8)

## 2017-02-16 PROCEDURE — 97116 GAIT TRAINING THERAPY: CPT

## 2017-02-16 PROCEDURE — 97110 THERAPEUTIC EXERCISES: CPT

## 2017-02-16 PROCEDURE — 97530 THERAPEUTIC ACTIVITIES: CPT

## 2017-02-16 PROCEDURE — 94760 N-INVAS EAR/PLS OXIMETRY 1: CPT

## 2017-02-16 PROCEDURE — A9270 NON-COVERED ITEM OR SERVICE: HCPCS | Performed by: PHYSICAL MEDICINE & REHABILITATION

## 2017-02-16 PROCEDURE — 80053 COMPREHEN METABOLIC PANEL: CPT

## 2017-02-16 PROCEDURE — 83880 ASSAY OF NATRIURETIC PEPTIDE: CPT

## 2017-02-16 PROCEDURE — 99233 SBSQ HOSP IP/OBS HIGH 50: CPT | Performed by: HOSPITALIST

## 2017-02-16 PROCEDURE — 36415 COLL VENOUS BLD VENIPUNCTURE: CPT

## 2017-02-16 PROCEDURE — 700102 HCHG RX REV CODE 250 W/ 637 OVERRIDE(OP): Performed by: PHYSICAL MEDICINE & REHABILITATION

## 2017-02-16 PROCEDURE — 85610 PROTHROMBIN TIME: CPT

## 2017-02-16 PROCEDURE — 700112 HCHG RX REV CODE 229: Performed by: PHYSICAL MEDICINE & REHABILITATION

## 2017-02-16 PROCEDURE — 770010 HCHG ROOM/CARE - REHAB SEMI PRIVAT*

## 2017-02-16 PROCEDURE — 99232 SBSQ HOSP IP/OBS MODERATE 35: CPT | Performed by: PHYSICAL MEDICINE & REHABILITATION

## 2017-02-16 PROCEDURE — 97112 NEUROMUSCULAR REEDUCATION: CPT

## 2017-02-16 PROCEDURE — 85025 COMPLETE CBC W/AUTO DIFF WBC: CPT

## 2017-02-16 RX ORDER — WARFARIN SODIUM 5 MG/1
10 TABLET ORAL
Status: COMPLETED | OUTPATIENT
Start: 2017-02-16 | End: 2017-02-16

## 2017-02-16 RX ADMIN — WARFARIN SODIUM 10 MG: 5 TABLET ORAL at 17:47

## 2017-02-16 RX ADMIN — PRAVASTATIN SODIUM 20 MG: 20 TABLET ORAL at 20:17

## 2017-02-16 RX ADMIN — ASPIRIN 81 MG: 81 TABLET, COATED ORAL at 08:17

## 2017-02-16 RX ADMIN — DOCUSATE SODIUM 100 MG: 100 CAPSULE, LIQUID FILLED ORAL at 08:17

## 2017-02-16 RX ADMIN — TRAMADOL HYDROCHLORIDE 50 MG: 50 TABLET, FILM COATED ORAL at 20:19

## 2017-02-16 RX ADMIN — STANDARDIZED SENNA CONCENTRATE AND DOCUSATE SODIUM 1 TABLET: 8.6; 5 TABLET, FILM COATED ORAL at 20:16

## 2017-02-16 RX ADMIN — CALCIUM CARBONATE-CHOLECALCIFEROL TAB 250 MG-125 UNIT 1 TABLET: 250-125 TAB at 08:17

## 2017-02-16 RX ADMIN — Medication 3 MG: at 20:17

## 2017-02-16 RX ADMIN — THERA TABS 1 TABLET: TAB at 08:17

## 2017-02-16 RX ADMIN — TRAZODONE HYDROCHLORIDE 50 MG: 50 TABLET ORAL at 22:45

## 2017-02-16 RX ADMIN — Medication 1 TABLET: at 08:00

## 2017-02-16 RX ADMIN — Medication 1 TABLET: at 21:00

## 2017-02-16 ASSESSMENT — ENCOUNTER SYMPTOMS
NERVOUS/ANXIOUS: 0
NAUSEA: 0
FEVER: 0
ABDOMINAL PAIN: 0
VOMITING: 0
DIARRHEA: 0
SHORTNESS OF BREATH: 0
CHILLS: 0

## 2017-02-16 ASSESSMENT — GAIT ASSESSMENTS
GAIT LEVEL OF ASSIST: MODIFIED INDEPENDENT
DEVIATION: BRADYKINETIC
DISTANCE (FEET): 2644
DISTANCE (FEET): 1500
DEVIATION: BRADYKINETIC
GAIT LEVEL OF ASSIST: STAND BY ASSIST

## 2017-02-16 ASSESSMENT — PAIN SCALES - GENERAL
PAINLEVEL_OUTOF10: 4
PAINLEVEL_OUTOF10: 0

## 2017-02-16 NOTE — CARE PLAN
Problem: Safety  Goal: Will remain free from injury  Outcome: PROGRESSING AS EXPECTED  Patient is Mod I on the unit. Have good safety awareness. Encouraged to call for assistance when needed. Remains free from injury.
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
## 2017-02-16 NOTE — CARE PLAN
Problem: Infection  Goal: Will remain free from infection  Surgical incision open to air with no signs of infection, will continue to monitor.    Problem: Pain Management  Goal: Pain level will decrease to patient’s comfort goal  Medicated pt with tramadol at bedtime per pt request with relief, pt refused scheduled flexeril, will continue to monitor.

## 2017-02-16 NOTE — DISCHARGE PLANNING
Case Management;  Received phone message from patient and she would like to obtain a rental w/c for community mobility.  I will check on private pay pricing and follow for this.  Will refer for outpatient therapy per our discussion.

## 2017-02-16 NOTE — CARE PLAN
Problem: Pain Management  Goal: Pain level will decrease to patient’s comfort goal  Outcome: PROGRESSING AS EXPECTED  Patient denied any pain during shift.

## 2017-02-16 NOTE — PROGRESS NOTES
Pharmacy Warfarin Consult   2/16/2017     66 y.o.   female     Indication for anticoagulation: Bioprosthetic Valve Replacement     Goal INR = 2-3    Recent Labs      02/14/17   0626  02/15/17   0623  02/16/17   0527   INR  1.83*  1.96*  2.00*   HEMOGLOBIN   --    --   9.0*   HEMATOCRIT   --    --   28.4*       Pertinent Drug/Drug Interactions:  Aspirin, Statin  Outpatient Warfarin Regimen:  None noted  Recent Warfarin Dosing:    Dose from last 7 days     Date/Time INR Dose (mg)    02/16/17 0527 10    02/15/17 0623 10    02/14/17 0626 10    02/13/17 0530 8    02/12/17 1146 8    02/11/17 1159 7.5    02/10/17 0527 7.5    02/09/17 1115 7.5    02/09/17 1100 --        Bridge Therapy: None        1.  Coumadin 10 mg tonight for INR = 2.00        Ulises BakerD

## 2017-02-16 NOTE — FLOWSHEET NOTE
02/16/17 0810   Events/Summary/Plan   Events/Summary/Plan Pt is on RA stable pt using 2Lpm at night home use pt aida well will monitor.   Non-Invasive Resp Device Site Inspection Completed Intact   Location ears nasal cannula   Interdisciplinary Plan of Care-Goals (Indications)   Obstructive Ventilatory Defect or Pulmonary Disease without Obvious Obstruction Strong Subjective / Objective Improvement   Education   Education Yes - Pt. / Family has been Instructed in use of Home Oxygen   Respiratory WDL   Respiratory (WDL) X   Chest Exam   Work Of Breathing / Effort Mild   Respiration 18   Pulse 93   Breath Sounds   RUL Breath Sounds Clear   RML Breath Sounds Clear   RLL Breath Sounds Diminished   PRAMOD Breath Sounds Clear   LLL Breath Sounds Diminished   Secretions   Cough Non Productive   Oximetry   #Pulse Oximetry (Single Determination) Yes   Oxygen   Home O2 Use Prior To Admission? Yes   Home O2 LPM Flow 2 LPM   Home O2 Delivery Method Nasal Cannula   Home O2 Frequency of Use At Sleep   Pulse Oximetry 95 %   O2 (LPM) 0   O2 (FiO2) 21   O2 Daily Delivery Respiratory  Room Air with O2 Available

## 2017-02-17 LAB
ALBUMIN SERPL BCP-MCNC: 3 G/DL (ref 3.2–4.9)
ALBUMIN/GLOB SERPL: 1.2 G/DL
ALP SERPL-CCNC: 69 U/L (ref 30–99)
ALT SERPL-CCNC: 16 U/L (ref 2–50)
ANION GAP SERPL CALC-SCNC: 8 MMOL/L (ref 0–11.9)
AST SERPL-CCNC: 17 U/L (ref 12–45)
BASOPHILS # BLD AUTO: 0.2 % (ref 0–1.8)
BASOPHILS # BLD: 0.01 K/UL (ref 0–0.12)
BILIRUB SERPL-MCNC: 0.3 MG/DL (ref 0.1–1.5)
BNP SERPL-MCNC: 219 PG/ML (ref 0–100)
BUN SERPL-MCNC: 15 MG/DL (ref 8–22)
CALCIUM SERPL-MCNC: 8.9 MG/DL (ref 8.5–10.5)
CHLORIDE SERPL-SCNC: 106 MMOL/L (ref 96–112)
CO2 SERPL-SCNC: 23 MMOL/L (ref 20–33)
CREAT SERPL-MCNC: 0.57 MG/DL (ref 0.5–1.4)
EOSINOPHIL # BLD AUTO: 0.05 K/UL (ref 0–0.51)
EOSINOPHIL NFR BLD: 1.2 % (ref 0–6.9)
ERYTHROCYTE [DISTWIDTH] IN BLOOD BY AUTOMATED COUNT: 46.6 FL (ref 35.9–50)
GFR SERPL CREATININE-BSD FRML MDRD: >60 ML/MIN/1.73 M 2
GLOBULIN SER CALC-MCNC: 2.5 G/DL (ref 1.9–3.5)
GLUCOSE SERPL-MCNC: 87 MG/DL (ref 65–99)
HCT VFR BLD AUTO: 28.1 % (ref 37–47)
HGB BLD-MCNC: 8.9 G/DL (ref 12–16)
IMM GRANULOCYTES # BLD AUTO: 0.01 K/UL (ref 0–0.11)
IMM GRANULOCYTES NFR BLD AUTO: 0.2 % (ref 0–0.9)
INR PPP: 2.3 (ref 0.87–1.13)
LYMPHOCYTES # BLD AUTO: 1.3 K/UL (ref 1–4.8)
LYMPHOCYTES NFR BLD: 31 % (ref 22–41)
MCH RBC QN AUTO: 29.7 PG (ref 27–33)
MCHC RBC AUTO-ENTMCNC: 31.7 G/DL (ref 33.6–35)
MCV RBC AUTO: 93.7 FL (ref 81.4–97.8)
MONOCYTES # BLD AUTO: 0.52 K/UL (ref 0–0.85)
MONOCYTES NFR BLD AUTO: 12.4 % (ref 0–13.4)
NEUTROPHILS # BLD AUTO: 2.31 K/UL (ref 2–7.15)
NEUTROPHILS NFR BLD: 55 % (ref 44–72)
NRBC # BLD AUTO: 0 K/UL
NRBC BLD AUTO-RTO: 0 /100 WBC
PLATELET # BLD AUTO: 437 K/UL (ref 164–446)
PMV BLD AUTO: 9.9 FL (ref 9–12.9)
POTASSIUM SERPL-SCNC: 4.1 MMOL/L (ref 3.6–5.5)
PROT SERPL-MCNC: 5.5 G/DL (ref 6–8.2)
PROTHROMBIN TIME: 26 SEC (ref 12–14.6)
RBC # BLD AUTO: 3 M/UL (ref 4.2–5.4)
SODIUM SERPL-SCNC: 137 MMOL/L (ref 135–145)
WBC # BLD AUTO: 4.2 K/UL (ref 4.8–10.8)

## 2017-02-17 PROCEDURE — 94760 N-INVAS EAR/PLS OXIMETRY 1: CPT

## 2017-02-17 PROCEDURE — 97110 THERAPEUTIC EXERCISES: CPT

## 2017-02-17 PROCEDURE — 99232 SBSQ HOSP IP/OBS MODERATE 35: CPT | Performed by: PHYSICAL MEDICINE & REHABILITATION

## 2017-02-17 PROCEDURE — 97116 GAIT TRAINING THERAPY: CPT

## 2017-02-17 PROCEDURE — A9270 NON-COVERED ITEM OR SERVICE: HCPCS | Performed by: PHYSICAL MEDICINE & REHABILITATION

## 2017-02-17 PROCEDURE — 85025 COMPLETE CBC W/AUTO DIFF WBC: CPT

## 2017-02-17 PROCEDURE — 99232 SBSQ HOSP IP/OBS MODERATE 35: CPT | Performed by: HOSPITALIST

## 2017-02-17 PROCEDURE — 770010 HCHG ROOM/CARE - REHAB SEMI PRIVAT*

## 2017-02-17 PROCEDURE — 97530 THERAPEUTIC ACTIVITIES: CPT

## 2017-02-17 PROCEDURE — 700112 HCHG RX REV CODE 229: Performed by: PHYSICAL MEDICINE & REHABILITATION

## 2017-02-17 PROCEDURE — 80053 COMPREHEN METABOLIC PANEL: CPT

## 2017-02-17 PROCEDURE — 83880 ASSAY OF NATRIURETIC PEPTIDE: CPT

## 2017-02-17 PROCEDURE — 36415 COLL VENOUS BLD VENIPUNCTURE: CPT

## 2017-02-17 PROCEDURE — 97537 COMMUNITY/WORK REINTEGRATION: CPT

## 2017-02-17 PROCEDURE — 97535 SELF CARE MNGMENT TRAINING: CPT

## 2017-02-17 PROCEDURE — 700102 HCHG RX REV CODE 250 W/ 637 OVERRIDE(OP): Performed by: PHYSICAL MEDICINE & REHABILITATION

## 2017-02-17 PROCEDURE — 85610 PROTHROMBIN TIME: CPT

## 2017-02-17 RX ORDER — WARFARIN SODIUM 7.5 MG/1
7.5 TABLET ORAL
Status: COMPLETED | OUTPATIENT
Start: 2017-02-17 | End: 2017-02-17

## 2017-02-17 RX ADMIN — WARFARIN SODIUM 7.5 MG: 7.5 TABLET ORAL at 17:49

## 2017-02-17 RX ADMIN — Medication 3 MG: at 19:58

## 2017-02-17 RX ADMIN — CALCIUM CARBONATE-CHOLECALCIFEROL TAB 250 MG-125 UNIT 1 TABLET: 250-125 TAB at 08:15

## 2017-02-17 RX ADMIN — Medication 1 TABLET: at 08:00

## 2017-02-17 RX ADMIN — THERA TABS 1 TABLET: TAB at 08:15

## 2017-02-17 RX ADMIN — DOCUSATE SODIUM 100 MG: 100 CAPSULE, LIQUID FILLED ORAL at 08:15

## 2017-02-17 RX ADMIN — STANDARDIZED SENNA CONCENTRATE AND DOCUSATE SODIUM 1 TABLET: 8.6; 5 TABLET, FILM COATED ORAL at 19:58

## 2017-02-17 RX ADMIN — ASPIRIN 81 MG: 81 TABLET, COATED ORAL at 08:15

## 2017-02-17 RX ADMIN — PRAVASTATIN SODIUM 20 MG: 20 TABLET ORAL at 19:58

## 2017-02-17 RX ADMIN — TRAZODONE HYDROCHLORIDE 50 MG: 50 TABLET ORAL at 23:53

## 2017-02-17 RX ADMIN — TRAMADOL HYDROCHLORIDE 50 MG: 50 TABLET, FILM COATED ORAL at 19:58

## 2017-02-17 RX ADMIN — Medication 1 TABLET: at 21:00

## 2017-02-17 ASSESSMENT — PAIN SCALES - GENERAL
PAINLEVEL_OUTOF10: 2
PAINLEVEL_OUTOF10: 0

## 2017-02-17 ASSESSMENT — GAIT ASSESSMENTS
DEVIATION: BRADYKINETIC
DISTANCE (FEET): 900
GAIT LEVEL OF ASSIST: INDEPENDENT

## 2017-02-17 ASSESSMENT — ENCOUNTER SYMPTOMS
CHILLS: 0
SHORTNESS OF BREATH: 0
FEVER: 0
NERVOUS/ANXIOUS: 0
NAUSEA: 0
ABDOMINAL PAIN: 0
DIARRHEA: 0
VOMITING: 0

## 2017-02-17 ASSESSMENT — ACTIVITIES OF DAILY LIVING (ADL)
SHOWER_TRANSFER_LEVEL_OF_ASSIST: ABLE TO COMPLETE SHOWER TRANSFER WITHOUT ASSIST
TOILET_TRANSFER_LEVEL_OF_ASSIST: ABLE TO COMPLETE TOILET TRANSFER WITHOUT ASSIST
TOILETING_LEVEL_OF_ASSIST: ABLE TO COMPLETE TOILETING WITHOUT ASSIST

## 2017-02-17 NOTE — CARE PLAN
Problem: Functional Transfers  Goal: STG-Within one week, patient will transfer to toilet  1) Individualized Goal: at Mod I level using DME if needed  2) Interventions: OT Group Therapy, OT Self Care/ADL, OT Community Reintegration, OT Neuro Re-Ed/Balance, OT Therapeutic Activity, OT Evaluation and OT Therapeutic Exercise   Outcome: MET Date Met:  02/17/17  Mod I    Problem: IADL’s  Goal: STG-Within one week, patient will make a bed  1) Individualized Goal: at SPV level using DME if needed  2) Interventions: OT Group Therapy, OT Self Care/ADL, OT Community Reintegration, OT Neuro Re-Ed/Balance, OT Therapeutic Activity, OT Evaluation and OT Therapeutic Exercise   Outcome: MET Date Met:  02/17/17  Mod I   Goal: STG-Within one week, patient will utilize washer and dryer  1) Individualized Goal: at SPV level using DME if needed  2) Interventions: OT Group Therapy, OT Self Care/ADL, OT Community Reintegration, OT Neuro Re-Ed/Balance, OT Therapeutic Activity, OT Evaluation and OT Therapeutic Exercise   Outcome: MET Date Met:  02/17/17  Mod I     Problem: OT Long Term Goals  Goal: LTG-By discharge, patient will complete basic self care tasks  1) Individualized Goal: Mod I using AE/DME if needed  2) Interventions: OT Group Therapy, OT Self Care/ADL, OT Community Reintegration, OT Neuro Re-Ed/Balance, OT Therapeutic Activity, OT Evaluation and OT Therapeutic Exercise   Outcome: MET Date Met:  02/17/17  Mod I  Goal: LTG-By discharge, patient will perform bathroom transfers  1) Individualized Goal: Mod I using AE/DME if needed  2) Interventions: OT Group Therapy, OT Self Care/ADL, OT Community Reintegration, OT Neuro Re-Ed/Balance, OT Therapeutic Activity, OT Evaluation and OT Therapeutic Exercise   Outcome: MET Date Met:  02/17/17  Mod I  Goal: LTG-By discharge, patient will complete basic home management  1) Individualized Goal: Mod I using DME if needed  2) Interventions: OT Group Therapy, OT Self Care/ADL, OT Community  Reintegration, OT Neuro Re-Ed/Balance, OT Therapeutic Activity, OT Evaluation and OT Therapeutic Exercise   Outcome: MET Date Met:  02/17/17  Mod I

## 2017-02-17 NOTE — CARE PLAN
"Problem: Knowledge Deficit  Goal: Knowledge of disease process/condition, treatment plan, diagnostic tests, and medications will improve  Outcome: PROGRESSING SLOWER THAN EXPECTED  Patient refused her scheduled Flexeril for H.S. Patient was adamant on refusing the medication even after patient was educated on the importance of the medication. \"I just don't need it.\" as verbalized by patient.     Problem: Pain Management  Goal: Pain level will decrease to patient’s comfort goal  Outcome: PROGRESSING SLOWER THAN EXPECTED  Patient able to verbalize pain level and had complaints of 4/10 mid back discomfort. PRN Tramadol given per patient request. Patient briefly educated on measures to manage pain non- pharmacologically. Will continue to monitor patient and call light with reach of  Patient.        Problem: Psychosocial needs  Goal: Anxiety reduction  Intervention: Pharmacologic management per MD order  Patient took her PRN Trazodone for her complaints of insomnia. Interventions to promote sleep educated to patient.           "

## 2017-02-17 NOTE — DISCHARGE PLANNING
Referral for outpatient therapy sent to [facility].  Left message for Alejandra.  Pending acceptance.  Referral sent to [facility] for w/c.  W/C not covered by insurance, patient will pay privately.  Per Starla, they will deliver today.

## 2017-02-17 NOTE — CARE PLAN
Problem: IADL’s  Goal: STG-Within one week, patient will prepare a meal  1) Individualized Goal: at SPV level using DME if needed  2) Interventions: OT Group Therapy, OT Self Care/ADL, OT Community Reintegration, OT Neuro Re-Ed/Balance, OT Therapeutic Activity, OT Evaluation and OT Therapeutic Exercise   Outcome: MET Date Met:  02/17/17  Mod I
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
## 2017-02-17 NOTE — PROGRESS NOTES
"Rehab Progress Note     Chief complaint: follow-up mitral valve prolapse  Date of Service: February 16, 2017    Interval Events (Subjective)  Patient seen and examined. No acute events overnight. He was upset and scared after her visit with Dr. Falk yesterday due to concerns about needing another thoracentesis for pleural effusion. Otherwise therapy going well. Pain controlled. No other complaints.    Objective:  VITAL SIGNS: /74 mmHg  Pulse 107  Temp(Src) 36.7 °C (98 °F)  Resp 18  Ht 1.702 m (5' 7\")  Wt 56 kg (123 lb 7.3 oz)  BMI 19.33 kg/m2  SpO2 91%  LMP  (LMP Unknown)  Gen: alert, no apparent distress  CV: regular rate and rhythm, no murmurs, no peripheral edema, sternal incision c/d/i  Resp: clear to ascultation bilaterally, normal respiratory effort  GI: soft, non-tender abdomen, bowel sounds present      Recent Results (from the past 72 hour(s))   PROTHROMBIN TIME    Collection Time: 02/14/17  6:26 AM   Result Value Ref Range    PT 21.7 (H) 12.0 - 14.6 sec    INR 1.83 (H) 0.87 - 1.13   PROTHROMBIN TIME    Collection Time: 02/15/17  6:23 AM   Result Value Ref Range    PT 22.9 (H) 12.0 - 14.6 sec    INR 1.96 (H) 0.87 - 1.13   PROTHROMBIN TIME    Collection Time: 02/16/17  5:27 AM   Result Value Ref Range    PT 23.3 (H) 12.0 - 14.6 sec    INR 2.00 (H) 0.87 - 1.13   COMP METABOLIC PANEL    Collection Time: 02/16/17  5:27 AM   Result Value Ref Range    Sodium 135 135 - 145 mmol/L    Potassium 4.2 3.6 - 5.5 mmol/L    Chloride 101 96 - 112 mmol/L    Co2 25 20 - 33 mmol/L    Anion Gap 9.0 0.0 - 11.9    Glucose 86 65 - 99 mg/dL    Bun 13 8 - 22 mg/dL    Creatinine 0.65 0.50 - 1.40 mg/dL    Calcium 9.0 8.5 - 10.5 mg/dL    AST(SGOT) 18 12 - 45 U/L    ALT(SGPT) 16 2 - 50 U/L    Alkaline Phosphatase 69 30 - 99 U/L    Total Bilirubin 0.3 0.1 - 1.5 mg/dL    Albumin 3.0 (L) 3.2 - 4.9 g/dL    Total Protein 5.6 (L) 6.0 - 8.2 g/dL    Globulin 2.6 1.9 - 3.5 g/dL    A-G Ratio 1.2 g/dL   CBC WITH DIFFERENTIAL    " Collection Time: 02/16/17  5:27 AM   Result Value Ref Range    WBC 4.7 (L) 4.8 - 10.8 K/uL    RBC 3.03 (L) 4.20 - 5.40 M/uL    Hemoglobin 9.0 (L) 12.0 - 16.0 g/dL    Hematocrit 28.4 (L) 37.0 - 47.0 %    MCV 93.7 81.4 - 97.8 fL    MCH 29.7 27.0 - 33.0 pg    MCHC 31.7 (L) 33.6 - 35.0 g/dL    RDW 46.5 35.9 - 50.0 fL    Platelet Count 454 (H) 164 - 446 K/uL    MPV 10.2 9.0 - 12.9 fL    Neutrophils-Polys 60.70 44.00 - 72.00 %    Lymphocytes 24.90 22.00 - 41.00 %    Monocytes 12.90 0.00 - 13.40 %    Eosinophils 1.10 0.00 - 6.90 %    Basophils 0.20 0.00 - 1.80 %    Immature Granulocytes 0.20 0.00 - 0.90 %    Nucleated RBC 0.00 /100 WBC    Neutrophils (Absolute) 2.88 2.00 - 7.15 K/uL    Lymphs (Absolute) 1.18 1.00 - 4.80 K/uL    Monos (Absolute) 0.61 0.00 - 0.85 K/uL    Eos (Absolute) 0.05 0.00 - 0.51 K/uL    Baso (Absolute) 0.01 0.00 - 0.12 K/uL    Immature Granulocytes (abs) 0.01 0.00 - 0.11 K/uL    NRBC (Absolute) 0.00 K/uL   BTYPE NATRIURETIC PEPTIDE    Collection Time: 02/16/17  5:27 AM   Result Value Ref Range    B Natriuretic Peptide 190 (H) 0 - 100 pg/mL   ESTIMATED GFR    Collection Time: 02/16/17  5:27 AM   Result Value Ref Range    GFR If African American >60 >60 mL/min/1.73 m 2    GFR If Non African American >60 >60 mL/min/1.73 m 2       Current Facility-Administered Medications   Medication Frequency   • warfarin (COUMADIN) tablet 10 mg COUMADIN-ONCE   • JOINTASTIN CAPS (Patient own supplied medication in cart drawer) BID   • melatonin tablet 3 mg QHS   • tramadol (ULTRAM) 50 MG tablet 50 mg Q6HRS PRN   • acetaminophen (TYLENOL) tablet 650 mg Q4HRS PRN    Or   • acetaminophen (TYLENOL) suppository 650 mg Q4HRS PRN   • aspirin EC (ECOTRIN) tablet 81 mg DAILY   • docusate sodium (COLACE) capsule 100 mg QAM    And   • senna-docusate (PERICOLACE or SENOKOT S) 8.6-50 MG per tablet 1 Tab Nightly    And   • senna-docusate (PERICOLACE or SENOKOT S) 8.6-50 MG per tablet 1 Tab Q24HRS PRN    And   • lactulose 20 GM/30ML  solution 30 mL Q24HRS PRN    And   • bisacodyl (DULCOLAX) suppository 10 mg Q24HRS PRN    And   • fleet enema 133 mL Once PRN   • mag hydrox-al hydrox-simeth (MAALOX PLUS ES or MYLANTA DS) suspension 30 mL Q4HRS PRN   • MD ALERT... warfarin (COUMADIN) per pharmacy protocol PRN   • ipratropium-albuterol (DUONEB) nebulizer solution 3 mL Q2HRS PRN   • artificial tears 1.4 % ophthalmic solution 1 Drop PRN   • multivitamin (THERAGRAN) tablet 1 Tab DAILY   • oyster shell calcium/vitamin D 250-125 MG-UNIT tablet 1 Tab QDAY with Breakfast   • pravastatin (PRAVACHOL) tablet 20 mg Q EVENING   • sodium chloride (OCEAN) 0.65 % nasal spray 2 Spray PRN   • hydrocodone-acetaminophen (NORCO) 7.5-325 MG per tablet 1 Tab Q4HRS PRN   • hydrocodone-acetaminophen (NORCO) 5-325 MG per tablet 1-2 Tab Q4HRS PRN   • ondansetron (ZOFRAN ODT) dispertab 4 mg 4X/DAY PRN    Or   • ondansetron (ZOFRAN) syringe/vial injection 4 mg 4X/DAY PRN   • cyclobenzaprine (FLEXERIL) tablet 10 mg QHS   • cyclobenzaprine (FLEXERIL) tablet 10 mg BID PRN   • trazodone (DESYREL) tablet 50 mg QHS PRN       Orders Placed This Encounter   Procedures   • DIET ORDER     Standing Status: Standing      Number of Occurrences: 1      Standing Expiration Date:      Order Specific Question:  Diet:     Answer:  Cardiac [6]       Assessment:  Active Hospital Problems    Diagnosis   • S/P MVR (mitral valve repair)     Date of conference: 2/14/2017  Anticipated DC date: Sat 2/18  Home health: outpatient PT, cardiac rehab  Equip: none  Follow up: surgeon, PCP, neurology    Medical Decision Making and Plan:    Labs reviewed. Medications reviewed. Appreciate the assistance of the hospitalist.    Mitral valve prolapse status post mitral valve repair - continue sternal precautions.  Continue aspirin and Coumadin.    Right posterior shoulder pain - improved, likely muscular in nature. Continue modalities with therapy.    Respiratory insufficiency, nocturnal hypoxia - 2L oxygen.  Respiratory therapist to see patient. Titrate oxygen during the day.     Pleural effusions status post bilateral thoracentesis - improved. Continue incentive spirometry. Lasix discontinued. No evidence on exam of worsening effusions.    Hypertension - is on losartan at home. Currently not on blood pressure medications. Continue to monitor.    Anemia - normocytic. Likely post operative. Continue to monitor.    Hyperlipidemia - continue statin.    Right thumb pain - neuropathic in nature. Differential includes cervical stenosis, brachial plexus injury, or less likely radial nerve injury. Continue to monitor. Improved. May need nerve pain medicines and/or outpatient nerve conduction study.    GI prophylaxis -  Patient not complaining of heartburn. Continue to monitor.     DVT prophylaxis - Coumadin. Dosing per pharmacy.    Total time:  >25 minutes.  I spent greater than 50% of the time for patient care and coordination on this date, including unit/floor time, and face-to-face time with the patient as per assessment and plan above.    Ashley Camacho M.D.

## 2017-02-17 NOTE — FLOWSHEET NOTE
Patient on Home Regime. R/A during the day, sat=92%. Nightly use at 2L.     02/17/17 1030   Events/Summary/Plan   Non-Invasive Resp Device Site Inspection Completed Intact   Location Nasal cannula   Respiratory WDL   Respiratory (WDL) X   Chest Exam   Respiration 18   Pulse 89   Breath Sounds   RUL Breath Sounds Clear   RML Breath Sounds Clear   RLL Breath Sounds Clear;Diminished   PRAMOD Breath Sounds Clear   LLL Breath Sounds Clear;Diminished   Secretions   Cough Non Productive   How Sputum Obtained Cough on Request   Oximetry   #Pulse Oximetry (Single Determination) Yes   Oxygen   Home O2 Use Prior To Admission? Yes   Home O2 LPM Flow 2 LPM   Home O2 Delivery Method Silicone Nasal Cannula   Home O2 Frequency of Use At Sleep   Pulse Oximetry 92 %   O2 (LPM) 0  (Compliant with nightly use at 2L, as per home.)   O2 Daily Delivery Respiratory  Room Air with O2 Available

## 2017-02-17 NOTE — PROGRESS NOTES
Pharmacy Warfarin Consult   2/17/2017     66 y.o.   female     Indication for anticoagulation: Bioprosthetic Valve Replacement    Goal INR = 2 - 3    Recent Labs      02/15/17   0623  02/16/17   0527  02/17/17   0526   INR  1.96*  2.00*  2.30*   HEMOGLOBIN   --   9.0*  8.9*   HEMATOCRIT   --   28.4*  28.1*           Pertinent Drug/Drug Interactions:  Aspirin, Statin  Outpatient Warfarin Regimen:  None noted  Recent Warfarin Dosing:    Dose from last 7 days     Date/Time Dose (mg)    02/17/17 0526 7.5    02/16/17 0527 10    02/15/17 0623 10    02/14/17 0626 10    02/13/17 0530 8    02/12/17 1146 8    02/11/17 1159 7.5          Bridge Therapy: None        1.  Coumadin 7.5 mg tonight for INR= 2.3.        Liban Daugherty [facility]

## 2017-02-17 NOTE — CARE PLAN
Problem: Safety  Goal: Will remain free from injury  Outcome: PROGRESSING AS EXPECTED  Patient is Mod I in the unit. Have good safety awareness. Remains free from injury.     Problem: Pain Management  Goal: Pain level will decrease to patient’s comfort goal  Outcome: PROGRESSING AS EXPECTED  Patient denied any pain or discomfort during shift.

## 2017-02-18 VITALS
BODY MASS INDEX: 19.38 KG/M2 | OXYGEN SATURATION: 95 % | DIASTOLIC BLOOD PRESSURE: 68 MMHG | WEIGHT: 123.46 LBS | TEMPERATURE: 98.1 F | SYSTOLIC BLOOD PRESSURE: 113 MMHG | HEIGHT: 67 IN | HEART RATE: 97 BPM | RESPIRATION RATE: 18 BRPM

## 2017-02-18 LAB
INR PPP: 2.62 (ref 0.87–1.13)
PROTHROMBIN TIME: 28.8 SEC (ref 12–14.6)

## 2017-02-18 PROCEDURE — A9270 NON-COVERED ITEM OR SERVICE: HCPCS | Performed by: PHYSICAL MEDICINE & REHABILITATION

## 2017-02-18 PROCEDURE — 94760 N-INVAS EAR/PLS OXIMETRY 1: CPT

## 2017-02-18 PROCEDURE — 85610 PROTHROMBIN TIME: CPT

## 2017-02-18 PROCEDURE — 36415 COLL VENOUS BLD VENIPUNCTURE: CPT

## 2017-02-18 PROCEDURE — 700112 HCHG RX REV CODE 229: Performed by: PHYSICAL MEDICINE & REHABILITATION

## 2017-02-18 PROCEDURE — 700102 HCHG RX REV CODE 250 W/ 637 OVERRIDE(OP): Performed by: PHYSICAL MEDICINE & REHABILITATION

## 2017-02-18 RX ORDER — TRAMADOL HYDROCHLORIDE 50 MG/1
50 TABLET ORAL EVERY 4 HOURS PRN
Qty: 30 TAB | Refills: 0 | Status: SHIPPED | OUTPATIENT
Start: 2017-02-18

## 2017-02-18 RX ORDER — WARFARIN SODIUM 2.5 MG/1
7.5 TABLET ORAL
Qty: 90 TAB | Refills: 0 | Status: SHIPPED | OUTPATIENT
Start: 2017-02-18 | End: 2017-11-27

## 2017-02-18 RX ORDER — PRAVASTATIN SODIUM 20 MG
20 TABLET ORAL DAILY
Qty: 30 TAB | Refills: 0 | Status: SHIPPED | OUTPATIENT
Start: 2017-02-18

## 2017-02-18 RX ORDER — LANOLIN ALCOHOL/MO/W.PET/CERES
3 CREAM (GRAM) TOPICAL
COMMUNITY
Start: 2017-02-04

## 2017-02-18 RX ORDER — CYCLOBENZAPRINE HCL 10 MG
10 TABLET ORAL
Qty: 30 TAB | Refills: 0 | Status: SHIPPED | OUTPATIENT
Start: 2017-02-18 | End: 2017-11-27

## 2017-02-18 RX ORDER — SODIUM PHOSPHATE,MONO-DIBASIC 19G-7G/118
1 ENEMA (ML) RECTAL 2 TIMES DAILY
Qty: 90 CAP | COMMUNITY
Start: 2017-02-18 | End: 2017-11-27

## 2017-02-18 RX ADMIN — DOCUSATE SODIUM 100 MG: 100 CAPSULE, LIQUID FILLED ORAL at 08:20

## 2017-02-18 RX ADMIN — Medication 1 TABLET: at 08:00

## 2017-02-18 RX ADMIN — THERA TABS 1 TABLET: TAB at 08:20

## 2017-02-18 RX ADMIN — ASPIRIN 81 MG: 81 TABLET, COATED ORAL at 08:20

## 2017-02-18 RX ADMIN — CALCIUM CARBONATE-CHOLECALCIFEROL TAB 250 MG-125 UNIT 1 TABLET: 250-125 TAB at 08:20

## 2017-02-18 ASSESSMENT — PAIN SCALES - GENERAL: PAINLEVEL_OUTOF10: 0

## 2017-02-18 ASSESSMENT — LIFESTYLE VARIABLES: EVER_SMOKED: YES

## 2017-02-18 NOTE — FLOWSHEET NOTE
02/18/17 0845   Events/Summary/Plan   Events/Summary/Plan Pt is on RA using O2 at night home use pt stable will monitor.   Non-Invasive Resp Device Site Inspection Completed Intact   Location nasal cannula   Interdisciplinary Plan of Care-Goals (Indications)   Obstructive Ventilatory Defect or Pulmonary Disease without Obvious Obstruction Strong Subjective / Objective Improvement   Education   Education Yes - Pt. / Family has been Instructed in use of Home Oxygen   Respiratory WDL   Respiratory (WDL) X   Chest Exam   Work Of Breathing / Effort Mild   Respiration 18   Pulse 97   Breath Sounds   RUL Breath Sounds Clear   RML Breath Sounds Clear   RLL Breath Sounds Clear;Diminished   PRAMOD Breath Sounds Clear   LLL Breath Sounds Clear;Diminished   Secretions   Cough Non Productive   Oximetry   #Pulse Oximetry (Single Determination) Yes   Oxygen   Home O2 Use Prior To Admission? Yes   Home O2 LPM Flow 2 LPM   Home O2 Delivery Method Nasal Cannula   Home O2 Frequency of Use At Sleep   Pulse Oximetry 95 %   O2 (LPM) 0   O2 (FiO2) 21   O2 Daily Delivery Respiratory  Room Air with O2 Available

## 2017-02-18 NOTE — DISCHARGE PLANNING
02/17/17  1712   Discharge Instructions - Completed by Case Mgmt     Discharge Location       Home with home health         Agency Name / Address / Phone   [facility] at [phone] (we will have them call you to schedule visits)     Outpatient Services       Physical Therapy         DME Provider / Phone   [employer] for wheel chair at [phone]     Medical Equipment Ordered   Wheelchair       We will request Protime/Inr lab draws for Monday and Wednesday with [facility] [place] [facility].    Follow-up With Details Why Contact Young Javier M.D. (Primary Care) On 3/1/2017 Wednesday at 11:45 (please check in at 11:30)(records will be sent)   [address] [address]  [phone]     Chris Schmitt M.D. (Cardiac Surgery) On 3/6/2017 Monday at 12:45 pm [address] [facility] [address]  [place] [address]  [phone]     Tom Landin D.O. (Cardiology) On 3/15/2017 Wednesday at 3:00 pm [address]  [address]  [place] [address]  [phone]

## 2017-02-18 NOTE — PROGRESS NOTES
Patient discharged to home per order.  Reviewed all discharge instructions, appointments, discharge medications, and wound care instructions with patient; they verbalize understanding.  Education provided in discharge instructions about warfarin, incision care and Home Safety and Fall Prevention. Discharge paperwork completed; signed copies in chart.  Patient has education binder and all belongings; signed copy in chart.  Pt alert, calm, stable; no change in status from morning assessment.  Patient left facility at 1100 accompanied by staff; escorted to car by staff.  Have enjoyed working with this pleasant patient.

## 2017-02-18 NOTE — DISCHARGE PLANNING
Case Management;  Patient will discharge tomorrow.  W/c delivered from [facility].  Referral completed to John CALHOUN.  Follow up appointments confirmed  Patient verbally agreeable with all plans.

## 2017-02-18 NOTE — PROGRESS NOTES
Report received from day shift RN at 1900. POC discussed. Rounded on Pt with Day RN. Hourly rounds to be done each hour to ensure needs are met and pt is safe. No s/s of distress or discomfort. VS stable. Call light within reach. Will continue to monitor.

## 2017-02-18 NOTE — PROGRESS NOTES
Patient will discharge this morning. Pt. Uses 2L of Oxygen at night. Unable to see Oxygen equipment in d/c planning.

## 2017-02-18 NOTE — DISCHARGE INSTRUCTIONS
[facility] NURSING DISCHARGE INSTRUCTIONS    Blood Pressure : 136/78 mmHg  Weight: 56 kg (123 lb 7.3 oz)  Nursing recommendations for Kristen SUSAN Salazar at time of discharge are as follows:  Client verbalized understanding of all discharge instructions and prescriptions.     Review all your home medications and newly ordered medications with your doctor and/or pharmacist. Follow medication instructions as directed by your doctor and/or pharmacist.    Pain Management:   Discharge Pain Medication Instructions:  Comfort Goal: Comfort at Rest  Notify your primary care provider if pain is unrelieved with these measures, if the pain is new, or increased in intensity.    Discharge Skin Characteristics:  (Upper chest incision; heart prep BIANCA)  Discharge Skin Exam:    Surgical Incision  Incision Heart Prep (Active)   Drainage  None 2/17/2017  8:00 PM   Periwound Skin Pink 2/17/2017  8:00 PM   Daily - Wound Closure Dermabond 2/17/2017  8:00 PM   Dressing Options Open to Air 2/17/2017  8:00 PM   Weekly Photo (Inpatient Only) 02/09/17 2/9/2017 12:00 PM       Surgical Incision  Incision Bilateral Upper Abdomen (Active)   Drainage  None 2/17/2017  8:00 PM   Periwound Skin Pink 2/17/2017  8:00 PM   Daily - Wound Closure Dermabond 2/17/2017  8:00 PM   Dressing Options Open to Air 2/17/2017  8:00 PM   Weekly Photo (Inpatient Only) 02/09/17 2/9/2017 12:00 PM     Skin / Wound Care Instructions: Please contact your primary care physician for any change in skin integrity.     If You Have Surgical Incisions / Wounds:  Monitor surgical site(s) for signs of increased swelling, redness or symptoms of drainage from the site or fever as this could indicate signs and symptoms of infection. If these symptoms are noted, notifiy your primary care provider.      Discharge Safety Instructions: Should Have ADULT SUPERVISION     Discharge Safety Concerns: Weakness  The interdisciplinary team has made recommendation that you  should have adult supervision in the house due to weakness  Anti-embolic stockings are required during the day and off at night to increase circulation to the lower extremities.    Discharge Diet: cardiac     Discharge Liquids: thin  Discharge Bowel Function: Continent  Please contact your primary care physician for any changes in bowel habits.  Discharge Bowel Program:    Discharge Bladder Function: Continent  Discharge Urinary Devices:        Nursing Discharge Plan:   Pneumococcal Vaccine Given - only chart on this line when given: Given (See MAR)  Influenza Vaccine Indication: Patient Refuses    Case Management Discharge Instructions:   Discharge Location: Home with Outpatient Services  Agency Name/Address/Phone: [facility] at [address] [place], [place] [phone] (they will call you to schedule appointments)  Home Health:    Outpatient Services: Physical Therapy  DME Provider/Phone: [facility] at [phone]  Medical Equipment Ordered: Wheelchair  Prescription Faxed to:        Discharge Medication Instructions:  Below are the medications your physician expects you to take upon discharge:    Suicidal Feelings: How to Help Yourself    Suicide is the taking of one's own life. If you feel as though life is getting too tough to handle and are thinking about suicide, get help right away. To get help:  · Call your local emergency services (911 in the [place]  · Call a suicide hotline to speak with a trained counselor who understands how you are feeling. The following is a list of suicide hotlines in the [place]. For a list of hotlines in [place], visit www.suicide.org/hotlines/international/kbzjvg-uwutekh-kxkbckrc.html.  ·  7-211-071-TALK (1-708.390.5224).  ·  9-740-QDWQTPF (1-767.766.1907).  ·  1-773.882.4554. This is a hotline for Persian speakers.  ·  7-015-649-4TTY (1-903.146.1550). This is a hotline for TTY users.  ·  9-592-3-U-BILLY (1-170.108.1309). This is a hotline for  lesbian, dean, bisexual, transgender, or questioning youth.  · Contact a crisis center or a local suicide prevention center. To find a crisis center or suicide prevention center:  · Call your local hospital, clinic, community service organization, mental health center, social service provider, or health department. Ask for assistance in connecting to a crisis center.  · Visit www.suicidepreventionlifeline.org/getinvolved/ for a list of crisis centers in the [place], or visit www.suicideprevention.ca/uidrkeoa-mszfd-mvfqhst/find-a-crisis-centre for a list of centers in [place].  · Visit the following websites:  ·  National Suicide Prevention Lifeline: www.suicidepreventionlifeline.org  ·  Hopeline: www.hopeline.Goodfilms  ·  American Foundation for Suicide Prevention: www.afsp.org  ·  The Terrance Project (for lesbian, dean, bisexual, transgender, or questioning youth): www.thetrevorproject.org  HOW CAN I HELP MYSELF FEEL BETTER?  · Promise yourself that you will not do anything drastic when you have suicidal feelings. Remember, there is hope. Many people have gotten through suicidal thoughts and feelings, and you will, too. You may have gotten through them before, and this proves that you can get through them again.  · Let family, friends, teachers, or counselors know how you are feeling. Try not to isolate yourself from those who care about you. Remember, they will want to help you. Talk with someone every day, even if you do not feel sociable. Face-to-face conversation is best.  · Call a mental health professional and see one regularly.  · Visit your primary health care provider every year.  · Eat a well-balanced diet, and space your meals so you eat regularly.  · Get plenty of rest.  · Avoid alcohol and drugs, and remove them from your home. They will only make you feel worse.  · If you are thinking of taking a lot of medicine, give your medicine to someone who can give it to you one day at a time. If you are on  antidepressants and are concerned you will overdose, let your health care provider know so he or she can give you safer medicines. Ask your mental health professional about the possible side effects of any medicines you are taking.  · Remove weapons, poisons, knives, and anything else that could harm you from your home.  · Try to stick to routines. Follow a schedule every day. Put self-care on your schedule.  · Make a list of realistic goals, and cross them off when you achieve them. Accomplishments give a sense of worth.  · Wait until you are feeling better before doing the things you find difficult or unpleasant.  · Exercise if you are able. You will feel better if you exercise for even a half hour each day.  · Go out in the sun or into nature. This will help you recover from depression faster. If you have a favorite place to walk, go there.  · Do the things that have always given you pleasure. Play your favorite music, read a good book, paint a picture, play your favorite instrument, or do anything else that takes your mind off your depression if it is safe to do.  · Keep your living space well lit.  · When you are feeling well, write yourself a letter about tips and support that you can read when you are not feeling well.  · Remember that life's difficulties can be sorted out with help. Conditions can be treated. You can work on thoughts and strategies that serve you well.     This information is not intended to replace advice given to you by your health care provider. Make sure you discuss any questions you have with your health care provider.     Document Released: 06/23/2004 Document Revised: 01/08/2016 Document Reviewed: 04/14/2015  [employer] Interactive Patient Education ©2016 [employer]        Hypertension  Hypertension, commonly called high blood pressure, is when the force of blood pumping through your arteries is too strong. Your arteries are the blood vessels that carry blood from your heart  throughout your body. A blood pressure reading consists of a higher number over a lower number, such as 110/72. The higher number (systolic) is the pressure inside your arteries when your heart pumps. The lower number (diastolic) is the pressure inside your arteries when your heart relaxes. Ideally you want your blood pressure below 120/80.  Hypertension forces your heart to work harder to pump blood. Your arteries may become narrow or stiff. Having untreated or uncontrolled hypertension can cause heart attack, stroke, kidney disease, and other problems.  RISK FACTORS  Some risk factors for high blood pressure are controllable. Others are not.   Risk factors you cannot control include:   · Race. You may be at higher risk if you are .  · Age. Risk increases with age.  · Gender. Men are at higher risk than women before age 45 years. After age 65, women are at higher risk than men.  Risk factors you can control include:  · Not getting enough exercise or physical activity.  · Being overweight.  · Getting too much fat, sugar, calories, or salt in your diet.  · Drinking too much alcohol.  SIGNS AND SYMPTOMS  Hypertension does not usually cause signs or symptoms. Extremely high blood pressure (hypertensive crisis) may cause headache, anxiety, shortness of breath, and nosebleed.  DIAGNOSIS  To check if you have hypertension, your health care provider will measure your blood pressure while you are seated, with your arm held at the level of your heart. It should be measured at least twice using the same arm. Certain conditions can cause a difference in blood pressure between your right and left arms. A blood pressure reading that is higher than normal on one occasion does not mean that you need treatment. If it is not clear whether you have high blood pressure, you may be asked to return on a different day to have your blood pressure checked again. Or, you may be asked to monitor your blood pressure at home  for 1 or more weeks.  TREATMENT  Treating high blood pressure includes making lifestyle changes and possibly taking medicine. Living a healthy lifestyle can help lower high blood pressure. You may need to change some of your habits.  Lifestyle changes may include:  · Following the DASH diet. This diet is high in fruits, vegetables, and whole grains. It is low in salt, red meat, and added sugars.  · Keep your sodium intake below 2,300 mg per day.  · Getting at least 30-45 minutes of aerobic exercise at least 4 times per week.  · Losing weight if necessary.  · Not smoking.  · Limiting alcoholic beverages.  · Learning ways to reduce stress.  Your health care provider may prescribe medicine if lifestyle changes are not enough to get your blood pressure under control, and if one of the following is true:  · You are 18-59 years of age and your systolic blood pressure is above 140.  · You are 60 years of age or older, and your systolic blood pressure is above 150.  · Your diastolic blood pressure is above 90.  · You have diabetes, and your systolic blood pressure is over 140 or your diastolic blood pressure is over 90.  · You have kidney disease and your blood pressure is above 140/90.  · You have heart disease and your blood pressure is above 140/90.  Your personal target blood pressure may vary depending on your medical conditions, your age, and other factors.  HOME CARE INSTRUCTIONS  · Have your blood pressure rechecked as directed by your health care provider.    · Take medicines only as directed by your health care provider. Follow the directions carefully. Blood pressure medicines must be taken as prescribed. The medicine does not work as well when you skip doses. Skipping doses also puts you at risk for problems.  · Do not smoke.    · Monitor your blood pressure at home as directed by your health care provider.   SEEK MEDICAL CARE IF:   · You think you are having a reaction to medicines taken.  · You have recurrent  headaches or feel dizzy.  · You have swelling in your ankles.  · You have trouble with your vision.  SEEK IMMEDIATE MEDICAL CARE IF:  · You develop a severe headache or confusion.  · You have unusual weakness, numbness, or feel faint.  · You have severe chest or abdominal pain.  · You vomit repeatedly.  · You have trouble breathing.  MAKE SURE YOU:   · Understand these instructions.  · Will watch your condition.  · Will get help right away if you are not doing well or get worse.     This information is not intended to replace advice given to you by your health care provider. Make sure you discuss any questions you have with your health care provider.     Document Released: 12/18/2006 Document Revised: 05/03/2016 Document Reviewed: 10/10/2014  [employer] Interactive Patient Education ©2016 [employer]    Fall Prevention in the Home   Falls can cause injuries and can affect people from all age groups. There are many simple things that you can do to make your home safe and to help prevent falls.  WHAT CAN I DO ON THE OUTSIDE OF MY HOME?  · Regularly repair the edges of walkways and driveways and fix any cracks.  · Remove high doorway thresholds.  · Trim any shrubbery on the main path into your home.  · Use bright outdoor lighting.  · Clear walkways of debris and clutter, including tools and rocks.  · Regularly check that handrails are securely fastened and in good repair. Both sides of any steps should have handrails.  · Install guardrails along the edges of any raised decks or porches.  · Have leaves, snow, and ice cleared regularly.  · Use sand or salt on walkways during winter months.  · In the garage, clean up any spills right away, including grease or oil spills.  WHAT CAN I DO IN THE BATHROOM?  · Use night lights.  · Install grab bars by the toilet and in the tub and shower. Do not use towel bars as grab bars.  · Use non-skid mats or decals on the floor of the tub or shower.  · If you need to sit down while you  are in the shower, use a plastic, non-slip stool..  · Keep the floor dry. Immediately clean up any water that spills on the floor.  · Remove soap buildup in the tub or shower on a regular basis.  · Attach bath mats securely with double-sided non-slip rug tape.  · Remove throw rugs and other tripping hazards from the floor.  WHAT CAN I DO IN THE BEDROOM?  · Use night lights.  · Make sure that a bedside light is easy to reach.  · Do not use oversized bedding that drapes onto the floor.  · Have a firm chair that has side arms to use for getting dressed.  · Remove throw rugs and other tripping hazards from the floor.  WHAT CAN I DO IN THE KITCHEN?   · Clean up any spills right away.  · Avoid walking on wet floors.  · Place frequently used items in easy-to-reach places.  · If you need to reach for something above you, use a sturdy step stool that has a grab bar.  · Keep electrical cables out of the way.  · Do not use floor polish or wax that makes floors slippery. If you have to use wax, make sure that it is non-skid floor wax.  · Remove throw rugs and other tripping hazards from the floor.  WHAT CAN I DO IN THE STAIRWAYS?  · Do not leave any items on the stairs.  · Make sure that there are handrails on both sides of the stairs. Fix handrails that are broken or loose. Make sure that handrails are as long as the stairways.  · Check any carpeting to make sure that it is firmly attached to the stairs. Fix any carpet that is loose or worn.  · Avoid having throw rugs at the top or bottom of stairways, or secure the rugs with carpet tape to prevent them from moving.  · Make sure that you have a light switch at the top of the stairs and the bottom of the stairs. If you do not have them, have them installed.  WHAT ARE SOME OTHER FALL PREVENTION TIPS?  · Wear closed-toe shoes that fit well and support your feet. Wear shoes that have rubber soles or low heels.  · When you use a stepladder, make sure that it is completely opened  and that the sides are firmly locked. Have someone hold the ladder while you are using it. Do not climb a closed stepladder.  · Add color or contrast paint or tape to grab bars and handrails in your home. Place contrasting color strips on the first and last steps.  · Use mobility aids as needed, such as canes, walkers, scooters, and crutches.  · Turn on lights if it is dark. Replace any light bulbs that burn out.  · Set up furniture so that there are clear paths. Keep the furniture in the same spot.  · Fix any uneven floor surfaces.  · Choose a carpet design that does not hide the edge of steps of a stairway.  · Be aware of any and all pets.  · Review your medicines with your healthcare provider. Some medicines can cause dizziness or changes in blood pressure, which increase your risk of falling.  Talk with your health care provider about other ways that you can decrease your risk of falls. This may include working with a physical therapist or  to improve your strength, balance, and endurance.     This information is not intended to replace advice given to you by your health care provider. Make sure you discuss any questions you have with your health care provider.     Document Released: 12/08/2003 Document Revised: 05/03/2016 Document Reviewed: 01/22/2016  [employer] Interactive Patient Education ©2016 [employer]      Physical Therapy Discharge Instructions for Kristen Salazar    2/17/2017    Weight Bearing Status - Patient Should: Bear Weight as Tolerated Right Leg, Bear Weight as Tolerated Left Leg, Complete Non-Weighted Activities of Daily Living  Level of Assist Required for Ambulation: No Assist on Flat Surfaces, No Assist on Curbs, No Assist on Stairs  Distance Patient May Ambulate: 900 feet to 1500 feet or more as tolerated  Device Recommended for Ambulation: None, Stair Rails  Level of Assist Required to Propel Wheelchair:  (not applicable)  Level of Assist Required for Transfers: Requires No  "Assist  Device Recommended for Transfers: None  Home Exercise Program: Refer to Home Exercise Program Handout for Details  Prosthesis / Orthosis Recommendation / Location: No Prosthesis  or Orthosis Recommended    Occupational Therapy Discharge Instructions for Kristen TorresCarrieDe Anda    2/17/2017    Level of Assist Required for Eating: Able to Complete Eating without Assist  Level of Assist Required for Grooming: Able to Complete Grooming without Assist  Level of Assist Required for Dressing: Able to Complete Dressing without Assist  Level of Assist Required for Toileting: Able to Complete Toileting without Assist  Level of Assist Required for Toilet Transfer: Able to Complete Toilet Transfer without Assist  Equipment for Toilet Transfer: Grab Bars by Toilet  Level of Assist Required for Bathing: Able to Complete Bathing without Assist  Equipment for Bathing: Tub Transfer Bench, Hand Held Shower Head, Grab Bars in Tub / Shower  Level of Assist Required for Shower Transfer: Able to Complete Shower Transfer without Assist  Equipment for Shower Transfer: Tub Transfer Bench, Grab Bars in Tub / Shower  Level of Assist Required for Home Mgmt: Requires Physical Assist with Home Management  Level of Assist Required for Meal Prep: Able to Complete Meal Preparation without Assist  Driving: May not Drive, Please Contact Physician for Further Information  Home Exercise Program: None Issued    It was great working with you Kristen! You did a fantastic job here, have a great new year! Damaris Dailey MS OTR/L      Warfarin: What You Need to Know  Warfarin is an anticoagulant. Anticoagulants help prevent the formation of blood clots. They also help stop the growth of blood clots. Warfarin is sometimes referred to as a \"blood thinner.\"   Normally, when body tissues are cut or damaged, the blood clots in order to prevent blood loss. Sometimes clots form inside your blood vessels and obstruct the flow of blood through your circulatory " "system (thrombosis). These clots may travel through your bloodstream and become lodged in smaller blood vessels in your brain, which can cause a stroke, or in your lungs (pulmonary embolism).  WHO SHOULD USE WARFARIN?  Warfarin is prescribed for people at risk of developing harmful blood clots:  · People with surgically implanted mechanical heart valves, irregular heart rhythms called atrial fibrillation, and certain clotting disorders.  · People who have developed harmful blood clotting in the past, including those who have had a stroke or a pulmonary embolism, or thrombosis in their legs (deep vein thrombosis [DVT]).  · People with an existing blood clot, such as a pulmonary embolism.  WARFARIN DOSING  Warfarin tablets come in different strengths. Each tablet strength is a different color, with the amount of warfarin (in milligrams) clearly printed on the tablet. If the color of your tablet is different than usual when you receive a new prescription, report it immediately to your pharmacist or health care provider.  WARFARIN MONITORING  The goal of warfarin therapy is to lessen the clotting tendency of blood but not prevent clotting completely. Your health care provider will monitor the anticoagulation effect of warfarin closely and adjust your dose as needed. For your safety, blood tests called prothrombin time (PT) or international normalized ratio (INR) are used to measure the effects of warfarin. Both of these tests can be done with a finger stick or a blood draw.  The longer it takes the blood to clot, the higher the PT or INR. Your health care provider will inform you of your \"target\" PT or INR range. If, at any time, your PT or INR is above the target range, there is a risk of bleeding. If your PT or INR is below the target range, there is a risk of clotting.  Whether you are started on warfarin while you are in the hospital or in your health care provider's office, you will need to have your PT or INR " checked within one week of starting the medicine. Initially, some people are asked to have their PT or INR checked as much as twice a week.  Once you are on a stable maintenance dose, the PT or INR is checked less often, usually once every 2 to 4 weeks. The warfarin dose may be adjusted if the PT or INR is not within the target range. It is important to keep all laboratory and health care provider follow-up appointments. Not keeping appointments could result in a chronic or permanent injury, pain, or disability because warfarin is a medicine that requires close monitoring.  WHAT ARE THE SIDE EFFECTS OF WARFARIN?  · Too much warfarin can cause bleeding (hemorrhage) from any part of the body. This may include bleeding from the gums, blood in the urine, bloody or dark stools, a nosebleed that is not easily stopped, coughing up blood, or vomiting blood.  · Too little warfarin can increase the risk of blood clots.  · Too little or too much warfarin can also increase the risk of a stroke.  · Warfarin use may cause a skin rash or irritation, an unusual fever, continual nausea or stomach upset, or severe pain in your joints or back.  SPECIAL PRECAUTIONS WHILE TAKING WARFARIN  Warfarin should be taken exactly as directed. It is very important to take warfarin as directed since bleeding or blood clots could result in chronic or permanent injury, pain, or disability.  · Take your medicine at the same time every day. If you forget to take your dose, you can take it if it is within 6 hours of when it was due.  · Do not change the dose of warfarin on your own to make up for missed or extra doses.  · If you miss more than 2 doses in a row, you should contact your health care provider for advice.  Avoid situations that cause bleeding. You may have a tendency to bleed more easily than usual while taking warfarin. The following actions can limit bleeding:  · Using a softer toothbrush.  · Flossing with waxed floss rather than unwaxed  floss.  · Shaving with an electric razor rather than a blade.  · Limiting the use of sharp objects.  · Avoiding potentially harmful activities, such as contact sports.  Warfarin and Pregnancy or Breastfeeding  · Warfarin is not advised during the first trimester of pregnancy due to an increased risk of birth defects. In certain situations, a woman may take warfarin after her first trimester of pregnancy. A woman who becomes pregnant or plans to become pregnant while taking warfarin should notify her health care provider immediately.  · Although warfarin does not pass into breast milk, a woman who wishes to breastfeed while taking warfarin should also consult with her health care provider.  Alcohol, Smoking, and Illicit Drug Use  · Alcohol affects how warfarin works in the body. It is best to avoid alcoholic drinks or consume very small amounts while taking warfarin. In general, alcohol intake should be limited to 1 oz (30 mL) of liquor, 6 oz (180 mL) of wine, or 12 oz (360 mL) of beer each day. Notify your health care provider if you change your alcohol intake.  · Smoking affects how warfarin works. It is best to avoid smoking while taking warfarin. Notify your health care provider if you change your smoking habits.  · It is best to avoid all illicit drugs while taking warfarin since there are few studies that show how warfarin interacts with these drugs.  Other Medicines and Dietary Supplements  Many prescription and over-the-counter medicines can interfere with warfarin. Be sure all of your health care providers know you are taking warfarin. Notify your health care provider who prescribed warfarin for you or your pharmacist before starting or stopping any new medicines, including over-the-counter vitamins, dietary supplements, and pain medicines. Your warfarin dose may need to be adjusted.  Some common over-the-counter medicines that may increase the risk of bleeding while taking warfarin include:    · Acetaminophen.  · Aspirin.  · Nonsteroidal anti-inflammatory medicines (NSAIDs), such as ibuprofen or naproxen.  · Vitamin E.  Dietary Considerations   Foods that have moderate or high amounts of vitamin K can interfere with warfarin. Avoid major changes in your diet or notify your health care provider before changing your diet. Eat a consistent amount of foods that have moderate or high amounts of vitamin K. Eating less foods containing vitamin K can increase the risk of bleeding. Eating more foods containing vitamin K can increase the risk of blood clots. Additional questions about dietary considerations can be discussed with a dietitian.  Foods that are very high in vitamin K:  · Greens, such as Swiss chard and beet, dae, mustard, or turnip greens (fresh or frozen, cooked).  · Kale (fresh or frozen, cooked).  · Parsley (raw).  · Spinach (cooked).  Foods that are high in vitamin K:  · Asparagus (frozen, cooked).  · Broccoli.  · Bok david (cooked).  · [place] sprouts (fresh or frozen, cooked).  · Cabbage (cooked).  ·  Coleslaw.  Foods that are moderately high in vitamin K:  · Blueberries.  · Black-eyed peas.  · Endive (raw).  · Green leaf lettuce (raw).  · Green scallions (raw).  · Kale (raw).  · Okra (frozen, cooked).  · Plantains (fried).  · Bam lettuce (raw).  · Sauerkraut (canned).  · Spinach (raw).  CALL YOUR CLINIC OR HEALTH CARE PROVIDER IF YOU:  · Plan to have any surgery or procedure.  · Feel sick, especially if you have diarrhea or vomiting.  · Experience or anticipate any major changes in your diet.  · Start or stop a prescription or over-the-counter medicine.  · Become, plan to become, or think you may be pregnant.  · Are having heavier than usual menstrual periods.  · Have had a fall, accident, or any symptoms of bleeding or unusual bruising.  · Develop an unusual fever.  CALL 911 IN THE [place] OR GO TO THE EMERGENCY DEPARTMENT IF YOU:   · Think you may be having an allergic reaction to  "warfarin. The signs of an allergic reaction could include itching, rash, hives, swelling, chest tightness, or trouble breathing.  · See signs of blood in your urine. The signs could include reddish, pinkish, or tea-colored urine.  · See signs of blood in your stools. The signs could include bright red or black stools.  · Vomit or cough up blood. In these instances, the blood could have either a bright red or a \"coffee-grounds\" appearance.  · Have bleeding that will not stop after applying pressure for 30 minutes such as cuts, nosebleeds, or other injuries.  · Have severe pain in your joints or back.  · Have a new and severe headache.  · Have sudden weakness or numbness of your face, arm, or leg, especially on one side of your body.  · Have sudden confusion or trouble understanding.  · Have sudden trouble seeing in one or both eyes.  · Have sudden trouble walking, dizziness, loss of balance, or coordination.  · Have trouble speaking or understanding (aphasia).     This information is not intended to replace advice given to you by your health care provider. Make sure you discuss any questions you have with your health care provider.     Document Released: 12/18/2006 Document Revised: 01/08/2016 Document Reviewed: 06/13/2014  [employer] Interactive Patient Education ©2016 [employer]      "

## 2017-02-18 NOTE — CARE PLAN
Problem: Bowel/Gastric:  Goal: Normal bowel function is maintained or improved  Outcome: PROGRESSING AS EXPECTED  Bowel sounds present in all 4 quadrants. Scheduled Senokot administered. LBM 2/17/17    Problem: Pain Management  Goal: Pain level will decrease to patient’s comfort goal  Outcome: PROGRESSING AS EXPECTED  Pt. refused scheduled Flexeril and requested PRN Tramadol for 2/10 middle back pain. Patient appears resting comfortably at this time. No s/s of distress or discomfort noted.

## 2017-02-18 NOTE — PROGRESS NOTES
Received shift report and assumed care of patient.  Patient awake, calm and stable, up for meal. Denies pain or discomfort at this time.  Will continue to monitor.

## 2017-02-18 NOTE — DISCHARGE SUMMARY
Rehab Discharge Note    Admission Diagnosis:   Active Hospital Problems    Diagnosis   • S/P MVR (mitral valve repair)   • Hypertension   • Left knee pain   • Right wrist pain   • Lumbar spinal stenosis   • Primary osteoarthritis of both knees   • Lumbar spondylosis   • Bilateral low back pain without sciatica       Discharge Diagnosis:  Active Hospital Problems    Diagnosis   • S/P MVR (mitral valve repair)   • Hypertension   • Left knee pain   • Right wrist pain   • Lumbar spinal stenosis   • Primary osteoarthritis of both knees   • Lumbar spondylosis   • Bilateral low back pain without sciatica     HPI prior to rehab  The patient is a 66 y.o. female with a past medical history of hypertension, nocturnal hypoxemia, remote subdural hematoma status post evacuation, and mitral valve prolapse; admitted for acute inpatient rehabilitation on 2/9/2017 with functional debility secondary to open heart surgery for mitral valve repair. Patient reports that she is very healthy at baseline with known history of mitral valve prolapse monitored by cardiologist Dr. Landin with recent checkup 3 months prior. She went alpine skiing and then the following day on January 24, 2017, was not feeling well with shortness of breath and a cough as well as hypoxia. She went to the emergency department at [facility] where she had acute respiratory failure requiring intubation. She was hypotensive. Echocardiogram showed a ruptured mitral valve leaflet with severe mitral regurgitation causing diffuse acute cardiogenic pulmonary edema and cardiogenic shock and elevated troponin due to demand ischemia. She went to the cath lab and was found to have normal coronary arteries. She went to the OR on 1/31/2017 for radicle mitral valve repair with Dr. Schmitt. Postoperative complications included pleural effusions requiring 3 thoracentesis. 2 on the left and 1 on the right. She has normocytic anemia, hyponatremia, hypokalemia, continued  "respiratory insufficiency requiring oxygen.    Rehab Hospital Course    The patient made excellent functional gains throughout her stay with us. The hospitalist was consulted to assist us with medical management. She continued on aspirin and Coumadin for her valve prophylaxis. Her Coumadin dosing was adjusted per pharmacy in in collaboration with myself. She was discharged home on 7.5 mg and will need home health nursing to recheck next Monday and Thursday. She was titrated off oxygen. Her pleural effusions remained stable with repeat chest x-ray on February 10 showing very small persistent bilateral pleural effusions. She has a history of hypertension. She was on losartan at home but did not need any blood pressure medications here. She had stable normocytic postoperative anemia without any evidence of bleeding. She was continued on a statin for hyperlipidemia. She did complain of intermittent sharp pain in the right thumb and was recommended that she have an outpatient nerve conduction study due to history of cervical stenosis. She was discharged home with her .    Discharge PE  /68 mmHg  Pulse 97  Temp(Src) 36.7 °C (98.1 °F)  Resp 18  Ht 1.702 m (5' 7\")  Wt 56 kg (123 lb 7.3 oz)  BMI 19.33 kg/m2  SpO2 95%  LMP  (LMP Unknown)  Gen: alert, no apparent distress  CV: regular rate and rhythm, no murmurs, no peripheral edema, sternal incision c/d/i  Resp: clear to ascultation bilaterally, normal respiratory effort  GI: soft, non-tender abdomen, bowel sounds present    Functional Status at Discharge  Eatin - Independent  Eating Description:  Increased time, Supervision for safety  Groomin - Independent  Grooming Description:     Bathin - Modified Independent  Bathing Description:  Increased time  Upper Body Dressin - Modified Independent  Upper Body Dressing Description:  Increased time  Lower Body Dressin - Modified Independent  Lower Body Dressing Description:  6 - " "Modified Independent  Discharge Location : Home  Patient Discharging with Assist of: Spouse / Significant Other  Level of Supervision Required: No Supervision  Recommended Equipment for Discharge: Tub Transfer Bench;Grab Bars by Toilet;Hand Held Shower Head;Grab Bars in Tub / Shower  Recommended Services Upon Discharge: No Follow-Up Occupational Therapy Recommended  Long Term Goals Met: 3  Long Term Goals Not Met: 0  Criteria for Termination of Services: Maximum Function Achieved for Inpatient Rehabilitation  Walk:  7 - Independent  Distance Walked:  Walks a minimum of 150 feet  Walk Description:  Extra time, Supervision for safety (1500 ft over oudoors over multiple surfaces including grass and pavement, spv)  Wheelchair:  0 - Not tested, patient refused  Distance Propelled:  Propels a minimum of 150 feet   Wheelchair Description:   (200 ft x2 using LEs, mod I )  Stairs 7 - Independent  Stairs DescriptionExtra time, Hand rails (20 6\" steps with 1 HR, alternating step, mod I. Pt also completed 4 6\" steps with no handrails and step through, mod I  )  Discharge Location: Home  Patient Discharging with Assist of: Spouse / Significant Other  Level of Supervision Required Upon Discharge: No Supervision  Recommended Equipment for Discharge: None  Recommeded Services Upon Discharge: Home Health Physical Therapy;Outpatient Physical Therapy  Long Term Goals Met: met all long-term goals for mobility, transfers, gait, balance, tolerance for activity  Long Term Goals Not Met: met all long-term goals  Criteria for Termination of Services: Maximum Function Achieved for Inpatient Rehabilitation  Comprehension Mode:  Both  Comprehension:  6 - Modified Independent  Comprehension Description:   (Some additional time.)  Expression Mode:  Vocal  Expression:  5 - Stand-by Prompting/Supervision or Set-up  Expression Description:  Verbal cueing  Social Interaction:  6 - Modified Independent  Social Interaction Description:     Problem " [place]vin - Standby Prompting/Supervision or Set-up  Problem Solving Description:     Memory:  4 - Minimal Assistance  Memory Description:          Discharge Medication:     Medication List      START taking these medications       Instructions    cyclobenzaprine 10 MG Tabs   Last time this was given:  10 mg on 2017  8:10 PM   Commonly known as:  FLEXERIL    Take 1 Tab by mouth every bedtime.   Dose:  10 mg       glucosamine/chondroitin 500-400 MG Caps   Last time this was given:  1 Tab on 2017  8:00 AM   Commonly known as:  GLUCOSAM/CHRONDROIT 500-400    Take 1 Tab by mouth 2 Times a Day.   Dose:  1 Tab       melatonin 3 MG Tabs   Last time this was given:  3 mg on 2017  7:58 PM    Take 1 Tab by mouth every bedtime.   Dose:  3 mg         CHANGE how you take these medications       Instructions    warfarin 2.5 MG Tabs   What changed:    - medication strength  - Another medication with the same name was removed. Continue taking this medication, and follow the directions you see here.   Last time this was given:  7.5 mg on 2017  5:49 PM   Commonly known as:  COUMADIN    Doctor's comments:  Take 3 tabs per day, or as directed by your physician   Take 3 Tabs by mouth COUMADIN-DAILY.   Dose:  7.5 mg         CONTINUE taking these medications       Instructions    artificial tears 1.4 % Soln    Place 1 Drop in both eyes as needed (discomfort/dry eyes).   Dose:  1 Drop       aspirin 81 MG EC tablet   Last time this was given:  81 mg on 2017  8:20 AM    Take 1 Tab by mouth every day.   Dose:  81 mg       glucosamine Sulfate 500 MG Caps    Take 500 mg by mouth 3 times a day, with meals.   Dose:  500 mg       multivitamin Tabs   Last time this was given:  1 Tab on 2017  8:20 AM    Take 1 Tab by mouth every day.   Dose:  1 Tab       oyster shell calcium/vitamin D 250-125 MG-UNIT Tabs tablet   Last time this was given:  1 Tab on 2017  8:20 AM    Take 1 Tab by mouth every day.   Dose:  1 Tab        pravastatin 20 MG Tabs   Last time this was given:  20 mg on 2/17/2017  7:58 PM   Commonly known as:  PRAVACHOL    Take 1 Tab by mouth every day.   Dose:  20 mg       sodium chloride 0.65 % Soln   Commonly known as:  OCEAN    Spray 2 Sprays in nose as needed.   Dose:  2 Spray       tramadol 50 MG Tabs   Last time this was given:  50 mg on 2/17/2017  7:58 PM   Commonly known as:  ULTRAM    Take 1 Tab by mouth every four hours as needed (Moderate Pain (NRS Pain Scale 4-6; CPOT Pain Scale 3-5) if opiates not ordered or tolerated).   Dose:  50 mg         STOP taking these medications          famotidine 20 MG Tabs   Commonly known as:  PEPCID       furosemide 10 MG/ML Soln   Commonly known as:  LASIX       ipratropium-albuterol 0.5-2.5 (3) MG/3ML nebulizer solution   Commonly known as:  DUONEB       potassium chloride SA 10 MEQ Tbcr   Commonly known as:  K-DUR           Discharge Instructions - Completed by Case Mgmt      Discharge Location        Home with [facility]          Agency Name / Address / Phone    [facility] at [phone] (we will have them call you to schedule visits)      Outpatient Services        Physical Therapy          DME Provider / Phone    [employer] for wheel chair at [phone]      Medical Equipment Ordered    Wheelchair        We will request Protime/Inr lab draws for Monday and Wednesday with [facility].      Follow-up With  Details  Why  Contact Young Javier M.D. (Primary Care)  On 3/1/2017  Wednesday at 11:45 (please check in at 11:30)(records will be sent)    [id number] [facility]  [address] [place] [address] [id number] [phone]      Chris Schmitt M.D. (Cardiac Surgery)  On 3/6/2017  Monday at 12:45 pm  [address]  [place] [address]  [phone]      Tom Landin D.O. (Cardiology)  On 3/15/2017  Wednesday at 3:00 pm  [address] [facility]  [address]  [facility] [id number] [phone]             Condition on Discharge:  Good.

## 2017-02-18 NOTE — PROGRESS NOTES
"Rehab Progress Note     Chief complaint: follow-up mitral valve prolapse  Date of Service: February 17, 2017    Interval Events (Subjective)  Patient seen and examined. No acute events overnight. Denies any pain. Has some concerns about access to her house due to heavy snowfall and a steep driveway. Has questions about her Coumadin and laboratory monitoring. Is ready for discharge home tomorrow.    Objective:  VITAL SIGNS: /68 mmHg  Pulse 80  Temp(Src) 37 °C (98.6 °F)  Resp 18  Ht 1.702 m (5' 7\")  Wt 56 kg (123 lb 7.3 oz)  BMI 19.33 kg/m2  SpO2 94%  LMP  (LMP Unknown)  Gen: alert, no apparent distress  CV: regular rate and rhythm, no murmurs, no peripheral edema, sternal incision c/d/i  Resp: clear to ascultation bilaterally, normal respiratory effort  GI: soft, non-tender abdomen, bowel sounds present      Recent Results (from the past 72 hour(s))   PROTHROMBIN TIME    Collection Time: 02/15/17  6:23 AM   Result Value Ref Range    PT 22.9 (H) 12.0 - 14.6 sec    INR 1.96 (H) 0.87 - 1.13   PROTHROMBIN TIME    Collection Time: 02/16/17  5:27 AM   Result Value Ref Range    PT 23.3 (H) 12.0 - 14.6 sec    INR 2.00 (H) 0.87 - 1.13   COMP METABOLIC PANEL    Collection Time: 02/16/17  5:27 AM   Result Value Ref Range    Sodium 135 135 - 145 mmol/L    Potassium 4.2 3.6 - 5.5 mmol/L    Chloride 101 96 - 112 mmol/L    Co2 25 20 - 33 mmol/L    Anion Gap 9.0 0.0 - 11.9    Glucose 86 65 - 99 mg/dL    Bun 13 8 - 22 mg/dL    Creatinine 0.65 0.50 - 1.40 mg/dL    Calcium 9.0 8.5 - 10.5 mg/dL    AST(SGOT) 18 12 - 45 U/L    ALT(SGPT) 16 2 - 50 U/L    Alkaline Phosphatase 69 30 - 99 U/L    Total Bilirubin 0.3 0.1 - 1.5 mg/dL    Albumin 3.0 (L) 3.2 - 4.9 g/dL    Total Protein 5.6 (L) 6.0 - 8.2 g/dL    Globulin 2.6 1.9 - 3.5 g/dL    A-G Ratio 1.2 g/dL   CBC WITH DIFFERENTIAL    Collection Time: 02/16/17  5:27 AM   Result Value Ref Range    WBC 4.7 (L) 4.8 - 10.8 K/uL    RBC 3.03 (L) 4.20 - 5.40 M/uL    Hemoglobin 9.0 (L) " 12.0 - 16.0 g/dL    Hematocrit 28.4 (L) 37.0 - 47.0 %    MCV 93.7 81.4 - 97.8 fL    MCH 29.7 27.0 - 33.0 pg    MCHC 31.7 (L) 33.6 - 35.0 g/dL    RDW 46.5 35.9 - 50.0 fL    Platelet Count 454 (H) 164 - 446 K/uL    MPV 10.2 9.0 - 12.9 fL    Neutrophils-Polys 60.70 44.00 - 72.00 %    Lymphocytes 24.90 22.00 - 41.00 %    Monocytes 12.90 0.00 - 13.40 %    Eosinophils 1.10 0.00 - 6.90 %    Basophils 0.20 0.00 - 1.80 %    Immature Granulocytes 0.20 0.00 - 0.90 %    Nucleated RBC 0.00 /100 WBC    Neutrophils (Absolute) 2.88 2.00 - 7.15 K/uL    Lymphs (Absolute) 1.18 1.00 - 4.80 K/uL    Monos (Absolute) 0.61 0.00 - 0.85 K/uL    Eos (Absolute) 0.05 0.00 - 0.51 K/uL    Baso (Absolute) 0.01 0.00 - 0.12 K/uL    Immature Granulocytes (abs) 0.01 0.00 - 0.11 K/uL    NRBC (Absolute) 0.00 K/uL   BTYPE NATRIURETIC PEPTIDE    Collection Time: 02/16/17  5:27 AM   Result Value Ref Range    B Natriuretic Peptide 190 (H) 0 - 100 pg/mL   ESTIMATED GFR    Collection Time: 02/16/17  5:27 AM   Result Value Ref Range    GFR If African American >60 >60 mL/min/1.73 m 2    GFR If Non African American >60 >60 mL/min/1.73 m 2   PROTHROMBIN TIME    Collection Time: 02/17/17  5:26 AM   Result Value Ref Range    PT 26.0 (H) 12.0 - 14.6 sec    INR 2.30 (H) 0.87 - 1.13   COMP METABOLIC PANEL    Collection Time: 02/17/17  5:26 AM   Result Value Ref Range    Sodium 137 135 - 145 mmol/L    Potassium 4.1 3.6 - 5.5 mmol/L    Chloride 106 96 - 112 mmol/L    Co2 23 20 - 33 mmol/L    Anion Gap 8.0 0.0 - 11.9    Glucose 87 65 - 99 mg/dL    Bun 15 8 - 22 mg/dL    Creatinine 0.57 0.50 - 1.40 mg/dL    Calcium 8.9 8.5 - 10.5 mg/dL    AST(SGOT) 17 12 - 45 U/L    ALT(SGPT) 16 2 - 50 U/L    Alkaline Phosphatase 69 30 - 99 U/L    Total Bilirubin 0.3 0.1 - 1.5 mg/dL    Albumin 3.0 (L) 3.2 - 4.9 g/dL    Total Protein 5.5 (L) 6.0 - 8.2 g/dL    Globulin 2.5 1.9 - 3.5 g/dL    A-G Ratio 1.2 g/dL   CBC WITH DIFFERENTIAL    Collection Time: 02/17/17  5:26 AM   Result Value Ref  Range    WBC 4.2 (L) 4.8 - 10.8 K/uL    RBC 3.00 (L) 4.20 - 5.40 M/uL    Hemoglobin 8.9 (L) 12.0 - 16.0 g/dL    Hematocrit 28.1 (L) 37.0 - 47.0 %    MCV 93.7 81.4 - 97.8 fL    MCH 29.7 27.0 - 33.0 pg    MCHC 31.7 (L) 33.6 - 35.0 g/dL    RDW 46.6 35.9 - 50.0 fL    Platelet Count 437 164 - 446 K/uL    MPV 9.9 9.0 - 12.9 fL    Neutrophils-Polys 55.00 44.00 - 72.00 %    Lymphocytes 31.00 22.00 - 41.00 %    Monocytes 12.40 0.00 - 13.40 %    Eosinophils 1.20 0.00 - 6.90 %    Basophils 0.20 0.00 - 1.80 %    Immature Granulocytes 0.20 0.00 - 0.90 %    Nucleated RBC 0.00 /100 WBC    Neutrophils (Absolute) 2.31 2.00 - 7.15 K/uL    Lymphs (Absolute) 1.30 1.00 - 4.80 K/uL    Monos (Absolute) 0.52 0.00 - 0.85 K/uL    Eos (Absolute) 0.05 0.00 - 0.51 K/uL    Baso (Absolute) 0.01 0.00 - 0.12 K/uL    Immature Granulocytes (abs) 0.01 0.00 - 0.11 K/uL    NRBC (Absolute) 0.00 K/uL   BTYPE NATRIURETIC PEPTIDE    Collection Time: 02/17/17  5:26 AM   Result Value Ref Range    B Natriuretic Peptide 219 (H) 0 - 100 pg/mL   ESTIMATED GFR    Collection Time: 02/17/17  5:26 AM   Result Value Ref Range    GFR If African American >60 >60 mL/min/1.73 m 2    GFR If Non African American >60 >60 mL/min/1.73 m 2       Current Facility-Administered Medications   Medication Frequency   • JOINTASTIN CAPS (Patient own supplied medication in cart drawer) BID   • melatonin tablet 3 mg QHS   • tramadol (ULTRAM) 50 MG tablet 50 mg Q6HRS PRN   • acetaminophen (TYLENOL) tablet 650 mg Q4HRS PRN    Or   • acetaminophen (TYLENOL) suppository 650 mg Q4HRS PRN   • aspirin EC (ECOTRIN) tablet 81 mg DAILY   • docusate sodium (COLACE) capsule 100 mg QAM    And   • senna-docusate (PERICOLACE or SENOKOT S) 8.6-50 MG per tablet 1 Tab Nightly    And   • senna-docusate (PERICOLACE or SENOKOT S) 8.6-50 MG per tablet 1 Tab Q24HRS PRN    And   • lactulose 20 GM/30ML solution 30 mL Q24HRS PRN    And   • bisacodyl (DULCOLAX) suppository 10 mg Q24HRS PRN    And   • fleet enema  133 mL Once PRN   • mag hydrox-al hydrox-simeth (MAALOX PLUS ES or MYLANTA DS) suspension 30 mL Q4HRS PRN   • MD ALERT... warfarin (COUMADIN) per pharmacy protocol PRN   • ipratropium-albuterol (DUONEB) nebulizer solution 3 mL Q2HRS PRN   • artificial tears 1.4 % ophthalmic solution 1 Drop PRN   • multivitamin (THERAGRAN) tablet 1 Tab DAILY   • oyster shell calcium/vitamin D 250-125 MG-UNIT tablet 1 Tab QDAY with Breakfast   • pravastatin (PRAVACHOL) tablet 20 mg Q EVENING   • sodium chloride (OCEAN) 0.65 % nasal spray 2 Spray PRN   • hydrocodone-acetaminophen (NORCO) 7.5-325 MG per tablet 1 Tab Q4HRS PRN   • hydrocodone-acetaminophen (NORCO) 5-325 MG per tablet 1-2 Tab Q4HRS PRN   • ondansetron (ZOFRAN ODT) dispertab 4 mg 4X/DAY PRN    Or   • ondansetron (ZOFRAN) syringe/vial injection 4 mg 4X/DAY PRN   • cyclobenzaprine (FLEXERIL) tablet 10 mg QHS   • cyclobenzaprine (FLEXERIL) tablet 10 mg BID PRN   • trazodone (DESYREL) tablet 50 mg QHS PRN       Orders Placed This Encounter   Procedures   • DIET ORDER     Standing Status: Standing      Number of Occurrences: 1      Standing Expiration Date:      Order Specific Question:  Diet:     Answer:  Cardiac [6]       Assessment:  Active Hospital Problems    Diagnosis   • S/P MVR (mitral valve repair)     Date of conference: 2/14/2017  Anticipated DC date: Sat 2/18  Home health: Have changed from outpatient to home health due to need for frequent lab draws for INR. We'll also order PT.  Equip: none  Follow up: surgeon, PCP, neurology    Medical Decision Making and Plan:    Labs reviewed. Medications reviewed. Appreciate the assistance of the hospitalist.    Mitral valve prolapse status post mitral valve repair - continue sternal precautions.  Continue aspirin and Coumadin.    Right posterior shoulder pain - improved, likely muscular in nature. Continue modalities with therapy. Patient wants to know if she can get outpatient massage therapy. Advised to wait till she is  cleared of her sternal precautions.    Respiratory insufficiency, nocturnal hypoxia - 2L oxygen. Respiratory therapist to see patient. Off oxygen during the day.     Pleural effusions status post bilateral thoracentesis - improved. Continue incentive spirometry. Lasix discontinued. No evidence on exam of worsening effusions.    Hypertension - is on losartan at home. Currently not on blood pressure medications. Continue to monitor.    Anemia - normocytic. Likely post operative. Continue to monitor.    Hyperlipidemia - continue statin.    Right thumb pain - neuropathic in nature. Differential includes cervical stenosis, brachial plexus injury, or less likely radial nerve injury. Continue to monitor. Improved. May need nerve pain medicines and/or outpatient nerve conduction study.    GI prophylaxis -  Patient not complaining of heartburn. Continue to monitor.     DVT prophylaxis - Coumadin. Dosing per pharmacy.    Total time:  >25 minutes.  I spent greater than 50% of the time for patient care and coordination on this date, including unit/floor time, and face-to-face time with the patient as per assessment and plan above.    Ashley Camacho M.D.

## 2017-02-19 NOTE — PROGRESS NOTES
DATE OF SERVICE:  02/17/2017    SUBJECTIVE:  The patient was seen for followup visit.  The patient will be   discharged tomorrow.  Her home program was discussed at length.  The patient   seems to have a good safety awareness and is very cognizant of her physical   limitations.  The patient will be participating in outpatient therapy.  She   has some familiarity with this facility and feels comfortable with the   therapist.       ____________________________________     GAURAV WILKERSON, PHD    MISHA / JANET    DD:  02/18/2017 10:37:37  DT:  02/18/2017 21:04:33    D#:  [id number]  Job#:  [id number]

## 2017-02-20 NOTE — DISCHARGE PLANNING
Case Management;  Met with patient on Friday prior to d/c planned for Saturday.  She indicated that she and the physician had spoken about home health instead of outpatient.  I completed a referral to [facility] at that time.  Confirmed with them this am that they will see patient today and draw labs.  Faxed records and labs to her PCP, Dr. Salinas, and to her cardiologist for management of Coumadin dosing.  We have cancelled outpatient therapy.  Patient had delivery of wheelchair from [employer] for rental.  Follow up appointments reviewed and patient agreeable with all plans.

## 2017-02-20 NOTE — DISCHARGE PLANNING
Spoke with Alejandra at [facility] to cancel referral.  Patient to have home health instead.  Per JEANNA, referral to [facility] accepted per Santa.

## 2017-02-21 NOTE — DISCHARGE PLANNING
Case Management;  Spoke with home health nurse at [facility].  She is trying to confirm follow up for Coumadin.  I contacted [facility] office and confirmed fax number.  Faxed, again, to their office and was told by Devika that they will set the follow up for this patient.  I have asked the home health nurse to contact me if I can assist any further and she agrees to do this.  I have also asked Devika at [facility] to do the same.  They both have my contact information.

## 2017-02-26 NOTE — CONSULTS
DATE OF SERVICE:  02/14/2017    BRIEF HISTORY OF PRESENTING COMPLAINTS:  The patient is a 66-year-old white    female who is referred for behavioral medicine evaluation by Dr. Camacho.  The patient was transferred to rehab from acute where she underwent a   mitral valve repair.  She was stabilized acutely and then sent to rehab to   address her general debility.    PAST MEDICAL HISTORY:  Singnificant for allergies, arthritis, osteoporosis,   heart murmur, fracture, hypoxia, and mitral valve prolapse.    PSYCHOLOGICAL STATUS:  MENTAL STATUS EXAMINATION:  The patient is a well-nourished, thinly built   female of medium stature, who appeared somewhat older than her stated age of   66.  At presentation, the patient was alert.  She was sitting upright in her   bed when approached.  The patient oriented well to my presence.  The patient   was kempt in appearance.  She was dressed casually in street attire that was   appropriate to her age and setting.  The patient's manner of presentation was   cooperative and friendly.    Patient showed no gross cognitive deficits.  She was well oriented to time,   place, and person.  Her language was logical and goal oriented, and her speech   was normal for rate and rhythm.  The patient's concentration and memory   functioning appeared intact.    Patient's affect was stable, mildly intense and well modulated.  She related   well.  Her mood appeared euthymic and appropriate to the context.    There was no evidence of delusional or perceptual disturbance.  Also, the   patient showed no unusual pain or motor behavior during the interview.    SPECIFIC BEHAVIORAL COMPLAINTS:  The patient denied any clinically significant   symptoms of a negative mood.  She reported no strong feelings of depression,   anxiety, or anger.    Patient did admit to some chronic low back pain.  She denied any significant   chest pain.  She also reported a slight diminishment of her appetite.  She   said  her appetite is returning.  She also said her energy level is coming   back.  The patient reported her sleep has been good for the past several days.    Patient reported no interpersonal discord or discomfort, marital strife,   family disharmony or problems managing her day-to-day stressors prior to her   hospitalization.    Patient also reported no ETOH or other drug abuse, tobacco dependence or any   excessive use of caffeinated products.    PSYCHIATRIC HISTORY:  The patient denied any history significant for   psychiatric disturbance or treatment including in or outpatient care.    PSYCHOMETRIC TESTING:  The patient was administered 2 psychometric tests and 2   screening instruments.  The PS/PC-R revealed clinically significant symptoms   of generalized mood disturbance.  Her CDR survey showed no problems with level   of consciousness, attention, thinking, perception, speech or memory.  She   showed very slight problems with behavioral activation.  The patient also   reported somewhat diminishment of her vigor, but not much.  She reported no   mood disturbance.  She said her appetite is returning.  She admitted to some   chronic problems with back pain.    Patient was screened for any elder abuse or risk of suicide.  There is no   strong evidence for either problem.    SOCIAL HISTORY:  The patient is  and retired.  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  She lives with her  in [place], [place].    IMPRESSION:  Very mild adjustment problems.    RECOMMENDATIONS:  Patient will be followed for status and supportive care.       ____________________________________     GAURAV WILKERSON, PHD    MISHA / JANET    DD:  02/26/2017 12:31:32  DT:  02/26/2017 13:47:15    D#:  [id number]  Job#:  [id number]

## 2017-03-08 ENCOUNTER — HOSPITAL ENCOUNTER (OUTPATIENT)
Dept: RADIOLOGY | Facility: MEDICAL CENTER | Age: 67
End: 2017-03-08
Attending: THORACIC SURGERY (CARDIOTHORACIC VASCULAR SURGERY)
Payer: MEDICARE

## 2017-03-08 DIAGNOSIS — R06.02 SHORTNESS OF BREATH: ICD-10-CM

## 2017-03-08 PROCEDURE — 71020 DX-CHEST-2 VIEWS: CPT

## 2017-03-17 NOTE — DOCUMENTATION QUERY
DOCUMENTATION QUERY    PROVIDERS: Please select “Cosign w/ note”to reply to query.    To better represent the severity of illness of your patient, please review the following information and exercise your independent professional judgment in responding to this query.     Elevated troponin likley secondary to demand ischemia is documented in the H&P. Non-STEMI is documented in progress notes and consult.  Based upon the clinical findings, risk factors, and treatment, can this diagnosis be further specified as    · Non-STEMI ruled in  · Non-STEMI ruled out  · Other explanation of clinical findings (please document)  · Unable to determine        The medical record reflects the following:   Clinical Findings  Troponins 1.79   4.23   3.35   Treatment   Mitral valve repair   Risk Factors  Severe mitral regurgitation, respiratory failure, right sided heart failure   Location within medical record  H&P  Consult   Progress notes     Thank you,   Negar Krishna, CCS

## 2017-04-06 VITALS
HEIGHT: 67 IN | SYSTOLIC BLOOD PRESSURE: 128 MMHG | WEIGHT: 137.2 LBS | BODY MASS INDEX: 21.53 KG/M2 | HEART RATE: 75 BPM | RESPIRATION RATE: 16 BRPM | TEMPERATURE: 97.8 F | DIASTOLIC BLOOD PRESSURE: 80 MMHG

## 2017-04-06 RX ORDER — OMEGA-3 FATTY ACIDS/FISH OIL 300-1000MG
CAPSULE ORAL DAILY
COMMUNITY
End: 2018-02-27

## 2017-04-10 ENCOUNTER — OFFICE VISIT (OUTPATIENT)
Dept: PULMONOLOGY | Facility: HOSPICE | Age: 67
End: 2017-04-10
Payer: MEDICARE

## 2017-04-10 VITALS
DIASTOLIC BLOOD PRESSURE: 70 MMHG | OXYGEN SATURATION: 94 % | WEIGHT: 130.8 LBS | SYSTOLIC BLOOD PRESSURE: 118 MMHG | HEIGHT: 67 IN | BODY MASS INDEX: 20.53 KG/M2 | TEMPERATURE: 97.7 F | HEART RATE: 90 BPM | RESPIRATION RATE: 18 BRPM

## 2017-04-10 DIAGNOSIS — R91.8 PULMONARY NODULES: ICD-10-CM

## 2017-04-10 DIAGNOSIS — J96.01 ACUTE RESPIRATORY FAILURE WITH HYPOXIA (HCC): ICD-10-CM

## 2017-04-10 DIAGNOSIS — J90 PLEURAL EFFUSION: ICD-10-CM

## 2017-04-10 DIAGNOSIS — Z98.890 S/P MVR (MITRAL VALVE REPAIR): ICD-10-CM

## 2017-04-10 PROCEDURE — 4040F PNEUMOC VAC/ADMIN/RCVD: CPT | Performed by: INTERNAL MEDICINE

## 2017-04-10 PROCEDURE — 99214 OFFICE O/P EST MOD 30 MIN: CPT | Performed by: INTERNAL MEDICINE

## 2017-04-10 PROCEDURE — 1101F PT FALLS ASSESS-DOCD LE1/YR: CPT | Mod: 8P | Performed by: INTERNAL MEDICINE

## 2017-04-10 PROCEDURE — G8420 CALC BMI NORM PARAMETERS: HCPCS | Performed by: INTERNAL MEDICINE

## 2017-04-10 PROCEDURE — G8432 DEP SCR NOT DOC, RNG: HCPCS | Performed by: INTERNAL MEDICINE

## 2017-04-10 PROCEDURE — 3014F SCREEN MAMMO DOC REV: CPT | Mod: 8P | Performed by: INTERNAL MEDICINE

## 2017-04-10 PROCEDURE — 1036F TOBACCO NON-USER: CPT | Performed by: INTERNAL MEDICINE

## 2017-04-10 PROCEDURE — 3017F COLORECTAL CA SCREEN DOC REV: CPT | Performed by: INTERNAL MEDICINE

## 2017-04-10 NOTE — MR AVS SNAPSHOT
"        Kristen HendrixDoug   4/10/2017 3:40 PM   Office Visit   MRN: [id number]    Department:  [facility]   Dept Phone:  [phone]    Description:  Female : 1950   Provider:  Kiki Kwong M.D.           Reason for Visit     Results CT results for Pulmonary Nodule.      Allergies as of 4/10/2017     Allergen Noted Reactions    Codeine 2012       vomiting    Latex 2015         You were diagnosed with     S/P MVR (mitral valve repair)   [543767]       Acute respiratory failure with hypoxia ([facility])   [089782]   Resolved    Pleural effusion   [848232]   Resolved    Pulmonary nodules   [280568]         Vital Signs     Blood Pressure Pulse Temperature Respirations Height Weight    118/70 mmHg 90 36.5 °C (97.7 °F) 18 1.702 m (5' 7.01\") 59.33 kg (130 lb 12.8 oz)    Body Mass Index Oxygen Saturation Smoking Status             20.48 kg/m2 94% Never Smoker          Basic Information     Date Of Birth Sex Race Ethnicity Preferred Language    1950 Female White Non- English      Problem List              ICD-10-CM Priority Class Noted - Resolved    Bilateral low back pain without sciatica M54.5   2015 - Present    Right hip pain M25.551   2015 - Present    Lumbar spondylosis M47.816   12/10/2015 - Present    Primary osteoarthritis of both knees M17.0   2016 - Present    Hamstring tear    3/28/2016 - Present    Lumbar spinal stenosis M48.06   2016 - Present    Plantar fasciitis of right foot M72.2   2016 - Present    Peroneal tendinitis of right lower extremity M76.71   2016 - Present    Loss of balance R26.89   2016 - Present    Dizziness R42   2016 - Present    Right wrist pain M25.531   2016 - Present    Falls W19.XXXA   2016 - Present    Left knee pain M25.562   2016 - Present    Neck pain M54.2   10/24/2016 - Present    Cervical spondylosis M47.812   11/15/2016 - Present    Hyperlipidemia E78.5   2017 - Present   " Hypertension I10   1/30/2017 - Present    S/P MVR (mitral valve repair) Z98.890   2/8/2017 - Present      Health Maintenance        Date Due Completion Dates    IMM DTaP/Tdap/Td Vaccine (1 - Tdap) 3/25/1967 ---    PAP SMEAR 3/25/1969 ---    MAMMOGRAM 3/25/1988 ---    COLONOSCOPY 3/25/1998 ---    IMM ZOSTER VACCINE 3/25/2008 ---    BONE DENSITY 3/25/2013 ---    IMM PNEUMOCOCCAL 65+ (ADULT) LOW/MEDIUM RISK SERIES (2 of 2 - PPSV23) 2/15/2018 2/15/2017            Current Immunizations     13-VALENT PCV PREVNAR 2/15/2017  9:26 AM      Below and/or attached are the medications your provider expects you to take. Review all of your home medications and newly ordered medications with your provider and/or pharmacist. Follow medication instructions as directed by your provider and/or pharmacist. Please keep your medication list with you and share with your provider. Update the information when medications are discontinued, doses are changed, or new medications (including over-the-counter products) are added; and carry medication information at all times in the event of emergency situations     Allergies:  CODEINE - (reactions not documented)     LATEX - (reactions not documented)               Medications  Valid as of: April 10, 2017 -  4:32 PM    Generic Name Brand Name Tablet Size Instructions for use    Aspirin (Tablet Delayed Response) aspirin 81 MG Take 1 Tab by mouth every day.        Calcium Carb-Cholecalciferol (Tab) oyster shell calcium/vitamin D 250-125 MG-UNIT Take 1 Tab by mouth every day.        Cyclobenzaprine HCl (Tab) FLEXERIL 10 MG Take 1 Tab by mouth every bedtime.        Glucosamine Sulfate (Cap) glucosamine Sulfate 500 MG Take 500 mg by mouth 3 times a day, with meals.        Glucosamine-Chondroitin (Cap) GLUCOSAM/CHRONDROIT 500-400 500-400 MG Take 1 Tab by mouth 2 Times a Day.        Ibuprofen (Cap) Ibuprofen 200 MG Take  by mouth every day.        Melatonin (Tab) melatonin 3 MG Take 1 Tab by mouth every  bedtime.        Multiple Vitamin (Tab) THERAGRAN  Take 1 Tab by mouth every day.        Polyvinyl Alcohol (Solution) artificial tears 1.4 % Place 1 Drop in both eyes as needed (discomfort/dry eyes).        Pravastatin Sodium (Tab) PRAVACHOL 20 MG Take 1 Tab by mouth every day.        Saline (Solution) OCEAN 0.65 % Spray 2 Sprays in nose as needed.        TraMADol HCl (Tab) ULTRAM 50 MG Take 1 Tab by mouth every four hours as needed (Moderate Pain (NRS Pain Scale 4-6; CPOT Pain Scale 3-5) if opiates not ordered or tolerated).        Warfarin Sodium (Tab) COUMADIN 2.5 MG Take 3 Tabs by mouth COUMADIN-DAILY.        .                 Medicines prescribed today were sent to:     [facility]    [address] [place] [address]    Phone: [phone] Fax: [phone]    [facility] [facility] [address] [address]    [address] [address] [address]    Phone: [phone] Fax: [phone]    Open 24 Hours?: No      Medication refill instructions:       If your prescription bottle indicates you have medication refills left, it is not necessary to call your provider’s office. Please contact your pharmacy and they will refill your medication.    If your prescription bottle indicates you do not have any refills left, you may request refills at any time through one of the following ways: The online [facility] system (except Urgent Care), by calling your provider’s office, or by asking your pharmacy to contact your provider’s office with a refill request. Medication refills are processed only during regular business hours and may not be available until the next business day. Your provider may request additional information or to have a follow-up visit with you prior to refilling your medication.   *Please Note: Medication refills are assigned a new Rx number when refilled electronically. Your pharmacy may indicate that no refills were authorized even though a new prescription for the  same medication is available at the pharmacy. Please request the medicine by name with the pharmacy before contacting your provider for a refill.           MyChart Status: Patient Declined

## 2017-04-10 NOTE — PROGRESS NOTES
Chief Complaint   Patient presents with   • Results     CT results for Pulmonary Nodule.       HPI: This patient is a 67 y.o. Female who returns after a prolonged hiatus for pulmonary follow-up. She was last seen in 2014 for mild pulmonary hypertension. She was in her usual state of health until January 24, 2017 when she developed acute shortness of breath and presented to the emergency department with acute decompensation, requiring intubation. She was diagnosed with a ruptured mitral valve leaflet with severe MR, and underwent mitral valve repair. Postoperatively she required multiple bilateral thoracentesis for pleural effusions. She was seen by Dr. Gonda during her ICU stay, and discharged on February 9, 2017. She is now in cardiac rehabilitation in [place], [place] and doing great. She sees Dr. Cole, cardiologist, regularly.  Follow-up chest x-ray in March 8, 2017 showed resolution of pleural effusions, with a 7 mm left upper lobe pulmonary nodule. She had a chest CAT scan January 25, 2017 confirming a noncalcified 7.5 mm left upper lobe nodule. She is a lifelong nonsmoker. She states that her PCP, Dr. Henderson, had followed a pulmonary nodule back in 2007 which was deemed benign. She denies shortness of breath, cough, chest pain, or edema.  She remains on nocturnal oxygen. Former polysomnography showed no evidence of sleep-disordered breathing.    Past Medical History   Diagnosis Date   • Allergy    • Arthritis    • Osteoporosis    • Heart murmur    • Fracture    • Hypoxia, sleep related      several years   • MVP (mitral valve prolapse)        Social History     Social History   • Marital Status:      Spouse Name: N/A   • Number of Children: N/A   • Years of Education: N/A     Occupational History   • Not on file.     Social History Main Topics   • Smoking status: Never Smoker    • Smokeless tobacco: Never Used   • Alcohol Use: Yes      Comment: sometimes   • Drug Use: No   • Sexual Activity: Not on  file     Other Topics Concern   • Not on file     Social History Narrative       Family History   Problem Relation Age of Onset   • Cancer Father        Current Outpatient Prescriptions on File Prior to Visit   Medication Sig Dispense Refill   • tramadol (ULTRAM) 50 MG Tab Take 1 Tab by mouth every four hours as needed (Moderate Pain (NRS Pain Scale 4-6; CPOT Pain Scale 3-5) if opiates not ordered or tolerated). 30 Tab 0   • warfarin (COUMADIN) 2.5 MG Tab Take 3 Tabs by mouth COUMADIN-DAILY. 90 Tab 0   • pravastatin (PRAVACHOL) 20 MG Tab Take 1 Tab by mouth every day. 30 Tab 0   • glucosamine/chondroitin (GLUCOSAM/CHRONDROIT 500-400) 500-400 MG Cap Take 1 Tab by mouth 2 Times a Day. 90 Cap    • cyclobenzaprine (FLEXERIL) 10 MG Tab Take 1 Tab by mouth every bedtime. 30 Tab 0   • artificial tears 1.4 % Solution Place 1 Drop in both eyes as needed (discomfort/dry eyes). 1 Bottle 3   • aspirin EC 81 MG EC tablet Take 1 Tab by mouth every day. 30 Tab    • sodium chloride (OCEAN) 0.65 % Solution Spray 2 Sprays in nose as needed. 1 Bottle 3   • Ibuprofen (ADVIL MIGRAINE) 200 MG Cap Take  by mouth every day.     • melatonin 3 MG Tab Take 1 Tab by mouth every bedtime.     • multivitamin (THERAGRAN) Tab Take 1 Tab by mouth every day. 30 Tab    • Calcium Carb-Cholecalciferol (OYSTER SHELL CALCIUM/VITAMIN D) 250-125 MG-UNIT Tab tablet Take 1 Tab by mouth every day.     • glucosamine Sulfate 500 MG CAPS Take 500 mg by mouth 3 times a day, with meals.       No current facility-administered medications on file prior to visit.       Allergies: Codeine and Latex    ROS:   Constitutional: Denies fevers, chills, night sweats, fatigue or weight loss  Eyes: Denies vision loss, pain, drainage, double vision  Ears, Nose, Throat: Denies earache, difficulty hearing, tinnitus, nasal congestion, hoarseness  Cardiovascular: Denies chest pain, tightness, palpitations, orthopnea or edema  Respiratory: Denies shortness of breath, cough, wheezing,  "hemoptysis  Sleep: Denies daytime sleepiness, snoring, apneas, insomnia, morning headaches  GI: Denies heartburn, dysphagia, nausea, abdominal pain, diarrhea or constipation  : Denies frequent urination, hematuria, discharge or painful urination  Musculoskeletal: Denies back pain, painful joints, sore muscles  Neurological: Denies weakness or headaches  Skin: No rashes    Blood pressure 118/70, pulse 90, temperature 36.5 °C (97.7 °F), resp. rate 18, height 1.702 m (5' 7.01\"), weight 59.33 kg (130 lb 12.8 oz), SpO2 94 %.  Multi-Ox Readings  Multi Ox #1     O2 sat % at rest     O2 sat % on exertion     O2 sat average on exertion     Multi Ox #2     O2 sat % at rest     O2 sat % on exertion     O2 sat average on exertion       Oxygen Use     Oxygen Frequency     Duration of need     Is the patient mobile within the home?     CPAP Use?     BIPAP Use?     Servo Titration         Physical Exam:  Appearance: Well-nourished, well-developed, in no acute distress  HEENT: Normocephalic, atraumatic, white sclera, PERRLA, oropharynx clear  Neck: No adenopathy or masses  Respiratory: no intercostal retractions or accessory muscle use  Lungs auscultation: Clear to auscultation bilaterally, well-healed median sternotomy incision  Cardiovascular: Regular rate rhythm. No murmurs, rubs or gallops.  No LE edema  Abdomen: soft, nondistended  Gait: Normal  Digits: No clubbing, cyanosis  Motor: No focal deficits  Orientation: Oriented to time, person and place    Diagnosis:  1. S/P MVR (mitral valve repair)  DME CNOX BY DME CO   2. Acute respiratory failure with hypoxia (CMS-HCC)      Resolved   3. Pleural effusion      Resolved   4. Pulmonary nodules         Plan:  The patient had acute respiratory failure secondary to mitral valve rupture, and underwent successful mitral valve repair. Her post operative pleural effusions have resolved. Her daytime oximetry is normal and she is asymptomatic. She is participating in cardiac " rehabilitation.  In terms of her pulmonary nodule, we will attempt to track down her former chest CAT scan from [facility] (under the name Kristen Francis) from 2007; she will also ask Dr. Henderson for medical records. If we cannot track down her former chest CAT scans, then we will need to follow-up her pulmonary nodule with chest CAT scan every 6 months, i.e. June 2017. She is a lifelong nonsmoker with no risk factors for lung cancer, and the likelihood is that the nodule is benign.  We'll also repeat overnight oximetry on room air.  Return for after testing.

## 2017-06-07 ENCOUNTER — TELEPHONE (OUTPATIENT)
Dept: PULMONOLOGY | Facility: HOSPICE | Age: 67
End: 2017-06-07

## 2017-06-07 DIAGNOSIS — G47.34 NOCTURNAL HYPOXEMIA: ICD-10-CM

## 2017-06-07 DIAGNOSIS — I27.20 PULMONARY HYPERTENSION (HCC): ICD-10-CM

## 2017-06-07 NOTE — TELEPHONE ENCOUNTER
Patient did overnight oximetry with Bobby and results are on system. Ordered by Dr Kwong. Patient would like a call back with results.

## 2017-06-07 NOTE — TELEPHONE ENCOUNTER
Can we find out how long ago she underwent sleep testing? Her CNOX shows persistently low oxygen levels and I suspect possible sleep apnea. Order for oxygen now, but she needs a sleep study. Orders signed. Follow up after sleep study.    We also need to obtain her prior CT scan to determine if a follow up scan needs to be ordered. Cris lin (under Kristen Francis) or Dr. Henderson. Please find these.

## 2017-06-08 ENCOUNTER — HOSPITAL ENCOUNTER (OUTPATIENT)
Dept: RADIOLOGY | Facility: MEDICAL CENTER | Age: 67
End: 2017-06-08

## 2017-06-08 NOTE — TELEPHONE ENCOUNTER
Patient already on 02 at night @2lpm with Bobby. Copy of OPO report from Bobby mailed to patient as requested. CT report and images retrieved from [facility]. Any other questions that Damaris DOWD may have will need to be directed to Purvi as DR IZAGUIRRE indicates no LAURI attached to her 02 use.

## 2017-06-08 NOTE — TELEPHONE ENCOUNTER
Per OV notes pt has already had a SS and LAURI has been ruled out.  Pt does not need a new sleep study

## 2017-06-09 NOTE — TELEPHONE ENCOUNTER
Ok, if CNOX was performed on RA, then she will continue to benefit from 2L supplemental oxygen at night. If this was done on O2, we need to increase it - can we verify if she was using it or not?

## 2017-11-27 PROBLEM — M53.3 SACROILIAC PAIN: Status: ACTIVE | Noted: 2017-11-27

## 2018-03-20 PROBLEM — M54.16 LUMBAR RADICULOPATHY: Status: ACTIVE | Noted: 2018-03-20

## 2019-01-24 NOTE — CARE PLAN
Phone Number Called: [phone]    Message: Called mother of patient and voicemail box is full    Left Message for patient to call back: no         Problem: Safety  Goal: Will remain free from injury  Outcome: PROGRESSING AS EXPECTED  Patient educated on unit policies and procedures to prevent fall risk, including: hourly rounding, call light usage, bed alarms, treaded socks and calling for assistance. Fall prevention measures assessed and in place. Patient provided with assistance in mobility. Patient's  and patient educated on need for restraints in order to keep patient safe from pulling on tubes and lines.       Problem: Pain Management  Goal: Pain level will decrease to patient’s comfort goal  Outcome: PROGRESSING AS EXPECTED  Pain assessed and documented per unit protocol and appropriate pharmacological and non-pharmacological interventions implemented. Reviewed pain goals with patient and family.

## 2021-04-06 PROBLEM — M79.18 PIRIFORMIS MUSCLE PAIN: Status: ACTIVE | Noted: 2021-04-06

## 2021-09-29 ENCOUNTER — APPOINTMENT (RX ONLY)
Dept: URBAN - NONMETROPOLITAN AREA CLINIC 1 | Facility: CLINIC | Age: 71
Setting detail: DERMATOLOGY
End: 2021-09-29

## 2021-09-29 DIAGNOSIS — L92.8 OTHER GRANULOMATOUS DISORDERS OF THE SKIN AND SUBCUTANEOUS TISSUE: ICD-10-CM

## 2021-09-29 DIAGNOSIS — L81.4 OTHER MELANIN HYPERPIGMENTATION: ICD-10-CM

## 2021-09-29 DIAGNOSIS — L98.8 OTHER SPECIFIED DISORDERS OF THE SKIN AND SUBCUTANEOUS TISSUE: ICD-10-CM

## 2021-09-29 DIAGNOSIS — B35.3 TINEA PEDIS: ICD-10-CM

## 2021-09-29 DIAGNOSIS — L82.1 OTHER SEBORRHEIC KERATOSIS: ICD-10-CM

## 2021-09-29 DIAGNOSIS — D22 MELANOCYTIC NEVI: ICD-10-CM

## 2021-09-29 DIAGNOSIS — Z41.9 ENCOUNTER FOR PROCEDURE FOR PURPOSES OTHER THAN REMEDYING HEALTH STATE, UNSPECIFIED: ICD-10-CM

## 2021-09-29 PROBLEM — D22.5 MELANOCYTIC NEVI OF TRUNK: Status: ACTIVE | Noted: 2021-09-29

## 2021-09-29 PROCEDURE — ? SILVER NITRATE

## 2021-09-29 PROCEDURE — ? COSMETIC CONSULTATION: GENERAL

## 2021-09-29 PROCEDURE — ? PRESCRIPTION

## 2021-09-29 PROCEDURE — 17250 CHEM CAUT OF GRANLTJ TISSUE: CPT

## 2021-09-29 PROCEDURE — ? COUNSELING

## 2021-09-29 PROCEDURE — 99203 OFFICE O/P NEW LOW 30 MIN: CPT | Mod: 25

## 2021-09-29 PROCEDURE — ? CHEMICAL CAUTERIZATION

## 2021-09-29 RX ORDER — KETOCONAZOLE 20 MG/G
1 CREAM TOPICAL QD
Qty: 30 | Refills: 3 | Status: ERX | COMMUNITY
Start: 2021-09-29

## 2021-09-29 RX ADMIN — KETOCONAZOLE 1: 20 CREAM TOPICAL at 00:00

## 2021-09-29 ASSESSMENT — LOCATION SIMPLE DESCRIPTION DERM
LOCATION SIMPLE: LEFT UPPER BACK
LOCATION SIMPLE: LEFT 5TH TOE
LOCATION SIMPLE: RIGHT THUMB
LOCATION SIMPLE: RIGHT UPPER BACK

## 2021-09-29 ASSESSMENT — LOCATION DETAILED DESCRIPTION DERM
LOCATION DETAILED: LEFT MEDIAL 5TH TOE
LOCATION DETAILED: RIGHT SUPERIOR MEDIAL UPPER BACK
LOCATION DETAILED: RIGHT DISTAL RADIAL THUMB
LOCATION DETAILED: PERIUNGUAL SKIN RIGHT THUMB
LOCATION DETAILED: LEFT SUPERIOR UPPER BACK

## 2021-09-29 ASSESSMENT — LOCATION ZONE DERM
LOCATION ZONE: TOE
LOCATION ZONE: FINGER
LOCATION ZONE: TRUNK

## 2021-09-29 NOTE — PROCEDURE: SILVER NITRATE
Detail Level: Detailed
Add 21638 Code?: No
Procedure Details: A silver nitrate stick was used for chemical cautery.

## 2022-01-31 NOTE — CARE PLAN
Problem: Safety  Goal: Will remain free from injury  Outcome: PROGRESSING AS EXPECTED  Calls for assistance 100% of the time. No unsafe behaviors noted.         second specimen sent in SDA/yes

## 2022-09-22 NOTE — PROGRESS NOTES
Daily Note     Today's date: 2022  Patient name: Carrie Fox  : 1981  MRN: [id number]  Referring provider: Katelynn Melara, PT  Dx:   Encounter Diagnosis     ICD-10-CM    1  Cervical radiculopathy  M54 12    2  Acute pain of left shoulder  M25 512                   Subjective: Patient reports that he still gets tingling into digits 3,4,5 at times, mostly when he is seated with symptoms going away when he lays down  Objective: See treatment diary below      Assessment: Patient tolerated treatment well  Patient denied any increased tingling in his fingers t/o session  Patient noted decreased symptoms post treatment  Patient would benefit from continued PT intervention to address deficits and attain set goals  Plan: Continue per plan of care        Precautions: anxiety, asthma, DM, GERD, HTN     Daily Treatment Diary      Assessment 9/13 9/15 9/20 9/22     9/1  9/7  9/8   Eval/Reval                FOTO            perf   Manuals    L 1st Rib Mob                 L UT & LS Stretch and STM MM SC CC MM    SC ML SC    L Cervical Opening Mobs C3-C6              Cervical Traction MM SC CC MM    SC ML SC    PA Mobs T1-T6             Exercise Diary     Supine Chin Tucks 5"x10 5"x10 5"x10 5"x10     5"x10  5"x10  5"x10    Upper Thoracic Extension Stretch - Rolled Towel 1'x2 /c foam 1'x2 /c foam 1'x2  c foam 1'x2 w foam     1 min /c foam  1 min /c foam  1'x2 /c foam    Supine 1/2 Foam Roll Pec Stretch   1'x2 c foam 1'x2 c foam             Seated UT & LS Stretches 30"x3 30"x3 30"x3 30"x3     30"x3  30"x3  30"x3    Biceps/Pec Wall Stretch 30"x3       30"x3  30"x3  30"x3                                                                                                                                                                                              Modalities    P C-T Spine  90/90  Pre-Tx 10'  5' 10"     10'  10'  10' Inpatient Anticoagulation Service Note    Date: 2017  Reason for Anticoagulation: Mitral Valve Repair        Hemoglobin Value: 8.8  Hematocrit Value: 25.9  Lab Platelet Value: 338  Target INR: 2.0 to 3.0    INR from last 7 days     Date/Time INR Value    17 1145 (!)1.69    17 0455 (!)1.71    17 0445 (!)1.46    17 0600 (!)1.4    17 0210 (!)1.26    17 0545 (!)1.25    17 1725 (!)1.38    17 0503 1.06        Dose from last 7 days     Date/Time Dose (mg)    17 1145 7.5    17 0455 5    17 1400 5    17 1300 5    17 1300 5    17 1300 5        Average Dose (mg):  (new start)  Significant Interactions: Aspirin, Statin  Bridge Therapy: No    Comments: This patient has a hypoxic event this morning desaturating into the 80% range.  She was given fuosemide and had 1.5 liters of urine output.  INR decreased overnight.  Warfarin increased to 7.5 mg daily.    Plan:  INR with morning labs  Education Material Provided?: Yes (AET 17)  Pharmacist suggested discharge dosin-7.5 mg daily     Fabi Schreiber

## 2022-10-29 NOTE — DISCHARGE PLANNING
Subjective    Chief Complaint   Patient presents with   • Abdominal Pain     HPI  Maday Kumar is a 47 year old female presenting to the Urgent Care with c/o lower abdominal pain that she has had intermittently for 3 weeks. She describes it as a \"cramping\" sensation mostly in her lower abdomen. It varies in intensity and is currently a 3/10. She notes she had nausea and one episode of vomiting yesterday. She reports occasional blood in her stools but typically notices this after straining. She otherwise denies constipation, diarrhea, fevers, chills, dysuria, hematuria, abnormal vaginal bleeding, or other symptoms. Her LMP was in August and she believes she is going through Menopause. She is on Xarelto and Plavis for PAD requiring stents. She denies a h/o abdominal or pelvic surgeries. There are no other associated symptoms or modifying factors at this time.  Nurses notes reviewed as documented above.       Review of Systems   Constitutional: Negative for chills and fever.   Gastrointestinal: Positive for abdominal pain, blood in stool, nausea and vomiting. Negative for abdominal distention, constipation and diarrhea.   Genitourinary: Positive for pelvic pain. Negative for dysuria, frequency and vaginal bleeding.   Allergic/Immunologic: Negative for immunocompromised state.   Hematological: Bruises/bleeds easily.      Current medications and allergies reviewed.  Allergies as of 10/29/2022 - Reviewed 10/29/2022   Allergen Reaction Noted   • Naproxen HIVES 12/12/2017     Current Outpatient Medications   Medication Sig Dispense Refill   • clopidogrel (PLAVIX) 75 MG tablet Take 75 mg by mouth daily.     • enalapril (VASOTEC) 20 MG tablet Take 20 mg by mouth daily.     • Xarelto 20 MG Tab Take 20 mg by mouth daily.     • spironolactone (ALDACTONE) 25 MG tablet TAKE 1/2 (ONE-HALF) TABLET BY MOUTH ONCE DAILY     • metoPROLOL tartrate (LOPRESSOR) 25 MG tablet Take 25 mg by mouth every 12 hours.     • atorvastatin  Received referral to see patient to notarize from [facility] . Met with patient - notarized change to [facility].    (LIPITOR) 40 MG tablet Take 40 mg by mouth nightly.       No current facility-administered medications for this visit.      Past Histories:   I have reviewed the past medical, surgical and social history as listed in the medical record as obtained by my nursing staff and support staff and agree with their documentation.  Past Medical History:   Diagnosis Date   • Anemia    • Anxiety    • Blood clot associated with vein wall inflammation    • Chronic pain    • Congestive cardiac failure (CMS/HCC)    • COPD (chronic obstructive pulmonary disease) (CMS/HCC)    • Coronary artery disease    • Depression    • Essential (primary) hypertension    • Fracture     Left hand, rib fx.   • Gastroesophageal reflux disease    • High cholesterol    • Malignant neoplasm (CMS/HCC)     cervical cancer   • Myocardial infarction (CMS/HCC)      Past Surgical History:   Procedure Laterality Date   • Cardiac surgery      CABG 2016      Social History     Tobacco Use   • Smoking status: Current Every Day Smoker     Packs/day: 0.50   • Smokeless tobacco: Never Used   Substance Use Topics   • Alcohol use: Yes   • Drug use: No     Objective    Vitals:    10/29/22 1337   BP: 138/72   BP Location: LUE - Left upper extremity   Patient Position: Sitting   Cuff Size: Regular   Pulse: 85   Resp: 18   Temp: 97.9 °F (36.6 °C)   TempSrc: Tympanic   SpO2: 98%   Weight: 51.3 kg (113 lb)   LMP: 08/06/2022     Physical Exam  Vitals and nursing note reviewed.   Constitutional:       General: She is not in acute distress.     Appearance: She is well-developed. She is not diaphoretic.   HENT:      Head: Normocephalic and atraumatic.      Right Ear: External ear normal.      Left Ear: External ear normal.      Neck: Neck supple.   Eyes:      General: Lids are normal. No scleral icterus.     Pupils: Pupils are equal, round, and reactive to light.   Cardiovascular:      Rate and Rhythm: Normal rate and regular rhythm.      Heart sounds: Normal heart sounds.    Pulmonary:      Effort: Pulmonary effort is normal. No respiratory distress.      Breath sounds: Normal breath sounds. No wheezing or rales.   Abdominal:      General: Abdomen is flat.      Palpations: Abdomen is soft.      Tenderness: There is abdominal tenderness (mild) in the right lower quadrant, suprapubic area and left lower quadrant. There is no right CVA tenderness, left CVA tenderness or guarding.   Genitourinary:     Rectum: Guaiac result negative. No tenderness, anal fissure, external hemorrhoid or internal hemorrhoid.   Skin:     General: Skin is warm and dry.      Coloration: Skin is not pale.      Findings: No erythema.   Neurological:      Mental Status: She is alert.       Assessment & Plan    Diagnoses and all orders for this visit:  Generalized abdominal pain  -     URINALYSIS & REFLEX MICROSCOPY WITH CULTURE IF INDICATED; Future  -     PREGNANCY TEST, URINE  -     CBC WITH DIFFERENTIAL; Future  -     COMPREHENSIVE METABOLIC PANEL; Future  -     XR ABDOMEN 1 VW KUB SUPINE; Future  Acute kidney injury (CMS/HCC)  Leukocytosis, unspecified type        PLAN:   Patient presents with lower abdominal pain, nausea, and one episode of vomiting that started 3 weeks ago. She has normal VS, a soft although mildly tender abdomen on exam, and negative stool guaiac.   Labs obtained were significant for leukocytosis of 13.3, creatinine of 2.47 and (increased from 1.25 on 8/22/22). KUB without findings to explain the patient's pain.   Considering her acute kidney injury, leukocytosis, and abdominal pain, I felt she required further care in the ED. I spoke with Josefina from [facility] at 2:52 PM and she will be accepted at [facility].  Pt updated on all of these findings. I do feel she is stable to transfer by private vehicle. She is instructed to go straight there.   Pt expresses their understanding. All questions answered.    Patient instructions provided as documented on After Visit Summary     Julia  Sternat, PA-C

## 2024-08-31 NOTE — PROGRESS NOTES
This RN assumed care of patient from waiting room, pt ambulatory to bathroom with steady gait. Upon returning to room, this RN in with another critical patient. Charge TIERRA nelson   [facility] Progress Note               Author: Lj Philip Date & Time created: 2/8/2017  10:25 AM     Interval History:  Pt reports that her ear fullness improved after nasal rinse. Denies any new symptoms overnight.     Per RN, patient was hypotensive for a brief period with systolic of 80's. No other issues.    1. Severe MR s/p valve repair  - cont coumadin (will be held until thoracentesis) and diuretics.  - Likely transfer to rehab after thoracentesis.      Review of Systems   Constitutional: Negative for fever and chills.   Respiratory: Negative for shortness of breath and wheezing.    Cardiovascular: Negative for chest pain and palpitations.   Gastrointestinal: Negative for nausea, vomiting, abdominal pain, diarrhea and constipation.   Neurological: Negative for sensory change and focal weakness.         Physical Exam   Constitutional: She is oriented to person, place, and time. No distress.   Cardiovascular: Normal rate and regular rhythm.    Systolic click noted.   Pulmonary/Chest: Breath sounds normal. No respiratory distress. She has no wheezes.   On 3L O2 via NC. Surgical scar healing well.   Abdominal: Soft. Bowel sounds are normal.   Neurological: She is alert and oriented to person, place, and time.   Skin: Skin is warm and dry.       Labs:  Recent Results (from the past 24 hour(s))   CBC with Differential    Collection Time: 02/08/17  2:15 AM   Result Value Ref Range    WBC 9.2 4.8 - 10.8 K/uL    RBC 3.09 (L) 4.20 - 5.40 M/uL    Hemoglobin 9.5 (L) 12.0 - 16.0 g/dL    Hematocrit 28.8 (L) 37.0 - 47.0 %    MCV 93.2 81.4 - 97.8 fL    MCH 30.7 27.0 - 33.0 pg    MCHC 33.0 (L) 33.6 - 35.0 g/dL    RDW 46.8 35.9 - 50.0 fL    Platelet Count 494 (H) 164 - 446 K/uL    MPV 10.4 9.0 - 12.9 fL    Neutrophils-Polys 73.30 (H) 44.00 - 72.00 %    Lymphocytes 15.70 (L) 22.00 - 41.00 %    Monocytes 9.10 0.00 - 13.40 %    Eosinophils 1.00 0.00 - 6.90 %    Basophils 0.10 0.00 - 1.80 %    Immature Granulocytes 0.80 0.00  - 0.90 %    Nucleated RBC 0.00 /100 WBC    Neutrophils (Absolute) 6.71 2.00 - 7.15 K/uL    Lymphs (Absolute) 1.44 1.00 - 4.80 K/uL    Monos (Absolute) 0.83 0.00 - 0.85 K/uL    Eos (Absolute) 0.09 0.00 - 0.51 K/uL    Baso (Absolute) 0.01 0.00 - 0.12 K/uL    Immature Granulocytes (abs) 0.07 0.00 - 0.11 K/uL    NRBC (Absolute) 0.00 K/uL   PROTHROMBIN TIME    Collection Time: 17  2:15 AM   Result Value Ref Range    PT 18.1 (H) 12.0 - 14.6 sec    INR 1.45 (H) 0.87 - 1.13   BASIC METABOLIC PANEL    Collection Time: 17  2:15 AM   Result Value Ref Range    Sodium 132 (L) 135 - 145 mmol/L    Potassium 3.6 3.6 - 5.5 mmol/L    Chloride 94 (L) 96 - 112 mmol/L    Co2 30 20 - 33 mmol/L    Glucose 97 65 - 99 mg/dL    Bun 15 8 - 22 mg/dL    Creatinine 0.77 0.50 - 1.40 mg/dL    Calcium 8.3 (L) 8.5 - 10.5 mg/dL    Anion Gap 8.0 0.0 - 11.9   ESTIMATED GFR    Collection Time: 17  2:15 AM   Result Value Ref Range    GFR If African American >60 >60 mL/min/1.73 m 2    GFR If Non African American >60 >60 mL/min/1.73 m 2       Hemodynamics:  Temp (24hrs), Av.7 °C (98 °F), Min:36.4 °C (97.6 °F), Max:37 °C (98.6 °F)  Temperature: 36.4 °C (97.6 °F)  Pulse  Av.5  Min: 51  Max: 129Heart Rate (Monitored): 86  NIBP: (!) 92/56 mmHg    Respiratory:    Respiration: 17, Pulse Oximetry: 93 %, O2 Daily Delivery Respiratory : Silicone Nasal Cannula     Work Of Breathing / Effort: Mild  RUL Breath Sounds: Clear, RML Breath Sounds: Diminished, RLL Breath Sounds: Diminished, PRAMOD Breath Sounds: Clear, LLL Breath Sounds: Diminished  Fluids:    Intake/Output Summary (Last 24 hours) at 17 0956  Last data filed at 17 0600   Gross per 24 hour   Intake    930 ml   Output   2250 ml   Net  -1320 ml     Weight: 63 kg (138 lb 14.2 oz)  GI/Nutrition:  Orders Placed This Encounter   Procedures   • DIET ORDER     Standing Status: Standing      Number of Occurrences: 1      Standing Expiration Date:      Order Specific Question:   Diet:     Answer:  Consistent Carbohydrate [4]     Order Specific Question:  Diet:     Answer:  Cardiac [6]     Medications:  Current Facility-Administered Medications   Medication Last Dose   • furosemide (LASIX) tablet 40 mg     • warfarin (COUMADIN) tablet 7.5 mg Stopped at 02/06/17 1800   • sodium chloride (OCEAN) 0.65 % nasal spray 2 Spray 2 Spray at 02/07/17 0841   • acetaminophen (TYLENOL) tablet 650 mg 650 mg at 02/04/17 1135    Or   • acetaminophen (TYLENOL) suppository 650 mg     • Respiratory Care per Protocol     • docusate sodium (COLACE) capsule 100 mg 100 mg at 02/08/17 0822    And   • senna-docusate (PERICOLACE or SENOKOT S) 8.6-50 MG per tablet 1 Tab 1 Tab at 02/07/17 2100    And   • senna-docusate (PERICOLACE or SENOKOT S) 8.6-50 MG per tablet 1 Tab 1 Tab at 02/03/17 1001    And   • lactulose 20 GM/30ML solution 30 mL      And   • bisacodyl (DULCOLAX) suppository 10 mg      And   • fleet enema 133 mL     • aspirin EC (ECOTRIN) tablet 81 mg Stopped at 02/08/17 0900   • MD ALERT... warfarin (COUMADIN) per pharmacy protocol     • oxycodone immediate-release (ROXICODONE) tablet 5 mg     • oxycodone immediate release (ROXICODONE) tablet 10 mg 10 mg at 02/07/17 0842   • tramadol (ULTRAM) 50 MG tablet 50 mg 50 mg at 02/06/17 2156   • ondansetron (ZOFRAN) syringe/vial injection 4 mg 4 mg at 02/02/17 1941    Or   • prochlorperazine (COMPAZINE) injection 10 mg     • mag hydrox-al hydrox-simeth (MAALOX PLUS ES or MYLANTA DS) suspension 30 mL     • diphenhydrAMINE (BENADRYL) tablet/capsule 25 mg     • hydrocodone-acetaminophen (NORCO) 5-325 MG per tablet 1-2 Tab 1 Tab at 02/06/17 1545   • artificial tears 1.4 % ophthalmic solution 1 Drop     • glucose 4 g chewable tablet 16 g      And   • dextrose 50% (D50W) injection 25 mL     • multivitamin (THERAGRAN) tablet 1 Tab 1 Tab at 02/08/17 0822   • lactulose 20 GM/30ML solution 30 mL     • ipratropium-albuterol (DUONEB) nebulizer solution 3 mL 3 mL at 02/06/17 0546    • famotidine (PEPCID) tablet 20 mg 20 mg at 02/08/17 0822   • oyster shell calcium/vitamin D 250-125 MG-UNIT tablet 1 Tab 1 Tab at 02/08/17 0822   • pravastatin (PRAVACHOL) tablet 20 mg 20 mg at 02/07/17 2149     Medical Decision Making, by Problem:  Active Hospital Problems    Diagnosis   • Mitral regurgitation [I34.0]   • Respiratory failure with hypoxia (CMS-HCC) [J96.91]   • Hyperlipidemia [E78.5]   • Hypertension [I10]   • CAP (community acquired pneumonia) [J18.9]   • Bradycardia [R00.1]   • Hypotension [I95.9]   • Normocytic anemia [D64.9]   • Pulmonary edema [J81.1]     Quality  Measures:    Frank catheter: No Frank      DVT Prophylaxis: Warfarin (Coumadin)    Ulcer prophylaxis: Yes        Plan:    1. Acute hypoxemic respiratory failure    Acute pulmonary edema d/t severe MR  Hypertensive Emergency  Demand ischemia  Prolonged Qtc    CAP  due to severe MR    Extubated on 1/31/2017 (Intubated 1/24, failed extubation on 1/28).  Echo: EF 75%, severe MR, RVSP 40  Cardiac cath: normal coronary arteries  CTA: no PE  KAREN: EF 65%, mod MR, no rupture of cord or tendinae  Renal duplex neg for stenosis  S/p ceftriaxone & doxy.  S/P radical mitral valve repair (P2 triangular resection, 34-mm Ferguson flexible annuloplasty band), left atrial appendage ligation and  intraoperative transesophageal echocardiography on 1/31/2017. On coumadin (will be held until thoracentesis).  Will reduce lasix from 60 iv bid to 40mg po qd. Cont metolazone at current dose.  CT team on board, likely transfer to rehab today after her L thoracentesis.      2. Volume overload  2 view CXR shows pleural effusions, thora performed 2/4/17 with 825cc removed. R sided thoracentesis done today (2/7/17) with 400 cc removed. Pocket of fluid noted on L side and thoracentesis to be done this morning and CT surgery team planning to discharge pt to rehab after the procedure.  Will reduce lasix from 60 iv bid to 40mg po qd. Cont metolazone at current  dose.    3. Ear fullness and nasal discharge  - Symptoms improved after nasal rinse. Recommended to continue as needed.    Chronic medical issues:   4. DLD: cont pravastatin  5. Chronic back/knee/neck pain: pain management  6. HTN: losartan held, diuretics as above.

## (undated) DEVICE — TUBING CLEARLINK DUO-VENT - C-FLO (48EA/CA)

## (undated) DEVICE — ARMBOARD  SMALL IV 9 INLONG - (25EA/CA)

## (undated) DEVICE — GLOVE SZ 6 BIOGEL PI MICRO - PF LF (50PR/BX 4BX/CA)

## (undated) DEVICE — TUBE CONNECT SUCTION CLEAR 120 X 1/4" (50EA/CA)"

## (undated) DEVICE — CANISTER SUCTION 3000ML MECHANICAL FILTER AUTO SHUTOFF MEDI-VAC NONSTERILE LF DISP  (40EA/CA)

## (undated) DEVICE — KIT ROOM DECONTAMINATION

## (undated) DEVICE — D-5-W INJ. 500 ML - (24EA/CA)

## (undated) DEVICE — CELLSAVER PACK

## (undated) DEVICE — GLOVE BIOGEL PI INDICATOR SZ 6.5 SURGICAL PF LF - (50/BX 4BX/CA)

## (undated) DEVICE — DEVICE KIT COR-KNOT COMBO2 DEVICES & 12 KNOTS PER KIT (6KT/CA)

## (undated) DEVICE — SUTURE 2-0 ETHIBOND V-5 - (6/BX)

## (undated) DEVICE — MASK ANESTHESIA ADULT  - (100/CA)

## (undated) DEVICE — SUTURE 4-0 PROLENE V-7 D/A (36PK/BX)

## (undated) DEVICE — TUBE CHEST 32FR. STRAIGHT - (10EA/CA)

## (undated) DEVICE — SUCTION INSTRUMENT YANKAUER BULBOUS TIP W/O VENT (50EA/CA)

## (undated) DEVICE — ELECTRODE 850 FOAM ADHESIVE - HYDROGEL RADIOTRNSPRNT (50/PK)

## (undated) DEVICE — TUBE CHEST 32FR. RIGHT ANGLED (10EA/CA)

## (undated) DEVICE — HEAD HOLDER JUNIOR/ADULT

## (undated) DEVICE — SET EXTENSION WITH 2 PORTS (48EA/CA) ***PART #2C8610 IS A SUBSTITUTE*****

## (undated) DEVICE — ORGANIZER SUTURE GABBAY-FRAT - ER STERILE (3/SET 4ST/BX)

## (undated) DEVICE — SUTURE 4-0 30CM STRATAFIX SPIRAL PS-2 (12EA/BX)

## (undated) DEVICE — KIT INTROPERCUTANEOUS SHEATH - 8.5 FR W/MAX BARRIER AND BIOPATCH  (5/CA)

## (undated) DEVICE — GLOVE BIOGEL PI INDICATOR SZ 8.0 SURGICAL PF LF -(50/BX 4BX/CA)

## (undated) DEVICE — WIRE STEEL 5-0 B&S 20 OHS - 5/PK 12PK/BX ITEM. D5329 OR D6625 CAN BE USED AS A SUB

## (undated) DEVICE — BLADE SURGICAL #15 - (50/BX 3BX/CA)

## (undated) DEVICE — SUTURE 4-0 PROLENE RB-1 D/A 36 (36PK/BX)"

## (undated) DEVICE — TUBE E-T HI-LO CUFF 7.5MM (10EA/PK)

## (undated) DEVICE — SUTURE 2-0 ETHIBOND V-5 30 (12EA/BX)"

## (undated) DEVICE — LEAD SET 6 DISP. EKG NIHON KOHDEN

## (undated) DEVICE — CELLSAVER STAT

## (undated) DEVICE — LEAD PACING TEMP MYO - (12/BX)

## (undated) DEVICE — ELECTRODE DUAL RETURN W/ CORD - (50/PK)

## (undated) DEVICE — PACK CV DRAPING/BASIN 2PART - (1/CA)

## (undated) DEVICE — SOD. CHL. INJ. 0.9% 1000 ML - (14EA/CA 60CA/PF)

## (undated) DEVICE — PERFUSION STAT

## (undated) DEVICE — BLADE STERNUM SAW SURGICAL 32.0 X 6.4 MM STERILE (1/EA)

## (undated) DEVICE — SENSOR CEREBRAL AND SOMATIC MONITORING (20/CA)

## (undated) DEVICE — SODIUM CHL IRRIGATION 0.9% 1000ML (12EA/CA)

## (undated) DEVICE — DRESSING TRANSPARENT FILM TEGADERM 2.375 X 2.75"  (100EA/BX)"

## (undated) DEVICE — PERFUSION

## (undated) DEVICE — NEPTUNE 4 PORT MANIFOLD - (20/PK)

## (undated) DEVICE — Device

## (undated) DEVICE — POLY UMBILICAL TAPE 1/8X30 - (36/BX)

## (undated) DEVICE — GLOVE SZ 7.5 BIOGEL PI MICRO - PF LF (50PR/BX)

## (undated) DEVICE — SUTURE 4-0 ETHIBOND RB-1 EXCEL (12/BX)

## (undated) DEVICE — GOWN SURGEONS X-LARGE - DISP. (30/CA)

## (undated) DEVICE — KIT RADIAL ARTERY 20GA W/MAX BARRIER AND BIOPATCH  (5EA/CA) #10740 IS FOR THE SET RADIAL ARTERIAL

## (undated) DEVICE — GAUZE FLUFF STERILE 2-PLY 36 X 36 (100EA/CA)

## (undated) DEVICE — SLEEVE, VASO, THIGH, MED

## (undated) DEVICE — GLOVE BIOGEL PI INDICATOR SZ 7.5 SURGICAL PF LF -(50/BX 4BX/CA)

## (undated) DEVICE — TRAY SURESTEP FOLEY TEMP SENSING 16FR (10EA/CA) ORDER  #18764 FOR TEMP FOLEY ONLY

## (undated) DEVICE — PACK E SUTURE USED FOR - OPEN HEART  (5/BX)

## (undated) DEVICE — BANDAGE ELASTIC 4 IN X 5 YDS - LATEX FREE(10/BX 5BX/CA)

## (undated) DEVICE — TRANSDUCER BIFURCATED MONITORING KIT (10EA/CA)

## (undated) DEVICE — DRESSING TRANSPARENT FILM TEGADERM 4 X 4.75" (50EA/BX)"

## (undated) DEVICE — SET FLUID WARMING STANDARD FLOW - (10/CA)

## (undated) DEVICE — SET LEADWIRE 5 LEAD BEDSIDE DISPOSABLE ECG (1SET OF 5/EA)

## (undated) DEVICE — STOPCOCK MALE 4-WAY - (50/CA)

## (undated) DEVICE — DRAIN CHEST ADULT (6EA/CA) DELETED ITEM  ORDER #15909

## (undated) DEVICE — SENSOR SPO2 NEO LNCS ADHESIVE (20/BX) SEE USER NOTES

## (undated) DEVICE — GOWN WARMING STANDARD FLEX - (30/CA)

## (undated) DEVICE — LACTATED RINGERS INJ 1000 ML - (14EA/CA 60CA/PF)

## (undated) DEVICE — BAG RESUSCITATION DISPOSABLE - WITH MASK (10 EA/CA)

## (undated) DEVICE — SUTURE OHS

## (undated) DEVICE — GLOVE BIOGEL PI ORTHO SZ 7 PF LF (40PR/BX)

## (undated) DEVICE — SUTURE NONABSORBABLE ETHIBOND EXCEL 2-0 V-5 2 ARM BRAID GREEN WHITE (6EA/BX)

## (undated) DEVICE — FIBRILLAR SURGICEL 4X4 - 10/CA

## (undated) DEVICE — SET BIFURCATED BLOOD - (48EA/CS)

## (undated) DEVICE — PROBE CRYOMAZE - (DAVINCI)

## (undated) DEVICE — PROTECTOR ULNA NERVE - (36PR/CA)

## (undated) DEVICE — CATHETER THERMALDILUTION SWAN - (5EA/CA)

## (undated) DEVICE — SYS DLV COST CLS RM TEMP - INJECTATE (CO-SET II) (10EA/CA)

## (undated) DEVICE — KIT ANESTHESIA W/CIRCUIT & 3/LT BAG W/FILTER (20EA/CA)

## (undated) DEVICE — DERMABOND ADVANCED - (12EA/BX)

## (undated) DEVICE — SUTURE 6-0 PROLENE RB-2 D/A 30 (36PK/BX)"

## (undated) DEVICE — INSERT STEALTH #5 - (10/BX)

## (undated) DEVICE — MICRODRIP PRIMARY VENTED 60 (48EA/CA) THIS WAS PART #2C8428 WHICH WAS DISCONTINUED

## (undated) DEVICE — SODIUM CHL. INJ. 0.9% 500ML (24EA/CA 50CA/PF)

## (undated) DEVICE — SUTURE 5-0 PROLENE C-1 D/A 24 (36PK/BX)"